# Patient Record
Sex: MALE | Race: OTHER | NOT HISPANIC OR LATINO
[De-identification: names, ages, dates, MRNs, and addresses within clinical notes are randomized per-mention and may not be internally consistent; named-entity substitution may affect disease eponyms.]

---

## 2017-05-18 ENCOUNTER — APPOINTMENT (OUTPATIENT)
Dept: ORTHOPEDIC SURGERY | Facility: CLINIC | Age: 22
End: 2017-05-18

## 2017-08-09 ENCOUNTER — APPOINTMENT (OUTPATIENT)
Age: 22
End: 2017-08-09
Payer: COMMERCIAL

## 2017-08-09 VITALS
SYSTOLIC BLOOD PRESSURE: 143 MMHG | BODY MASS INDEX: 36.1 KG/M2 | TEMPERATURE: 98.2 F | RESPIRATION RATE: 14 BRPM | HEIGHT: 67 IN | HEART RATE: 86 BPM | WEIGHT: 230 LBS | DIASTOLIC BLOOD PRESSURE: 85 MMHG

## 2017-08-09 DIAGNOSIS — Z87.2 PERSONAL HISTORY OF DISEASES OF THE SKIN AND SUBCUTANEOUS TISSUE: ICD-10-CM

## 2017-08-09 DIAGNOSIS — Z78.9 OTHER SPECIFIED HEALTH STATUS: ICD-10-CM

## 2017-08-09 DIAGNOSIS — Z83.3 FAMILY HISTORY OF DIABETES MELLITUS: ICD-10-CM

## 2017-08-09 PROCEDURE — 99243 OFF/OP CNSLTJ NEW/EST LOW 30: CPT

## 2017-08-11 ENCOUNTER — APPOINTMENT (OUTPATIENT)
Dept: RHEUMATOLOGY | Facility: CLINIC | Age: 22
End: 2017-08-11

## 2017-09-12 ENCOUNTER — APPOINTMENT (OUTPATIENT)
Dept: RHEUMATOLOGY | Facility: CLINIC | Age: 22
End: 2017-09-12
Payer: COMMERCIAL

## 2017-09-12 VITALS
OXYGEN SATURATION: 98 % | WEIGHT: 228 LBS | DIASTOLIC BLOOD PRESSURE: 98 MMHG | HEART RATE: 96 BPM | BODY MASS INDEX: 35.79 KG/M2 | TEMPERATURE: 98.1 F | SYSTOLIC BLOOD PRESSURE: 129 MMHG | HEIGHT: 67 IN

## 2017-09-12 PROCEDURE — 99205 OFFICE O/P NEW HI 60 MIN: CPT

## 2017-09-13 LAB
ALBUMIN SERPL ELPH-MCNC: 4.1 G/DL
ALP BLD-CCNC: 82 U/L
ALT SERPL-CCNC: 55 U/L
ANION GAP SERPL CALC-SCNC: 16 MMOL/L
AST SERPL-CCNC: 35 U/L
BASOPHILS # BLD AUTO: 0.03 K/UL
BASOPHILS NFR BLD AUTO: 0.4 %
BILIRUB SERPL-MCNC: 0.7 MG/DL
BUN SERPL-MCNC: 10 MG/DL
C3 SERPL-MCNC: 94 MG/DL
C4 SERPL-MCNC: 16 MG/DL
CALCIUM SERPL-MCNC: 9.3 MG/DL
CARDIOLIPIN AB SER IA-ACNC: NEGATIVE
CHLORIDE SERPL-SCNC: 101 MMOL/L
CK SERPL-CCNC: 62 U/L
CO2 SERPL-SCNC: 22 MMOL/L
CREAT SERPL-MCNC: 0.85 MG/DL
DSDNA AB SER-ACNC: 169 IU/ML
EOSINOPHIL # BLD AUTO: 0.1 K/UL
EOSINOPHIL NFR BLD AUTO: 1.2 %
ERYTHROCYTE [SEDIMENTATION RATE] IN BLOOD BY WESTERGREN METHOD: 34 MM/HR
ERYTHROCYTE [SEDIMENTATION RATE] IN BLOOD BY WESTERGREN METHOD: 37 MM/HR
GLUCOSE SERPL-MCNC: 90 MG/DL
HAV IGG+IGM SER QL: NONREACTIVE
HAV IGM SER QL: NONREACTIVE
HCT VFR BLD CALC: 43.1 %
HGB BLD-MCNC: 14.4 G/DL
HIV1+2 AB SPEC QL IA.RAPID: NONREACTIVE
IMM GRANULOCYTES NFR BLD AUTO: 0.7 %
LYMPHOCYTES # BLD AUTO: 3.84 K/UL
LYMPHOCYTES NFR BLD AUTO: 47.5 %
MAN DIFF?: NORMAL
MCHC RBC-ENTMCNC: 26.6 PG
MCHC RBC-ENTMCNC: 33.4 GM/DL
MCV RBC AUTO: 79.5 FL
MONOCYTES # BLD AUTO: 0.63 K/UL
MONOCYTES NFR BLD AUTO: 7.8 %
NEUTROPHILS # BLD AUTO: 3.43 K/UL
NEUTROPHILS NFR BLD AUTO: 42.4 %
PLATELET # BLD AUTO: 229 K/UL
POTASSIUM SERPL-SCNC: 4.4 MMOL/L
PROT SERPL-MCNC: 7.9 G/DL
RBC # BLD: 5.42 M/UL
RBC # FLD: 15.4 %
SODIUM SERPL-SCNC: 139 MMOL/L
WBC # FLD AUTO: 8.09 K/UL

## 2017-09-18 LAB
ANA PAT FLD IF-IMP: ABNORMAL
ANA SER IF-ACNC: ABNORMAL
B19V IGG SER QL IA: 0.4 INDEX
B19V IGG+IGM SER-IMP: NEGATIVE
B19V IGG+IGM SER-IMP: NORMAL
B19V IGM FLD-ACNC: 0.1 INDEX
B19V IGM SER-ACNC: NEGATIVE
B2 GLYCOPROT1 IGA SERPL IA-ACNC: <5 SAU
B2 GLYCOPROT1 IGG SER-ACNC: <5 SGU
B2 GLYCOPROT1 IGM SER-ACNC: 22.3 SMU
ENA RNP AB SER IA-ACNC: 0.8 AL
ENA SM AB SER IA-ACNC: <0.2 AL
ENA SS-A AB SER IA-ACNC: <0.2 AL
ENA SS-B AB SER IA-ACNC: <0.2 AL
HLA-B27 RELATED AG QL: NORMAL
MITOCHONDRIA AB SER IF-ACNC: NORMAL
RPR SER-TITR: NORMAL
SMOOTH MUSCLE AB SER QL IF: NORMAL

## 2017-09-21 ENCOUNTER — RESULT REVIEW (OUTPATIENT)
Age: 22
End: 2017-09-21

## 2017-11-06 ENCOUNTER — APPOINTMENT (OUTPATIENT)
Age: 22
End: 2017-11-06

## 2017-12-05 ENCOUNTER — APPOINTMENT (OUTPATIENT)
Dept: RHEUMATOLOGY | Facility: CLINIC | Age: 22
End: 2017-12-05
Payer: COMMERCIAL

## 2017-12-05 ENCOUNTER — MESSAGE (OUTPATIENT)
Age: 22
End: 2017-12-05

## 2017-12-05 VITALS
BODY MASS INDEX: 35.31 KG/M2 | HEIGHT: 67 IN | OXYGEN SATURATION: 98 % | HEART RATE: 80 BPM | DIASTOLIC BLOOD PRESSURE: 78 MMHG | WEIGHT: 225 LBS | SYSTOLIC BLOOD PRESSURE: 136 MMHG | TEMPERATURE: 98.4 F

## 2017-12-05 PROCEDURE — 99214 OFFICE O/P EST MOD 30 MIN: CPT

## 2017-12-06 LAB
ALBUMIN SERPL ELPH-MCNC: 3.8 G/DL
ALP BLD-CCNC: 70 U/L
ALT SERPL-CCNC: 47 U/L
ANION GAP SERPL CALC-SCNC: 16 MMOL/L
APPEARANCE: CLEAR
AST SERPL-CCNC: 27 U/L
B2 GLYCOPROT1 IGA SERPL IA-ACNC: 5.6 SAU
B2 GLYCOPROT1 IGG SER-ACNC: <5 SGU
B2 GLYCOPROT1 IGM SER-ACNC: 14.7 SMU
BASOPHILS # BLD AUTO: 0.02 K/UL
BASOPHILS NFR BLD AUTO: 0.3 %
BILIRUB SERPL-MCNC: 0.8 MG/DL
BILIRUBIN URINE: NEGATIVE
BLOOD URINE: NEGATIVE
BUN SERPL-MCNC: 11 MG/DL
C3 SERPL-MCNC: 72 MG/DL
C4 SERPL-MCNC: 13 MG/DL
CALCIUM SERPL-MCNC: 9.4 MG/DL
CHLORIDE SERPL-SCNC: 103 MMOL/L
CK SERPL-CCNC: 53 U/L
CO2 SERPL-SCNC: 23 MMOL/L
COLOR: YELLOW
CREAT SERPL-MCNC: 0.86 MG/DL
CREAT SPEC-SCNC: 144 MG/DL
CREAT/PROT UR: 0.1 RATIO
CRP SERPL-MCNC: 0.8 MG/DL
DSDNA AB SER-ACNC: 172 IU/ML
EOSINOPHIL # BLD AUTO: 0.09 K/UL
EOSINOPHIL NFR BLD AUTO: 1.3 %
ERYTHROCYTE [SEDIMENTATION RATE] IN BLOOD BY WESTERGREN METHOD: 41 MM/HR
GLUCOSE QUALITATIVE U: NEGATIVE MG/DL
HCT VFR BLD CALC: 42 %
HGB BLD-MCNC: 14 G/DL
IMM GRANULOCYTES NFR BLD AUTO: 0.4 %
KETONES URINE: NEGATIVE
LEUKOCYTE ESTERASE URINE: NEGATIVE
LYMPHOCYTES # BLD AUTO: 2.54 K/UL
LYMPHOCYTES NFR BLD AUTO: 35.5 %
MAN DIFF?: NORMAL
MCHC RBC-ENTMCNC: 26.5 PG
MCHC RBC-ENTMCNC: 33.3 GM/DL
MCV RBC AUTO: 79.4 FL
MONOCYTES # BLD AUTO: 0.48 K/UL
MONOCYTES NFR BLD AUTO: 6.7 %
NEUTROPHILS # BLD AUTO: 3.99 K/UL
NEUTROPHILS NFR BLD AUTO: 55.8 %
NITRITE URINE: NEGATIVE
PH URINE: 6.5
PLATELET # BLD AUTO: 206 K/UL
POTASSIUM SERPL-SCNC: 4.2 MMOL/L
PROT SERPL-MCNC: 7.4 G/DL
PROT UR-MCNC: 9 MG/DL
PROTEIN URINE: NEGATIVE MG/DL
RBC # BLD: 5.29 M/UL
RBC # FLD: 14.7 %
SODIUM SERPL-SCNC: 142 MMOL/L
SPECIFIC GRAVITY URINE: 1.02
UROBILINOGEN URINE: NEGATIVE MG/DL
WBC # FLD AUTO: 7.15 K/UL

## 2017-12-08 LAB
CARDIOLIPIN AB SER IA-ACNC: NEGATIVE
ENA SS-A AB SER IA-ACNC: <0.2 AL
ENA SS-B AB SER IA-ACNC: <0.2 AL
MITOCHONDRIA AB SER IF-ACNC: NORMAL
SMOOTH MUSCLE AB SER QL IF: NORMAL

## 2017-12-15 ENCOUNTER — RESULT REVIEW (OUTPATIENT)
Age: 22
End: 2017-12-15

## 2017-12-15 LAB
ENA RNP AB SER IA-ACNC: 2.4 AL
ENA SM AB SER IA-ACNC: <0.2 AL
GGT SERPL-CCNC: 97 U/L

## 2018-07-23 PROBLEM — Z78.9 ALCOHOL USE: Status: ACTIVE | Noted: 2017-08-09

## 2019-03-11 ENCOUNTER — APPOINTMENT (OUTPATIENT)
Dept: RHEUMATOLOGY | Facility: CLINIC | Age: 24
End: 2019-03-11
Payer: COMMERCIAL

## 2019-03-11 VITALS
TEMPERATURE: 98.1 F | BODY MASS INDEX: 36.88 KG/M2 | HEIGHT: 67 IN | OXYGEN SATURATION: 98 % | DIASTOLIC BLOOD PRESSURE: 85 MMHG | WEIGHT: 235 LBS | HEART RATE: 117 BPM | SYSTOLIC BLOOD PRESSURE: 141 MMHG

## 2019-03-11 PROCEDURE — 99214 OFFICE O/P EST MOD 30 MIN: CPT

## 2019-03-12 ENCOUNTER — CLINICAL ADVICE (OUTPATIENT)
Age: 24
End: 2019-03-12

## 2019-03-12 LAB
BASOPHILS # BLD AUTO: 0.04 K/UL
BASOPHILS NFR BLD AUTO: 0.5 %
C3 SERPL-MCNC: 58 MG/DL
C4 SERPL-MCNC: 8 MG/DL
EOSINOPHIL # BLD AUTO: 0.1 K/UL
EOSINOPHIL NFR BLD AUTO: 1.2 %
HCT VFR BLD CALC: 42.5 %
HGB BLD-MCNC: 13.9 G/DL
IMM GRANULOCYTES NFR BLD AUTO: 0.6 %
LYMPHOCYTES # BLD AUTO: 3.43 K/UL
LYMPHOCYTES NFR BLD AUTO: 40 %
MAN DIFF?: NORMAL
MCHC RBC-ENTMCNC: 26.6 PG
MCHC RBC-ENTMCNC: 32.7 GM/DL
MCV RBC AUTO: 81.4 FL
MONOCYTES # BLD AUTO: 0.48 K/UL
MONOCYTES NFR BLD AUTO: 5.6 %
NEUTROPHILS # BLD AUTO: 4.47 K/UL
NEUTROPHILS NFR BLD AUTO: 52.1 %
PLATELET # BLD AUTO: 221 K/UL
RBC # BLD: 5.22 M/UL
RBC # FLD: 14.2 %
TSH SERPL-ACNC: 1.69 UIU/ML
WBC # FLD AUTO: 8.57 K/UL

## 2019-03-13 ENCOUNTER — MESSAGE (OUTPATIENT)
Age: 24
End: 2019-03-13

## 2019-03-13 ENCOUNTER — OUTPATIENT (OUTPATIENT)
Dept: OUTPATIENT SERVICES | Facility: HOSPITAL | Age: 24
LOS: 1 days | End: 2019-03-13
Payer: COMMERCIAL

## 2019-03-13 ENCOUNTER — APPOINTMENT (OUTPATIENT)
Dept: MRI IMAGING | Facility: CLINIC | Age: 24
End: 2019-03-13
Payer: COMMERCIAL

## 2019-03-13 DIAGNOSIS — Z00.8 ENCOUNTER FOR OTHER GENERAL EXAMINATION: ICD-10-CM

## 2019-03-13 LAB
ALBUMIN SERPL ELPH-MCNC: 3.9 G/DL
ALDOLASE SERPL-CCNC: 15.8 U/L
ALP BLD-CCNC: 73 U/L
ALT SERPL-CCNC: 50 U/L
ANION GAP SERPL CALC-SCNC: 13 MMOL/L
APPEARANCE: CLEAR
AST SERPL-CCNC: 49 U/L
B2 GLYCOPROT1 IGA SERPL IA-ACNC: 5 SAU
B2 GLYCOPROT1 IGG SER-ACNC: <5 SGU
B2 GLYCOPROT1 IGM SER-ACNC: <5 SMU
BACTERIA: NEGATIVE
BILIRUB SERPL-MCNC: 0.4 MG/DL
BILIRUBIN URINE: NEGATIVE
BLOOD URINE: ABNORMAL
BUN SERPL-MCNC: 20 MG/DL
CALCIUM SERPL-MCNC: 9.5 MG/DL
CARDIOLIPIN AB SER IA-ACNC: POSITIVE
CARDIOLIPIN IGM SER-MCNC: 12.9 GPL
CARDIOLIPIN IGM SER-MCNC: 8.2 MPL
CHLORIDE SERPL-SCNC: 106 MMOL/L
CK SERPL-CCNC: 900 U/L
CO2 SERPL-SCNC: 22 MMOL/L
COLOR: YELLOW
CREAT SERPL-MCNC: 0.98 MG/DL
CREAT SPEC-SCNC: 182 MG/DL
CREAT/PROT UR: 2.1 RATIO
CRP SERPL-MCNC: 1.82 MG/DL
DSDNA AB SER-ACNC: 607 IU/ML
ENA RNP AB SER IA-ACNC: 5.2 AL
ENA SM AB SER IA-ACNC: <0.2 AL
ENA SS-A AB SER IA-ACNC: <0.2 AL
ENA SS-B AB SER IA-ACNC: <0.2 AL
ERYTHROCYTE [SEDIMENTATION RATE] IN BLOOD BY WESTERGREN METHOD: 84 MM/HR
GGT SERPL-CCNC: 84 U/L
GLUCOSE QUALITATIVE U: NEGATIVE
HYALINE CASTS: 4 /LPF
KETONES URINE: NEGATIVE
LEUKOCYTE ESTERASE URINE: NEGATIVE
MICROSCOPIC-UA: NORMAL
NITRITE URINE: NEGATIVE
PH URINE: 6.5
POTASSIUM SERPL-SCNC: 4.7 MMOL/L
PROT SERPL-MCNC: 7.6 G/DL
PROT UR-MCNC: 374 MG/DL
PROTEIN URINE: ABNORMAL
RED BLOOD CELLS URINE: 3 /HPF
RIBOSOMAL P AB SER IA-ACNC: <0.2 AL
SODIUM SERPL-SCNC: 140 MMOL/L
SPECIFIC GRAVITY URINE: 1.03
SQUAMOUS EPITHELIAL CELLS: 3 /HPF
THYROGLOB AB SERPL-ACNC: <20 IU/ML
THYROPEROXIDASE AB SERPL IA-ACNC: <10 IU/ML
UROBILINOGEN URINE: NORMAL
WHITE BLOOD CELLS URINE: 3 /HPF

## 2019-03-13 PROCEDURE — 70551 MRI BRAIN STEM W/O DYE: CPT | Mod: 26

## 2019-03-13 PROCEDURE — 70551 MRI BRAIN STEM W/O DYE: CPT

## 2019-03-14 ENCOUNTER — APPOINTMENT (OUTPATIENT)
Dept: NEPHROLOGY | Facility: CLINIC | Age: 24
End: 2019-03-14
Payer: COMMERCIAL

## 2019-03-14 ENCOUNTER — FORM ENCOUNTER (OUTPATIENT)
Age: 24
End: 2019-03-14

## 2019-03-14 VITALS
DIASTOLIC BLOOD PRESSURE: 88 MMHG | SYSTOLIC BLOOD PRESSURE: 143 MMHG | HEART RATE: 108 BPM | WEIGHT: 230 LBS | OXYGEN SATURATION: 98 % | BODY MASS INDEX: 36.1 KG/M2 | HEIGHT: 67 IN

## 2019-03-14 LAB
ANA PAT FLD IF-IMP: ABNORMAL
ANA SER IF-ACNC: ABNORMAL
APPEARANCE: CLEAR
APTT BLD: 30.7 SEC
BACTERIA: NEGATIVE
BILIRUBIN URINE: NEGATIVE
BLOOD URINE: ABNORMAL
COLOR: YELLOW
CREAT SPEC-SCNC: 231 MG/DL
CREAT/PROT UR: 1.3 RATIO
GLUCOSE QUALITATIVE U: NEGATIVE
HYALINE CASTS: 14 /LPF
INR PPP: 0.98 RATIO
KETONES URINE: NEGATIVE
LEUKOCYTE ESTERASE URINE: NEGATIVE
MICROSCOPIC-UA: NORMAL
NITRITE URINE: NEGATIVE
PH URINE: 6.5
PROT UR-MCNC: 294 MG/DL
PROTEIN URINE: ABNORMAL
PT BLD: 11.2 SEC
RED BLOOD CELLS URINE: 15 /HPF
SPECIFIC GRAVITY URINE: 1.03
SQUAMOUS EPITHELIAL CELLS: 2 /HPF
UROBILINOGEN URINE: NORMAL
WHITE BLOOD CELLS URINE: 4 /HPF

## 2019-03-14 PROCEDURE — 99244 OFF/OP CNSLTJ NEW/EST MOD 40: CPT

## 2019-03-14 NOTE — PHYSICAL EXAM
[General Appearance - Alert] : alert [General Appearance - In No Acute Distress] : in no acute distress [Outer Ear] : the ears and nose were normal in appearance [Oropharynx] : the oropharynx was normal [Neck Appearance] : the appearance of the neck was normal [Neck Cervical Mass (___cm)] : no neck mass was observed [Jugular Venous Distention Increased] : there was no jugular-venous distention [Thyroid Diffuse Enlargement] : the thyroid was not enlarged [Thyroid Nodule] : there were no palpable thyroid nodules [Auscultation Breath Sounds / Voice Sounds] : lungs were clear to auscultation bilaterally [Heart Rate And Rhythm] : heart rate was normal and rhythm regular [Heart Sounds] : normal S1 and S2 [Heart Sounds Gallop] : no gallops [Murmurs] : no murmurs [Heart Sounds Pericardial Friction Rub] : no pericardial rub [Edema] : there was no peripheral edema [Bowel Sounds] : normal bowel sounds [Abdomen Soft] : soft [Abdomen Tenderness] : non-tender [] : no hepato-splenomegaly [Abdomen Mass (___ Cm)] : no abdominal mass palpated [No CVA Tenderness] : no ~M costovertebral angle tenderness [Abnormal Walk] : normal gait [Nail Clubbing] : no clubbing  or cyanosis of the fingernails [Musculoskeletal - Swelling] : no joint swelling seen [Motor Tone] : muscle strength and tone were normal [Cranial Nerves] : cranial nerves 2-12 were intact [Oriented To Time, Place, And Person] : oriented to person, place, and time

## 2019-03-14 NOTE — ASSESSMENT
[FreeTextEntry1] : Mr. SCRUGGS is a 23 year old male with hx of SLE here with recently noted proteinuria, non nephrotic with hematuria and hypocomplementemia. 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 No JOYCE at this point. NSAID induced injury less likely as were stopped 1 month ago. Pt has agreed for kidney biopsy\par \par Plan for kidney bx tomorrow( 1:30pm radiology), coags ordered.\par He could have a slight elevation in PTT given he has APLAS positive but will likely be lupus anticaog related.\par Risk/benefit of kidney bx explained\par Pt to start prednisone 60mg and ACEI tomorrow as well\par Might require MMF vs Rituxan post kidney bx findings.\par Pt also going to [place] for school, gave him name of a nephrologist in [place] that can follow in case needs closer follow up there and bx cannot happen tomorrow- Dr Michoacano Bello\par \par d/w with mother and sister in detail\par \par fu in 2 months

## 2019-03-14 NOTE — REASON FOR VISIT
[Initial Evaluation] : an initial evaluation [Family Member] : family member [FreeTextEntry1] : for proteinrua

## 2019-03-14 NOTE — HISTORY OF PRESENT ILLNESS
[FreeTextEntry1] : Mr. SCRUGGS is a 23 year old male with known SLE here for recently noted proteinuria. He was dx with SLE 2 years ago with symptoms of jt pains and was prescribed plaquenil and steroids, at that time he was ELMO, dsDNA and RNP positive, His urinalysis at that time was neg. He didn't take his prescribed meds and just worked on getting his diet and exercise regimen. He did take NSAIDS on and off for years for the pain. He stopped taking NSAIDS last 1 month. Last few weeks,noted foamy urine and more fatigue. Labs revealed ELMO rising, dsDNA worse, lower c3 and c4 and now 2.1gm of proteinuria along with hematuria and RBCS in the urine. He has normal crt for now at 0.9mg/dl. He feels no other symptoms. He is currently not on any meds. Recently also had Headaches that are worse and he saw Neurology last week. He had a MRI that has results pending. No OTC use. 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

## 2019-03-15 ENCOUNTER — OUTPATIENT (OUTPATIENT)
Dept: OUTPATIENT SERVICES | Facility: HOSPITAL | Age: 24
LOS: 1 days | End: 2019-03-15
Payer: COMMERCIAL

## 2019-03-15 ENCOUNTER — APPOINTMENT (OUTPATIENT)
Dept: ULTRASOUND IMAGING | Facility: HOSPITAL | Age: 24
End: 2019-03-15

## 2019-03-15 ENCOUNTER — RESULT REVIEW (OUTPATIENT)
Age: 24
End: 2019-03-15

## 2019-03-15 ENCOUNTER — APPOINTMENT (OUTPATIENT)
Dept: RHEUMATOLOGY | Facility: CLINIC | Age: 24
End: 2019-03-15
Payer: COMMERCIAL

## 2019-03-15 VITALS
WEIGHT: 230 LBS | SYSTOLIC BLOOD PRESSURE: 141 MMHG | HEIGHT: 67 IN | OXYGEN SATURATION: 99 % | RESPIRATION RATE: 14 BRPM | DIASTOLIC BLOOD PRESSURE: 98 MMHG | HEART RATE: 132 BPM | BODY MASS INDEX: 36.1 KG/M2

## 2019-03-15 DIAGNOSIS — R76.8 OTHER SPECIFIED ABNORMAL IMMUNOLOGICAL FINDINGS IN SERUM: ICD-10-CM

## 2019-03-15 PROCEDURE — 88312 SPECIAL STAINS GROUP 1: CPT | Mod: 26

## 2019-03-15 PROCEDURE — 76942 ECHO GUIDE FOR BIOPSY: CPT

## 2019-03-15 PROCEDURE — 50200 RENAL BIOPSY PERQ: CPT

## 2019-03-15 PROCEDURE — 88305 TISSUE EXAM BY PATHOLOGIST: CPT | Mod: 26

## 2019-03-15 PROCEDURE — 88312 SPECIAL STAINS GROUP 1: CPT

## 2019-03-15 PROCEDURE — 88305 TISSUE EXAM BY PATHOLOGIST: CPT

## 2019-03-15 PROCEDURE — 88313 SPECIAL STAINS GROUP 2: CPT

## 2019-03-15 PROCEDURE — 88350 IMFLUOR EA ADDL 1ANTB STN PX: CPT | Mod: 26

## 2019-03-15 PROCEDURE — 88313 SPECIAL STAINS GROUP 2: CPT | Mod: 26

## 2019-03-15 PROCEDURE — 88346 IMFLUOR 1ST 1ANTB STAIN PX: CPT | Mod: 26

## 2019-03-15 PROCEDURE — 88348 ELECTRON MICROSCOPY DX: CPT

## 2019-03-15 PROCEDURE — 99215 OFFICE O/P EST HI 40 MIN: CPT

## 2019-03-15 PROCEDURE — 88348 ELECTRON MICROSCOPY DX: CPT | Mod: 26

## 2019-03-15 PROCEDURE — 88346 IMFLUOR 1ST 1ANTB STAIN PX: CPT

## 2019-03-15 PROCEDURE — 76942 ECHO GUIDE FOR BIOPSY: CPT | Mod: 26

## 2019-03-15 PROCEDURE — 88350 IMFLUOR EA ADDL 1ANTB STN PX: CPT

## 2019-03-15 PROCEDURE — 50200 RENAL BIOPSY PERQ: CPT | Mod: LT

## 2019-03-15 NOTE — DATA REVIEWED
[FreeTextEntry1] : 2017\par ELMO 1:640\par dsDNA 179\par SSA, SSB neg\par Sm neg\par RNP POS\par HLA B27 neg\par smooth and caty neg\par c3 72 with normal c4\par CPK 53 and 62\par \par 2019\par Protein: creat ratio 1.7 - 2.1\par SGOT 49, SGPT 50, GGTP 84\par  Aldolase 15.8\par CRP 1.82\par C3 58\par C4 8\par ELMO, 1:2560, dsDNA 607\par RNP +\par Sm, SSA, SSB, Ribosomal P neg\par beta 2 GP1 negative\par acl IgG12.9

## 2019-03-15 NOTE — HISTORY OF PRESENT ILLNESS
[Currently Experiencing] : currently experiencing [Headache] : headache [Arthritis] : arthritis [Arthralgias] : arthralgias [None] : The patient is currently asymptomatic [Sicca] : no sicca [Rash] : no rash [Chest Pain] : no chest pain [Shortness of Breath] : no shortness of breath [Psychological Symptoms] : no psychological symptoms [Sun Sensitivity] : no sun sensitivity [FreeTextEntry1] : Active proteinuria - \par Didnt start the steroids yet - biopsy this afternoon\par has questions regarding chemotherapy\par denies edema or sob\par \par Headaches \par feeling overall better than monday in terms of headaches.   Hasnt had a headache since restarting herbal medications, teas and tumeric. \par \par Myopathy -  Does endorse overall muscle weakness - notably in the hands and when lifting weights in the gym.  Hasnt been able to lift as much - unsure of how long that is occurring for.  denies neck weakness \par \par  [FreeTextEntry7] : +ELMO, +dsDNA

## 2019-03-15 NOTE — REVIEW OF SYSTEMS
[As Noted in HPI] : as noted in HPI [Negative] : Genitourinary [FreeTextEntry4] : no oral ulcers [FreeTextEntry8] : denies foamy urine [FreeTextEntry9] : no recurrent swelling [de-identified] : denines photosensitivity /rash

## 2019-03-15 NOTE — ASSESSMENT
[FreeTextEntry1] : 21 yo man SLE (2017) now with non-nephrotic proteinuria, headaches, myopathy and serologic activity \par \par # SLE - nephritis - active\par -Having kidney biopsy this afternoon\par -will be starting the steroid after bx\par -lisinopril also to start after bx per renal\par -d/w patient need for medication such as cellcept or rtx - r/b/a of taking and not taking discussed at length.   Discussed and strategize how to optimize treatment and communication while in [place] at school\par -will be coming home once a month to follow with renal and rheum\par -can have b/w interval at [place]\par -will be seeing renal in [place] \par \par #migraines\par -worsening late likely due to lupus\par -doing well now \par \par #myositis\par -lupus myopathy with increased cpk, aldolase and subjective weakness\par -given RNP serology - will continue to monitor for evolution to MCTD - \par -no pulmonary symptoms at present.\par \par #CAM\par -d/w patient at length concerns regarding meds and desire for natural remedies - such as tumeric\par -will continue to utilize in context of rx\par all questions answered\par

## 2019-03-15 NOTE — PHYSICAL EXAM
[General Appearance - Alert] : alert [General Appearance - In No Acute Distress] : in no acute distress [Sclera] : the sclera and conjunctiva were normal [PERRL With Normal Accommodation] : pupils were equal in size, round, and reactive to light [Extraocular Movements] : extraocular movements were intact [Outer Ear] : the ears and nose were normal in appearance [Oropharynx] : the oropharynx was normal [Neck Appearance] : the appearance of the neck was normal [Neck Cervical Mass (___cm)] : no neck mass was observed [Jugular Venous Distention Increased] : there was no jugular-venous distention [Auscultation Breath Sounds / Voice Sounds] : lungs were clear to auscultation bilaterally [Heart Rate And Rhythm] : heart rate was normal and rhythm regular [Heart Sounds] : normal S1 and S2 [Heart Sounds Gallop] : no gallops [Murmurs] : no murmurs [Heart Sounds Pericardial Friction Rub] : no pericardial rub [Edema] : there was no peripheral edema [Cervical Lymph Nodes Enlarged Posterior Bilaterally] : posterior cervical [Cervical Lymph Nodes Enlarged Anterior Bilaterally] : anterior cervical [Supraclavicular Lymph Nodes Enlarged Bilaterally] : supraclavicular [No CVA Tenderness] : no ~M costovertebral angle tenderness [No Spinal Tenderness] : no spinal tenderness [Abnormal Walk] : normal gait [Nail Clubbing] : no clubbing  or cyanosis of the fingernails [Musculoskeletal - Swelling] : no joint swelling seen [Motor Tone] : muscle strength and tone were normal [Skin Color & Pigmentation] : normal skin color and pigmentation [Skin Turgor] : normal skin turgor [Motor Exam] : the motor exam was normal [No Focal Deficits] : no focal deficits [Oriented To Time, Place, And Person] : oriented to person, place, and time [Impaired Insight] : insight and judgment were intact [Affect] : the affect was normal [Mood] : the mood was normal [Memory Recent] : recent memory was not impaired [General Appearance - Well Nourished] : well nourished [General Appearance - Well Developed] : well developed [General Appearance - Well-Appearing] : healthy appearing [] : normal voice and communication [FreeTextEntry1] : MMM, no oral ulcers

## 2019-03-16 ENCOUNTER — TRANSCRIPTION ENCOUNTER (OUTPATIENT)
Age: 24
End: 2019-03-16

## 2019-03-19 LAB — SURGICAL PATHOLOGY STUDY: SIGNIFICANT CHANGE UP

## 2019-03-20 ENCOUNTER — CXD DOCUMENT (OUTPATIENT)
Age: 24
End: 2019-03-20

## 2019-04-11 ENCOUNTER — APPOINTMENT (OUTPATIENT)
Dept: RHEUMATOLOGY | Facility: CLINIC | Age: 24
End: 2019-04-11
Payer: COMMERCIAL

## 2019-04-11 ENCOUNTER — APPOINTMENT (OUTPATIENT)
Dept: RADIOLOGY | Facility: CLINIC | Age: 24
End: 2019-04-11
Payer: COMMERCIAL

## 2019-04-11 ENCOUNTER — OUTPATIENT (OUTPATIENT)
Dept: OUTPATIENT SERVICES | Facility: HOSPITAL | Age: 24
LOS: 1 days | End: 2019-04-11
Payer: COMMERCIAL

## 2019-04-11 ENCOUNTER — APPOINTMENT (OUTPATIENT)
Dept: ULTRASOUND IMAGING | Facility: CLINIC | Age: 24
End: 2019-04-11
Payer: COMMERCIAL

## 2019-04-11 VITALS
OXYGEN SATURATION: 98 % | BODY MASS INDEX: 36.1 KG/M2 | SYSTOLIC BLOOD PRESSURE: 117 MMHG | HEART RATE: 105 BPM | WEIGHT: 230 LBS | TEMPERATURE: 98.2 F | HEIGHT: 67 IN | DIASTOLIC BLOOD PRESSURE: 77 MMHG

## 2019-04-11 DIAGNOSIS — Z00.8 ENCOUNTER FOR OTHER GENERAL EXAMINATION: ICD-10-CM

## 2019-04-11 PROCEDURE — 76700 US EXAM ABDOM COMPLETE: CPT | Mod: 26

## 2019-04-11 PROCEDURE — 76700 US EXAM ABDOM COMPLETE: CPT

## 2019-04-11 PROCEDURE — 73630 X-RAY EXAM OF FOOT: CPT

## 2019-04-11 PROCEDURE — 99214 OFFICE O/P EST MOD 30 MIN: CPT

## 2019-04-11 PROCEDURE — 73630 X-RAY EXAM OF FOOT: CPT | Mod: 26,LT

## 2019-04-11 NOTE — PHYSICAL EXAM
[General Appearance - Alert] : alert [General Appearance - In No Acute Distress] : in no acute distress [General Appearance - Well Nourished] : well nourished [General Appearance - Well Developed] : well developed [General Appearance - Well-Appearing] : healthy appearing [Sclera] : the sclera and conjunctiva were normal [Outer Ear] : the ears and nose were normal in appearance [PERRL With Normal Accommodation] : pupils were equal in size, round, and reactive to light [Extraocular Movements] : extraocular movements were intact [Oropharynx] : the oropharynx was normal [Neck Appearance] : the appearance of the neck was normal [Neck Cervical Mass (___cm)] : no neck mass was observed [Jugular Venous Distention Increased] : there was no jugular-venous distention [Heart Rate And Rhythm] : heart rate was normal and rhythm regular [Auscultation Breath Sounds / Voice Sounds] : lungs were clear to auscultation bilaterally [Heart Sounds] : normal S1 and S2 [Heart Sounds Gallop] : no gallops [Murmurs] : no murmurs [Heart Sounds Pericardial Friction Rub] : no pericardial rub [Edema] : there was no peripheral edema [Cervical Lymph Nodes Enlarged Posterior Bilaterally] : posterior cervical [Supraclavicular Lymph Nodes Enlarged Bilaterally] : supraclavicular [Cervical Lymph Nodes Enlarged Anterior Bilaterally] : anterior cervical [No CVA Tenderness] : no ~M costovertebral angle tenderness [No Spinal Tenderness] : no spinal tenderness [Abnormal Walk] : normal gait [Nail Clubbing] : no clubbing  or cyanosis of the fingernails [Musculoskeletal - Swelling] : no joint swelling seen [Motor Tone] : muscle strength and tone were normal [Skin Color & Pigmentation] : normal skin color and pigmentation [Skin Turgor] : normal skin turgor [] : no rash [Motor Exam] : the motor exam was normal [No Focal Deficits] : no focal deficits [Oriented To Time, Place, And Person] : oriented to person, place, and time [Impaired Insight] : insight and judgment were intact [Affect] : the affect was normal [Mood] : the mood was normal [Memory Recent] : recent memory was not impaired [FreeTextEntry1] : MS 5/5 bilaterally

## 2019-04-11 NOTE — CONSULT LETTER
[Dear  ___] : Dear  [unfilled], [Courtesy Letter:] : I had the pleasure of seeing your patient, [unfilled], in my office today. [Please see my note below.] : Please see my note below. [Referral Closing:] : Thank you very much for seeing this patient.  If you have any questions, please do not hesitate to contact me. [FreeTextEntry2] : RENAL IN [place] - [facility] - [phone]

## 2019-04-11 NOTE — PROCEDURE
[FreeTextEntry1] : Kidney biopsy \par lupus nephritis class 3 focal proliferative type\par -no cellular crescents\par -activity score 1/24 and chronicicty score 1/12\par - IF with igg, iga, c3, c1q

## 2019-04-11 NOTE — PHYSICAL EXAM
[General Appearance - Alert] : alert [General Appearance - In No Acute Distress] : in no acute distress [General Appearance - Well Nourished] : well nourished [General Appearance - Well-Appearing] : healthy appearing [General Appearance - Well Developed] : well developed [Sclera] : the sclera and conjunctiva were normal [Outer Ear] : the ears and nose were normal in appearance [Extraocular Movements] : extraocular movements were intact [PERRL With Normal Accommodation] : pupils were equal in size, round, and reactive to light [Oropharynx] : the oropharynx was normal [Neck Appearance] : the appearance of the neck was normal [Neck Cervical Mass (___cm)] : no neck mass was observed [Jugular Venous Distention Increased] : there was no jugular-venous distention [Auscultation Breath Sounds / Voice Sounds] : lungs were clear to auscultation bilaterally [Heart Rate And Rhythm] : heart rate was normal and rhythm regular [Heart Sounds] : normal S1 and S2 [Heart Sounds Gallop] : no gallops [Murmurs] : no murmurs [Heart Sounds Pericardial Friction Rub] : no pericardial rub [Edema] : there was no peripheral edema [Cervical Lymph Nodes Enlarged Posterior Bilaterally] : posterior cervical [Supraclavicular Lymph Nodes Enlarged Bilaterally] : supraclavicular [Cervical Lymph Nodes Enlarged Anterior Bilaterally] : anterior cervical [No CVA Tenderness] : no ~M costovertebral angle tenderness [No Spinal Tenderness] : no spinal tenderness [Abnormal Walk] : normal gait [Nail Clubbing] : no clubbing  or cyanosis of the fingernails [Musculoskeletal - Swelling] : no joint swelling seen [Motor Tone] : muscle strength and tone were normal [Skin Turgor] : normal skin turgor [Skin Color & Pigmentation] : normal skin color and pigmentation [] : no rash [Motor Exam] : the motor exam was normal [No Focal Deficits] : no focal deficits [Oriented To Time, Place, And Person] : oriented to person, place, and time [Impaired Insight] : insight and judgment were intact [Mood] : the mood was normal [Affect] : the affect was normal [Memory Recent] : recent memory was not impaired [FreeTextEntry1] : MS 5/5 bilaterally

## 2019-04-11 NOTE — REVIEW OF SYSTEMS
[As Noted in HPI] : as noted in HPI [Negative] : Heme/Lymph [FreeTextEntry8] : denies foamy urine [FreeTextEntry4] : no oral ulcers [FreeTextEntry9] : no recurrent swelling [de-identified] : denines photosensitivity /rash

## 2019-04-11 NOTE — REVIEW OF SYSTEMS
[As Noted in HPI] : as noted in HPI [Negative] : Heme/Lymph [FreeTextEntry4] : no oral ulcers [FreeTextEntry8] : denies foamy urine [de-identified] : denines photosensitivity /rash [FreeTextEntry9] : no recurrent swelling

## 2019-04-11 NOTE — ASSESSMENT
[FreeTextEntry1] : 21 yo man SLE (2017) now with non-nephrotic proteinuria, headaches, myopathy and serologic activity s/p kidney biopsy with Class 3 nephritis low chronicity\par \par # SLE - nephritis -\par -improving\par -continue CellCept - d/w patient possible need to increase CellCept - understands and will be d//w nephrology at upcoming appointment\par -discussed Plaquenil - patient considering at this time\par - wean steroids as tolerated\par - will send all labs to renal in [place]\par -will be starting the steroid after bx\par -lisinopril also to start after bx per renal\par -d/w patient need for medication such as cellcept or rtx - r/b/a of taking and not taking discussed at length. \par \par RENAL IN [place] - [facility] - [phone]\par \par #migraines\par -worsening late likely due to lupus\par -doing well now \par \par #myositis and muscle cramps - weakness improved now with intermittent muscle cramps - \par -check electrolytes and vit d given steroids\par --lupus myopathy with increased cpk, aldolase and subjective weakness\par -given RNP serology - will continue to monitor for evolution to MCTD - \par -no pulmonary symptoms at present.\par \par #CAM\par -d/w patient at length concerns regarding meds and desire for natural remedies - such as tumeric\par -will continue to utilize in context of rx\par \par #Social context\par -d/w patient plans to continue communication and followup while at college in [place]\par -will use [facility] and correspond to renal \par -verified phone\par all questions answered\par

## 2019-04-11 NOTE — CONSULT LETTER
[Dear  ___] : Dear  [unfilled], [Courtesy Letter:] : I had the pleasure of seeing your patient, [unfilled], in my office today. [Please see my note below.] : Please see my note below. [Referral Closing:] : Thank you very much for seeing this patient.  If you have any questions, please do not hesitate to contact me. [FreeTextEntry2] : RENAL IN [place] - [facility] - [phone]

## 2019-04-11 NOTE — ASSESSMENT
[FreeTextEntry1] : 23 yo man SLE (2017) now with non-nephrotic proteinuria, headaches, myopathy and serologic activity s/p kidney biopsy with Class 3 nephritis low chronicity\par \par # SLE - nephritis -\par -improving\par -continue CellCept - d/w patient possible need to increase CellCept - understands and will be d//w nephrology at upcoming appointment\par -discussed Plaquenil - patient considering at this time\par - wean steroids as tolerated\par - will send all labs to renal in [place]\par -will be starting the steroid after bx\par -lisinopril also to start after bx per renal\par -d/w patient need for medication such as cellcept or rtx - r/b/a of taking and not taking discussed at length. \par \par RENAL IN [place] - [facility] - [phone]\par \par #migraines\par -worsening late likely due to lupus\par -doing well now \par \par #myositis and muscle cramps - weakness improved now with intermittent muscle cramps - \par -check electrolytes and vit d given steroids\par --lupus myopathy with increased cpk, aldolase and subjective weakness\par -given RNP serology - will continue to monitor for evolution to MCTD - \par -no pulmonary symptoms at present.\par \par #CAM\par -d/w patient at length concerns regarding meds and desire for natural remedies - such as tumeric\par -will continue to utilize in context of rx\par \par #Social context\par -d/w patient plans to continue communication and followup while at college in [place]\par -will use [facility] and correspond to renal \par -verified phone\par all questions answered\par

## 2019-04-12 ENCOUNTER — CLINICAL ADVICE (OUTPATIENT)
Age: 24
End: 2019-04-12

## 2019-04-12 LAB
25(OH)D3 SERPL-MCNC: 24.3 NG/ML
ALBUMIN SERPL ELPH-MCNC: 3.7 G/DL
ALP BLD-CCNC: 52 U/L
ALT SERPL-CCNC: 43 U/L
ANION GAP SERPL CALC-SCNC: 13 MMOL/L
APPEARANCE: CLEAR
AST SERPL-CCNC: 17 U/L
BACTERIA: NEGATIVE
BASOPHILS # BLD AUTO: 0.05 K/UL
BASOPHILS NFR BLD AUTO: 0.4 %
BILIRUB SERPL-MCNC: 0.3 MG/DL
BILIRUBIN URINE: NEGATIVE
BLOOD URINE: ABNORMAL
BUN SERPL-MCNC: 21 MG/DL
C3 SERPL-MCNC: 77 MG/DL
C4 SERPL-MCNC: 15 MG/DL
CALCIUM OXALATE CRYSTALS: ABNORMAL
CALCIUM SERPL-MCNC: 9.1 MG/DL
CHLORIDE SERPL-SCNC: 102 MMOL/L
CK SERPL-CCNC: 49 U/L
CO2 SERPL-SCNC: 22 MMOL/L
COLOR: NORMAL
CREAT SERPL-MCNC: 0.83 MG/DL
CREAT SPEC-SCNC: 117 MG/DL
CREAT/PROT UR: 0.6 RATIO
CRP SERPL-MCNC: 0.86 MG/DL
EOSINOPHIL # BLD AUTO: 0.08 K/UL
EOSINOPHIL NFR BLD AUTO: 0.6 %
ERYTHROCYTE [SEDIMENTATION RATE] IN BLOOD BY WESTERGREN METHOD: 48 MM/HR
GGT SERPL-CCNC: 124 U/L
GLUCOSE QUALITATIVE U: NEGATIVE
HCT VFR BLD CALC: 40.9 %
HGB BLD-MCNC: 13.2 G/DL
HYALINE CASTS: 1 /LPF
IMM GRANULOCYTES NFR BLD AUTO: 2.1 %
KETONES URINE: NEGATIVE
LEUKOCYTE ESTERASE URINE: NEGATIVE
LYMPHOCYTES # BLD AUTO: 3.71 K/UL
LYMPHOCYTES NFR BLD AUTO: 27.2 %
MAGNESIUM SERPL-MCNC: 1.9 MG/DL
MAN DIFF?: NORMAL
MCHC RBC-ENTMCNC: 27.2 PG
MCHC RBC-ENTMCNC: 32.3 GM/DL
MCV RBC AUTO: 84.2 FL
MICROSCOPIC-UA: NORMAL
MONOCYTES # BLD AUTO: 1.02 K/UL
MONOCYTES NFR BLD AUTO: 7.5 %
NEUTROPHILS # BLD AUTO: 8.48 K/UL
NEUTROPHILS NFR BLD AUTO: 62.2 %
NITRITE URINE: NEGATIVE
PH URINE: 6.5
PLATELET # BLD AUTO: 207 K/UL
POTASSIUM SERPL-SCNC: 4.2 MMOL/L
PROT SERPL-MCNC: 6.3 G/DL
PROT UR-MCNC: 70 MG/DL
PROTEIN URINE: ABNORMAL
RBC # BLD: 4.86 M/UL
RBC # FLD: 15.2 %
RED BLOOD CELLS URINE: 7 /HPF
SODIUM SERPL-SCNC: 137 MMOL/L
SPECIFIC GRAVITY URINE: 1.03
SQUAMOUS EPITHELIAL CELLS: 0 /HPF
UROBILINOGEN URINE: NORMAL
WBC # FLD AUTO: 13.63 K/UL
WHITE BLOOD CELLS URINE: 2 /HPF

## 2019-04-12 NOTE — HISTORY OF PRESENT ILLNESS
[Currently Experiencing] : currently experiencing [Headache] : headache [Arthritis] : arthritis [Arthralgias] : arthralgias [None] : The patient is currently asymptomatic [Sicca] : no sicca [Shortness of Breath] : no shortness of breath [Chest Pain] : no chest pain [Rash] : no rash [Sun Sensitivity] : no sun sensitivity [Psychological Symptoms] : no psychological symptoms [FreeTextEntry7] : +ELMO, +dsDNA [FreeTextEntry1] : opened in duplicate

## 2019-04-12 NOTE — HISTORY OF PRESENT ILLNESS
[Currently Experiencing] : currently experiencing [Headache] : headache [Arthritis] : arthritis [Arthralgias] : arthralgias [None] : The patient is currently asymptomatic [Sicca] : no sicca [Shortness of Breath] : no shortness of breath [Rash] : no rash [Chest Pain] : no chest pain [Psychological Symptoms] : no psychological symptoms [Sun Sensitivity] : no sun sensitivity [FreeTextEntry7] : +ELMO, +dsDNA [FreeTextEntry1] : opened in duplicate

## 2019-04-13 LAB
ALDOLASE SERPL-CCNC: 5.9 U/L
DSDNA AB SER-ACNC: 157 IU/ML

## 2019-04-15 ENCOUNTER — APPOINTMENT (OUTPATIENT)
Dept: RHEUMATOLOGY | Facility: CLINIC | Age: 24
End: 2019-04-15

## 2019-07-15 ENCOUNTER — APPOINTMENT (OUTPATIENT)
Dept: RHEUMATOLOGY | Facility: CLINIC | Age: 24
End: 2019-07-15

## 2019-07-30 ENCOUNTER — APPOINTMENT (OUTPATIENT)
Dept: RHEUMATOLOGY | Facility: CLINIC | Age: 24
End: 2019-07-30
Payer: MEDICAID

## 2019-07-30 VITALS
WEIGHT: 235 LBS | OXYGEN SATURATION: 99 % | SYSTOLIC BLOOD PRESSURE: 135 MMHG | BODY MASS INDEX: 36.88 KG/M2 | HEIGHT: 67 IN | HEART RATE: 94 BPM | DIASTOLIC BLOOD PRESSURE: 87 MMHG

## 2019-07-30 DIAGNOSIS — E55.9 VITAMIN D DEFICIENCY, UNSPECIFIED: ICD-10-CM

## 2019-07-30 PROCEDURE — 99214 OFFICE O/P EST MOD 30 MIN: CPT

## 2019-07-30 NOTE — HISTORY OF PRESENT ILLNESS
[Currently Experiencing] : currently experiencing [Headache] : headache [Arthritis] : arthritis [Arthralgias] : arthralgias [None] : The patient is currently asymptomatic [Sicca] : no sicca [Chest Pain] : no chest pain [Rash] : no rash [Psychological Symptoms] : no psychological symptoms [Shortness of Breath] : no shortness of breath [Sun Sensitivity] : no sun sensitivity [FreeTextEntry7] : +ELMO, +dsDNA [FreeTextEntry1] : INTERVAL HX\par - doing well without complaints\par - denies lupus s/s - denies muscle weakness - \par - taking cellcept 2 tabs BID - off all other meds\par - denies sle s/s - no photosensitivity

## 2019-07-30 NOTE — PHYSICAL EXAM
[General Appearance - Alert] : alert [General Appearance - Well Nourished] : well nourished [General Appearance - In No Acute Distress] : in no acute distress [General Appearance - Well-Appearing] : healthy appearing [General Appearance - Well Developed] : well developed [PERRL With Normal Accommodation] : pupils were equal in size, round, and reactive to light [Sclera] : the sclera and conjunctiva were normal [Extraocular Movements] : extraocular movements were intact [Outer Ear] : the ears and nose were normal in appearance [Oropharynx] : the oropharynx was normal [Neck Cervical Mass (___cm)] : no neck mass was observed [Jugular Venous Distention Increased] : there was no jugular-venous distention [Neck Appearance] : the appearance of the neck was normal [Auscultation Breath Sounds / Voice Sounds] : lungs were clear to auscultation bilaterally [Heart Rate And Rhythm] : heart rate was normal and rhythm regular [Heart Sounds] : normal S1 and S2 [Heart Sounds Gallop] : no gallops [Murmurs] : no murmurs [Heart Sounds Pericardial Friction Rub] : no pericardial rub [Edema] : there was no peripheral edema [Cervical Lymph Nodes Enlarged Anterior Bilaterally] : anterior cervical [Cervical Lymph Nodes Enlarged Posterior Bilaterally] : posterior cervical [Supraclavicular Lymph Nodes Enlarged Bilaterally] : supraclavicular [No CVA Tenderness] : no ~M costovertebral angle tenderness [No Spinal Tenderness] : no spinal tenderness [Abnormal Walk] : normal gait [Musculoskeletal - Swelling] : no joint swelling seen [Nail Clubbing] : no clubbing  or cyanosis of the fingernails [Skin Color & Pigmentation] : normal skin color and pigmentation [Motor Tone] : muscle strength and tone were normal [Skin Turgor] : normal skin turgor [] : no rash [Motor Exam] : the motor exam was normal [Oriented To Time, Place, And Person] : oriented to person, place, and time [No Focal Deficits] : no focal deficits [Impaired Insight] : insight and judgment were intact [Affect] : the affect was normal [Mood] : the mood was normal [Memory Recent] : recent memory was not impaired [FreeTextEntry1] : MMM, no oral ulcers

## 2019-07-30 NOTE — REVIEW OF SYSTEMS
[As Noted in HPI] : as noted in HPI [Negative] : Heme/Lymph [FreeTextEntry8] : denies foamy urine [FreeTextEntry9] : no recurrent swelling [FreeTextEntry4] : no oral ulcers [de-identified] : denines photosensitivity /rash

## 2019-07-30 NOTE — ASSESSMENT
[FreeTextEntry1] : 23 yo man SLE (2017) now with non-nephrotic proteinuria, headaches, myopathy and serologic activity \par \par # SLE - class III nephritis, myositis  - ELMO, RNP, ds DNA positive\par - on cellcept 2 tab bid\par - declines HCQ\par - check labs\par \par #nephritis - follows with a neprhologist in [place].  has been stable.  \par - continue cellcept\par - reinforced importance of f/u with nephro\par - off lisinopril per nephro/pt\par \par #migraines\par -worsening late likely due to lupus\par -doing well now \par \par #myositis\par -lupus myopathy with increased cpk, aldolase and subjective weakness\par -given RNP serology - will continue to monitor for evolution to MCTD - \par -no pulmonary symptoms at present.\par \par #CAM\par - took tumeric for a short while - no longer on it\par all questions answered\par

## 2019-07-31 LAB
25(OH)D3 SERPL-MCNC: 23.1 NG/ML
ALBUMIN SERPL ELPH-MCNC: 3.9 G/DL
ALP BLD-CCNC: 87 U/L
ALT SERPL-CCNC: 25 U/L
ANION GAP SERPL CALC-SCNC: 11 MMOL/L
AST SERPL-CCNC: 20 U/L
BASOPHILS # BLD AUTO: 0.02 K/UL
BASOPHILS NFR BLD AUTO: 0.2 %
BILIRUB SERPL-MCNC: 0.5 MG/DL
BUN SERPL-MCNC: 10 MG/DL
CALCIUM SERPL-MCNC: 9.3 MG/DL
CHLORIDE SERPL-SCNC: 105 MMOL/L
CK SERPL-CCNC: 75 U/L
CO2 SERPL-SCNC: 25 MMOL/L
CREAT SERPL-MCNC: 0.76 MG/DL
CREAT SPEC-SCNC: 123 MG/DL
CREAT/PROT UR: 0.3 RATIO
CRP SERPL-MCNC: 1.36 MG/DL
EOSINOPHIL # BLD AUTO: 0.15 K/UL
EOSINOPHIL NFR BLD AUTO: 1.9 %
ERYTHROCYTE [SEDIMENTATION RATE] IN BLOOD BY WESTERGREN METHOD: 64 MM/HR
GGT SERPL-CCNC: 103 U/L
HCT VFR BLD CALC: 45 %
HGB BLD-MCNC: 14 G/DL
IMM GRANULOCYTES NFR BLD AUTO: 1 %
LYMPHOCYTES # BLD AUTO: 3.29 K/UL
LYMPHOCYTES NFR BLD AUTO: 40.6 %
MAN DIFF?: NORMAL
MCHC RBC-ENTMCNC: 26.1 PG
MCHC RBC-ENTMCNC: 31.1 GM/DL
MCV RBC AUTO: 84 FL
MONOCYTES # BLD AUTO: 0.46 K/UL
MONOCYTES NFR BLD AUTO: 5.7 %
NEUTROPHILS # BLD AUTO: 4.1 K/UL
NEUTROPHILS NFR BLD AUTO: 50.6 %
PLATELET # BLD AUTO: 245 K/UL
POTASSIUM SERPL-SCNC: 4.6 MMOL/L
PROT SERPL-MCNC: 7.1 G/DL
PROT UR-MCNC: 31 MG/DL
RBC # BLD: 5.36 M/UL
RBC # FLD: 14.3 %
SODIUM SERPL-SCNC: 141 MMOL/L
WBC # FLD AUTO: 8.1 K/UL

## 2019-08-01 LAB
C3 SERPL-MCNC: 116 MG/DL
C4 SERPL-MCNC: 18 MG/DL
DSDNA AB SER-ACNC: 236 IU/ML

## 2019-08-09 ENCOUNTER — MESSAGE (OUTPATIENT)
Age: 24
End: 2019-08-09

## 2019-12-28 ENCOUNTER — INPATIENT (INPATIENT)
Facility: HOSPITAL | Age: 24
LOS: 4 days | Discharge: HOME | End: 2020-01-02
Attending: HOSPITALIST | Admitting: HOSPITALIST
Payer: MEDICAID

## 2019-12-28 VITALS
HEART RATE: 99 BPM | SYSTOLIC BLOOD PRESSURE: 166 MMHG | RESPIRATION RATE: 16 BRPM | DIASTOLIC BLOOD PRESSURE: 107 MMHG | TEMPERATURE: 98 F | OXYGEN SATURATION: 100 %

## 2019-12-28 DIAGNOSIS — Z29.9 ENCOUNTER FOR PROPHYLACTIC MEASURES, UNSPECIFIED: ICD-10-CM

## 2019-12-28 DIAGNOSIS — Z02.9 ENCOUNTER FOR ADMINISTRATIVE EXAMINATIONS, UNSPECIFIED: ICD-10-CM

## 2019-12-28 DIAGNOSIS — M32.9 SYSTEMIC LUPUS ERYTHEMATOSUS, UNSPECIFIED: ICD-10-CM

## 2019-12-28 DIAGNOSIS — I10 ESSENTIAL (PRIMARY) HYPERTENSION: ICD-10-CM

## 2019-12-28 DIAGNOSIS — R60.0 LOCALIZED EDEMA: ICD-10-CM

## 2019-12-28 DIAGNOSIS — M32.14 GLOMERULAR DISEASE IN SYSTEMIC LUPUS ERYTHEMATOSUS: ICD-10-CM

## 2019-12-28 LAB
ALBUMIN SERPL ELPH-MCNC: 2.1 G/DL — LOW (ref 3.3–5)
ALP SERPL-CCNC: 69 U/L — SIGNIFICANT CHANGE UP (ref 40–120)
ALT FLD-CCNC: 15 U/L — SIGNIFICANT CHANGE UP (ref 4–41)
ANION GAP SERPL CALC-SCNC: 8 MMO/L — SIGNIFICANT CHANGE UP (ref 7–14)
APPEARANCE UR: SIGNIFICANT CHANGE UP
AST SERPL-CCNC: 26 U/L — SIGNIFICANT CHANGE UP (ref 4–40)
BACTERIA # UR AUTO: SIGNIFICANT CHANGE UP
BASOPHILS # BLD AUTO: 0.03 K/UL — SIGNIFICANT CHANGE UP (ref 0–0.2)
BASOPHILS NFR BLD AUTO: 0.4 % — SIGNIFICANT CHANGE UP (ref 0–2)
BILIRUB SERPL-MCNC: 0.3 MG/DL — SIGNIFICANT CHANGE UP (ref 0.2–1.2)
BILIRUB UR-MCNC: NEGATIVE — SIGNIFICANT CHANGE UP
BLOOD UR QL VISUAL: HIGH
BUN SERPL-MCNC: 16 MG/DL — SIGNIFICANT CHANGE UP (ref 7–23)
CALCIUM SERPL-MCNC: 7.8 MG/DL — LOW (ref 8.4–10.5)
CHLORIDE SERPL-SCNC: 111 MMOL/L — HIGH (ref 98–107)
CO2 SERPL-SCNC: 23 MMOL/L — SIGNIFICANT CHANGE UP (ref 22–31)
COLOR SPEC: YELLOW — SIGNIFICANT CHANGE UP
CREAT SERPL-MCNC: 1.09 MG/DL — SIGNIFICANT CHANGE UP (ref 0.5–1.3)
EOSINOPHIL # BLD AUTO: 0.07 K/UL — SIGNIFICANT CHANGE UP (ref 0–0.5)
EOSINOPHIL NFR BLD AUTO: 0.8 % — SIGNIFICANT CHANGE UP (ref 0–6)
GLUCOSE SERPL-MCNC: 83 MG/DL — SIGNIFICANT CHANGE UP (ref 70–99)
GLUCOSE UR-MCNC: NEGATIVE — SIGNIFICANT CHANGE UP
HCT VFR BLD CALC: 39.5 % — SIGNIFICANT CHANGE UP (ref 39–50)
HGB BLD-MCNC: 13.2 G/DL — SIGNIFICANT CHANGE UP (ref 13–17)
HYALINE CASTS # UR AUTO: HIGH
IMM GRANULOCYTES NFR BLD AUTO: 0.5 % — SIGNIFICANT CHANGE UP (ref 0–1.5)
KETONES UR-MCNC: NEGATIVE — SIGNIFICANT CHANGE UP
LEUKOCYTE ESTERASE UR-ACNC: NEGATIVE — SIGNIFICANT CHANGE UP
LYMPHOCYTES # BLD AUTO: 3.62 K/UL — HIGH (ref 1–3.3)
LYMPHOCYTES # BLD AUTO: 42.5 % — SIGNIFICANT CHANGE UP (ref 13–44)
MAGNESIUM SERPL-MCNC: 1.9 MG/DL — SIGNIFICANT CHANGE UP (ref 1.6–2.6)
MCHC RBC-ENTMCNC: 26 PG — LOW (ref 27–34)
MCHC RBC-ENTMCNC: 33.4 % — SIGNIFICANT CHANGE UP (ref 32–36)
MCV RBC AUTO: 77.9 FL — LOW (ref 80–100)
MONOCYTES # BLD AUTO: 0.65 K/UL — SIGNIFICANT CHANGE UP (ref 0–0.9)
MONOCYTES NFR BLD AUTO: 7.6 % — SIGNIFICANT CHANGE UP (ref 2–14)
NEUTROPHILS # BLD AUTO: 4.1 K/UL — SIGNIFICANT CHANGE UP (ref 1.8–7.4)
NEUTROPHILS NFR BLD AUTO: 48.2 % — SIGNIFICANT CHANGE UP (ref 43–77)
NITRITE UR-MCNC: NEGATIVE — SIGNIFICANT CHANGE UP
NRBC # FLD: 0 K/UL — SIGNIFICANT CHANGE UP (ref 0–0)
PH UR: 6.5 — SIGNIFICANT CHANGE UP (ref 5–8)
PHOSPHATE SERPL-MCNC: 2.9 MG/DL — SIGNIFICANT CHANGE UP (ref 2.5–4.5)
PLATELET # BLD AUTO: 190 K/UL — SIGNIFICANT CHANGE UP (ref 150–400)
PMV BLD: 11 FL — SIGNIFICANT CHANGE UP (ref 7–13)
POTASSIUM SERPL-MCNC: 4.4 MMOL/L — SIGNIFICANT CHANGE UP (ref 3.5–5.3)
POTASSIUM SERPL-SCNC: 4.4 MMOL/L — SIGNIFICANT CHANGE UP (ref 3.5–5.3)
PROT SERPL-MCNC: 5.2 G/DL — LOW (ref 6–8.3)
PROT UR-MCNC: 600 — HIGH
RBC # BLD: 5.07 M/UL — SIGNIFICANT CHANGE UP (ref 4.2–5.8)
RBC # FLD: 14.6 % — HIGH (ref 10.3–14.5)
RBC CASTS # UR COMP ASSIST: >50 — HIGH (ref 0–?)
SODIUM SERPL-SCNC: 142 MMOL/L — SIGNIFICANT CHANGE UP (ref 135–145)
SP GR SPEC: 1.02 — SIGNIFICANT CHANGE UP (ref 1–1.04)
SQUAMOUS # UR AUTO: SIGNIFICANT CHANGE UP
UROBILINOGEN FLD QL: NORMAL — SIGNIFICANT CHANGE UP
WBC # BLD: 8.51 K/UL — SIGNIFICANT CHANGE UP (ref 3.8–10.5)
WBC # FLD AUTO: 8.51 K/UL — SIGNIFICANT CHANGE UP (ref 3.8–10.5)
WBC UR QL: HIGH (ref 0–?)
YEAST BUDDING # UR COMP ASSIST: SIGNIFICANT CHANGE UP

## 2019-12-28 PROCEDURE — 99223 1ST HOSP IP/OBS HIGH 75: CPT

## 2019-12-28 PROCEDURE — 93970 EXTREMITY STUDY: CPT | Mod: 26

## 2019-12-28 RX ORDER — LISINOPRIL 2.5 MG/1
10 TABLET ORAL DAILY
Refills: 0 | Status: DISCONTINUED | OUTPATIENT
Start: 2019-12-28 | End: 2019-12-28

## 2019-12-28 RX ORDER — ENOXAPARIN SODIUM 100 MG/ML
40 INJECTION SUBCUTANEOUS EVERY 12 HOURS
Refills: 0 | Status: DISCONTINUED | OUTPATIENT
Start: 2019-12-28 | End: 2019-12-28

## 2019-12-28 RX ORDER — MYCOPHENOLATE MOFETIL 250 MG/1
1000 CAPSULE ORAL
Refills: 0 | Status: DISCONTINUED | OUTPATIENT
Start: 2019-12-28 | End: 2019-12-30

## 2019-12-28 RX ORDER — ENOXAPARIN SODIUM 100 MG/ML
40 INJECTION SUBCUTANEOUS DAILY
Refills: 0 | Status: DISCONTINUED | OUTPATIENT
Start: 2019-12-28 | End: 2020-01-02

## 2019-12-28 RX ORDER — FUROSEMIDE 40 MG
20 TABLET ORAL ONCE
Refills: 0 | Status: COMPLETED | OUTPATIENT
Start: 2019-12-28 | End: 2019-12-28

## 2019-12-28 RX ORDER — LISINOPRIL 2.5 MG/1
10 TABLET ORAL ONCE
Refills: 0 | Status: COMPLETED | OUTPATIENT
Start: 2019-12-28 | End: 2019-12-28

## 2019-12-28 RX ORDER — FUROSEMIDE 40 MG
40 TABLET ORAL
Refills: 0 | Status: DISCONTINUED | OUTPATIENT
Start: 2019-12-29 | End: 2019-12-31

## 2019-12-28 RX ADMIN — LISINOPRIL 10 MILLIGRAM(S): 2.5 TABLET ORAL at 17:13

## 2019-12-28 RX ADMIN — Medication 20 MILLIGRAM(S): at 20:59

## 2019-12-28 RX ADMIN — Medication 20 MILLIGRAM(S): at 18:15

## 2019-12-28 NOTE — CONSULT NOTE ADULT - PROBLEM SELECTOR RECOMMENDATION 9
Pt. with proteinuria in the setting of lupus nephritis. Pt. found to have 600 of protein, along with moderate blood and RBCs on UA from admission (12/28/19). Pt. with history of proteinuria, with last spot urine TP/CR on 7/31/19 was 0.3, however was noted to be as high as 2.1 on 3/11/19. Pt. previously on lisinopril, however discontinued due to normal BP. Recommend obtaining spot urine TP/CR. Check renal US with renal vein and artery Doppler to rule out RVT. Monitor daily weights and UOP

## 2019-12-28 NOTE — CONSULT NOTE ADULT - SUBJECTIVE AND OBJECTIVE BOX
[facility] DIVISION OF KIDNEY DISEASES AND HYPERTENSION -- INITIAL CONSULT NOTE  --------------------------------------------------------------------------------  HPI: 25 yo M with known class 3 lupus nephritis 2/2 SLE is admitted to [facility] with b/l LE swelling and intermittent sharp low back pain. On admission (12/28/19), pt. found to have a Scr of 1.09, serum albumin of 2.1, an UA significant for moderate blood/RBCs and 600 protein. Nephrology team was consulted for lupus nephritis and volume overload. Pt. seen and examined at bedside in ER with sister present. Pt. states that he was first diagnosed with SLE about 3 years ago, and was diagnosed with lupus nephritis in March 2019. Pt. was subsequently started on MMF 1G BID by his rheumatologist, and lisinopril 5 mg daily. Pt. was stopped of his lisinopril due to improvement of his BP. Pt. states that he developed LE edema yesterday along with intermittent back pain. Pt. also states that he has gained a significant amount of weight in the past several months and feels that his face is swollen. Upon lab review of [facility], pt. with normal Scr, last found to be 0.76 on 7/30/19. Pt. with history of proteinuria, with last spot urine TP/CR on 7/31/19 was 0.3, however was noted to be as high as 2.1 on 3/11/19. Pt. underwent kidney biopsy on 3/15/19 which confirmed class 3 lupus nephritis without cellular crescents however with 10 % global glomerulosclerosis in sampled glomeruli. Pt. currently denies CP, SOB, N/V/F/C, rashes, hair loss, mouth ulcers, family history of kidney disease/lupus.    PAST HISTORY  --------------------------------------------------------------------------------  PAST MEDICAL & SURGICAL HISTORY:  Lupus nephritis  No significant past surgical history    FAMILY HISTORY:  Denies family history of kidney disease    PAST SOCIAL HISTORY:  Admits to occasional alcohol use.  Denies tobacco use.    ALLERGIES & MEDICATIONS  --------------------------------------------------------------------------------  Allergies    No Known Allergies    Intolerances    Standing Inpatient Medications    PRN Inpatient Medications    REVIEW OF SYSTEMS  --------------------------------------------------------------------------------  Gen: No lethargy  Respiratory: No dyspnea  CV: No chest pain  GI: No abdominal pain  : + decreased urination  MSK: + LE edema, + intermittent back pain  Neuro: No dizziness  Heme: No bleeding  Psych: No depression  Skin: No rashes    All other systems were reviewed and are negative, except as noted.    VITALS/PHYSICAL EXAM  --------------------------------------------------------------------------------  T(C): 36.6 (12-28-19 @ 14:15), Max: 36.6 (12-28-19 @ 14:15)  HR: 99 (12-28-19 @ 16:30) (99 - 99)  BP: 187/131 (12-28-19 @ 16:30) (166/107 - 187/131)  RR: 18 (12-28-19 @ 16:30) (16 - 18)  SpO2: 99% (12-28-19 @ 16:30) (99% - 100%)  Wt(kg): --    Physical Exam:  	Gen: NAD, well-appearing  	HEENT: ? facial swelling, otherwise supple neck  	Pulm: CTA B/L  	CV: RRR, S1S2; no rub  	Back: No spinal or CVA tenderness  	Abd: +BS, soft, nontender/nondistended  	: No suprapubic tenderness  	LE: 1+ pitting edema b/l  	Skin: Warm, without rashes  	Psych: Normal affect    LABS/STUDIES  --------------------------------------------------------------------------------              13.2   8.51  >-----------<  190      [12-28-19 @ 16:30]              39.5     142  |  111  |  16  ----------------------------<  83      [12-28-19 @ 16:30]  4.4   |  23  |  1.09        Ca     7.8     [12-28-19 @ 16:30]      Mg     1.9     [12-28-19 @ 16:30]      Phos  2.9     [12-28-19 @ 16:30]    Creatinine Trend:  SCr 1.09 [12-28 @ 16:30]    Urinalysis - [12-28-19 @ 16:30]      Color YELLOW / Appearance Lt TURBID / SG 1.022 / pH 6.5      Gluc NEGATIVE / Ketone NEGATIVE  / Bili NEGATIVE / Urobili NORMAL       Blood MODERATE / Protein 600 / Leuk Est NEGATIVE / Nitrite NEGATIVE      RBC >50 / WBC 11-25 / Hyaline 2+ / Gran  / Sq Epi MODERATE / Non Sq Epi  / Bacteria FEW

## 2019-12-28 NOTE — ED ADULT NURSE NOTE - NSIMPLEMENTINTERV_GEN_ALL_ED
Implemented All Universal Safety Interventions:  [place] to call system. Call bell, personal items and telephone within reach. Instruct patient to call for assistance. Room bathroom lighting operational. Non-slip footwear when patient is off stretcher. Physically safe environment: no spills, clutter or unnecessary equipment. Stretcher in lowest position, wheels locked, appropriate side rails in place.

## 2019-12-28 NOTE — ED PROVIDER NOTE - CLINICAL SUMMARY MEDICAL DECISION MAKING FREE TEXT BOX
24 year old male with PMH lupus nephritis presents with one-day history of lower extremity edema. Nephrology consulted. Rheumatology consulted. CBC. CMP, U/A ordered. Patient hypertensive, given lisinopril 10 mg x1 and lasix 20 mg x1. 24 year old male with PMH lupus nephritis presents with one-day history of lower extremity edema. Nephrology consulted. Rheumatology consulted. CBC. CMP, U/A ordered. Patient hypertensive, given lisinopril 10 mg x1 and lasix 20 mg x1. LE dopplers to rule out DVT.

## 2019-12-28 NOTE — H&P ADULT - NSHPSOCIALHISTORY_GEN_ALL_CORE
Currently in [place] for [place], visiting family in [place]  Denies any tobacco or illicit substance use  Occasional EtOH use (less than once a week)

## 2019-12-28 NOTE — H&P ADULT - NSHPPHYSICALEXAM_GEN_ALL_CORE
Vital Signs Last 24 Hrs  T(C): 36.7 (28 Dec 2019 20:32), Max: 36.7 (28 Dec 2019 20:32)  T(F): 98.1 (28 Dec 2019 20:32), Max: 98.1 (28 Dec 2019 20:32)  HR: 91 (28 Dec 2019 20:32) (91 - 99)  BP: 172/97 (28 Dec 2019 20:32) (166/107 - 187/131)  BP(mean): --  RR: 18 (28 Dec 2019 20:32) (16 - 18)  SpO2: 99% (28 Dec 2019 20:32) (99% - 100%)    GENERAL: No acute distress, well-developed  ENT: EOMI, PERRL, conjunctiva and sclera clear, Neck supple, No JVD, moist mucosa  CHEST/LUNG: Clear to auscultation bilaterally; No wheeze, equal breath sounds bilaterally   BACK: No spinal tenderness  HEART: Regular rate and rhythm; No murmurs, rubs, or gallops  ABDOMEN: Soft, Nontender, Nondistended; Bowel sounds present  EXTREMITIES: 2+ DP/PT pulses, 1+ pitting edema up to mid shins  PSYCH: Nl behavior, nl affect  NEUROLOGY: AAOx3, non-focal, cranial nerves intact  SKIN: Normal color, No rashes or lesions

## 2019-12-28 NOTE — H&P ADULT - HISTORY OF PRESENT ILLNESS
This is a 24M with history of SLE c/b Lupus Nephritis (dx in March 2019 with renal bx, s/p course of prednisone, was on lisinopril but stopped due to improved BPs) who presents to the hospital with complaints of worsening LE edema and intermittent b/l flank pain for the past 1 day. Said that he has also noted significant foamy urine over the past day. Denies any hematuria, dysuria, or fevers/chills. No recent illnesses or sick contacts. Said that he is compliant with his medication but did miss a few doses over the past few days. Came in due to the worsening LE edema. No other complaints.     On arrival to the ED, his vitals were T 97.9, P 99, /107, RR 16, O2 sat 100% RA. His lab work was significant for low protein, low albumin, and UA showing significant protein, moderate blood, and >50 RBCs/11-25 WBCs. He had a US Duplex of b/l LE that was negative for DVTs. He was seen by nephrology who recommended a further work up for his proteinuria. He was admitted to medicine.

## 2019-12-28 NOTE — H&P ADULT - NSHPLABSRESULTS_GEN_ALL_CORE
LABS and ADDITIONAL STUDIES:                        13.2   8.51  )-----------( 190      ( 28 Dec 2019 16:30 )             39.5         142  |  111<H>  |  16  ----------------------------<  83  4.4   |  23  |  1.09    Ca    7.8<L>      28 Dec 2019 16:30  Phos  2.9       Mg     1.9         TPro  5.2<L>  /  Alb  2.1<L>  /  TBili  0.3  /  DBili  x   /  AST  26  /  ALT  15  /  AlkPhos  69      LIVER FUNCTIONS - ( 28 Dec 2019 16:30 )  Alb: 2.1 g/dL / Pro: 5.2 g/dL / ALK PHOS: 69 u/L / ALT: 15 u/L / AST: 26 u/L / GGT: x           Urinalysis Basic - ( 28 Dec 2019 16:30 )  Color: YELLOW / Appearance: Lt TURBID / S.022 / pH: 6.5  Gluc: NEGATIVE / Ketone: NEGATIVE  / Bili: NEGATIVE / Urobili: NORMAL   Blood: MODERATE / Protein: 600 / Nitrite: NEGATIVE   Leuk Esterase: NEGATIVE / RBC: >50 / WBC 11-25   Sq Epi: MODERATE / Non Sq Epi: x / Bacteria: FEW    < from: US Duplex Venous Lower Ext Complete, Bilateral (19 @ 18:54) >    IMPRESSION:     No evidence of deep venous thrombosis in either lower extremity.    < end of copied text >

## 2019-12-28 NOTE — ED ADULT NURSE NOTE - CHIEF COMPLAINT QUOTE
PMH of lupus nephritis, reports lower leg/pedal edema, back pain, and HTN at home worsening since yesterday. Denies blurred vision or headache, was previously on HTN medications, not currently. Denies other PMH

## 2019-12-28 NOTE — ED ADULT NURSE NOTE - OBJECTIVE STATEMENT
Pt received into spot #27. AAOx4, c/o B/L leg/pedal edema. Pt states he has lupus nephritis, c/o lower back pain and high blood pressure at home worsening since yesterday. Denies blurred vision or headache. Denies n/v/abdominal pain. Denies hematuria/dysuria. MD street being performed at bedside. no acute distress. Awaiting further plan of care. Pt received into spot #27. AAOx4, c/o B/L leg/pedal edema. Pt states he has lupus nephritis, c/o lower back pain and high blood pressure at home worsening since yesterday. Denies blurred vision or headache. Denies n/v/abdominal pain. Denies hematuria/dysuria. MD street being performed at bedside. #20g IVSL placed to left ac, labs drawn and sent. no acute distress. Awaiting further plan of care.

## 2019-12-28 NOTE — H&P ADULT - NSHPREVIEWOFSYSTEMS_GEN_ALL_CORE
REVIEW OF SYSTEMS:    CONSTITUTIONAL: No generalized weakness, fevers or chills  EYES: No visual changes or eye discharge  ENT: No rhinorrhea or sore throat  NECK: No pain or stiffness  RESPIRATORY: No cough, wheezing, hemoptysis; No shortness of breath  CARDIOVASCULAR: No chest pain or palpitations; No lower extremity edema  GASTROINTESTINAL: No abdominal or epigastric pain. No nausea, vomiting, or hematemesis; No diarrhea or constipation. No melena or hematochezia.  BACK: No central back pain, b/l intermittent flank pain  GENITOURINARY: +Foamy urine; No dysuria, frequency or hematuria  NEUROLOGICAL: No numbness or weakness  SKIN: No itching, burning, rashes, or lesions

## 2019-12-28 NOTE — CONSULT NOTE ADULT - PROBLEM SELECTOR RECOMMENDATION 4
Pt. noted to have elevated BP in the setting of hypervolemia. Recommend Lasix 40 mg IV BID. Hold ACE-I therapy for now. Consider additional anti-hypertensives if patient remains hypertensive. Low salt and restricted fluid diet. Monitor BP.    Plan reviewed with on-call nephrology attending, Dr. Ham

## 2019-12-28 NOTE — ED PROVIDER NOTE - PHYSICAL EXAMINATION
Vital Signs Last 24 Hrs  T(C): 36.6 (28 Dec 2019 14:15), Max: 36.6 (28 Dec 2019 14:15)  T(F): 97.9 (28 Dec 2019 14:15), Max: 97.9 (28 Dec 2019 14:15)  HR: 99 (28 Dec 2019 14:15) (99 - 99)  BP: 166/107 (28 Dec 2019 14:15) (166/107 - 166/107)  BP(mean): --  RR: 16 (28 Dec 2019 14:15) (16 - 16)  SpO2: 100% (28 Dec 2019 14:15) (100% - 100%)    General:  NAD  Head: Normocephalic, Atraumatic  Eyes: PERRLA, EOMI, normal sclera  Throat: Moist mucous membranes  Respiratory: CTAB, normal respiratory effort, no wheezes, crackles, rales  CV: RRR, S1/S2, no murmurs, rubs or gallops  Abdominal: Soft, bowel sounds present, NT, ND  Extremities: 2+ pitting edema, 2+ DP pulses  Neurological: A&Ox4, MAEx4, non-focal  Skin: No rashes

## 2019-12-28 NOTE — H&P ADULT - PROBLEM SELECTOR PLAN 4
1.  Name of PCP: Rheum - Dr. Ramos, Renal - Dr. Brooks  2.  PCP Contacted on Admission: [ ] Y    [ ] N    3.  PCP contacted at Discharge: [ ] Y    [ ] N    [ ] N/A  4.  Post-Discharge Appointment Date and Location:  5.  Summary of Handoff given to PCP:

## 2019-12-28 NOTE — ED PROVIDER NOTE - ATTENDING CONTRIBUTION TO CARE
Attending note:   After face to face evaluation of this patient, I concur with above noted hx, pe, and care plan for this patient.  23 y/o M with sle, nephritis, with c/o leg swelling for 24 hours, +diffuse edema of legs,  diastolic.     Evaluation in progress

## 2019-12-28 NOTE — CONSULT NOTE ADULT - PROBLEM SELECTOR RECOMMENDATION 2
Pt. with likely JOYCE in the setting of worsening lupus nephritis/nephrotic syndrome. Upon lab review of [facility], pt. with normal Scr, last found to be 0.76 on 7/30/19. Scr on admission (12/28/19) was normal but elevated to 1.09. Recommend serologic work up with C3, C4, ELMO, dsDNA, anti-PLA2R ab, HepBsA and Hep C Ab. Check renal US as outlined above. Monitor labs and urine output. Avoid nephrotoxins

## 2019-12-28 NOTE — CONSULT NOTE ADULT - PROBLEM SELECTOR RECOMMENDATION 3
Pt. with known class 3 lupus nephritis 2/2 SLE. Pt. diagnosed with SLE 3 years ago.  Pt. underwent kidney biopsy (d/t proteinuria) on 3/15/19 which confirmed class 3 lupus nephritis without cellular crescents however with 10 % global glomerulosclerosis in sampled glomeruli. Pt. was then started on MMF 1 G BID by his rheumatologist. Recommend continuation of immunosuppression. Obtain rheumatology team consult. Check serologies as outlined above. Monitor labs

## 2019-12-28 NOTE — ED PROVIDER NOTE - OBJECTIVE STATEMENT
24 year old male with lupus nephritis on mycophenolate presenting with complaint of bilateral lower extremity swelling, hypertension and back pain. The patient's legs started to swell yesterday. The foot swelling makes ambulation painful. He took 5 mg prednisone twice and saw no relief. He called his Nephrologist who told him to go to the ED if no improvement is seen the next day. Today, the patient's legs were unchanged. He also developed sharp bilateral lower back pain. It was relieved temporarily by Alleve. He checked his blood pressure and it was 187/129 at home. He also reports reduced urination since yesterday. But, patient has also had reduced po intake. He has been compliant with his medication. He denies trauma, fever, chills, chest pain, shortness of breath, abdominal pain, nausea, vomiting, diarrhea, constipation, hematuria. Rheumatologist is Dr. Guillaume. PCP is Dr. Bangura. Nephrologist is Dr. Adelita Brooks.

## 2019-12-29 LAB
ANION GAP SERPL CALC-SCNC: 12 MMO/L — SIGNIFICANT CHANGE UP (ref 7–14)
BUN SERPL-MCNC: 16 MG/DL — SIGNIFICANT CHANGE UP (ref 7–23)
C3 SERPL-MCNC: 41.2 MG/DL — LOW (ref 90–180)
C4 SERPL-MCNC: 4.9 MG/DL — LOW (ref 10–40)
CALCIUM SERPL-MCNC: 7.7 MG/DL — LOW (ref 8.4–10.5)
CHLORIDE SERPL-SCNC: 110 MMOL/L — HIGH (ref 98–107)
CO2 SERPL-SCNC: 20 MMOL/L — LOW (ref 22–31)
CREAT SERPL-MCNC: 1.08 MG/DL — SIGNIFICANT CHANGE UP (ref 0.5–1.3)
GLUCOSE SERPL-MCNC: 90 MG/DL — SIGNIFICANT CHANGE UP (ref 70–99)
HBV SURFACE AB SER-ACNC: SIGNIFICANT CHANGE UP
HCT VFR BLD CALC: 36.6 % — LOW (ref 39–50)
HCV AB S/CO SERPL IA: 0.23 S/CO — SIGNIFICANT CHANGE UP (ref 0–0.99)
HCV AB SERPL-IMP: SIGNIFICANT CHANGE UP
HGB BLD-MCNC: 12.1 G/DL — LOW (ref 13–17)
MAGNESIUM SERPL-MCNC: 1.8 MG/DL — SIGNIFICANT CHANGE UP (ref 1.6–2.6)
MCHC RBC-ENTMCNC: 26.1 PG — LOW (ref 27–34)
MCHC RBC-ENTMCNC: 33.1 % — SIGNIFICANT CHANGE UP (ref 32–36)
MCV RBC AUTO: 79 FL — LOW (ref 80–100)
NRBC # FLD: 0 K/UL — SIGNIFICANT CHANGE UP (ref 0–0)
PHOSPHATE SERPL-MCNC: 3.6 MG/DL — SIGNIFICANT CHANGE UP (ref 2.5–4.5)
PLATELET # BLD AUTO: 180 K/UL — SIGNIFICANT CHANGE UP (ref 150–400)
PMV BLD: 12.1 FL — SIGNIFICANT CHANGE UP (ref 7–13)
POTASSIUM SERPL-MCNC: 3.8 MMOL/L — SIGNIFICANT CHANGE UP (ref 3.5–5.3)
POTASSIUM SERPL-SCNC: 3.8 MMOL/L — SIGNIFICANT CHANGE UP (ref 3.5–5.3)
RBC # BLD: 4.63 M/UL — SIGNIFICANT CHANGE UP (ref 4.2–5.8)
RBC # FLD: 14.8 % — HIGH (ref 10.3–14.5)
SODIUM SERPL-SCNC: 142 MMOL/L — SIGNIFICANT CHANGE UP (ref 135–145)
URATE SERPL-MCNC: 9.4 MG/DL — HIGH (ref 3.4–8.8)
WBC # BLD: 7.68 K/UL — SIGNIFICANT CHANGE UP (ref 3.8–10.5)
WBC # FLD AUTO: 7.68 K/UL — SIGNIFICANT CHANGE UP (ref 3.8–10.5)

## 2019-12-29 PROCEDURE — 99222 1ST HOSP IP/OBS MODERATE 55: CPT | Mod: GC

## 2019-12-29 PROCEDURE — 99233 SBSQ HOSP IP/OBS HIGH 50: CPT

## 2019-12-29 PROCEDURE — 99223 1ST HOSP IP/OBS HIGH 75: CPT | Mod: GC

## 2019-12-29 RX ORDER — ACETAMINOPHEN 500 MG
650 TABLET ORAL EVERY 6 HOURS
Refills: 0 | Status: DISCONTINUED | OUTPATIENT
Start: 2019-12-29 | End: 2020-01-02

## 2019-12-29 RX ORDER — INFLUENZA VIRUS VACCINE 15; 15; 15; 15 UG/.5ML; UG/.5ML; UG/.5ML; UG/.5ML
0.5 SUSPENSION INTRAMUSCULAR ONCE
Refills: 0 | Status: COMPLETED | OUTPATIENT
Start: 2019-12-29 | End: 2019-12-29

## 2019-12-29 RX ORDER — PANTOPRAZOLE SODIUM 20 MG/1
40 TABLET, DELAYED RELEASE ORAL
Refills: 0 | Status: DISCONTINUED | OUTPATIENT
Start: 2019-12-29 | End: 2020-01-02

## 2019-12-29 RX ADMIN — MYCOPHENOLATE MOFETIL 1000 MILLIGRAM(S): 250 CAPSULE ORAL at 17:34

## 2019-12-29 RX ADMIN — Medication 40 MILLIGRAM(S): at 17:31

## 2019-12-29 RX ADMIN — Medication 50 MILLIGRAM(S): at 15:31

## 2019-12-29 RX ADMIN — ENOXAPARIN SODIUM 40 MILLIGRAM(S): 100 INJECTION SUBCUTANEOUS at 14:05

## 2019-12-29 RX ADMIN — Medication 40 MILLIGRAM(S): at 05:45

## 2019-12-29 RX ADMIN — MYCOPHENOLATE MOFETIL 1000 MILLIGRAM(S): 250 CAPSULE ORAL at 05:49

## 2019-12-29 RX ADMIN — Medication 650 MILLIGRAM(S): at 22:00

## 2019-12-29 RX ADMIN — Medication 650 MILLIGRAM(S): at 21:19

## 2019-12-29 NOTE — CHART NOTE - NSCHARTNOTEFT_GEN_A_CORE
Rheum consul e-mailed as requested by renal    Lesly Robles [facility] [id number]

## 2019-12-29 NOTE — PROGRESS NOTE ADULT - SUBJECTIVE AND OBJECTIVE BOX
Patient is a 24y old  Male who presents with a chief complaint of LE edema, b/l intermittent flank pain (29 Dec 2019 10:16)      SUBJECTIVE / OVERNIGHT EVENTS:    MEDICATIONS  (STANDING):  enoxaparin Injectable 40 milliGRAM(s) SubCutaneous daily  furosemide   Injectable 40 milliGRAM(s) IV Push two times a day  influenza   Vaccine 0.5 milliLiter(s) IntraMuscular once  mycophenolate mofetil 1000 milliGRAM(s) Oral two times a day    MEDICATIONS  (PRN):      Vital Signs Last 24 Hrs  T(C): 36.6 (29 Dec 2019 05:37), Max: 36.7 (28 Dec 2019 20:32)  T(F): 97.9 (29 Dec 2019 05:37), Max: 98.1 (28 Dec 2019 20:32)  HR: 91 (29 Dec 2019 05:37) (91 - 99)  BP: 149/99 (29 Dec 2019 05:37) (149/99 - 187/131)  BP(mean): --  RR: 18 (29 Dec 2019 05:37) (16 - 18)  SpO2: 99% (29 Dec 2019 05:37) (99% - 100%)  CAPILLARY BLOOD GLUCOSE        I&O's Summary    28 Dec 2019 07:  -  29 Dec 2019 07:00  --------------------------------------------------------  IN: 0 mL / OUT: 1650 mL / NET: -1650 mL    29 Dec 2019 07:  -  29 Dec 2019 10:59  --------------------------------------------------------  IN: 0 mL / OUT: 500 mL / NET: -500 mL        PHYSICAL EXAM:  GENERAL: NAD, well-developed  HEAD:  Atraumatic, Normocephalic  EYES: EOMI, PERRLA, conjunctiva and sclera clear  NECK: Supple, No JVD  CHEST/LUNG: Clear to auscultation bilaterally; No wheeze  HEART: Regular rate and rhythm; No murmurs, rubs, or gallops  ABDOMEN: Soft, Nontender, Nondistended; Bowel sounds present  EXTREMITIES:  2+ Peripheral Pulses, No clubbing, cyanosis, or edema  PSYCH: AAOx3  NEUROLOGY: non-focal  SKIN: No rashes or lesions    LABS:                        12.1   7.68  )-----------( 180      ( 29 Dec 2019 06:35 )             36.6     12-    142  |  110<H>  |  16  ----------------------------<  90  3.8   |  20<L>  |  1.08    Ca    7.7<L>      29 Dec 2019 06:35  Phos  3.6       Mg     1.8         TPro  5.2<L>  /  Alb  2.1<L>  /  TBili  0.3  /  DBili  x   /  AST  26  /  ALT  15  /  AlkPhos  69  12-          Urinalysis Basic - ( 28 Dec 2019 16:30 )    Color: YELLOW / Appearance: Lt TURBID / S.022 / pH: 6.5  Gluc: NEGATIVE / Ketone: NEGATIVE  / Bili: NEGATIVE / Urobili: NORMAL   Blood: MODERATE / Protein: 600 / Nitrite: NEGATIVE   Leuk Esterase: NEGATIVE / RBC: >50 / WBC 11-25   Sq Epi: MODERATE / Non Sq Epi: x / Bacteria: FEW        RADIOLOGY & ADDITIONAL TESTS:    Imaging Personally Reviewed:    Consultant(s) Notes Reviewed:      Care Discussed with Consultants/Other Providers: Patient is a 24y old  Male who presents with a chief complaint of LE edema, b/l intermittent flank pain (29 Dec 2019 10:16)      SUBJECTIVE / OVERNIGHT EVENTS: patient seen and examined by bedside, legs are still swollen but rt leg better than before , denies headache, dizziness, SOB, CP, Palpitations , N/V/D, abdominal pain        MEDICATIONS  (STANDING):  enoxaparin Injectable 40 milliGRAM(s) SubCutaneous daily  furosemide   Injectable 40 milliGRAM(s) IV Push two times a day  influenza   Vaccine 0.5 milliLiter(s) IntraMuscular once  mycophenolate mofetil 1000 milliGRAM(s) Oral two times a day    MEDICATIONS  (PRN):      Vital Signs Last 24 Hrs  T(C): 36.6 (29 Dec 2019 05:37), Max: 36.7 (28 Dec 2019 20:32)  T(F): 97.9 (29 Dec 2019 05:37), Max: 98.1 (28 Dec 2019 20:32)  HR: 91 (29 Dec 2019 05:37) (91 - 99)  BP: 149/99 (29 Dec 2019 05:37) (149/99 - 187/131)  BP(mean): --  RR: 18 (29 Dec 2019 05:37) (16 - 18)  SpO2: 99% (29 Dec 2019 05:37) (99% - 100%)  CAPILLARY BLOOD GLUCOSE        I&O's Summary    28 Dec 2019 07:01  -  29 Dec 2019 07:00  --------------------------------------------------------  IN: 0 mL / OUT: 1650 mL / NET: -1650 mL    29 Dec 2019 07:01  -  29 Dec 2019 10:59  --------------------------------------------------------  IN: 0 mL / OUT: 500 mL / NET: -500 mL        PHYSICAL EXAM:  GENERAL: NAD, well-developed  HEAD:  Atraumatic, Normocephalic  EYES: EOMI, PERRLA, conjunctiva and sclera clear  NECK: Supple,  CHEST/LUNG: Clear to auscultation bilaterally; No wheeze  HEART: Regular rate and rhythm;   ABDOMEN: Soft, Nontender, Nondistended; Bowel sounds present  EXTREMITIES:  2+ Peripheral Pulses, No clubbing, cyanosis,1+ pitting edema up to mid shins   PSYCH: AAOx3  NEUROLOGY: non-focal  SKIN: No rashes or lesions    LABS:                        12.1   7.68  )-----------( 180      ( 29 Dec 2019 06:35 )             36.6     -    142  |  110<H>  |  16  ----------------------------<  90  3.8   |  20<L>  |  1.08    Ca    7.7<L>      29 Dec 2019 06:35  Phos  3.6       Mg     1.8         TPro  5.2<L>  /  Alb  2.1<L>  /  TBili  0.3  /  DBili  x   /  AST  26  /  ALT  15  /  AlkPhos  69            Urinalysis Basic - ( 28 Dec 2019 16:30 )    Color: YELLOW / Appearance: Lt TURBID / S.022 / pH: 6.5  Gluc: NEGATIVE / Ketone: NEGATIVE  / Bili: NEGATIVE / Urobili: NORMAL   Blood: MODERATE / Protein: 600 / Nitrite: NEGATIVE   Leuk Esterase: NEGATIVE / RBC: >50 / WBC 11-25   Sq Epi: MODERATE / Non Sq Epi: x / Bacteria: FEW        RADIOLOGY & ADDITIONAL TESTS:    Imaging Personally Reviewed:    Consultant(s) Notes Reviewed:  Rheum, Nephrology     Care Discussed with Consultants/Other Providers:

## 2019-12-29 NOTE — CONSULT NOTE ADULT - SUBJECTIVE AND OBJECTIVE BOX
DANUTA [facility]  [id number]    HISTORY OF PRESENT ILLNESS:    24yoM with PMHx of SLE with LN Class III p/w worsening LE edema, b/l flank pain and elevated BP a/w increased frothy urine for 1 day PTA. Compliant with meds.     SLE hx:  Follows with Rheumatologist, Dr. Guillaume and Nephrologist, Dr. Brooks.  Dx 3 yrs ago. +ELMO, dsDNA 600s, high titer RNP, low C', arthralgias, elevated LFT.   Lupus myopathy with increased CPK, aldolase, and subjective weakness, no pulmonary sx.  s/p kidney biopsy 3/2019 showing ISN/RPS Class III without cellular crescents, 10% global glomerulosclerosis.  Medication hx: On MMF 1g BID. Declines HCQ.  Labs from 2019 - sCr 0.76 and UPCR 0.3 g/d    PAST MEDICAL & SURGICAL HISTORY:  SLE (systemic lupus erythematosus)  Lupus nephritis  No significant past surgical history    Review of Systems:  Gen:  No fevers/chills, weight loss  HEENT: No blurry vision, no difficulty swallowing, no oral or nasal ulcers  CVS: No chest pain/palpitations  Resp: No SOB/wheezing  GI: No N/V/C/D/abdominal pain. +Frothy urine  MSK: No joint pain or swelling. +LE edema  Back: +flank pain  Skin: No new rashes  Neuro: No headaches    MEDICATIONS  (STANDING):  enoxaparin Injectable 40 milliGRAM(s) SubCutaneous daily  furosemide   Injectable 40 milliGRAM(s) IV Push two times a day  influenza   Vaccine 0.5 milliLiter(s) IntraMuscular once  mycophenolate mofetil 1000 milliGRAM(s) Oral two times a day    MEDICATIONS  (PRN):    Allergies  No Known Allergies  Intolerances      PERTINENT MEDICATION HISTORY:  SOCIAL HISTORY:  OCCUPATION:  TRAVEL HISTORY:  FAMILY HISTORY:  FH: diabetes mellitus: in Father and Mother    Vital Signs Last 24 Hrs  T(C): 36.6 (29 Dec 2019 05:37), Max: 36.7 (28 Dec 2019 20:32)  T(F): 97.9 (29 Dec 2019 05:37), Max: 98.1 (28 Dec 2019 20:32)  HR: 91 (29 Dec 2019 05:37) (91 - 99)  BP: 149/99 (29 Dec 2019 05:37) (149/99 - 187/131)  BP(mean): --  RR: 18 (29 Dec 2019 05:37) (16 - 18)  SpO2: 99% (29 Dec 2019 05:37) (99% - 100%)    Physical Exam:  General: No apparent distress  HEENT: EOMI, MMM  CVS: +S1/S2, RRR, no murmurs/rubs/gallops  Resp: CTA b/l. No crackles/wheezing  GI: Soft, NT/ND +BS  MSK:  Neuro: AAOx3  Skin: no visible rashes    LABS:                        12.1   7.68  )-----------( 180      ( 29 Dec 2019 06:35 )             36.6     12-    142  |  110<H>  |  16  ----------------------------<  90  3.8   |  20<L>  |  1.08    Ca    7.7<L>      29 Dec 2019 06:35  Phos  3.6       Mg     1.8         TPro  5.2<L>  /  Alb  2.1<L>  /  TBili  0.3  /  DBili  x   /  AST  26  /  ALT  15  /  AlkPhos  69  -      Urinalysis Basic - ( 28 Dec 2019 16:30 )    Color: YELLOW / Appearance: Lt TURBID / S.022 / pH: 6.5  Gluc: NEGATIVE / Ketone: NEGATIVE  / Bili: NEGATIVE / Urobili: NORMAL   Blood: MODERATE / Protein: 600 / Nitrite: NEGATIVE   Leuk Esterase: NEGATIVE / RBC: >50 / WBC 11-25   Sq Epi: MODERATE / Non Sq Epi: x / Bacteria: FEW        RADIOLOGY & ADDITIONAL STUDIES:    Protein, Random Urine (19 @ 20:02)    Protein, Random Urine: > 600: URINE CM=8859.5 mg/dL  NO REFERENCE RANGES HAVE BEEN ESTABLISHED. mg/dL    Creatinine, Random Urine (19 @ 20:02)    Creatinine, Random Urine: 139.10: * * RANDOM URINARY CREATININE * *  REFERENCE RANGE    NORMAL = 15-30 MG/KG BODY WEIGHT/DAY mg/dL    UPCR 4.3g/d    Labs from 2019 - sCr 0.76 and UPCR 0.3 g/d      EXAM:  US DPLX LWR EXT VEINS COMPL BI    PROCEDURE DATE:  Dec 28 2019   IMPRESSION: No evidence of deep venous thrombosis in either lower extremity. [facility]ANUTA [facility]  [id number]    HISTORY OF PRESENT ILLNESS:    24yoM with PMHx of SLE with LN Class III p/w worsening LE edema, b/l flank pain and elevated BP a/w increased frothy urine for 1 day PTA. Compliant with meds.     SLE hx:  Follows with Rheumatologist, Dr. Guillaume and Nephrologist, Dr. Brooks.  Dx 3 yrs ago. +ELMO, dsDNA 600s, high titer RNP, low C', arthralgias, elevated LFT.   Lupus myopathy with increased CPK, aldolase, and subjective weakness, no pulmonary sx.  s/p kidney biopsy 3/2019 showing ISN/RPS Class III without cellular crescents, 10% global glomerulosclerosis.  Medication hx: On MMF 1g BID. Declines HCQ.  Labs from 2019 - sCr 0.76 and UPCR 0.3 g/d    PAST MEDICAL & SURGICAL HISTORY:  SLE (systemic lupus erythematosus)  Lupus nephritis  No significant past surgical history    Review of Systems:  Gen:  No fevers/chills, weight loss  HEENT: No blurry vision, no difficulty swallowing, no oral or nasal ulcers  CVS: No chest pain/palpitations  Resp: No SOB/wheezing  GI: No N/V/C/D/abdominal pain. +Frothy urine  MSK: No joint pain or swelling. +LE edema  Back: +flank pain  Skin: No new rashes  Neuro: No headaches    MEDICATIONS  (STANDING):  enoxaparin Injectable 40 milliGRAM(s) SubCutaneous daily  furosemide   Injectable 40 milliGRAM(s) IV Push two times a day  influenza   Vaccine 0.5 milliLiter(s) IntraMuscular once  mycophenolate mofetil 1000 milliGRAM(s) Oral two times a day    MEDICATIONS  (PRN):    Allergies  No Known Allergies  Intolerances      PERTINENT MEDICATION HISTORY:  SOCIAL HISTORY:  OCCUPATION:  TRAVEL HISTORY:  FAMILY HISTORY:  FH: diabetes mellitus: in Father and Mother    Vital Signs Last 24 Hrs  T(C): 36.6 (29 Dec 2019 05:37), Max: 36.7 (28 Dec 2019 20:32)  T(F): 97.9 (29 Dec 2019 05:37), Max: 98.1 (28 Dec 2019 20:32)  HR: 91 (29 Dec 2019 05:37) (91 - 99)  BP: 149/99 (29 Dec 2019 05:37) (149/99 - 187/131)  BP(mean): --  RR: 18 (29 Dec 2019 05:37) (16 - 18)  SpO2: 99% (29 Dec 2019 05:37) (99% - 100%)    Physical Exam:  General: No apparent distress  HEENT: EOMI, MMM  CVS: +S1/S2, RRR, no murmurs/rubs/gallops  Resp: CTA b/l. No crackles/wheezing  GI: Soft, NT/ND +BS  MSK:  Neuro: AAOx3  Skin: no visible rashes    LABS:                        12.1   7.68  )-----------( 180      ( 29 Dec 2019 06:35 )             36.6         142  |  110<H>  |  16  ----------------------------<  90  3.8   |  20<L>  |  1.08    Ca    7.7<L>      29 Dec 2019 06:35  Phos  3.6       Mg     1.8         TPro  5.2<L>  /  Alb  2.1<L>  /  TBili  0.3  /  DBili  x   /  AST  26  /  ALT  15  /  AlkPhos  69  12-28      Urinalysis Basic - ( 28 Dec 2019 16:30 )    Color: YELLOW / Appearance: Lt TURBID / S.022 / pH: 6.5  Gluc: NEGATIVE / Ketone: NEGATIVE  / Bili: NEGATIVE / Urobili: NORMAL   Blood: MODERATE / Protein: 600 / Nitrite: NEGATIVE   Leuk Esterase: NEGATIVE / RBC: >50 / WBC 11-25   Sq Epi: MODERATE / Non Sq Epi: x / Bacteria: FEW    C3 Complement, Serum (19 @ 06:35)    C3 Complement, Serum: 41.2 mg/dL    C4 Complement, Serum (19 @ 06:35)    C4 Complement, Serum: 4.9 mg/dL      RADIOLOGY & ADDITIONAL STUDIES:    Protein, Random Urine (19 @ 20:02)    Protein, Random Urine: > 600: URINE MH=1325.5 mg/dL  NO REFERENCE RANGES HAVE BEEN ESTABLISHED. mg/dL    Creatinine, Random Urine (19 @ 20:02)    Creatinine, Random Urine: 139.10: * * RANDOM URINARY CREATININE * *  REFERENCE RANGE    NORMAL = 15-30 MG/KG BODY WEIGHT/DAY mg/dL    UPCR 4.3g/d    Labs from 2019 - sCr 0.76 and UPCR 0.3 g/d      EXAM:  US DPLX LWR EXT VEINS COMPL BI    PROCEDURE DATE:  Dec 28 2019   IMPRESSION: No evidence of deep venous thrombosis in either lower extremity. [facility]ANUTA [facility]  [id number]    HISTORY OF PRESENT ILLNESS:    24yoM with PMHx of SLE with LN Class III p/w worsening LE edema, b/l flank pain and elevated BP a/w increased frothy urine for 1 day PTA. Compliant with meds up until a week ago where he was missing doses intermittently.    SLE hx:  Follows with Rheumatologist, Dr. Guillaume and Nephrologist, Dr. Brooks.  Dx 3 yrs ago. +ELMO, dsDNA 600s, high titer RNP, low C', arthralgias, elevated LFT.   Lupus myopathy with increased CPK, aldolase, and subjective weakness, no pulmonary sx.  s/p kidney biopsy 3/2019 showing ISN/RPS Class III without cellular crescents, 10% global glomerulosclerosis.  Medication hx: On MMF 1g BID. Declines HCQ.  Labs from 2019 - sCr 0.76 and UPCR 0.3 g/d    PAST MEDICAL & SURGICAL HISTORY:  SLE (systemic lupus erythematosus)  Lupus nephritis  No significant past surgical history    Review of Systems:  Gen:  No fevers/chills, weight loss  HEENT: No blurry vision, no difficulty swallowing, no oral or nasal ulcers  CVS: No chest pain/palpitations  Resp: No SOB/wheezing  GI: No N/V/C/D/abdominal pain. +Frothy urine  MSK: No joint pain or swelling. +LE edema  Back: +flank pain  Skin: No new rashes  Neuro: No headaches    MEDICATIONS  (STANDING):  enoxaparin Injectable 40 milliGRAM(s) SubCutaneous daily  furosemide   Injectable 40 milliGRAM(s) IV Push two times a day  influenza   Vaccine 0.5 milliLiter(s) IntraMuscular once  mycophenolate mofetil 1000 milliGRAM(s) Oral two times a day    MEDICATIONS  (PRN):    Allergies  No Known Allergies  Intolerances      PERTINENT MEDICATION HISTORY:  SOCIAL HISTORY: occassional etoh, denies tobacco and illicits  OCCUPATION: student  TRAVEL HISTORY: lives in [place]  FAMILY HISTORY:  FH: diabetes mellitus: in Father and Mother    Vital Signs Last 24 Hrs  T(C): 36.6 (29 Dec 2019 05:37), Max: 36.7 (28 Dec 2019 20:32)  T(F): 97.9 (29 Dec 2019 05:37), Max: 98.1 (28 Dec 2019 20:32)  HR: 91 (29 Dec 2019 05:37) (91 - 99)  BP: 149/99 (29 Dec 2019 05:37) (149/99 - 187/131)  BP(mean): --  RR: 18 (29 Dec 2019 05:37) (16 - 18)  SpO2: 99% (29 Dec 2019 05:37) (99% - 100%)    Physical Exam:  General: No apparent distress  HEENT: EOMI, MMM  CVS: +S1/S2, RRR, no murmurs/rubs/gallops  Resp: CTA b/l. No crackles/wheezing  GI: Soft, NT/ND +BS  MSK: No synovitis. LE edema non pitting over feet and ankles  Neuro: AAOx3  Skin: no visible rashes    LABS:                        12.1   7.68  )-----------( 180      ( 29 Dec 2019 06:35 )             36.6         142  |  110<H>  |  16  ----------------------------<  90  3.8   |  20<L>  |  1.08    Ca    7.7<L>      29 Dec 2019 06:35  Phos  3.6       Mg     1.8         TPro  5.2<L>  /  Alb  2.1<L>  /  TBili  0.3  /  DBili  x   /  AST  26  /  ALT  15  /  AlkPhos  69  12-      Urinalysis Basic - ( 28 Dec 2019 16:30 )    Color: YELLOW / Appearance: Lt TURBID / S.022 / pH: 6.5  Gluc: NEGATIVE / Ketone: NEGATIVE  / Bili: NEGATIVE / Urobili: NORMAL   Blood: MODERATE / Protein: 600 / Nitrite: NEGATIVE   Leuk Esterase: NEGATIVE / RBC: >50 / WBC 11-25   Sq Epi: MODERATE / Non Sq Epi: x / Bacteria: FEW    C3 Complement, Serum (19 @ 06:35)    C3 Complement, Serum: 41.2 mg/dL    C4 Complement, Serum (19 @ 06:35)    C4 Complement, Serum: 4.9 mg/dL      RADIOLOGY & ADDITIONAL STUDIES:    Protein, Random Urine (19 @ 20:02)    Protein, Random Urine: > 600: URINE QV=7961.5 mg/dL  NO REFERENCE RANGES HAVE BEEN ESTABLISHED. mg/dL    Creatinine, Random Urine (19 @ 20:02)    Creatinine, Random Urine: 139.10: * * RANDOM URINARY CREATININE * *  REFERENCE RANGE    NORMAL = 15-30 MG/KG BODY WEIGHT/DAY mg/dL    UPCR 4.3g/d    Labs from 2019 - sCr 0.76 and UPCR 0.3 g/d      EXAM:  US DPLX LWR EXT VEINS COMPL BI    PROCEDURE DATE:  Dec 28 2019   IMPRESSION: No evidence of deep venous thrombosis in either lower extremity.

## 2019-12-29 NOTE — CONSULT NOTE ADULT - ASSESSMENT
24yoM with PMHx of SLE with LN Class III (+ELMO, dsDNA 600s, high titer RNP, low C', arthralgias, elevated LFT; currently on MMF 1g BID), p/w worsening LE edema, b/l flank pain, and elevated BP a/w increased frothy urine. Labs with JOYCE and worsening proteinuria.    Recommendations: ***FINAL PENDING***  -F/U C3, C4, dsDNA  -Renal duplex to r/o SVT  -c/w MMF 1g BID  -Lasix as per Nephrology 24yoM with PMHx of SLE with LN Class III (+ELMO, dsDNA 600s, high titer RNP, low C', arthralgias, elevated LFT; currently on MMF 1g BID), p/w worsening LE edema, b/l flank pain, and elevated BP a/w increased frothy urine. Labs with JOYCE and worsening proteinuria.    Recommendations: ***FINAL PENDING***  -F/U dsDNA  -Renal duplex to r/o SVT  -c/w MMF 1g BID  -Lasix as per Nephrology 24yoM with PMHx of SLE with LN Class III (+ELMO, dsDNA 600s, high titer RNP, low C', arthralgias, elevated LFT; currently on MMF 1g BID), p/w worsening LE edema, b/l flank pain, and elevated BP a/w increased frothy urine. Labs with JOYCE and worsening proteinuria. Concern for lupus nephritis flare.    Recommendations:  -Start Solumedrol 1mg/kg daily  -GI and PCP ppx while on high dose steroids  -c/w MMF 1g BID  -F/U dsDNA  -Renal duplex to r/o SVT  -Lasix as per Nephrology  -Will likely need kidney re-biopsy to re-classify LN involvement

## 2019-12-29 NOTE — PROGRESS NOTE ADULT - PROBLEM SELECTOR PLAN 2
- Will c/w cellcept  - Consider rheum eval in AM - Will c/w cellcept  - rheum eval appreciated, plan as above

## 2019-12-29 NOTE — PROGRESS NOTE ADULT - PROBLEM SELECTOR PLAN 1
- Likely has flare of his lupus nephritis  - Renal consult appreciated, started patient on lasix 40mg IV BID, holding lisinopril as per renal   - Lab work ordered as per renal, unable to find order for anti-PLA2R ab -> defer that to team in AM  - Ordered US renal, as per US tech they do not perform renal duplex, ordered [facility] duplex of abdomen and specified for renal vein/artery duplex, will also make patient NPO in AM for his abd imaging test - Likely has flare of his lupus nephritis  - Renal consult appreciated, started patient on lasix 40mg IV BID, holding lisinopril as per renal   - will d/w renal for plan for renal Bx   - Lab work ordered as per renal, unable to find order for anti-PLA2R ab   - Ordered US renal, as per US tech they do not perform renal duplex, ordered [facility] duplex of abdomen and specified for renal vein/artery duplex,   Rheum eval appreciated : recommend c/w MMF 1 gm BID , f/u Ds DNA and Renal duplex to r/o SVT

## 2019-12-30 DIAGNOSIS — I10 ESSENTIAL (PRIMARY) HYPERTENSION: ICD-10-CM

## 2019-12-30 LAB — DSDNA AB SER-ACNC: 254 IU/ML — HIGH

## 2019-12-30 PROCEDURE — 99233 SBSQ HOSP IP/OBS HIGH 50: CPT

## 2019-12-30 PROCEDURE — 76770 US EXAM ABDO BACK WALL COMP: CPT | Mod: 26

## 2019-12-30 PROCEDURE — 99233 SBSQ HOSP IP/OBS HIGH 50: CPT | Mod: GC

## 2019-12-30 RX ORDER — MYCOPHENOLATE MOFETIL 250 MG/1
1000 CAPSULE ORAL
Refills: 0 | Status: DISCONTINUED | OUTPATIENT
Start: 2019-12-31 | End: 2019-12-31

## 2019-12-30 RX ORDER — LABETALOL HCL 100 MG
200 TABLET ORAL EVERY 8 HOURS
Refills: 0 | Status: DISCONTINUED | OUTPATIENT
Start: 2019-12-30 | End: 2020-01-02

## 2019-12-30 RX ORDER — POLYETHYLENE GLYCOL 3350 17 G/17G
17 POWDER, FOR SOLUTION ORAL DAILY
Refills: 0 | Status: DISCONTINUED | OUTPATIENT
Start: 2019-12-30 | End: 2020-01-02

## 2019-12-30 RX ORDER — LABETALOL HCL 100 MG
200 TABLET ORAL EVERY 8 HOURS
Refills: 0 | Status: DISCONTINUED | OUTPATIENT
Start: 2019-12-30 | End: 2019-12-30

## 2019-12-30 RX ADMIN — ENOXAPARIN SODIUM 40 MILLIGRAM(S): 100 INJECTION SUBCUTANEOUS at 11:30

## 2019-12-30 RX ADMIN — MYCOPHENOLATE MOFETIL 1000 MILLIGRAM(S): 250 CAPSULE ORAL at 17:16

## 2019-12-30 RX ADMIN — Medication 200 MILLIGRAM(S): at 14:22

## 2019-12-30 RX ADMIN — POLYETHYLENE GLYCOL 3350 17 GRAM(S): 17 POWDER, FOR SOLUTION ORAL at 11:30

## 2019-12-30 RX ADMIN — PANTOPRAZOLE SODIUM 40 MILLIGRAM(S): 20 TABLET, DELAYED RELEASE ORAL at 06:26

## 2019-12-30 RX ADMIN — Medication 40 MILLIGRAM(S): at 17:16

## 2019-12-30 RX ADMIN — Medication 40 MILLIGRAM(S): at 05:37

## 2019-12-30 RX ADMIN — Medication 50 MILLIGRAM(S): at 05:37

## 2019-12-30 RX ADMIN — Medication 1 TABLET(S): at 09:07

## 2019-12-30 RX ADMIN — MYCOPHENOLATE MOFETIL 1000 MILLIGRAM(S): 250 CAPSULE ORAL at 05:38

## 2019-12-30 RX ADMIN — Medication 50 MILLIGRAM(S): at 17:16

## 2019-12-30 NOTE — PROGRESS NOTE ADULT - ASSESSMENT
Mr. Yoo is a 25 yo man with SLE c/b lupus nephritis admitted for likely lupus nephritis flare with noted poorly controlled BP.

## 2019-12-30 NOTE — PROGRESS NOTE ADULT - PROBLEM SELECTOR PLAN 2
- Will c/w cellcept  - rheum eval appreciated, plan as above Likely due to renal disease  - start labetalol 200mg TID as per renal  - monitor BP

## 2019-12-30 NOTE — PROGRESS NOTE ADULT - SUBJECTIVE AND OBJECTIVE BOX
DANUTA SCRUGGS  [id number]    INTERVAL HPI/OVERNIGHT EVENTS:        MEDICATIONS  (STANDING):  enoxaparin Injectable 40 milliGRAM(s) SubCutaneous daily  furosemide   Injectable 40 milliGRAM(s) IV Push two times a day  influenza   Vaccine 0.5 milliLiter(s) IntraMuscular once  labetalol 200 milliGRAM(s) Oral every 8 hours  methylPREDNISolone sodium succinate Injectable 50 milliGRAM(s) IV Push two times a day  mycophenolate mofetil 1000 milliGRAM(s) Oral two times a day  pantoprazole    Tablet 40 milliGRAM(s) Oral before breakfast  polyethylene glycol 3350 17 Gram(s) Oral daily  trimethoprim  160 mG/sulfamethoxazole 800 mG 1 Tablet(s) Oral <User Schedule>    MEDICATIONS  (PRN):  acetaminophen   Tablet .. 650 milliGRAM(s) Oral every 6 hours PRN Mild Pain (1 - 3), Moderate Pain (4 - 6)      Allergies    No Known Allergies    Intolerances      Vital Signs Last 24 Hrs  T(C): 36.5 (30 Dec 2019 05:27), Max: 36.5 (30 Dec 2019 05:27)  T(F): 97.7 (30 Dec 2019 05:27), Max: 97.7 (30 Dec 2019 05:27)  HR: 94 (30 Dec 2019 06:41) (91 - 103)  BP: 171/97 (30 Dec 2019 08:52) (151/107 - 178/120)  BP(mean): --  RR: 18 (30 Dec 2019 05:27) (18 - 18)  SpO2: 97% (30 Dec 2019 05:27) (95% - 97%)    Physical Exam:  General: No apparent distress  HEENT: EOMI, MMM  CVS: +S1/S2, RRR, no murmurs/rubs/gallops  Resp: CTA b/l. No crackles/wheezing  GI: Soft, NT/ND +BS  MSK: No synovitis. LE edema non pitting over feet and ankles  Neuro: AAOx3  Skin: no visible rashes    LABS:                        12.1   7.68  )-----------( 180      ( 29 Dec 2019 06:35 )             36.6     12-    142  |  110<H>  |  16  ----------------------------<  90  3.8   |  20<L>  |  1.08    Ca    7.7<L>      29 Dec 2019 06:35  Phos  3.6     12-  Mg     1.8     12-    TPro  5.2<L>  /  Alb  2.1<L>  /  TBili  0.3  /  DBili  x   /  AST  26  /  ALT  15  /  AlkPhos  69  12-28      Urinalysis Basic - ( 28 Dec 2019 16:30 )    Color: YELLOW / Appearance: Lt TURBID / S.022 / pH: 6.5  Gluc: NEGATIVE / Ketone: NEGATIVE  / Bili: NEGATIVE / Urobili: NORMAL   Blood: MODERATE / Protein: 600 / Nitrite: NEGATIVE   Leuk Esterase: NEGATIVE / RBC: >50 / WBC 11-25   Sq Epi: MODERATE / Non Sq Epi: x / Bacteria: FEW          RADIOLOGY & ADDITIONAL TESTS:      Double Stranded DNA Antibody (12.29.19 @ 06:35)    Double Stranded DNA Antibody: 254: Method: EIA  Reference Ranges  Interpretation  <= 29    IU/mL     Negative  30 - 75  IU/mL     Borderline  > 75      IU/mL     Positive IU/mL        EXAM:  US KIDNEYS AND BLADDER        PROCEDURE DATE:  Dec 30 2019         INTERPRETATION:  CLINICAL INFORMATION: 24-year-old man with proteinuria    COMPARISON: None available.    TECHNIQUE: Sonography of the kidneys and bladder.     FINDINGS:    Right kidney:  10.9 cm. No renal mass, hydronephrosis or calculi.    Left kidney:  11.8 cm. No renal mass, hydronephrosis or calculi.    Urinary bladder: Collapsed.    IMPRESSION:     Normal renal ultrasound.        MARTIN STEINER M.D., ATTENDING RADIOLOGIST  This document has been electronically signed. Dec 30 2019  8:39AM DANUTA SCRUGGS  [id number]    INTERVAL HPI/OVERNIGHT EVENTS:    Patient reports mildly improved feet swelling. Also reports h/o NSAID use for neuropathic pain on soles of feet. Was taking around 200-600mg per week of ibuprofen.    MEDICATIONS  (STANDING):  enoxaparin Injectable 40 milliGRAM(s) SubCutaneous daily  furosemide   Injectable 40 milliGRAM(s) IV Push two times a day  influenza   Vaccine 0.5 milliLiter(s) IntraMuscular once  labetalol 200 milliGRAM(s) Oral every 8 hours  methylPREDNISolone sodium succinate Injectable 50 milliGRAM(s) IV Push two times a day  mycophenolate mofetil 1000 milliGRAM(s) Oral two times a day  pantoprazole    Tablet 40 milliGRAM(s) Oral before breakfast  polyethylene glycol 3350 17 Gram(s) Oral daily  trimethoprim  160 mG/sulfamethoxazole 800 mG 1 Tablet(s) Oral <User Schedule>    MEDICATIONS  (PRN):  acetaminophen   Tablet .. 650 milliGRAM(s) Oral every 6 hours PRN Mild Pain (1 - 3), Moderate Pain (4 - 6)      Allergies    No Known Allergies    Intolerances      Vital Signs Last 24 Hrs  T(C): 36.5 (30 Dec 2019 05:27), Max: 36.5 (30 Dec 2019 05:27)  T(F): 97.7 (30 Dec 2019 05:27), Max: 97.7 (30 Dec 2019 05:27)  HR: 94 (30 Dec 2019 06:41) (91 - 103)  BP: 171/97 (30 Dec 2019 08:52) (151/107 - 178/120)  BP(mean): --  RR: 18 (30 Dec 2019 05:27) (18 - 18)  SpO2: 97% (30 Dec 2019 05:27) (95% - 97%)    Physical Exam:  General: No apparent distress  HEENT: EOMI, MMM  CVS: +S1/S2, RRR, no murmurs/rubs/gallops  Resp: CTA b/l. No crackles/wheezing  GI: Soft, NT/ND +BS  MSK: No synovitis. LE edema non pitting over feet and ankles  Neuro: AAOx3  Skin: no visible rashes    LABS:                        12.1   7.68  )-----------( 180      ( 29 Dec 2019 06:35 )             36.6     12-    142  |  110<H>  |  16  ----------------------------<  90  3.8   |  20<L>  |  1.08    Ca    7.7<L>      29 Dec 2019 06:35  Phos  3.6       Mg     1.8         TPro  5.2<L>  /  Alb  2.1<L>  /  TBili  0.3  /  DBili  x   /  AST  26  /  ALT  15  /  AlkPhos  69        Urinalysis Basic - ( 28 Dec 2019 16:30 )    Color: YELLOW / Appearance: Lt TURBID / S.022 / pH: 6.5  Gluc: NEGATIVE / Ketone: NEGATIVE  / Bili: NEGATIVE / Urobili: NORMAL   Blood: MODERATE / Protein: 600 / Nitrite: NEGATIVE   Leuk Esterase: NEGATIVE / RBC: >50 / WBC 11-25   Sq Epi: MODERATE / Non Sq Epi: x / Bacteria: FEW          RADIOLOGY & ADDITIONAL TESTS:  C3 Complement, Serum (12.29.19 @ 06:35)    C3 Complement, Serum: 41.2 mg/dL    C4 Complement, Serum (12.29.19 @ 06:35)    C4 Complement, Serum: 4.9 mg/dL    Double Stranded DNA Antibody (12.29.19 @ 06:35)    Double Stranded DNA Antibody: 254: Method: EIA  Reference Ranges  Interpretation  <= 29    IU/mL     Negative  30 - 75  IU/mL     Borderline  > 75      IU/mL     Positive IU/mL    Protein, Random Urine (12.28.19 @ 20:02)    Protein, Random Urine: > 600: URINE LP=8271.5 mg/dL  NO REFERENCE RANGES HAVE BEEN ESTABLISHED. mg/dL    Creatinine, Random Urine (12..19 @ 20:02)    Creatinine, Random Urine: 139.10: * * RANDOM URINARY CREATININE * *  REFERENCE RANGE    NORMAL = 15-30 MG/KG BODY WEIGHT/DAY mg/dL        EXAM:  US KIDNEYS AND BLADDER        PROCEDURE DATE:  Dec 30 2019         INTERPRETATION:  CLINICAL INFORMATION: 24-year-old man with proteinuria    COMPARISON: None available.    TECHNIQUE: Sonography of the kidneys and bladder.     FINDINGS:    Right kidney:  10.9 cm. No renal mass, hydronephrosis or calculi.    Left kidney:  11.8 cm. No renal mass, hydronephrosis or calculi.    Urinary bladder: Collapsed.    IMPRESSION:     Normal renal ultrasound.        MARTIN STEINER M.D., ATTENDING RADIOLOGIST  This document has been electronically signed. Dec 30 2019  8:39AM DANUTA SCRUGGS  [id number]    INTERVAL HPI/OVERNIGHT EVENTS:    Patient reports mildly improved feet swelling. Also reports h/o NSAID use for neuropathic pain on soles of feet. Was taking around 200-600mg per week of ibuprofen.    MEDICATIONS  (STANDING):  enoxaparin Injectable 40 milliGRAM(s) SubCutaneous daily  furosemide   Injectable 40 milliGRAM(s) IV Push two times a day  influenza   Vaccine 0.5 milliLiter(s) IntraMuscular once  labetalol 200 milliGRAM(s) Oral every 8 hours  methylPREDNISolone sodium succinate Injectable 50 milliGRAM(s) IV Push two times a day  mycophenolate mofetil 1000 milliGRAM(s) Oral two times a day  pantoprazole    Tablet 40 milliGRAM(s) Oral before breakfast  polyethylene glycol 3350 17 Gram(s) Oral daily  trimethoprim  160 mG/sulfamethoxazole 800 mG 1 Tablet(s) Oral <User Schedule>    MEDICATIONS  (PRN):  acetaminophen   Tablet .. 650 milliGRAM(s) Oral every 6 hours PRN Mild Pain (1 - 3), Moderate Pain (4 - 6)      Allergies    No Known Allergies    Intolerances      Vital Signs Last 24 Hrs  T(C): 36.5 (30 Dec 2019 05:27), Max: 36.5 (30 Dec 2019 05:27)  T(F): 97.7 (30 Dec 2019 05:27), Max: 97.7 (30 Dec 2019 05:27)  HR: 94 (30 Dec 2019 06:41) (91 - 103)  BP: 171/97 (30 Dec 2019 08:52) (151/107 - 178/120)  BP(mean): --  RR: 18 (30 Dec 2019 05:27) (18 - 18)  SpO2: 97% (30 Dec 2019 05:27) (95% - 97%)    Physical Exam:  General: No apparent distress  HEENT: EOMI, MMM  CVS: +S1/S2, RRR, no murmurs/rubs/gallops  Resp: CTA b/l. No crackles/wheezing  GI: Soft, NT/ND +BS  MSK: No synovitis.  Neuro: AAOx3  Skin: no visible rashes  LE : + pitting edema    LABS:                        12.1   7.68  )-----------( 180      ( 29 Dec 2019 06:35 )             36.6     12-    142  |  110<H>  |  16  ----------------------------<  90  3.8   |  20<L>  |  1.08      Ca    7.7<L>      29 Dec 2019 06:35  Phos  3.6       Mg     1.8         TPro  5.2<L>  /  Alb  2.1<L>  /  TBili  0.3  /  DBili  x   /  AST  26  /  ALT  15  /  AlkPhos  69        Urinalysis Basic - ( 28 Dec 2019 16:30 )    Color: YELLOW / Appearance: Lt TURBID / S.022 / pH: 6.5  Gluc: NEGATIVE / Ketone: NEGATIVE  / Bili: NEGATIVE / Urobili: NORMAL   Blood: MODERATE / Protein: 600 / Nitrite: NEGATIVE   Leuk Esterase: NEGATIVE / RBC: >50 / WBC 11-25   Sq Epi: MODERATE / Non Sq Epi: x / Bacteria: FEW          RADIOLOGY & ADDITIONAL TESTS:  C3 Complement, Serum (..19 @ 06:35)    C3 Complement, Serum: 41.2 mg/dL    C4 Complement, Serum (..19 @ 06:35)    C4 Complement, Serum: 4.9 mg/dL    Double Stranded DNA Antibody (..19 @ 06:35)    Double Stranded DNA Antibody: 254: Method: EIA  Reference Ranges  Interpretation  <= 29    IU/mL     Negative  30 - 75  IU/mL     Borderline  > 75      IU/mL     Positive IU/mL    Protein, Random Urine (..19 @ 20:02)    Protein, Random Urine: > 600: URINE IE=9648.5 mg/dL  NO REFERENCE RANGES HAVE BEEN ESTABLISHED. mg/dL    Creatinine, Random Urine (. @ 20:02)    Creatinine, Random Urine: 139.10: * * RANDOM URINARY CREATININE * *  REFERENCE RANGE    NORMAL = 15-30 MG/KG BODY WEIGHT/DAY mg/dL        EXAM:  US KIDNEYS AND BLADDER        PROCEDURE DATE:  Dec 30 2019         INTERPRETATION:  CLINICAL INFORMATION: 24-year-old man with proteinuria    COMPARISON: None available.    TECHNIQUE: Sonography of the kidneys and bladder.     FINDINGS:    Right kidney:  10.9 cm. No renal mass, hydronephrosis or calculi.    Left kidney:  11.8 cm. No renal mass, hydronephrosis or calculi.    Urinary bladder: Collapsed.    IMPRESSION:     Normal renal ultrasound.        MARTIN STEINER M.D., ATTENDING RADIOLOGIST  This document has been electronically signed. Dec 30 2019  8:39AM

## 2019-12-30 NOTE — PROGRESS NOTE ADULT - ASSESSMENT
24yoM with PMHx of SLE with LN Class III (+ELMO, dsDNA 600s, high titer RNP, low C', arthralgias, elevated LFT; currently on MMF 1g BID), p/w worsening LE edema, b/l flank pain, and elevated BP a/w increased frothy urine in the setting of non-compliance to MMF. Labs with JOYCE and worsening proteinuria. Concern for lupus nephritis flare.    Recommendations:  -c/w Solumedrol 1mg/kg daily  -GI and PCP ppx while on high dose steroids  -c/w MMF 1g BID  -Lasix as per Nephrology 24yoM with PMHx of SLE with LN Class III (+ELMO, dsDNA 600s, high titer RNP, low C', arthralgias, elevated LFT; s/p kidney bx 3/2019, currently on MMF 1g BID), p/w worsening LE edema, b/l flank pain, and elevated BP a/w increased frothy urine in the setting of non-compliance to MMF and NSAID use for LE neuropathic pain. Labs with JOYCE, worsening proteinuria, active urinary sediment with serologically active disease. Concern for lupus nephritis flare with possible transformation to Class IV vs analgesic nephropathy/minimal change disease.    Recommendations:  -Increase MMF to 1.5g BID.  -c/w Solumedrol 1mg/kg daily.  -GI and PCP ppx while on high dose steroids.  -Obtain US kidney with duplex to r/o RVT prior to proceeding with kidney biopsy.  -Potential candidate for AMP study.  -Lasix as per Nephrology. 24yoM with PMHx of SLE with LN Class III (+ELMO, dsDNA 600s, high titer RNP, low C', arthralgias, elevated LFT; s/p kidney bx 3/2019, currently on MMF 1g BID), p/w worsening LE edema, b/l flank pain, and elevated BP a/w increased frothy urine in the setting of non-compliance to MMF and NSAID use for LE neuropathic pain. Labs with JOYCE, worsening proteinuria, active urinary sediment with serologically active disease. Concern for lupus nephritis flare with possible transformation to Class IV vs analgesic nephropathy/minimal change disease.    Recommendations:  -Increase MMF to 1.5g BID.  -c/w Solumedrol 1mg/kg daily.  -GI and PCP ppx while on high dose steroids.  -Obtain US kidney with duplex to r/o RVT prior to proceeding with kidney biopsy.  -Potential candidate for AMP study.  -Lasix as per Nephrology.      ACR 2012 Lupus Nephritis Guidelines:  [email or web address]/ACR%20Guidelines%20for%20Screening,%20Treatment,%20and%20Management%20of%20Lupus%20Nephritis.pdf 24yoM with PMHx of SLE with LN Class III (+ELMO, dsDNA 600s, high titer RNP, low C', arthralgias, elevated LFT; s/p kidney bx 3/2019, currently on MMF 1g BID), p/w worsening LE edema, b/l flank pain, and elevated BP a/w increased frothy urine in the setting of non-compliance to MMF and NSAID use for LE neuropathic pain. Labs with JOYCE ( creat increase from 0.8 to 1.08) worsening proteinuria, active urinary sediment with serologically active disease, consistent with lupus nephritis flare with possible transformation to Class IV/ +/- class V vs analgesic nephropathy/minimal change disease.    Recommendations:  -Obtain US kidney with duplex to r/o RVT prior to proceeding with kidney biopsy.  -If no evidence of renal vein thrombois - would start pulse steroids - Solumedrol 1g IV daily x 3 and increase MMF to 1.5g BID.  -in meantime , c/w Solumedrol 1mg/kg daily  -GI and PCP ppx while on high dose steroids.  -Lasix as per Nephrology  -If proceeding with renal bx - the patient is a potential candidate for renal bx AMP study.      ACR 2012 Lupus Nephritis Guidelines:  [email or web address]/ACR%20Guidelines%20for%20Screening,%20Treatment,%20and%20Management%20of%20Lupus%20Nephritis.pdf

## 2019-12-30 NOTE — PROGRESS NOTE ADULT - PROBLEM SELECTOR PLAN 3
- Lovenox for DVT ppx  - Ambulate as tolerated  - DASH Diet - Lovenox for DVT ppx  - Ambulate as tolerated  - DASH Diet, start Miralax for constipation

## 2019-12-30 NOTE — CHART NOTE - NSCHARTNOTEFT_GEN_A_CORE
Patient Age: 24  Patient Gender: male    Procedure (including site / side if known): renal biopsy   Diagnosis / Indication: lupus nephritis   Interventional Radiology Attending Physician: Dr Bose  Ordering Attending Physician: Dr Sanches   Pertinent medical history: SLE, lupus nephritis   Pertinent labs:  wbc 7.6 h&h 12/36 platelets 180   na 142 k 3.8 cl 110 co2 20 bun 16 creatinine 1.08   Patient and Family aware? Yes

## 2019-12-30 NOTE — PROGRESS NOTE ADULT - ASSESSMENT
Pt. with lupus nephritis presents with LE edema, HTN, and intermittent back pain.    Assessment and Recommendation:   · Assessment		  Pt. with lupus nephritis presents with LE edema, HTN, and intermittent back pain.     Problem/Recommendation - 1:  Problem: R/O Nephrotic syndrome. Recommendation: Pt. with proteinuria in the setting of lupus nephritis. Pt. found to have 600 of protein, along with moderate blood and RBCs on UA from admission (12/28/19). Pt. with history of proteinuria, with last spot urine TP/CR on 7/31/19 was 0.3, however was noted to be as high as 2.1 on 3/11/19. Pt. previously on lisinopril, however discontinued due to normal BP. TP/C ratio now ~12g. Check renal US with renal vein to rule out RVT PRIOR to biopsy. Monitor daily weights and UOP.     Problem/Recommendation - 2:  ·  Problem: R/O JOYCE (acute kidney injury).  Recommendation: Pt. with likely JOYCE in the setting of worsening lupus nephritis/nephrotic syndrome. Upon lab review of [facility], pt. with normal Scr, last found to be 0.76 on 7/30/19. Scr on admission (12/28/19) was normal but elevated to 1.08. Recommend serologic work up with C3, C4, ELMO, dsDNA, anti-PLA2R ab, HepBsA and Hep C Ab. Check renal US as outlined above. Monitor labs and urine output. Avoid nephrotoxins.      Problem/Recommendation - 3:  ·  Problem: Lupus nephritis, ISN/RPS class III.  Recommendation: Pt. with known class 3 lupus nephritis 2/2 SLE. Pt. diagnosed with SLE 3 years ago.  Pt. underwent kidney biopsy (d/t proteinuria) on 3/15/19 which confirmed class 3 lupus nephritis without cellular crescents however with 10 % global glomerulosclerosis in sampled glomeruli. Pt. was then started on MMF 1 G BID by his rheumatologist. Recommend continuation of immunosuppression. Obtain rheumatology team consult. Check serologies as outlined above. Monitor labs.      Problem/Recommendation - 4:  ·  Problem: HTN (hypertension).  Recommendation: Pt. noted to have elevated BP in the setting of hypervolemia. Recommend continue Lasix 40 mg IV BID. Please start Labetalol 300  TID and requires BP control prior to biopsy . Low salt and restricted fluid diet. Monitor BP. Pt. with lupus nephritis presents with LE edema, HTN, and intermittent back pain.    Assessment and Recommendation:   · Assessment		  Pt. with lupus nephritis presents with LE edema, HTN, and intermittent back pain.     Problem/Recommendation - 1:  Problem: Nephrotic syndrome. Recommendation: Pt. with proteinuria in the setting of lupus nephritis. Pt. found to have ~10 g protein, along with moderate blood and RBCs on UA from admission (12/28/19). Pt. with history of proteinuria, with last spot urine TP/CR on 7/31/19 was 0.3, however was noted to be as high as 2.1 on 3/11/19. Pt. previously on lisinopril, however discontinued due to normal BP. Check renal US with renal vein to rule out RVT PRIOR to anticipated kidney biopsy.  Differential diagnosis includes transformation to class V lupus vs less likely minimal change disease from nsaid use. Monitor daily weights and UOP.     Problem/Recommendation - 2:  ·  Problem: R/O JOYCE (acute kidney injury).  Recommendation: Pt. with likely JOYCE in the setting of worsening lupus nephritis/nephrotic syndrome. Upon lab review of [facility], pt. with normal Scr, last found to be 0.76 on 7/30/19. Scr on admission (12/28/19) was normal but elevated to 1.08. Monitor labs and urine output. Avoid nephrotoxins.      Problem/Recommendation - 3:  ·  Problem: Lupus nephritis, ISN/RPS class III.  Recommendation: Pt. with known class 3 lupus nephritis 2/2 SLE. Pt. diagnosed with SLE 3 years ago.  Pt. underwent kidney biopsy (d/t proteinuria) on 3/15/19 which confirmed class 3 lupus nephritis without cellular crescents however with 10 % global glomerulosclerosis in sampled glomeruli. Pt. was then started on MMF 1 G BID by his rheumatologist. Recommend continuation of immunosuppression.  complete steroid pulse and infectious prophylaxis. Monitor labs.      Problem/Recommendation - 4:  ·  Problem: HTN (hypertension).  Recommendation: Pt. noted to have elevated BP in the setting of hypervolemia. Recommend continue Lasix 40 mg IV BID. Please start Labetalol 200  TID and requires BP control prior to biopsy . Low salt and restricted fluid diet. Monitor BP.

## 2019-12-30 NOTE — PROGRESS NOTE ADULT - SUBJECTIVE AND OBJECTIVE BOX
Patient is a 24y old  Male who presents with a chief complaint of LE edema, b/l intermittent flank pain (29 Dec 2019 10:58)        SUBJECTIVE / OVERNIGHT EVENTS:      MEDICATIONS  (STANDING):  enoxaparin Injectable 40 milliGRAM(s) SubCutaneous daily  furosemide   Injectable 40 milliGRAM(s) IV Push two times a day  influenza   Vaccine 0.5 milliLiter(s) IntraMuscular once  methylPREDNISolone sodium succinate Injectable 50 milliGRAM(s) IV Push two times a day  mycophenolate mofetil 1000 milliGRAM(s) Oral two times a day  pantoprazole    Tablet 40 milliGRAM(s) Oral before breakfast  polyethylene glycol 3350 17 Gram(s) Oral daily  trimethoprim  160 mG/sulfamethoxazole 800 mG 1 Tablet(s) Oral <User Schedule>    MEDICATIONS  (PRN):  acetaminophen   Tablet .. 650 milliGRAM(s) Oral every 6 hours PRN Mild Pain (1 - 3), Moderate Pain (4 - 6)      Vital Signs Last 24 Hrs  T(C): 36.5 (30 Dec 2019 05:27), Max: 36.6 (29 Dec 2019 12:09)  T(F): 97.7 (30 Dec 2019 05:27), Max: 97.8 (29 Dec 2019 12:09)  HR: 94 (30 Dec 2019 06:41) (88 - 103)  BP: 171/97 (30 Dec 2019 08:52) (151/107 - 178/120)  BP(mean): --  RR: 18 (30 Dec 2019 05:27) (18 - 18)  SpO2: 97% (30 Dec 2019 05:27) (95% - 98%)  CAPILLARY BLOOD GLUCOSE        I&O's Summary    29 Dec 2019 07:01  -  30 Dec 2019 07:00  --------------------------------------------------------  IN: 1210 mL / OUT: 2850 mL / NET: -1640 mL          PHYSICAL EXAM  GENERAL: NAD, well-developed  HEAD:  Atraumatic, Normocephalic  EYES: EOMI, PERRLA, conjunctiva and sclera clear  NECK: Supple, No JVD  CHEST/LUNG: Clear to auscultation bilaterally; No wheeze  HEART: Regular rate and rhythm; No murmurs, rubs, or gallops  ABDOMEN: Soft, Nontender, Nondistended; Bowel sounds present  EXTREMITIES:  2+ Peripheral Pulses, No clubbing, cyanosis, or edema  PSYCH: AAOx3  SKIN: No rashes or lesions    LABS:                        12.1   7.68  )-----------( 180      ( 29 Dec 2019 06:35 )             36.6     12-    142  |  110<H>  |  16  ----------------------------<  90  3.8   |  20<L>  |  1.08    Ca    7.7<L>      29 Dec 2019 06:35  Phos  3.6       Mg     1.8         TPro  5.2<L>  /  Alb  2.1<L>  /  TBili  0.3  /  DBili  x   /  AST  26  /  ALT  15  /  AlkPhos  69  12-          Urinalysis Basic - ( 28 Dec 2019 16:30 )    Color: YELLOW / Appearance: Lt TURBID / S.022 / pH: 6.5  Gluc: NEGATIVE / Ketone: NEGATIVE  / Bili: NEGATIVE / Urobili: NORMAL   Blood: MODERATE / Protein: 600 / Nitrite: NEGATIVE   Leuk Esterase: NEGATIVE / RBC: >50 / WBC 11-25   Sq Epi: MODERATE / Non Sq Epi: x / Bacteria: FEW        RADIOLOGY & ADDITIONAL TESTS:    Imaging Personally Reviewed:  Consultant(s) Notes Reviewed:    Care Discussed with Consultants/Other Providers: Patient is a 24y old  Male who presents with a chief complaint of LE edema, b/l intermittent flank pain (29 Dec 2019 10:58)      SUBJECTIVE / OVERNIGHT EVENTS:  Patient seen and examined in the morning. Patient's mother present at bedside. He reports intermittent left foot pain on the plantar surface, but not currently present. Patient with b/l LE edema, L slightly more than the right. He states his been urinating more with the IV Lasix but has been constipated. No headache, chest pain, SOB or abdominal pain.       MEDICATIONS  (STANDING):  enoxaparin Injectable 40 milliGRAM(s) SubCutaneous daily  furosemide   Injectable 40 milliGRAM(s) IV Push two times a day  influenza   Vaccine 0.5 milliLiter(s) IntraMuscular once  methylPREDNISolone sodium succinate Injectable 50 milliGRAM(s) IV Push two times a day  mycophenolate mofetil 1000 milliGRAM(s) Oral two times a day  pantoprazole    Tablet 40 milliGRAM(s) Oral before breakfast  polyethylene glycol 3350 17 Gram(s) Oral daily  trimethoprim  160 mG/sulfamethoxazole 800 mG 1 Tablet(s) Oral <User Schedule>    MEDICATIONS  (PRN):  acetaminophen   Tablet .. 650 milliGRAM(s) Oral every 6 hours PRN Mild Pain (1 - 3), Moderate Pain (4 - 6)      Vital Signs Last 24 Hrs  T(C): 36.5 (30 Dec 2019 05:27), Max: 36.6 (29 Dec 2019 12:09)  T(F): 97.7 (30 Dec 2019 05:27), Max: 97.8 (29 Dec 2019 12:09)  HR: 94 (30 Dec 2019 06:41) (88 - 103)  BP: 171/97 (30 Dec 2019 08:52) (151/107 - 178/120)  BP(mean): --  RR: 18 (30 Dec 2019 05:27) (18 - 18)  SpO2: 97% (30 Dec 2019 05:27) (95% - 98%)          I&O's Summary    29 Dec 2019 07:01  -  30 Dec 2019 07:00  --------------------------------------------------------  IN: 1210 mL / OUT: 2850 mL / NET: -1640 mL          PHYSICAL EXAM  GENERAL: NAD, well-appearing, obese  CHEST/LUNG: Clear to auscultation bilaterally; No wheeze  HEART: Regular rate and rhythm; No murmurs, rubs, or gallops  ABDOMEN: Soft, Nontender, Nondistended; Bowel sounds present  EXTREMITIES:  2+ Peripheral Pulses, No clubbing, cyanosis. Significant 3+ pedal edema, L>R. 1+ pitting edema of the legs b/l. No point tenderness or erythema of left foot. ROM intact in all 4 extremities  PSYCH: AAOx3  SKIN: No rashes or lesions      LABS:                        12.1   7.68  )-----------( 180      ( 29 Dec 2019 06:35 )             36.6         142  |  110<H>  |  16  ----------------------------<  90  3.8   |  20<L>  |  1.08    Ca    7.7<L>      29 Dec 2019 06:35  Phos  3.6       Mg     1.8         TPro  5.2<L>  /  Alb  2.1<L>  /  TBili  0.3  /  DBili  x   /  AST  26  /  ALT  15  /  AlkPhos  69        Urinalysis Basic - ( 28 Dec 2019 16:30 )    Color: YELLOW / Appearance: Lt TURBID / S.022 / pH: 6.5  Gluc: NEGATIVE / Ketone: NEGATIVE  / Bili: NEGATIVE / Urobili: NORMAL   Blood: MODERATE / Protein: 600 / Nitrite: NEGATIVE   Leuk Esterase: NEGATIVE / RBC: >50 / WBC 11-25   Sq Epi: MODERATE / Non Sq Epi: x / Bacteria: FEW        RADIOLOGY & ADDITIONAL TESTS:      EXAM:  US KIDNEYS AND BLADDER      FINDINGS:  Right kidney:  10.9 cm. No renal mass, hydronephrosis or calculi.    Left kidney:  11.8 cm. No renal mass, hydronephrosis or calculi.    Urinary bladder: Collapsed.    IMPRESSION:   Normal renal ultrasound.        Consultant(s) Notes Reviewed:  Yes  Care Discussed with Consultants/Other Providers: Yes, Dr. Luis (nephrology)

## 2019-12-30 NOTE — PROGRESS NOTE ADULT - PROBLEM SELECTOR PLAN 1
Suspect acute flare given elevated dsDNA and low complement levels. Last bx done on March 2019. Renal following,  - c/w lasix 40mg IV BID; monitor I&Os  - continue holding lisinopril as per renal. Avoid nephrotoxins, such as   - will d/w renal for planfor renal Bx   - Lab work ordered as per renal, unable to find order for anti-PLA2R ab   - Ordered US renal, as per US tech they do not perform renal duplex, ordered [facility] duplex of abdomen and specified for renal vein/artery duplex,   Rheum eval appreciated : recommend c/w MMF 1 gm BID , f/u Ds DNA and Renal duplex to r/o SVT Suspect acute flare given elevated dsDNA and low complement levels. Significant proteinuria on UA. Last bx done on March 2019. 12/30 Renal US unremarkable. Renal following, recs appreciated.  - c/w lasix 40mg IV BID; monitor I&Os  - continue holding lisinopril as per renal. Avoid nephrotoxins, such as NSAIDs  - planned for IR-guided renal biopsy tomorrow   - pending [facility] duplex of abdomen for renal vein/artery assessment prior to biopsy  -  c/w MMF 1 gm BID   - c/w methylprednisolone 50mg IV BID  - c/w pantoprazole for GI ppx and Bactrim DS for PCP ppx

## 2019-12-30 NOTE — PROGRESS NOTE ADULT - PROBLEM SELECTOR PROBLEM 2
Systemic lupus erythematosus, unspecified SLE type, unspecified organ involvement status Hypertension, unspecified type

## 2019-12-30 NOTE — PROGRESS NOTE ADULT - SUBJECTIVE AND OBJECTIVE BOX
[facility] Division of Kidney Diseases & Hypertension  FOLLOW UP NOTE  [phone]--------------------------------------------------------------------------------  Chief Complaint:Localized edema    HPI: 25 yo M with known class 3 lupus nephritis 2/2 SLE is admitted to [facility] with b/l LE swelling and intermittent sharp low back pain. On admission (12/28/19), pt. found to have a Scr of 1.09, serum albumin of 2.1, an UA significant for moderate blood/RBCs and 600 protein. Nephrology team was consulted for lupus nephritis and volume overload. Pt. states that he was first diagnosed with SLE about 3 years ago, and was diagnosed with lupus nephritis in March 2019. Pt. was subsequently started on MMF 1G BID by his rheumatologist, and lisinopril 5 mg daily. Pt. was stopped of his lisinopril due to improvement of his BP. Pt. also states that he has gained a significant amount of weight in the past several months and feels that his face is swollen. [facility]. with normal Scr, last found to be 0.76 on 7/30/19. Pt. with history of proteinuria, with last spot urine TP/CR on 7/31/19 was 0.3, however was noted to be as high as 2.1 on 3/11/19. Pt. underwent kidney biopsy on 3/15/19 which confirmed class 3 lupus nephritis without cellular crescents however with 10 % global glomerulosclerosis in sampled glomeruli.    Patient seen and examined at bedside. States he has foot pain and some swelling in the legs as well but otherwise asymptomatic. Labs, vitals, and medications reviewed. Vital signs show patient is hypertensive. Labs show stable creatinine and stale electrolytes.        PAST HISTORY  --------------------------------------------------------------------------------  No significant changes to PMH, PSH, FHx, SHx, unless otherwise noted    ALLERGIES & MEDICATIONS  --------------------------------------------------------------------------------  Allergies    No Known Allergies    Intolerances      Standing Inpatient Medications  enoxaparin Injectable 40 milliGRAM(s) SubCutaneous daily  furosemide   Injectable 40 milliGRAM(s) IV Push two times a day  influenza   Vaccine 0.5 milliLiter(s) IntraMuscular once  methylPREDNISolone sodium succinate Injectable 50 milliGRAM(s) IV Push two times a day  mycophenolate mofetil 1000 milliGRAM(s) Oral two times a day  pantoprazole    Tablet 40 milliGRAM(s) Oral before breakfast  polyethylene glycol 3350 17 Gram(s) Oral daily  trimethoprim  160 mG/sulfamethoxazole 800 mG 1 Tablet(s) Oral <User Schedule>    PRN Inpatient Medications  acetaminophen   Tablet .. 650 milliGRAM(s) Oral every 6 hours PRN      REVIEW OF SYSTEMS  --------------------------------------------------------------------------------  Gen: No  fevers/chills  Head/Eyes/Ears/Mouth: No headache  Respiratory: No dyspnea  CV: No chest pain  GI: No abdominal pain, diarrhea, constipation, nausea, vomiting  MSK: Pedal edema and pain in left foot.  Neuro: No dizziness/lightheadedness      All other systems were reviewed and are negative, except as noted.    VITALS/PHYSICAL EXAM  --------------------------------------------------------------------------------  T(C): 36.5 (12-30-19 @ 05:27), Max: 36.5 (12-30-19 @ 05:27)  HR: 94 (12-30-19 @ 06:41) (91 - 103)  BP: 171/97 (12-30-19 @ 08:52) (151/107 - 178/120)  RR: 18 (12-30-19 @ 05:27) (18 - 18)  SpO2: 97% (12-30-19 @ 05:27) (95% - 97%)  Wt(kg): --  Height (cm): 172.7 (12-28-19 @ 20:32)  Weight (kg): 110.3 (12-28-19 @ 20:32)  BMI (kg/m2): 37 (12-28-19 @ 20:32)  BSA (m2): 2.22 (12-28-19 @ 20:32)      12-29-19 @ 07:01  -  12-30-19 @ 07:00  --------------------------------------------------------  IN: 1210 mL / OUT: 2850 mL / NET: -1640 mL      Physical Exam:  	Gen: NAD, well-appearing  	HEENT: Anicteric  	Pulm: CTA B/L  	CV: RRR, S1S2;  	Abd: +BS, soft, nondistended              Extremities: significant pedal edema noted L > R              Neuro: No focal deficits, intact gait  	Skin: Warm  	Vascular: No cyanosis    LABS/STUDIES  --------------------------------------------------------------------------------              12.1   7.68  >-----------<  180      [12-29-19 @ 06:35]              36.6     142  |  110  |  16  ----------------------------<  90      [12-29-19 @ 06:35]  3.8   |  20  |  1.08        Ca     7.7     [12-29-19 @ 06:35]      Mg     1.8     [12-29-19 @ 06:35]      Phos  3.6     [12-29-19 @ 06:35]    TPro  5.2  /  Alb  2.1  /  TBili  0.3  /  DBili  x   /  AST  26  /  ALT  15  /  AlkPhos  69  [12-28-19 @ 16:30]        Uric acid 9.4      [12-29-19 @ 06:35]    Creatinine Trend:  SCr 1.08 [12-29 @ 06:35]  SCr 1.09 [12-28 @ 16:30]    Urinalysis - [12-28-19 @ 16:30]      Color YELLOW / Appearance Lt TURBID / SG 1.022 / pH 6.5      Gluc NEGATIVE / Ketone NEGATIVE  / Bili NEGATIVE / Urobili NORMAL       Blood MODERATE / Protein 600 / Leuk Est NEGATIVE / Nitrite NEGATIVE      RBC >50 / WBC 11-25 / Hyaline 2+ / Gran  / Sq Epi MODERATE / Non Sq Epi  / Bacteria FEW    Urine Creatinine 139.10      [12-28-19 @ 20:02]  Urine Protein > 600      [12-28-19 @ 20:02]      HBsAb Indeterminate Test repeated.      [12-29-19 @ 06:35]  HCV 0.23, Nonreactive Hepatitis C AB  S/CO Ratio                        Interpretation  < 1.00                                   Non-Reactive  1.00 - 4.99                         Weakly-Reactive  >= 5.00                                Reactive  Non-Reactive: Aperson with a non-reactive HCV antibody  result is considered uninfected.  No further action is  needed unless recent infection is suspected.  In these  cases, consider repeat testing later to detect  seroconversion..  Weakly-Reactive: HCV antibody test is abnormal, HCV RNA  Qualitative test will follow.  Reactive: HCV antibody test is abnormal, HCV RNA  Qualitative test will follow.  Note: HCV antibody testing is performed on the [employer] system.      [12-29-19 @ 06:35]    C3 Complement 41.2      [12-29-19 @ 06:35]  C4 Complement 4.9      [12-29-19 @ 06:35] [facility] Division of Kidney Diseases & Hypertension  FOLLOW UP NOTE  [phone]--------------------------------------------------------------------------------  Chief Complaint:Localized edema    HPI: 23 yo M with known class 3 lupus nephritis 2/2 SLE is admitted to [facility] with b/l LE swelling and intermittent sharp low back pain. On admission (12/28/19), pt. found to have a Scr of 1.09, serum albumin of 2.1, an UA significant for moderate blood/RBCs and 600 protein. Nephrology team was consulted for lupus nephritis and volume overload. Pt. states that he was first diagnosed with SLE about 3 years ago, and was diagnosed with lupus nephritis in March 2019. Pt. was subsequently started on MMF 1G BID by his rheumatologist, and lisinopril 5 mg daily. Pt. was stopped of his lisinopril due to improvement of his BP. Pt. also states that he has gained a significant amount of weight in the past several months and feels that his face is swollen. [facility] pt. with normal Scr, last found to be 0.76 on 7/30/19. Pt. with history of proteinuria, with last spot urine TP/CR on 7/31/19 was 0.3, however was noted to be as high as 2.1 on 3/11/19. Pt. underwent kidney biopsy on 3/15/19 which confirmed class 3 lupus nephritis without cellular crescents however with 10 % global glomerulosclerosis in sampled glomeruli.    Patient seen and examined at bedside. States he has foot pain and some swelling in the legs as well but otherwise asymptomatic. Labs, vitals, and medications reviewed. Vital signs show patient is hypertensive. Labs show stable creatinine and stale electrolytes.        PAST HISTORY  --------------------------------------------------------------------------------  No significant changes to PMH, PSH, FHx, SHx, unless otherwise noted    ALLERGIES & MEDICATIONS  --------------------------------------------------------------------------------  Allergies    No Known Allergies    Intolerances      Standing Inpatient Medications  enoxaparin Injectable 40 milliGRAM(s) SubCutaneous daily  furosemide   Injectable 40 milliGRAM(s) IV Push two times a day  influenza   Vaccine 0.5 milliLiter(s) IntraMuscular once  methylPREDNISolone sodium succinate Injectable 50 milliGRAM(s) IV Push two times a day  mycophenolate mofetil 1000 milliGRAM(s) Oral two times a day  pantoprazole    Tablet 40 milliGRAM(s) Oral before breakfast  polyethylene glycol 3350 17 Gram(s) Oral daily  trimethoprim  160 mG/sulfamethoxazole 800 mG 1 Tablet(s) Oral <User Schedule>    PRN Inpatient Medications  acetaminophen   Tablet .. 650 milliGRAM(s) Oral every 6 hours PRN      REVIEW OF SYSTEMS  --------------------------------------------------------------------------------  Gen: No  fevers/chills  Head/Eyes/Ears/Mouth: No headache  Respiratory: No dyspnea  CV: No chest pain  GI: No abdominal pain, diarrhea, constipation, nausea, vomiting  MSK: Pedal edema and pain in left foot.  Neuro: No dizziness/lightheadedness    All other systems were reviewed and are negative, except as noted.    VITALS/PHYSICAL EXAM  --------------------------------------------------------------------------------  T(C): 36.5 (12-30-19 @ 05:27), Max: 36.5 (12-30-19 @ 05:27)  HR: 94 (12-30-19 @ 06:41) (91 - 103)  BP: 171/97 (12-30-19 @ 08:52) (151/107 - 178/120)  RR: 18 (12-30-19 @ 05:27) (18 - 18)  SpO2: 97% (12-30-19 @ 05:27) (95% - 97%)  Height (cm): 172.7 (12-28-19 @ 20:32)  Weight (kg): 110.3 (12-28-19 @ 20:32)  BMI (kg/m2): 37 (12-28-19 @ 20:32)  BSA (m2): 2.22 (12-28-19 @ 20:32)      12-29-19 @ 07:01  -  12-30-19 @ 07:00  --------------------------------------------------------  IN: 1210 mL / OUT: 2850 mL / NET: -1640 mL      Physical Exam:  	Gen: NAD, well-appearing  	HEENT: Anicteric  	Pulm: CTA B/L  	CV: RRR, S1S2;  	Abd: +BS, soft, nondistended              Extremities: significant pedal edema noted L > R              Neuro: No focal deficits, intact gait  	Skin: Warm  	Vascular: No cyanosis    LABS/STUDIES  --------------------------------------------------------------------------------              12.1   7.68  >-----------<  180      [12-29-19 @ 06:35]              36.6     142  |  110  |  16  ----------------------------<  90      [12-29-19 @ 06:35]  3.8   |  20  |  1.08        Ca     7.7     [12-29-19 @ 06:35]      Mg     1.8     [12-29-19 @ 06:35]      Phos  3.6     [12-29-19 @ 06:35]    TPro  5.2  /  Alb  2.1  /  TBili  0.3  /  DBili  x   /  AST  26  /  ALT  15  /  AlkPhos  69  [12-28-19 @ 16:30]        Uric acid 9.4      [12-29-19 @ 06:35]    Creatinine Trend:  SCr 1.08 [12-29 @ 06:35]  SCr 1.09 [12-28 @ 16:30]    Urinalysis - [12-28-19 @ 16:30]      Color YELLOW / Appearance Lt TURBID / SG 1.022 / pH 6.5      Gluc NEGATIVE / Ketone NEGATIVE  / Bili NEGATIVE / Urobili NORMAL       Blood MODERATE / Protein 600 / Leuk Est NEGATIVE / Nitrite NEGATIVE      RBC >50 / WBC 11-25 / Hyaline 2+ / Gran  / Sq Epi MODERATE / Non Sq Epi  / Bacteria FEW    Urine Creatinine 139.10      [12-28-19 @ 20:02]  Urine Protein > 600      [12-28-19 @ 20:02]      HBsAb Indeterminate Test repeated.      [12-29-19 @ 06:35]  HCV 0.23, Nonreactive Hepatitis C AB  S/CO Ratio                        Interpretation  < 1.00                                   Non-Reactive  1.00 - 4.99                         Weakly-Reactive  >= 5.00                                Reactive  Non-Reactive: Aperson with a non-reactive HCV antibody  result is considered uninfected.  No further action is  needed unless recent infection is suspected.  In these  cases, consider repeat testing later to detect  seroconversion..  Weakly-Reactive: HCV antibody test is abnormal, HCV RNA  Qualitative test will follow.  Reactive: HCV antibody test is abnormal, HCV RNA  Qualitative test will follow.  Note: HCV antibody testing is performed on the [employer] system.      [12-29-19 @ 06:35]    C3 Complement 41.2      [12-29-19 @ 06:35]  C4 Complement 4.9      [12-29-19 @ 06:35]

## 2019-12-31 LAB
ANA PAT FLD IF-IMP: SIGNIFICANT CHANGE UP
ANA TITR SER: SIGNIFICANT CHANGE UP
ANION GAP SERPL CALC-SCNC: 11 MMO/L — SIGNIFICANT CHANGE UP (ref 7–14)
APTT BLD: 29.3 SEC — SIGNIFICANT CHANGE UP (ref 27.5–36.3)
BUN SERPL-MCNC: 34 MG/DL — HIGH (ref 7–23)
CALCIUM SERPL-MCNC: 7.6 MG/DL — LOW (ref 8.4–10.5)
CHLORIDE SERPL-SCNC: 109 MMOL/L — HIGH (ref 98–107)
CO2 SERPL-SCNC: 22 MMOL/L — SIGNIFICANT CHANGE UP (ref 22–31)
CREAT SERPL-MCNC: 1.3 MG/DL — SIGNIFICANT CHANGE UP (ref 0.5–1.3)
GLUCOSE SERPL-MCNC: 155 MG/DL — HIGH (ref 70–99)
HCT VFR BLD CALC: 40.4 % — SIGNIFICANT CHANGE UP (ref 39–50)
HGB BLD-MCNC: 13.7 G/DL — SIGNIFICANT CHANGE UP (ref 13–17)
INR BLD: 0.98 — SIGNIFICANT CHANGE UP (ref 0.88–1.17)
MAGNESIUM SERPL-MCNC: 2 MG/DL — SIGNIFICANT CHANGE UP (ref 1.6–2.6)
MCHC RBC-ENTMCNC: 26.4 PG — LOW (ref 27–34)
MCHC RBC-ENTMCNC: 33.9 % — SIGNIFICANT CHANGE UP (ref 32–36)
MCV RBC AUTO: 77.8 FL — LOW (ref 80–100)
NRBC # FLD: 0 K/UL — SIGNIFICANT CHANGE UP (ref 0–0)
PHOSPHATE SERPL-MCNC: 4.2 MG/DL — SIGNIFICANT CHANGE UP (ref 2.5–4.5)
PLATELET # BLD AUTO: 247 K/UL — SIGNIFICANT CHANGE UP (ref 150–400)
PMV BLD: 11.6 FL — SIGNIFICANT CHANGE UP (ref 7–13)
POTASSIUM SERPL-MCNC: 4 MMOL/L — SIGNIFICANT CHANGE UP (ref 3.5–5.3)
POTASSIUM SERPL-SCNC: 4 MMOL/L — SIGNIFICANT CHANGE UP (ref 3.5–5.3)
PROTHROM AB SERPL-ACNC: 11.2 SEC — SIGNIFICANT CHANGE UP (ref 9.8–13.1)
RBC # BLD: 5.19 M/UL — SIGNIFICANT CHANGE UP (ref 4.2–5.8)
RBC # FLD: 14.6 % — HIGH (ref 10.3–14.5)
SODIUM SERPL-SCNC: 142 MMOL/L — SIGNIFICANT CHANGE UP (ref 135–145)
WBC # BLD: 13.56 K/UL — HIGH (ref 3.8–10.5)
WBC # FLD AUTO: 13.56 K/UL — HIGH (ref 3.8–10.5)

## 2019-12-31 PROCEDURE — 77012 CT SCAN FOR NEEDLE BIOPSY: CPT | Mod: 26

## 2019-12-31 PROCEDURE — 99233 SBSQ HOSP IP/OBS HIGH 50: CPT

## 2019-12-31 PROCEDURE — 50200 RENAL BIOPSY PERQ: CPT | Mod: LT

## 2019-12-31 PROCEDURE — 99233 SBSQ HOSP IP/OBS HIGH 50: CPT | Mod: GC

## 2019-12-31 RX ORDER — FUROSEMIDE 40 MG
40 TABLET ORAL DAILY
Refills: 0 | Status: DISCONTINUED | OUTPATIENT
Start: 2019-12-31 | End: 2020-01-02

## 2019-12-31 RX ORDER — MYCOPHENOLATE MOFETIL 250 MG/1
1500 CAPSULE ORAL
Refills: 0 | Status: DISCONTINUED | OUTPATIENT
Start: 2019-12-31 | End: 2020-01-02

## 2019-12-31 RX ADMIN — Medication 200 MILLIGRAM(S): at 21:30

## 2019-12-31 RX ADMIN — MYCOPHENOLATE MOFETIL 1000 MILLIGRAM(S): 250 CAPSULE ORAL at 05:38

## 2019-12-31 RX ADMIN — Medication 40 MILLIGRAM(S): at 09:07

## 2019-12-31 RX ADMIN — PANTOPRAZOLE SODIUM 40 MILLIGRAM(S): 20 TABLET, DELAYED RELEASE ORAL at 05:38

## 2019-12-31 RX ADMIN — Medication 200 MILLIGRAM(S): at 14:18

## 2019-12-31 RX ADMIN — Medication 108 MILLIGRAM(S): at 14:11

## 2019-12-31 RX ADMIN — MYCOPHENOLATE MOFETIL 1500 MILLIGRAM(S): 250 CAPSULE ORAL at 17:23

## 2019-12-31 RX ADMIN — Medication 50 MILLIGRAM(S): at 05:37

## 2019-12-31 NOTE — PROGRESS NOTE ADULT - ASSESSMENT
Mr. Yoo is a 23 yo man with SLE c/b lupus nephritis admitted for likely lupus nephritis flare with noted poorly controlled BP, improved with initiation of lasix and labetalol.

## 2019-12-31 NOTE — PROGRESS NOTE ADULT - SUBJECTIVE AND OBJECTIVE BOX
DANUTA SCRUGGS  [id number]    INTERVAL HPI/OVERNIGHT EVENTS:        MEDICATIONS  (STANDING):  enoxaparin Injectable 40 milliGRAM(s) SubCutaneous daily  furosemide   Injectable 40 milliGRAM(s) IV Push two times a day  influenza   Vaccine 0.5 milliLiter(s) IntraMuscular once  labetalol 200 milliGRAM(s) Oral every 8 hours  methylPREDNISolone sodium succinate Injectable 50 milliGRAM(s) IV Push two times a day  mycophenolate mofetil 1000 milliGRAM(s) Oral two times a day  pantoprazole    Tablet 40 milliGRAM(s) Oral before breakfast  polyethylene glycol 3350 17 Gram(s) Oral daily  trimethoprim  160 mG/sulfamethoxazole 800 mG 1 Tablet(s) Oral <User Schedule>    MEDICATIONS  (PRN):  acetaminophen   Tablet .. 650 milliGRAM(s) Oral every 6 hours PRN Mild Pain (1 - 3), Moderate Pain (4 - 6)      Allergies    No Known Allergies    Intolerances    Vital Signs Last 24 Hrs  T(C): 36.6 (31 Dec 2019 05:27), Max: 36.7 (30 Dec 2019 22:08)  T(F): 97.8 (31 Dec 2019 05:27), Max: 98 (30 Dec 2019 22:08)  HR: 80 (31 Dec 2019 08:57) (80 - 95)  BP: 148/97 (31 Dec 2019 08:57) (121/63 - 148/97)  BP(mean): --  RR: 16 (31 Dec 2019 08:57) (16 - 19)  SpO2: 95% (31 Dec 2019 08:57) (95% - 98%)    Physical Exam:  General: NAD  HEENT: EOMI, MMM  Cardio: +S1/S2, RRR  Resp: CTA b/l  GI: +BS, soft, NT/ND  MSK:  Neuro: AAOx3  Psych: wnl    LABS:                        13.7   13.56 )-----------( 247      ( 31 Dec 2019 06:08 )             40.4     12-31    142  |  109<H>  |  34<H>  ----------------------------<  155<H>  4.0   |  22  |  1.30    Ca    7.6<L>      31 Dec 2019 06:08  Phos  4.2     12-31  Mg     2.0     12-31      PT/INR - ( 31 Dec 2019 06:08 )   PT: 11.2 SEC;   INR: 0.98          PTT - ( 31 Dec 2019 06:08 )  PTT:29.3 SEC        RADIOLOGY & ADDITIONAL TESTS:      Patient name: DANUTA SCRUGGS  Date of test: 12/31/2019  MR#: [id number]  [facility] #: [id number]    Location: [facility] Physician(s): , Rhona Kumar MD  Interpreted by: William Manrique MD, [facility], [facility], [facility]  Tech: Judy Granado MACKENZIE  Type of Test: Abdominal Aorta  ------------------------------------------------------------------------  Procedure: Real-time grayscale and color Duplex  ultrasonography was used to interrogate the abdominal  arterial system including the mesentric and renal  arteries.  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)  ------------------------------------------------------------------------  DIAMETERS:  ------------------------------------------------------------------------  Prox width: 1.0 cm  Prox AP: N/A cm  Mid width: N/A cm  Mid AP: N/A cm  Distal width: N/A cm  Distal AP: N/A cm  ------------------------------------------------------------------------  Left Iliac diameter: N/A cm  Right Iliac diameter: N/A cm  ------------------------------------------------------------------------  Largest aortic diameter: 1.0 cm  ------------------------------------------------------------------------  MESENTERIC VESSELS:  ------------------------------------------------------------------------  PSV:     Aorta: 87.0 cm/sec     Celiac Artery: N/A cm/sec     Superior messenteric artery: N/A cm/sec  ------------------------------------------------------------------------  EDV:      Aorta: 13.0 cm/sec      Celiac Artery: N/A cm/sec      Superior messentericartery: N/A cm/sec  ------------------------------------------------------------------------  RENAL ARTERY:  ------------------------------------------------------------------------  RIGHT:  ------------------------------------------------------------------------   PSV:     Proximal Vessel: 153.0 cm/sec     Mid. Vessel: 160.0 cm/sec     Dist. Vessel: 128.0 cm/sec  ------------------------------------------------------------------------   EDV:      Proximal Vessel: 61.0 cm/sec      Mid. Vessel: 71.0 cm/sec      Dist. Vessel: 46.0 cm/sec  ------------------------------------------------------------------------   RAR: 1.7  ------------------------------------------------------------------------  LEFT:  ------------------------------------------------------------------------   PSV:     Proximal Vessel: 137.0 cm/sec     Mid. Vessel: 147.0 cm/sec     Dist. Vessel: 110.0 cm/sec  ------------------------------------------------------------------------   EDV:      Proximal Vessel: 52.0 cm/sec  Mid. Vessel: 66.0 cm/sec      Dist. Vessel: 53.0 cm/sec  ------------------------------------------------------------------------   RAR: 1.5  ------------------------------------------------------------------------  KIDNEY:  ------------------------------------------------------------------------  RIGHT:  ------------------------------------------------------------------------    PSV: 63.0 cm/sec    EDV: 25.0 cm/sec    RI: 0.6    Length: 11.3 cm  ------------------------------------------------------------------------  LEFT:  ------------------------------------------------------------------------    PSV: 51.0 cm/sec    EDV: 28.0 cm/sec    RI: 0.4    Length: 11.3 cm  ------------------------------------------------------------------------  Right Findings: ---The right kidney measures 11.3 x 6.3 x  6.2 cm.  ---Proximal right renal artery:  cm/sec, EDV 61  cm/sec; mid right renal artery:  cm/sec, EDV 71  cm/sec; distal right renal artery:  cm/sec, EDV 46  cm/sec.  ---The renal-aortic ratio (RAR) is 1.7.  The resistive  index (RI) is 0.6.  ---The right renal vein appears patent, with normal phasic  Doppler waveforms and without obvious evidence of  thrombosis.  Left Findings: ---The left kidney measures 11.3 x 7.3 x  7.1 cm  ---Proximal left renal artery:  cm/sec, EDV 52  cm/sec; mid left renal artery:  cm/sec, EDV 66  cm/sec; distal left renal artery:  cm/sec, EDV 53  cm/sec.  ---The renal-aortic ratio (RAR) is 1.5.  The resistive  index (RI) is .45.  ---The left renal vein appears patent, with normal phasic  Doppler waveforms and without obvious evidence of  thrombosis.  ------------------------------------------------------------------------  Summary/Impressions:  1.  No evidence of hemodynamically significant stenoses in  the proximal right and left renal arteries.  2.  The right and left renal veins appear patent, without  obvious evidence of thrombosis.  ------------------------------------------------------------------------  Confirmed on  12/31/2019 - 10:22 AM by William Manrique MD,  [facility], [facility], [facility]  By signing this report, the attending physician certifies  that he or she has personally supervised and interpreted  the vascular study and has reviewed and or edited and  agreeswith the written comments contained within the  report. DANUTA SCRUGGS  [id number]    INTERVAL HPI/OVERNIGHT EVENTS:    Feels ok today, reports decreased swelling of feet. No other new complaints. s/p kidney re-biopsy today.    MEDICATIONS  (STANDING):  enoxaparin Injectable 40 milliGRAM(s) SubCutaneous daily  furosemide   Injectable 40 milliGRAM(s) IV Push two times a day  influenza   Vaccine 0.5 milliLiter(s) IntraMuscular once  labetalol 200 milliGRAM(s) Oral every 8 hours  methylPREDNISolone sodium succinate Injectable 50 milliGRAM(s) IV Push two times a day  mycophenolate mofetil 1000 milliGRAM(s) Oral two times a day  pantoprazole    Tablet 40 milliGRAM(s) Oral before breakfast  polyethylene glycol 3350 17 Gram(s) Oral daily  trimethoprim  160 mG/sulfamethoxazole 800 mG 1 Tablet(s) Oral <User Schedule>    MEDICATIONS  (PRN):  acetaminophen   Tablet .. 650 milliGRAM(s) Oral every 6 hours PRN Mild Pain (1 - 3), Moderate Pain (4 - 6)      Allergies    No Known Allergies    Intolerances    Vital Signs Last 24 Hrs  T(C): 36.6 (31 Dec 2019 05:27), Max: 36.7 (30 Dec 2019 22:08)  T(F): 97.8 (31 Dec 2019 05:27), Max: 98 (30 Dec 2019 22:08)  HR: 80 (31 Dec 2019 08:57) (80 - 95)  BP: 148/97 (31 Dec 2019 08:57) (121/63 - 148/97)  BP(mean): --  RR: 16 (31 Dec 2019 08:57) (16 - 19)  SpO2: 95% (31 Dec 2019 08:57) (95% - 98%)    Physical Exam:  General: No apparent distress  HEENT: EOMI, MMM  CVS: +S1/S2, RRR, no murmurs/rubs/gallops  Resp: CTA b/l. No crackles/wheezing  GI: Soft, NT/ND +BS  MSK: No synovitis.  Neuro: AAOx3  Skin: no visible rashes  LE : + pitting edema    LABS:                        13.7   13.56 )-----------( 247      ( 31 Dec 2019 06:08 )             40.4     12-31    142  |  109<H>  |  34<H>  ----------------------------<  155<H>  4.0   |  22  |  1.30    Ca    7.6<L>      31 Dec 2019 06:08  Phos  4.2     12-31  Mg     2.0     12-31      PT/INR - ( 31 Dec 2019 06:08 )   PT: 11.2 SEC;   INR: 0.98          PTT - ( 31 Dec 2019 06:08 )  PTT:29.3 SEC        RADIOLOGY & ADDITIONAL TESTS:      Patient name: DANUTA SCRUGGS  Date of test: 12/31/2019  MR#: [id number]  [facility] #: [id number]    Location: [facility] Physician(s): , Rhona Kumar MD  Interpreted by: William Manrique MD, [facility], [facility]  Tech: Judy Granado MACKENZIE  Type of Test: Abdominal Aorta  ------------------------------------------------------------------------  Procedure: Real-time grayscale and color Duplex  ultrasonography was used to interrogate the abdominal  arterial system including the mesentric and renal  arteries.  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)  ------------------------------------------------------------------------  DIAMETERS:  ------------------------------------------------------------------------  Prox width: 1.0 cm  Prox AP: N/A cm  Mid width: N/A cm  Mid AP: N/A cm  Distal width: N/A cm  Distal AP: N/A cm  ------------------------------------------------------------------------  Left Iliac diameter: N/A cm  Right Iliac diameter: N/A cm  ------------------------------------------------------------------------  Largest aortic diameter: 1.0 cm  ------------------------------------------------------------------------  MESENTERIC VESSELS:  ------------------------------------------------------------------------  PSV:     Aorta: 87.0 cm/sec     Celiac Artery: N/A cm/sec     Superior messenteric artery: N/A cm/sec  ------------------------------------------------------------------------  EDV:      Aorta: 13.0 cm/sec      Celiac Artery: N/A cm/sec      Superior messentericartery: N/A cm/sec  ------------------------------------------------------------------------  RENAL ARTERY:  ------------------------------------------------------------------------  RIGHT:  ------------------------------------------------------------------------   PSV:     Proximal Vessel: 153.0 cm/sec     Mid. Vessel: 160.0 cm/sec     Dist. Vessel: 128.0 cm/sec  ------------------------------------------------------------------------   EDV:      Proximal Vessel: 61.0 cm/sec      Mid. Vessel: 71.0 cm/sec      Dist. Vessel: 46.0 cm/sec  ------------------------------------------------------------------------   RAR: 1.7  ------------------------------------------------------------------------  LEFT:  ------------------------------------------------------------------------   PSV:     Proximal Vessel: 137.0 cm/sec     Mid. Vessel: 147.0 cm/sec     Dist. Vessel: 110.0 cm/sec  ------------------------------------------------------------------------   EDV:      Proximal Vessel: 52.0 cm/sec  Mid. Vessel: 66.0 cm/sec      Dist. Vessel: 53.0 cm/sec  ------------------------------------------------------------------------   RAR: 1.5  ------------------------------------------------------------------------  KIDNEY:  ------------------------------------------------------------------------  RIGHT:  ------------------------------------------------------------------------    PSV: 63.0 cm/sec    EDV: 25.0 cm/sec    RI: 0.6    Length: 11.3 cm  ------------------------------------------------------------------------  LEFT:  ------------------------------------------------------------------------    PSV: 51.0 cm/sec    EDV: 28.0 cm/sec    RI: 0.4    Length: 11.3 cm  ------------------------------------------------------------------------  Right Findings: ---The right kidney measures 11.3 x 6.3 x  6.2 cm.  ---Proximal right renal artery:  cm/sec, EDV 61  cm/sec; mid right renal artery:  cm/sec, EDV 71  cm/sec; distal right renal artery:  cm/sec, EDV 46  cm/sec.  ---The renal-aortic ratio (RAR) is 1.7.  The resistive  index (RI) is 0.6.  ---The right renal vein appears patent, with normal phasic  Doppler waveforms and without obvious evidence of  thrombosis.  Left Findings: ---The left kidney measures 11.3 x 7.3 x  7.1 cm  ---Proximal left renal artery:  cm/sec, EDV 52  cm/sec; mid left renal artery:  cm/sec, EDV 66  cm/sec; distal left renal artery:  cm/sec, EDV 53  cm/sec.  ---The renal-aortic ratio (RAR) is 1.5.  The resistive  index (RI) is .45.  ---The left renal vein appears patent, with normal phasic  Doppler waveforms and without obvious evidence of  thrombosis.  ------------------------------------------------------------------------  Summary/Impressions:  1.  No evidence of hemodynamically significant stenoses in  the proximal right and left renal arteries.  2.  The right and left renal veins appear patent, without  obvious evidence of thrombosis.  ------------------------------------------------------------------------  Confirmed on  12/31/2019 - 10:22 AM by William Manrique MD,  [facility], [facility], [facility]  By signing this report, the attending physician certifies  that he or she has personally supervised and interpreted  the vascular study and has reviewed and or edited and  agreeswith the written comments contained within the  report. DANUTA SCRUGGS  [id number]    INTERVAL HPI/OVERNIGHT EVENTS:    Feels ok today, reports decreased swelling of feet. No other new complaints. s/p kidney re-biopsy today.    MEDICATIONS  (STANDING):  enoxaparin Injectable 40 milliGRAM(s) SubCutaneous daily  furosemide   Injectable 40 milliGRAM(s) IV Push two times a day  influenza   Vaccine 0.5 milliLiter(s) IntraMuscular once  labetalol 200 milliGRAM(s) Oral every 8 hours  methylPREDNISolone sodium succinate Injectable 50 milliGRAM(s) IV Push two times a day  mycophenolate mofetil 1000 milliGRAM(s) Oral two times a day  pantoprazole    Tablet 40 milliGRAM(s) Oral before breakfast  polyethylene glycol 3350 17 Gram(s) Oral daily  trimethoprim  160 mG/sulfamethoxazole 800 mG 1 Tablet(s) Oral <User Schedule>    MEDICATIONS  (PRN):  acetaminophen   Tablet .. 650 milliGRAM(s) Oral every 6 hours PRN Mild Pain (1 - 3), Moderate Pain (4 - 6)      Allergies    No Known Allergies    Intolerances    Vital Signs Last 24 Hrs  T(C): 36.6 (31 Dec 2019 05:27), Max: 36.7 (30 Dec 2019 22:08)  T(F): 97.8 (31 Dec 2019 05:27), Max: 98 (30 Dec 2019 22:08)  HR: 80 (31 Dec 2019 08:57) (80 - 95)  BP: 148/97 (31 Dec 2019 08:57) (121/63 - 148/97)  BP(mean): --  RR: 16 (31 Dec 2019 08:57) (16 - 19)  SpO2: 95% (31 Dec 2019 08:57) (95% - 98%)    Physical Exam:  General: No apparent distress  HEENT: EOMI, MMM  CVS: +S1/S2, RRR, no murmurs/rubs/gallops  Resp: CTA b/l. No crackles/wheezing  GI: Soft, NT/ND +BS  MSK: No synovitis.  Neuro: AAOx3  Skin: no visible rashes  LE : + pitting edema    LABS:                        13.7   13.56 )-----------( 247      ( 31 Dec 2019 06:08 )             40.4     12-31    142  |  109<H>  |  34<H>  ----------------------------<  155<H>  4.0   |  22  |  1.30    Ca    7.6<L>      31 Dec 2019 06:08  Phos  4.2     12-31  Mg     2.0     12-31      PT/INR - ( 31 Dec 2019 06:08 )   PT: 11.2 SEC;   INR: 0.98          PTT - ( 31 Dec 2019 06:08 )  PTT:29.3 SEC        RADIOLOGY & ADDITIONAL TESTS:      Patient name: DANUTA SCRUGGS  Date of test: 12/31/2019  MR#: [id number]  [facility] #: [id number]    Location: [facility] Physician(s): , Rhona Kumar MD  Interpreted by: William Manrique MD, [facility], [facility]  Tech: Judy Granado MACKENZIE  Type of Test: Abdominal Aorta  ------------------------------------------------------------------------  Procedure: Real-time grayscale and color Duplex  ultrasonography was used to interrogate the abdominal  arterial system including the mesentric and renal  arteries.  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)  ------------------------------------------------------------------------  DIAMETERS:  ------------------------------------------------------------------------  Prox width: 1.0 cm  Prox AP: N/A cm  Mid width: N/A cm  Mid AP: N/A cm  Distal width: N/A cm  Distal AP: N/A cm  ------------------------------------------------------------------------  Left Iliac diameter: N/A cm  Right Iliac diameter: N/A cm  ------------------------------------------------------------------------  Largest aortic diameter: 1.0 cm  ------------------------------------------------------------------------  MESENTERIC VESSELS:  ------------------------------------------------------------------------  PSV:     Aorta: 87.0 cm/sec     Celiac Artery: N/A cm/sec     Superior messenteric artery: N/A cm/sec  ------------------------------------------------------------------------  EDV:      Aorta: 13.0 cm/sec      Celiac Artery: N/A cm/sec      Superior messentericartery: N/A cm/sec  ------------------------------------------------------------------------  RENAL ARTERY:  ------------------------------------------------------------------------  RIGHT:  ------------------------------------------------------------------------   PSV:     Proximal Vessel: 153.0 cm/sec     Mid. Vessel: 160.0 cm/sec     Dist. Vessel: 128.0 cm/sec  ------------------------------------------------------------------------   EDV:      Proximal Vessel: 61.0 cm/sec      Mid. Vessel: 71.0 cm/sec      Dist. Vessel: 46.0 cm/sec  ------------------------------------------------------------------------   RAR: 1.7  ------------------------------------------------------------------------  LEFT:  ------------------------------------------------------------------------   PSV:     Proximal Vessel: 137.0 cm/sec     Mid. Vessel: 147.0 cm/sec     Dist. Vessel: 110.0 cm/sec  ------------------------------------------------------------------------   EDV:      Proximal Vessel: 52.0 cm/sec  Mid. Vessel: 66.0 cm/sec      Dist. Vessel: 53.0 cm/sec  ------------------------------------------------------------------------   RAR: 1.5  ------------------------------------------------------------------------  KIDNEY:  ------------------------------------------------------------------------  RIGHT:  ------------------------------------------------------------------------    PSV: 63.0 cm/sec    EDV: 25.0 cm/sec    RI: 0.6    Length: 11.3 cm  ------------------------------------------------------------------------  LEFT:  ------------------------------------------------------------------------    PSV: 51.0 cm/sec    EDV: 28.0 cm/sec    RI: 0.4    Length: 11.3 cm  ------------------------------------------------------------------------  Right Findings: ---The right kidney measures 11.3 x 6.3 x  6.2 cm.  ---Proximal right renal artery:  cm/sec, EDV 61  cm/sec; mid right renal artery:  cm/sec, EDV 71  cm/sec; distal right renal artery:  cm/sec, EDV 46  cm/sec.  ---The renal-aortic ratio (RAR) is 1.7.  The resistive  index (RI) is 0.6.  ---The right renal vein appears patent, with normal phasic  Doppler waveforms and without obvious evidence of  thrombosis.  Left Findings: ---The left kidney measures 11.3 x 7.3 x  7.1 cm  ---Proximal left renal artery:  cm/sec, EDV 52  cm/sec; mid left renal artery:  cm/sec, EDV 66  cm/sec; distal left renal artery:  cm/sec, EDV 53  cm/sec.  ---The renal-aortic ratio (RAR) is 1.5.  The resistive  index (RI) is .45.  ---The left renal vein appears patent, with normal phasic  Doppler waveforms and without obvious evidence of  thrombosis.  ------------------------------------------------------------------------  Summary/Impressions:  1.  No evidence of hemodynamically significant stenoses in  the proximal right and left renal arteries.  2.  The right and left renal veins appear patent, without  obvious evidence of thrombosis.  ------------------------------------------------------------------------  Confirmed on  12/31/2019 - 10:22 AM by William Manrique MD,  [facility], [facility], [facility]  By signing this report, the attending physician certifies  that he or she has personally supervised and interpreted  the vascular study and has reviewed and or edited and  agreeswith the written comments contained within the  report. DANUTA SCRUGGS  [id number]    INTERVAL HPI/OVERNIGHT EVENTS:    Feels ok today, reports decreased swelling of feet. No other new complaints.  s/p kidney re-biopsy 1 hour ago today.    MEDICATIONS  (STANDING):  enoxaparin Injectable 40 milliGRAM(s) SubCutaneous daily  furosemide   Injectable 40 milliGRAM(s) IV Push two times a day  influenza   Vaccine 0.5 milliLiter(s) IntraMuscular once  labetalol 200 milliGRAM(s) Oral every 8 hours  methylPREDNISolone sodium succinate Injectable 50 milliGRAM(s) IV Push two times a day  mycophenolate mofetil 1000 milliGRAM(s) Oral two times a day  pantoprazole    Tablet 40 milliGRAM(s) Oral before breakfast  polyethylene glycol 3350 17 Gram(s) Oral daily  trimethoprim  160 mG/sulfamethoxazole 800 mG 1 Tablet(s) Oral <User Schedule>    MEDICATIONS  (PRN):  acetaminophen   Tablet .. 650 milliGRAM(s) Oral every 6 hours PRN Mild Pain (1 - 3), Moderate Pain (4 - 6)      Allergies    No Known Allergies    Intolerances    Vital Signs Last 24 Hrs  T(C): 36.6 (31 Dec 2019 05:27), Max: 36.7 (30 Dec 2019 22:08)  T(F): 97.8 (31 Dec 2019 05:27), Max: 98 (30 Dec 2019 22:08)  HR: 80 (31 Dec 2019 08:57) (80 - 95)  BP: 148/97 (31 Dec 2019 08:57) (121/63 - 148/97)  BP(mean): --  RR: 16 (31 Dec 2019 08:57) (16 - 19)  SpO2: 95% (31 Dec 2019 08:57) (95% - 98%)    Physical Exam:  General: No apparent distress  HEENT: EOMI, MMM  CVS: +S1/S2, RRR, no murmurs/rubs/gallops  Resp: CTA b/l. No crackles/wheezing  GI: Soft, NT/ND +BS  MSK: No synovitis.  Neuro: AAOx3  Skin: no visible rashes  LE : trace pitting edema    LABS:                        13.7   13.56 )-----------( 247      ( 31 Dec 2019 06:08 )             40.4     12-31    142  |  109<H>  |  34<H>  ----------------------------<  155<H>  4.0   |  22  |  1.30    Ca    7.6<L>      31 Dec 2019 06:08  Phos  4.2     12-31  Mg     2.0     12-31      PT/INR - ( 31 Dec 2019 06:08 )   PT: 11.2 SEC;   INR: 0.98          PTT - ( 31 Dec 2019 06:08 )  PTT:29.3 SEC        RADIOLOGY & ADDITIONAL TESTS:      Patient name: DANUTA SCRUGGS  Date of test: 12/31/2019  MR#: [id number]  [facility] #: [id number]    Location: [facility] Physician(s): , Rhona Kumar MD  Interpreted by: William Manrique MD, [facility], [facility]  Tech: Judy Granado MACKENZIE  Type of Test: Abdominal Aorta  ------------------------------------------------------------------------  Procedure: Real-time grayscale and color Duplex  ultrasonography was used to interrogate the abdominal  arterial system including the mesenteric and renal  arteries.  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)  ------------------------------------------------------------------------  DIAMETERS:  ------------------------------------------------------------------------  Prox width: 1.0 cm  Prox AP: N/A cm  Mid width: N/A cm  Mid AP: N/A cm  Distal width: N/A cm  Distal AP: N/A cm  ------------------------------------------------------------------------  Left Iliac diameter: N/A cm  Right Iliac diameter: N/A cm  ------------------------------------------------------------------------  Largest aortic diameter: 1.0 cm  ------------------------------------------------------------------------  MESENTERIC VESSELS:  ------------------------------------------------------------------------  PSV:     Aorta: 87.0 cm/sec     Celiac Artery: N/A cm/sec     Superior mesenteric artery: N/A cm/sec  ------------------------------------------------------------------------  EDV:      Aorta: 13.0 cm/sec      Celiac Artery: N/A cm/sec      Superior messentericartery: N/A cm/sec  ------------------------------------------------------------------------  RENAL ARTERY:  ------------------------------------------------------------------------  RIGHT:  ------------------------------------------------------------------------   PSV:     Proximal Vessel: 153.0 cm/sec     Mid. Vessel: 160.0 cm/sec     Dist. Vessel: 128.0 cm/sec  ------------------------------------------------------------------------   EDV:      Proximal Vessel: 61.0 cm/sec      Mid. Vessel: 71.0 cm/sec      Dist. Vessel: 46.0 cm/sec  ------------------------------------------------------------------------   RAR: 1.7  ------------------------------------------------------------------------  LEFT:  ------------------------------------------------------------------------   PSV:     Proximal Vessel: 137.0 cm/sec     Mid. Vessel: 147.0 cm/sec     Dist. Vessel: 110.0 cm/sec  ------------------------------------------------------------------------   EDV:      Proximal Vessel: 52.0 cm/sec  Mid. Vessel: 66.0 cm/sec      Dist. Vessel: 53.0 cm/sec  ------------------------------------------------------------------------   RAR: 1.5  ------------------------------------------------------------------------  KIDNEY:  ------------------------------------------------------------------------  RIGHT:  ------------------------------------------------------------------------    PSV: 63.0 cm/sec    EDV: 25.0 cm/sec    RI: 0.6    Length: 11.3 cm  ------------------------------------------------------------------------  LEFT:  ------------------------------------------------------------------------    PSV: 51.0 cm/sec    EDV: 28.0 cm/sec    RI: 0.4    Length: 11.3 cm  ------------------------------------------------------------------------  Right Findings: ---The right kidney measures 11.3 x 6.3 x  6.2 cm.  ---Proximal right renal artery:  cm/sec, EDV 61  cm/sec; mid right renal artery:  cm/sec, EDV 71  cm/sec; distal right renal artery:  cm/sec, EDV 46  cm/sec.  ---The renal-aortic ratio (RAR) is 1.7.  The resistive  index (RI) is 0.6.  ---The right renal vein appears patent, with normal phasic  Doppler waveforms and without obvious evidence of  thrombosis.  Left Findings: ---The left kidney measures 11.3 x 7.3 x  7.1 cm  ---Proximal left renal artery:  cm/sec, EDV 52  cm/sec; mid left renal artery:  cm/sec, EDV 66  cm/sec; distal left renal artery:  cm/sec, EDV 53  cm/sec.  ---The renal-aortic ratio (RAR) is 1.5.  The resistive  index (RI) is .45.  ---The left renal vein appears patent, with normal phasic  Doppler waveforms and without obvious evidence of  thrombosis.  ------------------------------------------------------------------------  Summary/Impressions:  1.  No evidence of hemodynamically significant stenoses in  the proximal right and left renal arteries.  2.  The right and left renal veins appear patent, without  obvious evidence of thrombosis.  ------------------------------------------------------------------------  Confirmed on  12/31/2019 - 10:22 AM by William Manrique MD,  [facility], [facility], [facility]  By signing this report, the attending physician certifies  that he or she has personally supervised and interpreted  the vascular study and has reviewed and or edited and  agreeswith the written comments contained within the  report.

## 2019-12-31 NOTE — PROVIDER CONTACT NOTE (OTHER) - ACTION/TREATMENT ORDERED:
Reschedule lasix till 9am.  Inform day team
BP repeated and done manually: informed to NP.Tylenol was ordered for headache and given. Continue to monitor.
Continue to monitor patient BP. no new intervention
Continue to monitor x24hrs
Lasix 20mg IVP given as ordered. Continue to monitor. Urine output monitored.
Lasix administered as per order.
MD aware. will evaluate need for intervention and place orders as needed

## 2019-12-31 NOTE — PROGRESS NOTE ADULT - ASSESSMENT
24yoM with PMHx of SLE with LN Class III (+ELMO, dsDNA 600s, high titer RNP, low C', arthralgias, elevated LFT; s/p kidney bx 3/2019, currently on MMF 1g BID), p/w worsening LE edema, b/l flank pain, and elevated BP a/w increased frothy urine in the setting of non-compliance to MMF and NSAID use for LE neuropathic pain. Labs with JOYCE ( creat increase from 0.8 to 1.08) worsening proteinuria, active urinary sediment with serologically active disease, consistent with lupus nephritis flare with possible transformation to Class IV/ +/- class V vs analgesic nephropathy/minimal change disease. RVT has been ruled out on duplex. Plan for kidney re-biopsy.    Recommendations:  -Would start pulse steroids - Solumedrol 1g IV daily x 3 and increase MMF to 1.5g BID.  -GI and PCP ppx while on high dose steroids.  -Lasix as per Nephrology  -proceeding with renal bx - the patient is a potential candidate for renal bx AMP study.  -F/U kidney biopsy results 24yoM with PMHx of SLE with LN Class III (+ELMO, dsDNA 600s, high titer RNP, low C', arthralgias, elevated LFT; s/p kidney bx 3/2019, currently on MMF 1g BID), p/w worsening LE edema, b/l flank pain, and elevated BP a/w increased frothy urine in the setting of non-compliance to MMF and NSAID use for LE neuropathic pain. Labs with JOYCE ( creat increase from 0.8 to 1.08) worsening proteinuria, active urinary sediment with serologically active disease, consistent with lupus nephritis flare with possible transformation to Class IV/ +/- class V vs analgesic nephropathy/minimal change disease. RVT has been ruled out on duplex. S/p kidney re-biopsy on 12/31.    Recommendations:  -Start pulse steroids - Solumedrol 1g IV daily x 3 and increase MMF to 1.5g BID.  -GI and PCP ppx while on high dose steroids.  -Lasix as per Nephrology  -F/U kidney biopsy results 24yoM with PMHx of SLE with LN Class III (+ELMO, dsDNA 600s, high titer RNP, low C', arthralgias, elevated LFT; s/p kidney bx 3/2019, currently on MMF 1g BID), p/w worsening LE edema, b/l flank pain, and elevated BP a/w increased frothy urine in the setting of non-compliance to MMF and NSAID use for LE neuropathic pain. Labs with worsening JOYCE ( creat increase from 0.8 -> 1.08 -> 1.3) worsening proteinuria, active urinary sediment with serologically active disease, consistent with lupus nephritis flare with possible transformation to Class IV/ +/- class V vs analgesic nephropathy/minimal change disease. RVT has been ruled out on duplex. S/p kidney re-biopsy on 12/31.    Recommendations:  -Start pulse steroids - Solumedrol 1g IV daily x 3 and increase MMF to 1.5g BID.  -GI and PCP ppx while on high dose steroids.  -Lasix as per Nephrology  -F/U kidney biopsy results 24yoM with PMHx of SLE with LN Class III (+ELMO, dsDNA 600s, high titer RNP, low C', arthralgias, elevated LFT; s/p kidney bx 3/2019, currently on MMF 1g BID), p/w worsening LE edema, b/l flank pain, and elevated BP a/w increased frothy urine in the setting missing only few doses of MMF and NSAID use for LE neuropathic pain. Labs with worsening JOYCE ( creat increase from 0.8 -> 1.08 -> 1.3) worsening proteinuria, active urinary sediment with serologically active SLE.   Lupus nephritis flare with possible transformation to Class IV/ +/- class V S/p kidney re-biopsy on 12/31.  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     Recommendations:  -Start pulse steroids - Solumedrol 1g IV daily x 3 and increase MMF to 1.5g BID.  -GI and PCP ppx while on high dose steroids.  -Lasix as per Nephrology  -F/U kidney biopsy results

## 2019-12-31 NOTE — PROGRESS NOTE ADULT - ASSESSMENT
Pt. with lupus nephritis presents with LE edema, HTN, and intermittent back pain.    Assessment and Recommendation:   · Assessment		  Pt. with lupus nephritis presents with LE edema, HTN, and intermittent back pain.     Problem/Recommendation - 1:  Problem: Nephrotic syndrome. Recommendation: Pt. with proteinuria in the setting of lupus nephritis. Pt. found to have ~10 g protein, along with moderate blood and RBCs on UA from admission (12/28/19). Pt. with history of proteinuria, with last spot urine TP/CR on 7/31/19 was 0.3, however was noted to be as high as 2.1 on 3/11/19. Pt. previously on lisinopril, however discontinued due to normal BP. Patient underwent US of kidney with dopplers with no appreciable sign of RV thrombosis.  Differential diagnosis includes transformation to class V lupus vs less likely minimal change disease from nsaid use. Monitor daily weights and UOP.     Problem/Recommendation - 2:  ·  Problem: R/O JOYCE (acute kidney injury).  Recommendation: Pt. with likely JYOCE in the setting of worsening lupus nephritis/nephrotic syndrome. [facility] pt. with normal Scr, last found to be 0.76 on 7/30/19. Scr on admission (12/28/19) was normal but elevated to 1.08. Creatinine now elevated to 1.30 (12/31/19), likely in the setting of diuretic use. Monitor labs and urine output. Avoid nephrotoxins.      Problem/Recommendation - 3:  ·  Problem: Lupus nephritis, ISN/RPS class III.  Recommendation: Pt. with known class 3 lupus nephritis 2/2 SLE. Pt. diagnosed with SLE 3 years ago.  Pt. underwent kidney biopsy (d/t proteinuria) on 3/15/19 which confirmed class 3 lupus nephritis without cellular crescents however with 10 % global glomerulosclerosis in sampled glomeruli. Pt. was then started on MMF 1 G BID by his rheumatologist. Recommend continuation of immunosuppression.  complete steroid and infectious prophylaxis. Monitor labs.      Problem/Recommendation - 4:  ·  Problem: HTN (hypertension).  Recommendation: Pt. noted to have elevated BP in the setting of hypervolemia. Decrease Lasix to 40 mg IV daily. Please start Labetalol 200  TID and requires BP control prior to biopsy . Low salt and restricted fluid diet. Monitor BP. Pt. with lupus nephritis presents with LE edema, HTN, and intermittent back pain.    Assessment and Recommendation:   · Assessment		  Pt. with lupus nephritis presents with LE edema, HTN, and intermittent back pain.     Problem/Recommendation - 1:  Problem: Nephrotic syndrome. Recommendation: Pt. with proteinuria in the setting of lupus nephritis. Pt. found to have ~10 g protein, along with moderate blood and RBCs on UA from admission (12/28/19). Pt. with history of proteinuria, with last spot urine TP/CR on 7/31/19 was 0.3, however was noted to be as high as 2.1 on 3/11/19. Pt. previously on lisinopril, however discontinued due to normal BP. Patient underwent US of kidney with dopplers with no appreciable sign of RV thrombosis.  Differential diagnosis includes transformation to class V lupus vs less likely minimal change disease from nsaid use. Plan for biopsy per IR today now that BP is controlled -- risks of biopsy were explained to the patient.  Monitor daily weights and UOP.     Problem/Recommendation - 2:  ·  Problem: R/O JOYCE (acute kidney injury).  Recommendation: Pt. with likely JOYCE in the setting of worsening lupus nephritis/nephrotic syndrome. Upon lab review of [facility], pt. with normal Scr, last found to be 0.76 on 7/30/19. Scr on admission (12/28/19) was normal but elevated to 1.08. Creatinine now elevated to 1.30 (12/31/19), likely in the setting of diuretic use. Monitor labs and urine output. Avoid nephrotoxins. f/u biopsy.     Problem/Recommendation - 3:  ·  Problem: Lupus nephritis, ISN/RPS class III.  Recommendation: Pt. with known class 3 lupus nephritis 2/2 SLE. Pt. diagnosed with SLE 3 years ago.  Pt. underwent kidney biopsy (d/t proteinuria) on 3/15/19 which confirmed class 3 lupus nephritis without cellular crescents however with 10 % global glomerulosclerosis in sampled glomeruli. Pt. was then started on MMF 1 G BID by his rheumatologist. Recommend continuation of immunosuppression including solumedrol.  continue infectious prophylaxis. Monitor labs.      Problem/Recommendation - 4:  ·  Problem: HTN (hypertension).  Recommendation: Pt. noted to have elevated BP in the setting of hypervolemia. Decrease Lasix to 40 mg IV daily. Please continue Labetalol 200  TID.  BP optimized for biopsy . Low salt and restricted fluid diet. Monitor BP.

## 2019-12-31 NOTE — PROGRESS NOTE ADULT - SUBJECTIVE AND OBJECTIVE BOX
Patient is a 24y old  Male who presents with a chief complaint of LE edema, b/l intermittent flank pain (31 Dec 2019 11:55)    SUBJECTIVE / OVERNIGHT EVENTS:  Patient seen and examined s/p left kidney biopsy. Pt feels well and without pain. LE swelling markedly improved. No chest pain, back pain or SOB. Eating well.     MEDICATIONS  (STANDING):  enoxaparin Injectable 40 milliGRAM(s) SubCutaneous daily  furosemide   Injectable 40 milliGRAM(s) IV Push daily  influenza   Vaccine 0.5 milliLiter(s) IntraMuscular once  labetalol 200 milliGRAM(s) Oral every 8 hours  methylPREDNISolone sodium succinate IVPB 1000 milliGRAM(s) IV Intermittent daily  mycophenolate mofetil 1500 milliGRAM(s) Oral two times a day  pantoprazole    Tablet 40 milliGRAM(s) Oral before breakfast  polyethylene glycol 3350 17 Gram(s) Oral daily  trimethoprim  160 mG/sulfamethoxazole 800 mG 1 Tablet(s) Oral <User Schedule>    MEDICATIONS  (PRN):  acetaminophen   Tablet .. 650 milliGRAM(s) Oral every 6 hours PRN Mild Pain (1 - 3), Moderate Pain (4 - 6)      Vital Signs Last 24 Hrs  T(C): 36.3 (31 Dec 2019 16:10), Max: 36.7 (30 Dec 2019 22:08)  T(F): 97.4 (31 Dec 2019 16:10), Max: 98 (30 Dec 2019 22:08)  HR: 78 (31 Dec 2019 16:10) (78 - 95)  BP: 147/94 (31 Dec 2019 16:10) (121/63 - 148/97)  BP(mean): --  RR: 16 (31 Dec 2019 16:10) (16 - 19)  SpO2: 97% (31 Dec 2019 16:10) (95% - 98%)        PHYSICAL EXAM  GENERAL: NAD, well-appearing, obese  CHEST/LUNG: Clear to auscultation bilaterally; No wheeze  HEART: Regular rate and rhythm; No murmurs, rubs, or gallops  ABDOMEN: Soft, Nontender, Nondistended; Bowel sounds present  BACK: Left sided dressing from biopsy site c/d/i, no surrounding ecchymosis, tenderness, or erythema  EXTREMITIES:  2+ Peripheral Pulses, No clubbing, cyanosis. 1+ edema of the legs b/l. ROM intact in all 4 extremities  PSYCH: AAOx3      LABS:                        13.7   13.56 )-----------( 247      ( 31 Dec 2019 06:08 )             40.4     12-31    142  |  109<H>  |  34<H>  ----------------------------<  155<H>  4.0   |  22  |  1.30    Ca    7.6<L>      31 Dec 2019 06:08  Phos  4.2     12-31  Mg     2.0     12-31      PT/INR - ( 31 Dec 2019 06:08 )   PT: 11.2 SEC;   INR: 0.98          PTT - ( 31 Dec 2019 06:08 )  PTT:29.3 SEC    RADIOLOGY & ADDITIONAL TESTS:    Procedure: Real-time grayscale and color Duplex  ultrasonography was used to interrogate the abdominal  arterial system including the mesentric and renal  arteries.  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)  ------------------------------------------------------------------------  ------------------------------------------------------------------------  Summary/Impressions:  1.  No evidence of hemodynamically significant stenoses in  the proximal right and left renal arteries.  2.  The right and left renal veins appear patent, without  obvious evidence of thrombosis.      Imaging Personally Reviewed:  Consultant(s) Notes Reviewed:    Care Discussed with Consultants/Other Providers:

## 2019-12-31 NOTE — PROGRESS NOTE ADULT - SUBJECTIVE AND OBJECTIVE BOX
[facility] Division of Kidney Diseases & Hypertension  FOLLOW UP NOTE  [phone]--------------------------------------------------------------------------------  Chief Complaint:Localized edema    HPI: 23 yo M with known class 3 lupus nephritis 2/2 SLE is admitted to [facility] with b/l LE swelling and intermittent sharp low back pain. On admission (12/28/19), pt. found to have a Scr of 1.09, serum albumin of 2.1, an UA significant for moderate blood/RBCs and 600 protein. Nephrology team was consulted for lupus nephritis and volume overload. Pt. states that he was first diagnosed with SLE about 3 years ago, and was diagnosed with lupus nephritis in March 2019. Pt. was subsequently started on MMF 1G BID by his rheumatologist, and lisinopril 5 mg daily. Pt. was stopped of his lisinopril due to improvement of his BP. Pt. also states that he has gained a significant amount of weight in the past several months and feels that his face is swollen. [facility]. with normal Scr, last found to be 0.76 on 7/30/19. Pt. with history of proteinuria, with last spot urine TP/CR on 7/31/19 was 0.3, however was noted to be as high as 2.1 on 3/11/19. Pt. underwent kidney biopsy on 3/15/19 which confirmed class 3 lupus nephritis without cellular crescents however with 10 % global glomerulosclerosis in sampled glomeruli.    Patient seen and examined at bedside. States he has foot pain and some swelling in the legs as well but otherwise asymptomatic. Labs, vitals, and medications reviewed. Vital signs show patient is normotensive with -140s. Labs show slightly elevated creatinine 1.3.      PAST HISTORY  --------------------------------------------------------------------------------  No significant changes to PMH, PSH, FHx, SHx, unless otherwise noted    ALLERGIES & MEDICATIONS  --------------------------------------------------------------------------------  Allergies    No Known Allergies    Intolerances      Standing Inpatient Medications  enoxaparin Injectable 40 milliGRAM(s) SubCutaneous daily  furosemide   Injectable 40 milliGRAM(s) IV Push two times a day  influenza   Vaccine 0.5 milliLiter(s) IntraMuscular once  labetalol 200 milliGRAM(s) Oral every 8 hours  methylPREDNISolone sodium succinate Injectable 50 milliGRAM(s) IV Push two times a day  mycophenolate mofetil 1000 milliGRAM(s) Oral two times a day  pantoprazole    Tablet 40 milliGRAM(s) Oral before breakfast  polyethylene glycol 3350 17 Gram(s) Oral daily  trimethoprim  160 mG/sulfamethoxazole 800 mG 1 Tablet(s) Oral <User Schedule>    PRN Inpatient Medications  acetaminophen   Tablet .. 650 milliGRAM(s) Oral every 6 hours PRN      REVIEW OF SYSTEMS  --------------------------------------------------------------------------------  Gen: No  fevers/chills  Head/Eyes/Ears/Mouth: No headache  Respiratory: No dyspnea  CV: No chest pain  GI: No abdominal pain, diarrhea, constipation, nausea, vomiting  MSK: Pedal edema and pain in left foot however improving.  Neuro: No dizziness/lightheadedness    All other systems were reviewed and are negative, except as noted.    VITALS/PHYSICAL EXAM  --------------------------------------------------------------------------------  T(C): 36.6 (12-31-19 @ 05:27), Max: 36.7 (12-30-19 @ 22:08)  HR: 80 (12-31-19 @ 08:57) (80 - 95)  BP: 148/97 (12-31-19 @ 08:57) (121/63 - 148/97)  RR: 16 (12-31-19 @ 08:57) (16 - 19)  SpO2: 95% (12-31-19 @ 08:57) (95% - 98%)  Wt(kg): --        Physical Exam:  	Gen: NAD, well-appearing  	HEENT: Anicteric  	Pulm: CTA B/L  	CV: RRR, S1S2;  	Abd: +BS, soft, nondistended              Extremities: significant pedal edema noted L > R however improving.              Neuro: No focal deficits, intact gait  	Skin: Warm  	Vascular: No cyanosis    LABS/STUDIES  --------------------------------------------------------------------------------              13.7   13.56 >-----------<  247      [12-31-19 @ 06:08]              40.4     142  |  109  |  34  ----------------------------<  155      [12-31-19 @ 06:08]  4.0   |  22  |  1.30        Ca     7.6     [12-31-19 @ 06:08]      Mg     2.0     [12-31-19 @ 06:08]      Phos  4.2     [12-31-19 @ 06:08]      PT/INR: PT 11.2 , INR 0.98       [12-31-19 @ 06:08]  PTT: 29.3       [12-31-19 @ 06:08]      Creatinine Trend:  SCr 1.30 [12-31 @ 06:08]  SCr 1.08 [12-29 @ 06:35]  SCr 1.09 [12-28 @ 16:30]    Urinalysis - [12-28-19 @ 16:30]      Color YELLOW / Appearance Lt TURBID / SG 1.022 / pH 6.5      Gluc NEGATIVE / Ketone NEGATIVE  / Bili NEGATIVE / Urobili NORMAL       Blood MODERATE / Protein 600 / Leuk Est NEGATIVE / Nitrite NEGATIVE      RBC >50 / WBC 11-25 / Hyaline 2+ / Gran  / Sq Epi MODERATE / Non Sq Epi  / Bacteria FEW    Urine Creatinine 139.10      [12-28-19 @ 20:02]  Urine Protein > 600      [12-28-19 @ 20:02]      HBsAb Indeterminate Test repeated.      [12-29-19 @ 06:35]  HCV 0.23, Nonreactive Hepatitis C AB  S/CO Ratio                        Interpretation  < 1.00                                   Non-Reactive  1.00 - 4.99                         Weakly-Reactive  >= 5.00                                Reactive  Non-Reactive: Aperson with a non-reactive HCV antibody  result is considered uninfected.  No further action is  needed unless recent infection is suspected.  In these  cases, consider repeat testing later to detect  seroconversion..  Weakly-Reactive: HCV antibody test is abnormal, HCV RNA  Qualitative test will follow.  Reactive: HCV antibody test is abnormal, HCV RNA  Qualitative test will follow.  Note: HCV antibody testing is performed on the Abbott   system.      [12-29-19 @ 06:35]    dsDNA 254      [12-29-19 @ 06:35]  C3 Complement 41.2      [12-29-19 @ 06:35]  C4 Complement 4.9      [12-29-19 @ 06:35] [facility] Division of Kidney Diseases & Hypertension  FOLLOW UP NOTE  [phone]--------------------------------------------------------------------------------  Chief Complaint:Localized edema    HPI: 23 yo M with known class 3 lupus nephritis 2/2 SLE is admitted to [facility] with b/l LE swelling and intermittent sharp low back pain. On admission (12/28/19), pt. found to have a Scr of 1.09, serum albumin of 2.1, an UA significant for moderate blood/RBCs and 600 protein. Nephrology team was consulted for lupus nephritis and volume overload. Pt. states that he was first diagnosed with SLE about 3 years ago, and was diagnosed with lupus nephritis in March 2019. Pt. was subsequently started on MMF 1G BID by his rheumatologist, and lisinopril 5 mg daily. Pt. was stopped of his lisinopril due to improvement of his BP. Pt. also states that he has gained a significant amount of weight in the past several months and feels that his face is swollen. [facility] with normal Scr, last found to be 0.76 on 7/30/19. Pt. with history of proteinuria, with last spot urine TP/CR on 7/31/19 was 0.3, however was noted to be as high as 2.1 on 3/11/19. Pt. underwent kidney biopsy on 3/15/19 which confirmed class 3 lupus nephritis without cellular crescents however with 10 % global glomerulosclerosis in sampled glomeruli.    Patient seen and examined at bedside. States he has foot pain and some swelling in the legs as well but otherwise asymptomatic. Labs, vitals, and medications reviewed. Vital signs show patient is normotensive with -140s. Labs show slightly elevated creatinine 1.3.      PAST HISTORY  --------------------------------------------------------------------------------  No significant changes to PMH, PSH, FHx, SHx, unless otherwise noted    ALLERGIES & MEDICATIONS  --------------------------------------------------------------------------------  Allergies    No Known Allergies    Intolerances      Standing Inpatient Medications  enoxaparin Injectable 40 milliGRAM(s) SubCutaneous daily  furosemide   Injectable 40 milliGRAM(s) IV Push two times a day  influenza   Vaccine 0.5 milliLiter(s) IntraMuscular once  labetalol 200 milliGRAM(s) Oral every 8 hours  methylPREDNISolone sodium succinate Injectable 50 milliGRAM(s) IV Push two times a day  mycophenolate mofetil 1000 milliGRAM(s) Oral two times a day  pantoprazole    Tablet 40 milliGRAM(s) Oral before breakfast  polyethylene glycol 3350 17 Gram(s) Oral daily  trimethoprim  160 mG/sulfamethoxazole 800 mG 1 Tablet(s) Oral <User Schedule>    PRN Inpatient Medications  acetaminophen   Tablet .. 650 milliGRAM(s) Oral every 6 hours PRN      REVIEW OF SYSTEMS  --------------------------------------------------------------------------------  Gen: No  fevers/chills  Head/Eyes/Ears/Mouth: No headache  Respiratory: No dyspnea  CV: No chest pain  GI: No abdominal pain, diarrhea, constipation, nausea, vomiting  MSK: Pedal edema and pain in left foot however improving.  Neuro: No dizziness/lightheadedness    VITALS/PHYSICAL EXAM  --------------------------------------------------------------------------------  T(C): 36.6 (12-31-19 @ 05:27), Max: 36.7 (12-30-19 @ 22:08)  HR: 80 (12-31-19 @ 08:57) (80 - 95)  BP: 148/97 (12-31-19 @ 08:57) (121/63 - 148/97)  RR: 16 (12-31-19 @ 08:57) (16 - 19)  SpO2: 95% (12-31-19 @ 08:57) (95% - 98%)  Wt(kg): --        Physical Exam:  	Gen: NAD, well-appearing  	HEENT: Anicteric  	Pulm: CTA B/L  	CV: RRR, S1S2;  	Abd: +BS, soft, nondistended              Extremities: significant pedal edema noted L > R however improving.              Neuro: No focal deficits, intact gait  	Skin: Warm  	Vascular: No cyanosis    LABS/STUDIES  --------------------------------------------------------------------------------              13.7   13.56 >-----------<  247      [12-31-19 @ 06:08]              40.4     142  |  109  |  34  ----------------------------<  155      [12-31-19 @ 06:08]  4.0   |  22  |  1.30        Ca     7.6     [12-31-19 @ 06:08]      Mg     2.0     [12-31-19 @ 06:08]      Phos  4.2     [12-31-19 @ 06:08]      PT/INR: PT 11.2 , INR 0.98       [12-31-19 @ 06:08]  PTT: 29.3       [12-31-19 @ 06:08]      Creatinine Trend:  SCr 1.30 [12-31 @ 06:08]  SCr 1.08 [12-29 @ 06:35]  SCr 1.09 [12-28 @ 16:30]    Urinalysis - [12-28-19 @ 16:30]      Color YELLOW / Appearance Lt TURBID / SG 1.022 / pH 6.5      Gluc NEGATIVE / Ketone NEGATIVE  / Bili NEGATIVE / Urobili NORMAL       Blood MODERATE / Protein 600 / Leuk Est NEGATIVE / Nitrite NEGATIVE      RBC >50 / WBC 11-25 / Hyaline 2+ / Gran  / Sq Epi MODERATE / Non Sq Epi  / Bacteria FEW    Urine Creatinine 139.10      [12-28-19 @ 20:02]  Urine Protein > 600      [12-28-19 @ 20:02]      HBsAb Indeterminate Test repeated.      [12-29-19 @ 06:35]  HCV 0.23, Nonreactive Hepatitis C AB  S/CO Ratio                        Interpretation  < 1.00                                   Non-Reactive  1.00 - 4.99                         Weakly-Reactive  >= 5.00                                Reactive  Non-Reactive: Aperson with a non-reactive HCV antibody  result is considered uninfected.  No further action is  needed unless recent infection is suspected.  In these  cases, consider repeat testing later to detect  seroconversion..  Weakly-Reactive: HCV antibody test is abnormal, HCV RNA  Qualitative test will follow.  Reactive: HCV antibody test is abnormal, HCV RNA  Qualitative test will follow.  Note: HCV antibody testing is performed on the Abbott   system.      [12-29-19 @ 06:35]    dsDNA 254      [12-29-19 @ 06:35]  C3 Complement 41.2      [12-29-19 @ 06:35]  C4 Complement 4.9      [12-29-19 @ 06:35]

## 2019-12-31 NOTE — PROGRESS NOTE ADULT - PROBLEM SELECTOR PLAN 1
Suspect acute flare given elevated dsDNA and low complement levels. Significant proteinuria on UA. Last bx done on March 2019. 12/30 Renal US unremarkable. Renal following, recs appreciated.  - decrease to lasix 40mg IV daily; monitor I&Os  - continue holding lisinopril as per renal. Avoid nephrotoxins, such as NSAIDs  - planned for IR-guided renal biopsy tomorrow   - [facility] for renal vein/artery assessment prior to biopsy  -  c/w MMF 1 gm BID   - c/w methylprednisolone 50mg IV BID  - c/w pantoprazole for GI ppx and Bactrim DS for PCP ppx

## 2020-01-01 ENCOUNTER — TRANSCRIPTION ENCOUNTER (OUTPATIENT)
Age: 25
End: 2020-01-01

## 2020-01-01 ENCOUNTER — OUTPATIENT (OUTPATIENT)
Dept: OUTPATIENT SERVICES | Facility: HOSPITAL | Age: 25
LOS: 1 days | End: 2020-01-01
Payer: MEDICAID

## 2020-01-01 LAB
ANION GAP SERPL CALC-SCNC: 10 MMO/L — SIGNIFICANT CHANGE UP (ref 7–14)
BUN SERPL-MCNC: 39 MG/DL — HIGH (ref 7–23)
CALCIUM SERPL-MCNC: 7.5 MG/DL — LOW (ref 8.4–10.5)
CHLORIDE SERPL-SCNC: 104 MMOL/L — SIGNIFICANT CHANGE UP (ref 98–107)
CO2 SERPL-SCNC: 21 MMOL/L — LOW (ref 22–31)
CREAT SERPL-MCNC: 1.28 MG/DL — SIGNIFICANT CHANGE UP (ref 0.5–1.3)
GLUCOSE SERPL-MCNC: 210 MG/DL — HIGH (ref 70–99)
HCT VFR BLD CALC: 39.3 % — SIGNIFICANT CHANGE UP (ref 39–50)
HGB BLD-MCNC: 13.1 G/DL — SIGNIFICANT CHANGE UP (ref 13–17)
MAGNESIUM SERPL-MCNC: 2 MG/DL — SIGNIFICANT CHANGE UP (ref 1.6–2.6)
MCHC RBC-ENTMCNC: 26.4 PG — LOW (ref 27–34)
MCHC RBC-ENTMCNC: 33.3 % — SIGNIFICANT CHANGE UP (ref 32–36)
MCV RBC AUTO: 79.2 FL — LOW (ref 80–100)
NRBC # FLD: 0 K/UL — SIGNIFICANT CHANGE UP (ref 0–0)
PHOSPHATE SERPL-MCNC: 3.3 MG/DL — SIGNIFICANT CHANGE UP (ref 2.5–4.5)
PLATELET # BLD AUTO: 192 K/UL — SIGNIFICANT CHANGE UP (ref 150–400)
PMV BLD: 11.8 FL — SIGNIFICANT CHANGE UP (ref 7–13)
POTASSIUM SERPL-MCNC: 4.1 MMOL/L — SIGNIFICANT CHANGE UP (ref 3.5–5.3)
POTASSIUM SERPL-SCNC: 4.1 MMOL/L — SIGNIFICANT CHANGE UP (ref 3.5–5.3)
RBC # BLD: 4.96 M/UL — SIGNIFICANT CHANGE UP (ref 4.2–5.8)
RBC # FLD: 14.8 % — HIGH (ref 10.3–14.5)
SODIUM SERPL-SCNC: 135 MMOL/L — SIGNIFICANT CHANGE UP (ref 135–145)
WBC # BLD: 12.64 K/UL — HIGH (ref 3.8–10.5)
WBC # FLD AUTO: 12.64 K/UL — HIGH (ref 3.8–10.5)

## 2020-01-01 PROCEDURE — 99233 SBSQ HOSP IP/OBS HIGH 50: CPT

## 2020-01-01 PROCEDURE — G9001: CPT

## 2020-01-01 PROCEDURE — 99232 SBSQ HOSP IP/OBS MODERATE 35: CPT | Mod: GC

## 2020-01-01 RX ORDER — GABAPENTIN 400 MG/1
300 CAPSULE ORAL DAILY
Refills: 0 | Status: DISCONTINUED | OUTPATIENT
Start: 2020-01-01 | End: 2020-01-02

## 2020-01-01 RX ORDER — DULOXETINE HYDROCHLORIDE 30 MG/1
30 CAPSULE, DELAYED RELEASE ORAL DAILY
Refills: 0 | Status: DISCONTINUED | OUTPATIENT
Start: 2020-01-01 | End: 2020-01-02

## 2020-01-01 RX ADMIN — Medication 1 TABLET(S): at 11:21

## 2020-01-01 RX ADMIN — MYCOPHENOLATE MOFETIL 1500 MILLIGRAM(S): 250 CAPSULE ORAL at 05:53

## 2020-01-01 RX ADMIN — Medication 200 MILLIGRAM(S): at 21:32

## 2020-01-01 RX ADMIN — PANTOPRAZOLE SODIUM 40 MILLIGRAM(S): 20 TABLET, DELAYED RELEASE ORAL at 05:55

## 2020-01-01 RX ADMIN — Medication 108 MILLIGRAM(S): at 06:15

## 2020-01-01 RX ADMIN — GABAPENTIN 300 MILLIGRAM(S): 400 CAPSULE ORAL at 21:32

## 2020-01-01 RX ADMIN — MYCOPHENOLATE MOFETIL 1500 MILLIGRAM(S): 250 CAPSULE ORAL at 18:27

## 2020-01-01 RX ADMIN — POLYETHYLENE GLYCOL 3350 17 GRAM(S): 17 POWDER, FOR SOLUTION ORAL at 11:21

## 2020-01-01 RX ADMIN — Medication 40 MILLIGRAM(S): at 05:53

## 2020-01-01 NOTE — PROGRESS NOTE ADULT - PROBLEM SELECTOR PLAN 1
Suspect acute flare given elevated dsDNA and low complement levels. Significant proteinuria on UA. Last bx done on March 2019. 12/30 Renal US unremarkable. Renal following, recs appreciated.  - decrease to lasix 40mg IV daily; monitor I&Os  - continue holding lisinopril as per renal. Avoid nephrotoxins, such as NSAIDs  - planned for IR-guided renal biopsy tomorrow   - pending [facility] duplex [facility] abdomen for renal vein/artery assessment prior to biopsy  -  c/w MMF 1 gm BID   - c/w methylprednisolone 50mg IV BID  - c/w pantoprazole for GI ppx and Bactrim DS for PCP ppx Suspect acute flare given elevated dsDNA and low complement levels. Significant proteinuria on UA. Last bx done on March 2019. 12/30 Renal US unremarkable. Renal following, recs appreciated.  - lasix 40mg IV daily; monitor I&Os  - continue holding lisinopril as per renal. Avoid nephrotoxins, such as NSAIDs  - pending [facility] duplex of abdomen for renal vein/artery assessment prior to biopsy  -  c/w MMF 1 gm BID   - c/w methylprednisolone 50mg IV BID for now  - c/w pantoprazole for GI ppx and Bactrim DS for PCP ppx

## 2020-01-01 NOTE — DISCHARGE NOTE PROVIDER - NSDCMRMEDTOKEN_GEN_ALL_CORE_FT
CellCept 500 mg oral tablet: 2 tab(s) orally 2 times a day CellCept 500 mg oral tablet: 3 tab(s) orally 2 times a day   furosemide 40 mg oral tablet: 1 tab(s) orally once a day  labetalol 200 mg oral tablet: 1 tab(s) orally every 8 hours  pantoprazole 40 mg oral delayed release tablet: 1 tab(s) orally once a day (before a meal)  sulfamethoxazole-trimethoprim 800 mg-160 mg oral tablet: 1 tab(s) orally once a day CellCept 500 mg oral tablet: 3 tab(s) orally 2 times a day   furosemide 40 mg oral tablet: 1 tab(s) orally once a day  labetalol 200 mg oral tablet: 1 tab(s) orally every 8 hours  pantoprazole 40 mg oral delayed release tablet: 1 tab(s) orally once a day (before a meal)  predniSONE 20 mg oral tablet: 4 tab(s) orally once a day  sulfamethoxazole-trimethoprim 800 mg-160 mg oral tablet: 1 tab(s) orally once a day CellCept 500 mg oral tablet: 3 tab(s) orally 2 times a day   furosemide 40 mg oral tablet: 1 tab(s) orally once a day  labetalol 200 mg oral tablet: 1 tab(s) orally every 8 hours  omeprazole 40 mg oral delayed release capsule: 1 cap(s) orally once a day   predniSONE 20 mg oral tablet: 4 tab(s) orally once a day  sulfamethoxazole-trimethoprim 800 mg-160 mg oral tablet: 1 tab(s) orally once a day CellCept 500 mg oral tablet: 3 tab(s) orally 2 times a day   DULoxetine 30 mg oral delayed release capsule: 1 cap(s) orally once a day  furosemide 40 mg oral tablet: 1 tab(s) orally once a day  labetalol 200 mg oral tablet: 1 tab(s) orally every 8 hours  omeprazole 40 mg oral delayed release capsule: 1 cap(s) orally once a day   predniSONE 20 mg oral tablet: 4 tab(s) orally once a day  sulfamethoxazole-trimethoprim 800 mg-160 mg oral tablet: 1 tab(s) orally once a day

## 2020-01-01 NOTE — PROGRESS NOTE ADULT - PROBLEM SELECTOR PLAN 3
- Lovenox for DVT ppx  - Ambulate as tolerated  - DASH Diet, Miralax for constipation Likely due to renal disease  - c/w labetalol 200mg TID as per renal  - monitor BP

## 2020-01-01 NOTE — PROGRESS NOTE ADULT - SUBJECTIVE AND OBJECTIVE BOX
Patient is a 24y old  Male who presents with a chief complaint of LE edema, b/l intermittent flank pain (01 Jan 2020 09:44)        SUBJECTIVE / OVERNIGHT EVENTS:      MEDICATIONS  (STANDING):  enoxaparin Injectable 40 milliGRAM(s) SubCutaneous daily  furosemide   Injectable 40 milliGRAM(s) IV Push daily  influenza   Vaccine 0.5 milliLiter(s) IntraMuscular once  labetalol 200 milliGRAM(s) Oral every 8 hours  methylPREDNISolone sodium succinate IVPB 1000 milliGRAM(s) IV Intermittent daily  mycophenolate mofetil 1500 milliGRAM(s) Oral two times a day  pantoprazole    Tablet 40 milliGRAM(s) Oral before breakfast  polyethylene glycol 3350 17 Gram(s) Oral daily  trimethoprim  160 mG/sulfamethoxazole 800 mG 1 Tablet(s) Oral <User Schedule>    MEDICATIONS  (PRN):  acetaminophen   Tablet .. 650 milliGRAM(s) Oral every 6 hours PRN Mild Pain (1 - 3), Moderate Pain (4 - 6)      Vital Signs Last 24 Hrs  T(C): 36.7 (01 Jan 2020 05:47), Max: 36.7 (01 Jan 2020 05:47)  T(F): 98 (01 Jan 2020 05:47), Max: 98 (01 Jan 2020 05:47)  HR: 84 (01 Jan 2020 05:47) (78 - 84)  BP: 121/70 (01 Jan 2020 05:47) (121/70 - 147/94)  BP(mean): --  RR: 18 (01 Jan 2020 05:47) (16 - 18)  SpO2: 95% (01 Jan 2020 05:47) (95% - 97%)  CAPILLARY BLOOD GLUCOSE        I&O's Summary    31 Dec 2019 07:01  -  01 Jan 2020 07:00  --------------------------------------------------------  IN: 0 mL / OUT: 300 mL / NET: -300 mL          PHYSICAL EXAM  GENERAL: NAD, well-developed  HEAD:  Atraumatic, Normocephalic  EYES: EOMI, PERRLA, conjunctiva and sclera clear  NECK: Supple, No JVD  CHEST/LUNG: Clear to auscultation bilaterally; No wheeze  HEART: Regular rate and rhythm; No murmurs, rubs, or gallops  ABDOMEN: Soft, Nontender, Nondistended; Bowel sounds present  EXTREMITIES:  2+ Peripheral Pulses, No clubbing, cyanosis, or edema  PSYCH: AAOx3  SKIN: No rashes or lesions    LABS:                        13.1   12.64 )-----------( 192      ( 01 Jan 2020 05:43 )             39.3     01-01    135  |  104  |  39<H>  ----------------------------<  210<H>  4.1   |  21<L>  |  1.28    Ca    7.5<L>      01 Jan 2020 05:43  Phos  3.3     01-01  Mg     2.0     01-01      PT/INR - ( 31 Dec 2019 06:08 )   PT: 11.2 SEC;   INR: 0.98          PTT - ( 31 Dec 2019 06:08 )  PTT:29.3 SEC          RADIOLOGY & ADDITIONAL TESTS:    Imaging Personally Reviewed:  Consultant(s) Notes Reviewed:    Care Discussed with Consultants/Other Providers: Patient is a 24y old  Male who presents with a chief complaint of LE edema, b/l intermittent flank pain (01 Jan 2020 09:44)    SUBJECTIVE / OVERNIGHT EVENTS:  Patient seen and examined in the morning. Patient feels well but comments on intermittent sharp pain at the plantar surface of left foot. Pain not associated with walking and can occur at rest. No fever or chills.       MEDICATIONS  (STANDING):  enoxaparin Injectable 40 milliGRAM(s) SubCutaneous daily  furosemide   Injectable 40 milliGRAM(s) IV Push daily  influenza   Vaccine 0.5 milliLiter(s) IntraMuscular once  labetalol 200 milliGRAM(s) Oral every 8 hours  methylPREDNISolone sodium succinate IVPB 1000 milliGRAM(s) IV Intermittent daily  mycophenolate mofetil 1500 milliGRAM(s) Oral two times a day  pantoprazole    Tablet 40 milliGRAM(s) Oral before breakfast  polyethylene glycol 3350 17 Gram(s) Oral daily  trimethoprim  160 mG/sulfamethoxazole 800 mG 1 Tablet(s) Oral <User Schedule>    MEDICATIONS  (PRN):  acetaminophen   Tablet .. 650 milliGRAM(s) Oral every 6 hours PRN Mild Pain (1 - 3), Moderate Pain (4 - 6)      Vital Signs Last 24 Hrs  T(C): 36.7 (01 Jan 2020 05:47), Max: 36.7 (01 Jan 2020 05:47)  T(F): 98 (01 Jan 2020 05:47), Max: 98 (01 Jan 2020 05:47)  HR: 84 (01 Jan 2020 05:47) (78 - 84)  BP: 121/70 (01 Jan 2020 05:47) (121/70 - 147/94)  BP(mean): --  RR: 18 (01 Jan 2020 05:47) (16 - 18)  SpO2: 95% (01 Jan 2020 05:47) (95% - 97%)        I&O's Summary    31 Dec 2019 07:01  -  01 Jan 2020 07:00  --------------------------------------------------------  IN: 0 mL / OUT: 300 mL / NET: -300 mL          PHYSICAL EXAM  GENERAL: NAD, well-appearing, obese  CHEST/LUNG: Clear to auscultation bilaterally; No wheeze  HEART: Regular rate and rhythm; No murmurs, rubs, or gallops  ABDOMEN: Soft, Nontender, Nondistended; Bowel sounds present  BACK: Left sided dressing from biopsy site c/d/i, no surrounding ecchymosis, tenderness, or erythema  EXTREMITIES:  2+ Peripheral Pulses, No clubbing, cyanosis. 1+ edema of the legs b/l. ROM intact in all 4 extremities  PSYCH: AAOx3        LABS:                        13.1   12.64 )-----------( 192      ( 01 Jan 2020 05:43 )             39.3     01-01    135  |  104  |  39<H>  ----------------------------<  210<H>  4.1   |  21<L>  |  1.28    Ca    7.5<L>      01 Jan 2020 05:43  Phos  3.3     01-01  Mg     2.0     01-01      PT/INR - ( 31 Dec 2019 06:08 )   PT: 11.2 SEC;   INR: 0.98          PTT - ( 31 Dec 2019 06:08 )  PTT:29.3 SEC          RADIOLOGY & ADDITIONAL TESTS:    Imaging Personally Reviewed:  Consultant(s) Notes Reviewed:    Care Discussed with Consultants/Other Providers:

## 2020-01-01 NOTE — DISCHARGE NOTE PROVIDER - HOSPITAL COURSE
24M with SLE c/b lupus nephritis presents with likely lupus nephritis flare.        SLE w/ nephritis w/ JOYCE presented w/ worsening LE edema and elevated BP; + Frothy urine; Labetolol for BP; Holding ACE-I as per renal; Started on Lasix 40mg IV BID decreased to QD 12/31    - Renal vein/artery duplex neagtive for RVT    - IR for kidney biopsy 12/31 awaiting results ____ **    - Started Solumedrol 1mg/kg daily 12/29 changed to pulse steroids 12/31    - Start pulse steroids; Solumedrol 1g IV daily x 3 starting 12/31    - Rheumatology consulted         Dispo - Home 24M with SLE c/b lupus nephritis presents with likely lupus nephritis flare.        SLE w/ nephritis w/ JOYCE presented w/ worsening LE edema and elevated BP; + Frothy urine; Labetolol for BP; Holding ACE-I as per renal; Started on Lasix 40mg IV BID decreased to QD 12/31    - Renal vein/artery duplex neagtive for RVT    - IR for kidney biopsy 12/31 awaiting results lupus nephritis     - Started Solumedrol 1mg/kg daily 12/29 changed to pulse steroids 12/31    - Start pulse steroids; Solumedrol 1g IV daily x 3 starting 12/31    - Rheumatology consulted         Dispo - Home

## 2020-01-01 NOTE — DISCHARGE NOTE PROVIDER - NSDCFUSCHEDAPPT_GEN_ALL_CORE_FT
DANUTA SCRUGGS ; 01/03/2020 ; [facility] DANUTA SCRUGGS ; 01/03/2020 ; [facility] Rhe[facility] DANUTA SCRUGGS ; 01/03/2020 ; [facility] Rheum [address] [facility] DANUTA SCRUGGS ; 01/03/2020 ; [facility] Rheum [facility] DANUTA SCRUGGS ; 01/03/2020 ; [facility] Rhe[facility] DANUTA SCRUGGS ; 01/03/2020 ; [facility] Rheum [facility] DANUTA SCRUGGS ; 01/03/2020 ; [facility]PP Rheum [address] [facility] DANUTA SCRUGGS ; 01/03/2020 ; NPP Rheum [facility]

## 2020-01-01 NOTE — DISCHARGE NOTE PROVIDER - NSDCCPCAREPLAN_GEN_ALL_CORE_FT
PRINCIPAL DISCHARGE DIAGNOSIS  Diagnosis: Lower extremity edema  Assessment and Plan of Treatment: LASIX _____      SECONDARY DISCHARGE DIAGNOSES  Diagnosis: Lupus nephritis, ISN/RPS class III  Assessment and Plan of Treatment: You underwent a kidney biopsy with interventional radiology on 12/31  RESULTS _____ **    Diagnosis: Systemic lupus erythematosus, unspecified SLE type, unspecified organ involvement status  Assessment and Plan of Treatment: Cellcept and steroids ________  Follow-up with your rheumatology appointment as outpatient for further care.    Diagnosis: Hypertension, unspecified type  Assessment and Plan of Treatment: Continue blood pressure medication regimen as directed. Monitor for any visual changes, headaches or dizziness.  Monitor blood pressure regularly.  Follow up with your primary care provider for further management for high blood pressure. PRINCIPAL DISCHARGE DIAGNOSIS  Diagnosis: Lower extremity edema  Assessment and Plan of Treatment: Continue on lasix 40 mg orally daily.      SECONDARY DISCHARGE DIAGNOSES  Diagnosis: Lupus nephritis, ISN/RPS class III  Assessment and Plan of Treatment: You underwent a kidney biopsy with interventional radiology on 12/31/19.  Preliminary kidney biopsy result showed Class IV lupus nephritis. Continue on lasix 40 mg orally daily.  You need to follow up with primary nephrologist (Pt. planning to go back to [place]).    Diagnosis: Hypertension, unspecified type  Assessment and Plan of Treatment: Continue blood pressure medication regimen as directed. Monitor for any visual changes, headaches or dizziness.  Monitor blood pressure regularly.  Follow up with your primary care provider for further management for high blood pressure.    Diagnosis: Systemic lupus erythematosus, unspecified SLE type, unspecified organ involvement status  Assessment and Plan of Treatment: Continue Cellcept and steroids as prescribed. You need to follow up with your nephrologist and rheumatologist closely.   Follow-up with rheumatology Dr. Guillaume as outpatient for further care within 1-2 weeks. PRINCIPAL DISCHARGE DIAGNOSIS  Diagnosis: Lower extremity edema  Assessment and Plan of Treatment: Continue on lasix 40 mg orally daily. Please follow up with your PCP within 1-2 weeks for further medical recommendations. Follow up for lower extremity neuropathic pain as well.      SECONDARY DISCHARGE DIAGNOSES  Diagnosis: Lupus nephritis, ISN/RPS class III  Assessment and Plan of Treatment: You underwent a kidney biopsy with interventional radiology on 12/31/19.  Preliminary kidney biopsy result showed Class IV lupus nephritis. Continue on lasix 40 mg orally daily.  You need to follow up with primary nephrologist ( planning to go back to [place]).    Diagnosis: Hypertension, unspecified type  Assessment and Plan of Treatment: Continue blood pressure medication regimen as directed. Monitor for any visual changes, headaches or dizziness.  Monitor blood pressure regularly.  Follow up with your primary care provider for further management for high blood pressure.    Diagnosis: Systemic lupus erythematosus, unspecified SLE type, unspecified organ involvement status  Assessment and Plan of Treatment: Continue Cellcept and steroids as prescribed. You need to follow up with your nephrologist and rheumatologist closely.   Follow-up with rheumatology Dr. Guillaume as outpatient for further care within 1-2 weeks.

## 2020-01-01 NOTE — PROGRESS NOTE ADULT - SUBJECTIVE AND OBJECTIVE BOX
DANUTA SCRUGGS  [id number]    INTERVAL HPI/OVERNIGHT EVENTS:    MEDICATIONS  (STANDING):  enoxaparin Injectable 40 milliGRAM(s) SubCutaneous daily  furosemide   Injectable 40 milliGRAM(s) IV Push daily  gabapentin 300 milliGRAM(s) Oral daily  influenza   Vaccine 0.5 milliLiter(s) IntraMuscular once  labetalol 200 milliGRAM(s) Oral every 8 hours  methylPREDNISolone sodium succinate IVPB 1000 milliGRAM(s) IV Intermittent daily  mycophenolate mofetil 1500 milliGRAM(s) Oral two times a day  pantoprazole    Tablet 40 milliGRAM(s) Oral before breakfast  polyethylene glycol 3350 17 Gram(s) Oral daily  trimethoprim  160 mG/sulfamethoxazole 800 mG 1 Tablet(s) Oral <User Schedule>    MEDICATIONS  (PRN):  acetaminophen   Tablet .. 650 milliGRAM(s) Oral every 6 hours PRN Mild Pain (1 - 3), Moderate Pain (4 - 6)      Allergies    No Known Allergies    Intolerances        Review of Systems:   General: No fevers/chills, no fatigue  HEENT: No blurry vision, dysphagia, or odynophagia  CVS: No CP/palpitations  Resp: No SOB/wheezing  GI: No N/V/C/D/abdominal pain  MSK:   Skin: No new rashes  Neuro: No headaches      Vital Signs Last 24 Hrs  T(C): 36.4 (01 Jan 2020 14:28), Max: 36.7 (01 Jan 2020 05:47)  T(F): 97.5 (01 Jan 2020 14:28), Max: 98 (01 Jan 2020 05:47)  HR: 80 (01 Jan 2020 14:28) (78 - 84)  BP: 136/74 (01 Jan 2020 14:28) (121/70 - 142/85)  BP(mean): --  RR: 18 (01 Jan 2020 14:28) (16 - 18)  SpO2: 98% (01 Jan 2020 14:28) (95% - 98%)    Physical Exam:  General: NAD  HEENT: EOMI, MMM  Cardio: +S1/S2, RRR  Resp: CTA b/l  GI: +BS, soft, NT/ND  MSK:  Neuro: AAOx3  Psych: wnl    LABS:                        13.1   12.64 )-----------( 192      ( 01 Jan 2020 05:43 )             39.3     01-01    135  |  104  |  39<H>  ----------------------------<  210<H>  4.1   |  21<L>  |  1.28    Ca    7.5<L>      01 Jan 2020 05:43  Phos  3.3     01-01  Mg     2.0     01-01      PT/INR - ( 31 Dec 2019 06:08 )   PT: 11.2 SEC;   INR: 0.98          PTT - ( 31 Dec 2019 06:08 )  PTT:29.3 SEC        RADIOLOGY & ADDITIONAL TESTS: DANUTA WANGN  [id number]    INTERVAL HPI/OVERNIGHT EVENTS:    C/o left foot pain, no joint swelling  LE swelling improved    MEDICATIONS  (STANDING):  enoxaparin Injectable 40 milliGRAM(s) SubCutaneous daily  furosemide   Injectable 40 milliGRAM(s) IV Push daily  gabapentin 300 milliGRAM(s) Oral daily  influenza   Vaccine 0.5 milliLiter(s) IntraMuscular once  labetalol 200 milliGRAM(s) Oral every 8 hours  methylPREDNISolone sodium succinate IVPB 1000 milliGRAM(s) IV Intermittent daily  mycophenolate mofetil 1500 milliGRAM(s) Oral two times a day  pantoprazole    Tablet 40 milliGRAM(s) Oral before breakfast  polyethylene glycol 3350 17 Gram(s) Oral daily  trimethoprim  160 mG/sulfamethoxazole 800 mG 1 Tablet(s) Oral <User Schedule>    MEDICATIONS  (PRN):  acetaminophen   Tablet .. 650 milliGRAM(s) Oral every 6 hours PRN Mild Pain (1 - 3), Moderate Pain (4 - 6)      Allergies    No Known Allergies    Intolerances        Review of Systems:   General: neg  HEENT: neg  CVS: neg  Resp: Neg  GI: Neg  MSK: neg, but + left foot pain  Skin: No rashes  Neuro:  Left LE pain      Vital Signs Last 24 Hrs  T(C): 36.4 (01 Jan 2020 14:28), Max: 36.7 (01 Jan 2020 05:47)  T(F): 97.5 (01 Jan 2020 14:28), Max: 98 (01 Jan 2020 05:47)  HR: 80 (01 Jan 2020 14:28) (78 - 84)  BP: 136/74 (01 Jan 2020 14:28) (121/70 - 142/85)  BP(mean): --  RR: 18 (01 Jan 2020 14:28) (16 - 18)  SpO2: 98% (01 Jan 2020 14:28) (95% - 98%)    Physical Exam:  General: NAD  HEENT: EOMI, MMM  Cardio: +S1/S2, RRR  Resp: CTA b/l  GI: +BS, soft, NT/ND  Skin: No rashes  Neuro: + hypersthesia - left foot, normal reflexes, normal strength  Extr- no edema    LABS:                        13.1   12.64 )-----------( 192      ( 01 Jan 2020 05:43 )             39.3     01-01    135  |  104  |  39<H>  ----------------------------<  210<H>  4.1   |  21<L>  |  1.28    Creatinine, Serum: 1.30 mg/dL (12.31.19 @ 06:08)  Creatinine, Serum: 1.08 mg/dL (12.29.19 @ 06:35)  Creatinine, Serum: 1.09 mg/dL (12.28.19 @ 16:30)    Ca    7.5<L>      01 Jan 2020 05:43  Phos  3.3     01-01  Mg     2.0     01-01      PT/INR - ( 31 Dec 2019 06:08 )   PT: 11.2 SEC;   INR: 0.98          PTT - ( 31 Dec 2019 06:08 )  PTT:29.3 SEC    Double Stranded DNA Antibody: 254 IU/mL (12.29.19 @ 06:35)  C3 Complement, Serum (12.29.19 @ 06:35)    C3 Complement, Serum: 41.2 mg/dL  C4 Complement, Serum (12.29.19 @ 06:35)    C4 Complement, Serum: 4.9 mg/dL      RADIOLOGY & ADDITIONAL TESTS:

## 2020-01-01 NOTE — DISCHARGE NOTE PROVIDER - CARE PROVIDER_API CALL
Rosemarie Mcbride)  Rheumatology  [address] [facility] [address]  [place] [address]  Phone: [phone]  Fax: [phone]  Follow Up Time:     Giacomo Luis)  Internal Medicine; Nephrology  [address] [place] [address]  Phone: [phone]  Fax: [phone]  Follow Up Time: Giacomo Luis)  Internal Medicine; Nephrology  [address] [place] [address]  Phone: [phone]  Fax: [phone]  Follow Up Time:     Belinda Guillaume)  Internal Medicine; Rheumatology  [address] [place] [address] [address]  [place] [address]  Phone: [phone]  Fax: [phone]  Follow Up Time:

## 2020-01-01 NOTE — PROGRESS NOTE ADULT - PROBLEM SELECTOR PLAN 4
1.  Name of PCP: Rheum - Dr. Ramos, Renal - Dr. Brooks  2.  PCP Contacted on Admission: [ ] Y    [ ] N    3.  PCP contacted at Discharge: [ ] Y    [ ] N    [ ] N/A  4.  Post-Discharge Appointment Date and Location:  5.  Summary of Handoff given to PCP: - Lovenox for DVT ppx  - Ambulate as tolerated  - DASH Diet, Miralax for constipation

## 2020-01-01 NOTE — PROGRESS NOTE ADULT - PROBLEM SELECTOR PLAN 2
Likely due to renal disease  - c/w labetalol 200mg TID as per renal  - monitor BP Unclear etiology. Intermittent. Pt previously on gabapentin. Exam beinghn  - recommend keeping a journal of possibl ei  - gabapentin for now. Will continue to monitor

## 2020-01-01 NOTE — DISCHARGE NOTE PROVIDER - CARE PROVIDERS DIRECT ADDRESSES
,[email or web address],DirectAddress_Unknown ,DirectAddress_Unknown,[email or web address]

## 2020-01-01 NOTE — PROGRESS NOTE ADULT - ASSESSMENT
Mr. Yoo is a 25 yo man with SLE c/b lupus nephritis admitted for likely lupus nephritis flare with noted poorly controlled BP, improved with initiation of lasix and labetalol. Mr. Yoo is a 25 yo man with SLE c/b lupus nephritis admitted for likely lupus nephritis flare with noted poorly controlled BP, improved with initiation of lasix and labetalol. S/p left kidney biopsy on 12/31

## 2020-01-01 NOTE — DISCHARGE NOTE PROVIDER - PROVIDER TOKENS
PROVIDER:[TOKEN:[[id number]]],PROVIDER:[TOKEN:[[id number]]] PROVIDER:[TOKEN:[[id number]]],PROVIDER:[TOKEN:[[id number]]]

## 2020-01-01 NOTE — DISCHARGE NOTE PROVIDER - NSFOLLOWUPCLINICS_GEN_ALL_ED_FT
[facility] Rheumatology  Rheumatology  [address] [address]  Phone: [phone]  Fax:   Follow Up Time:

## 2020-01-01 NOTE — PROGRESS NOTE ADULT - ASSESSMENT
25yo M with SLE with LN Class III ( +ELMO, dsDNA 600s, high titer RNP, hypocomplementemia, arthralgias, elevated LFT; s/p kidney bx 3/2019)   = Now with likely LN flare ( worsening of proteinuria associated with LE edema  and rising creat and active urinary sediment with hypocomplementemia and high ds DNA )/ r/o NSAID related nephropathy as contributing factor   = LE neuropathic pain.   S/p renal bx on 12/31/2019  started on pulse steroids, MMF increased to 3 g a day    Recommendations:  - continue pulse steroids , followed by Prednisone 1mg/ kg  a day  - Cellcept 3 g a day   - fu renal bx results  - for neuropathic pain would try Cymbalta 30 mg a day instead of Gabapentin

## 2020-01-02 ENCOUNTER — TRANSCRIPTION ENCOUNTER (OUTPATIENT)
Age: 25
End: 2020-01-02

## 2020-01-02 VITALS
SYSTOLIC BLOOD PRESSURE: 142 MMHG | TEMPERATURE: 98 F | OXYGEN SATURATION: 96 % | HEART RATE: 89 BPM | DIASTOLIC BLOOD PRESSURE: 84 MMHG | RESPIRATION RATE: 19 BRPM

## 2020-01-02 DIAGNOSIS — M79.672 PAIN IN LEFT FOOT: ICD-10-CM

## 2020-01-02 LAB
ANION GAP SERPL CALC-SCNC: 15 MMO/L — HIGH (ref 7–14)
BUN SERPL-MCNC: 39 MG/DL — HIGH (ref 7–23)
CALCIUM SERPL-MCNC: 7.5 MG/DL — LOW (ref 8.4–10.5)
CHLORIDE SERPL-SCNC: 99 MMOL/L — SIGNIFICANT CHANGE UP (ref 98–107)
CO2 SERPL-SCNC: 18 MMOL/L — LOW (ref 22–31)
CREAT SERPL-MCNC: 1.19 MG/DL — SIGNIFICANT CHANGE UP (ref 0.5–1.3)
GIANT PLATELETS BLD QL SMEAR: PRESENT — SIGNIFICANT CHANGE UP
GLUCOSE SERPL-MCNC: 172 MG/DL — HIGH (ref 70–99)
HCT VFR BLD CALC: 56.6 % — HIGH (ref 39–50)
HGB BLD-MCNC: 18.2 G/DL — HIGH (ref 13–17)
MAGNESIUM SERPL-MCNC: 2 MG/DL — SIGNIFICANT CHANGE UP (ref 1.6–2.6)
MANUAL SMEAR VERIFICATION: SIGNIFICANT CHANGE UP
MCHC RBC-ENTMCNC: 26 PG — LOW (ref 27–34)
MCHC RBC-ENTMCNC: 32.2 % — SIGNIFICANT CHANGE UP (ref 32–36)
MCV RBC AUTO: 81 FL — SIGNIFICANT CHANGE UP (ref 80–100)
NRBC # FLD: 0 K/UL — SIGNIFICANT CHANGE UP (ref 0–0)
PHOSPHATE SERPL-MCNC: 4.2 MG/DL — SIGNIFICANT CHANGE UP (ref 2.5–4.5)
PLATELET # BLD AUTO: 134 K/UL — LOW (ref 150–400)
PLATELET COUNT - ESTIMATE: SIGNIFICANT CHANGE UP
PMV BLD: SIGNIFICANT CHANGE UP FL (ref 7–13)
POTASSIUM SERPL-MCNC: 4.1 MMOL/L — SIGNIFICANT CHANGE UP (ref 3.5–5.3)
POTASSIUM SERPL-SCNC: 4.1 MMOL/L — SIGNIFICANT CHANGE UP (ref 3.5–5.3)
RBC # BLD: 6.99 M/UL — HIGH (ref 4.2–5.8)
RBC # FLD: 17.2 % — HIGH (ref 10.3–14.5)
SODIUM SERPL-SCNC: 132 MMOL/L — LOW (ref 135–145)
WBC # BLD: 9.83 K/UL — SIGNIFICANT CHANGE UP (ref 3.8–10.5)
WBC # FLD AUTO: 9.83 K/UL — SIGNIFICANT CHANGE UP (ref 3.8–10.5)

## 2020-01-02 PROCEDURE — 99233 SBSQ HOSP IP/OBS HIGH 50: CPT | Mod: GC

## 2020-01-02 PROCEDURE — 99239 HOSP IP/OBS DSCHRG MGMT >30: CPT

## 2020-01-02 PROCEDURE — 99232 SBSQ HOSP IP/OBS MODERATE 35: CPT | Mod: GC

## 2020-01-02 RX ORDER — OMEPRAZOLE 10 MG/1
1 CAPSULE, DELAYED RELEASE ORAL
Qty: 30 | Refills: 0
Start: 2020-01-02 | End: 2020-01-31

## 2020-01-02 RX ORDER — DULOXETINE HYDROCHLORIDE 30 MG/1
1 CAPSULE, DELAYED RELEASE ORAL
Qty: 30 | Refills: 0
Start: 2020-01-02 | End: 2020-01-31

## 2020-01-02 RX ORDER — FUROSEMIDE 40 MG
1 TABLET ORAL
Qty: 30 | Refills: 0
Start: 2020-01-02 | End: 2020-01-31

## 2020-01-02 RX ORDER — FUROSEMIDE 40 MG
40 TABLET ORAL DAILY
Refills: 0 | Status: DISCONTINUED | OUTPATIENT
Start: 2020-01-03 | End: 2020-01-02

## 2020-01-02 RX ORDER — PANTOPRAZOLE SODIUM 20 MG/1
1 TABLET, DELAYED RELEASE ORAL
Qty: 30 | Refills: 0
Start: 2020-01-02 | End: 2020-01-31

## 2020-01-02 RX ORDER — MYCOPHENOLATE MOFETIL 250 MG/1
2 CAPSULE ORAL
Qty: 0 | Refills: 0 | DISCHARGE

## 2020-01-02 RX ORDER — MYCOPHENOLATE MOFETIL 250 MG/1
3 CAPSULE ORAL
Qty: 180 | Refills: 0
Start: 2020-01-02 | End: 2020-01-31

## 2020-01-02 RX ORDER — LABETALOL HCL 100 MG
1 TABLET ORAL
Qty: 90 | Refills: 0
Start: 2020-01-02 | End: 2020-01-31

## 2020-01-02 RX ADMIN — Medication 108 MILLIGRAM(S): at 05:26

## 2020-01-02 RX ADMIN — MYCOPHENOLATE MOFETIL 1500 MILLIGRAM(S): 250 CAPSULE ORAL at 05:28

## 2020-01-02 RX ADMIN — DULOXETINE HYDROCHLORIDE 30 MILLIGRAM(S): 30 CAPSULE, DELAYED RELEASE ORAL at 09:11

## 2020-01-02 RX ADMIN — MYCOPHENOLATE MOFETIL 1500 MILLIGRAM(S): 250 CAPSULE ORAL at 17:46

## 2020-01-02 RX ADMIN — ENOXAPARIN SODIUM 40 MILLIGRAM(S): 100 INJECTION SUBCUTANEOUS at 12:53

## 2020-01-02 RX ADMIN — Medication 200 MILLIGRAM(S): at 14:06

## 2020-01-02 RX ADMIN — PANTOPRAZOLE SODIUM 40 MILLIGRAM(S): 20 TABLET, DELAYED RELEASE ORAL at 05:28

## 2020-01-02 RX ADMIN — Medication 40 MILLIGRAM(S): at 05:27

## 2020-01-02 NOTE — PROGRESS NOTE ADULT - NSHPATTENDINGPLANDISCUSS_GEN_ALL_CORE
Medicine attending
patient and primary team
interventional radiology and dr cohen
medical team
patient, family and ACP

## 2020-01-02 NOTE — PROGRESS NOTE ADULT - SUBJECTIVE AND OBJECTIVE BOX
[facility] DIVISION OF KIDNEY DISEASES AND HYPERTENSION -- FOLLOW UP NOTE  --------------------------------------------------------------------------------  HPI: 25 yo M with known class 3 lupus nephritis 2/2 SLE is admitted to [facility] with b/l LE swelling and intermittent sharp low back pain. On admission (12/28/19), pt. found to have a Scr of 1.09, serum albumin of 2.1, an UA significant for moderate blood/RBCs and 600 protein. Nephrology team was consulted for lupus nephritis and volume overload. Pt. being seen for nephrotic syndrome, underwent kidney biopsy on 12/31/19.     Patient seen and examined at bedside. Feels well, offers no complains, preliminary kidney biopsy result showed Class IV Lupus nephritis.     PAST HISTORY  --------------------------------------------------------------------------------  No significant changes to PMH, PSH, FHx, SHx, unless otherwise noted    ALLERGIES & MEDICATIONS  --------------------------------------------------------------------------------  Allergies    No Known Allergies    Intolerances      Standing Inpatient Medications  DULoxetine 30 milliGRAM(s) Oral daily  enoxaparin Injectable 40 milliGRAM(s) SubCutaneous daily  influenza   Vaccine 0.5 milliLiter(s) IntraMuscular once  labetalol 200 milliGRAM(s) Oral every 8 hours  mycophenolate mofetil 1500 milliGRAM(s) Oral two times a day  pantoprazole    Tablet 40 milliGRAM(s) Oral before breakfast  polyethylene glycol 3350 17 Gram(s) Oral daily  predniSONE   Tablet 55 milliGRAM(s) Oral two times a day  trimethoprim  160 mG/sulfamethoxazole 800 mG 1 Tablet(s) Oral <User Schedule>    PRN Inpatient Medications  acetaminophen   Tablet .. 650 milliGRAM(s) Oral every 6 hours PRN      REVIEW OF SYSTEMS  --------------------------------------------------------------------------------  Gen: No  fevers/chills  Head/Eyes/Ears/Mouth: No headache  Respiratory: No dyspnea  CV: No chest pain  GI: No abdominal pain, diarrhea, constipation, nausea, vomiting  MSK: Pedal edema and pain in left foot however improving.  Neuro: No dizziness/lightheadedness    VITALS/PHYSICAL EXAM  --------------------------------------------------------------------------------  T(C): 36.6 (01-02-20 @ 13:03), Max: 37 (01-01-20 @ 20:50)  HR: 89 (01-02-20 @ 13:03) (78 - 89)  BP: 142/84 (01-02-20 @ 13:03) (124/70 - 150/82)  RR: 19 (01-02-20 @ 13:03) (18 - 19)  SpO2: 96% (01-02-20 @ 13:03) (96% - 100%)  Wt(kg): --    Weight (kg): 108.6 (01-02-20 @ 12:30)    Physical Exam:  	Gen: NAD, well-appearing  	HEENT: Anicteric  	Pulm: CTA B/L  	CV: RRR, S1S2;  	Abd: +BS, soft, nondistended              Extremities: significant pedal edema noted L > R however improving.              Neuro: No focal deficits, intact gait  	Skin: Warm  	Vascular: No cyanosis    LABS/STUDIES  --------------------------------------------------------------------------------              18.2   9.83  >-----------<  134      [01-02-20 @ 05:15]              56.6     132  |  99  |  39  ----------------------------<  172      [01-02-20 @ 05:15]  4.1   |  18  |  1.19        Ca     7.5     [01-02-20 @ 05:15]      Mg     2.0     [01-02-20 @ 05:15]      Phos  4.2     [01-02-20 @ 05:15]    Creatinine Trend:  SCr 1.19 [01-02 @ 05:15]  SCr 1.28 [01-01 @ 05:43]  SCr 1.30 [12-31 @ 06:08]  SCr 1.08 [12-29 @ 06:35]  SCr 1.09 [12-28 @ 16:30]

## 2020-01-02 NOTE — PROGRESS NOTE ADULT - PROBLEM SELECTOR PLAN 1
Suspect acute flare given elevated dsDNA and low complement levels. Significant proteinuria on UA. Last bx done on March 2019. 12/30 Renal US unremarkable. Renal following, recs appreciated. As per nephrologist, prelim read of renal biopsy revealed progression to class 4 lupus nephritis. Likely due to pt's non-adherence to meds  - transition to oral lasix 40mg daily  - continue holding lisinopril as per renal. Avoid nephrotoxins, such as NSAIDs  -  c/w MMF 1500mg BID   - discontinue methylprednisolone 50mg IV BID --> transition to oral prednisone 80mg daily as per rheum  - c/w pantoprazole for GI ppx and Bactrim DS for PCP ppx  - pt to f/u with rheum in about 2 weeks

## 2020-01-02 NOTE — DISCHARGE NOTE NURSING/CASE MANAGEMENT/SOCIAL WORK - PATIENT PORTAL LINK FT
You can access the FollowMyHealth Patient Portal offered by [facility] by registering at the following website: [email or web address]. By joining [facility]’s FollowMyHealth portal, you will also be able to view your health information using other applications (apps) compatible with our system.

## 2020-01-02 NOTE — PROGRESS NOTE ADULT - PROBLEM SELECTOR PLAN 5
Patient medically stable to be discharged home with close outpatient f/u. Spent 38 min coordinating discharge plan    1.  Name of PCP: Rheum - Dr. Ramos, Renal - Dr. Brooks  2.  PCP Contacted on Admission: [ ] Y    [ ] N    3.  PCP contacted at Discharge: [ ] Y    [ ] N    [ ] N/A  4.  Post-Discharge Appointment Date and Location:  5.  Summary of Handoff given to PCP:
1.  Name of PCP: Rheum - Dr. Ramos, Renal - Dr. Brooks  2.  PCP Contacted on Admission: [ ] Y    [ ] N    3.  PCP contacted at Discharge: [ ] Y    [ ] N    [ ] N/A  4.  Post-Discharge Appointment Date and Location:  5.  Summary of Handoff given to PCP:

## 2020-01-02 NOTE — PROGRESS NOTE ADULT - ATTENDING COMMENTS
as above
as above
Prelim bx results: Class 4 LN    Cont cellcept, steroids, lasix daily. Will need close follow up with nephrology as outpatient to start ACE-I/ARB.
as above
as above

## 2020-01-02 NOTE — PROGRESS NOTE ADULT - ASSESSMENT
Mr. Yoo is a 23 yo man with SLE c/b lupus nephritis admitted for likely lupus nephritis flare with noted poorly controlled BP, improved with initiation of lasix and labetalol. S/p left kidney biopsy on 12/31

## 2020-01-02 NOTE — PROGRESS NOTE ADULT - PROBLEM SELECTOR PROBLEM 1
Lupus nephritis, ISN/RPS class III
R/O Nephrotic syndrome
Lupus nephritis, ISN/RPS class III
Lupus nephritis, ISN/RPS class III

## 2020-01-02 NOTE — PROGRESS NOTE ADULT - ASSESSMENT
Pt. with lupus nephritis presents with LE edema, HTN, and intermittent back pain.    Assessment and Recommendation:   · Assessment		  Pt. with lupus nephritis presents with LE edema, HTN, and intermittent back pain.     Problem/Recommendation - 1:  Problem: Nephrotic syndrome. Recommendation: Pt. with proteinuria in the setting of lupus nephritis. Pt. found to have ~10 g protein, along with moderate blood and RBCs on UA from admission (12/28/19). Pt. with history of proteinuria, with last spot urine TP/CR on 7/31/19 was 0.3, however was noted to be as high as 2.1 on 3/11/19. Pt. previously on lisinopril, however discontinued due to normal BP. Patient underwent US of kidney with dopplers with no appreciable sign of RV thrombosis. Preliminary kidney biopsy result showed Class IV lupus nephritis. Pt. can be discharge on lasix 40 mg PO daily. Monitor daily weights and UOP. Pt. needs follow up with primary nephrologist (Pt. planning to go back to [place]).     Problem/Recommendation - 2:  ·  Problem: R/O JOYCE (acute kidney injury).  Recommendation: Pt. with likely JOYCE in the setting of worsening lupus nephritis/nephrotic syndrome. Upon lab review of [facility], pt. with normal Scr, last found to be 0.76 on 7/30/19. Scr on admission (12/28/19) was normal but elevated to 1.08. Creatinine elevated to 1.30 on 12/31/19, likely in the setting of diuretic use. Scr improved to 1.19 today. Monitor labs and urine output. Avoid nephrotoxins. f/u biopsy.     Problem/Recommendation - 3:  ·  Problem: Lupus nephritis, ISN/RPS class III.  Recommendation: Pt. with known class 3 lupus nephritis 2/2 SLE. Pt. diagnosed with SLE 3 years ago.  Pt. underwent kidney biopsy (d/t proteinuria) on 3/15/19 which confirmed class 3 lupus nephritis without cellular crescents however with 10 % global glomerulosclerosis in sampled glomeruli. Pt. was then started on MMF 1 G BID by his rheumatologist. Received solumedrol pulse. Kidney biopsy results as above.   Continue infectious prophylaxis. Monitor labs.      Problem/Recommendation - 4:  ·  Problem: HTN (hypertension).  Recommendation:  BP improved. Monitor BP on current BP medication. Low salt diet. Low salt and restricted fluid diet. Monitor BP.

## 2020-01-02 NOTE — PROGRESS NOTE ADULT - SUBJECTIVE AND OBJECTIVE BOX
Patient is a 24y old  Male who presents with a chief complaint of LE edema, b/l intermittent flank pain (02 Jan 2020 20:33)    SUBJECTIVE / OVERNIGHT EVENTS:  Patient seen and examined in the afternoon. Patient wants to go home. Left foot pain returned, sharp in nature. Patient reports trial of gabapentin did not relieve pain and neither did warm/cold packs. Patient denies LE swelling and urinating well.     MEDICATIONS  (STANDING):  DULoxetine 30 milliGRAM(s) Oral daily  enoxaparin Injectable 40 milliGRAM(s) SubCutaneous daily  labetalol 200 milliGRAM(s) Oral every 8 hours  mycophenolate mofetil 1500 milliGRAM(s) Oral two times a day  pantoprazole    Tablet 40 milliGRAM(s) Oral before breakfast  polyethylene glycol 3350 17 Gram(s) Oral daily  predniSONE   Tablet 80 milliGRAM(s) Oral daily  trimethoprim  160 mG/sulfamethoxazole 800 mG 1 Tablet(s) Oral <User Schedule>    MEDICATIONS  (PRN):  acetaminophen   Tablet .. 650 milliGRAM(s) Oral every 6 hours PRN Mild Pain (1 - 3), Moderate Pain (4 - 6)      Vital Signs Last 24 Hrs  T(C): 36.6 (02 Jan 2020 13:03), Max: 36.9 (02 Jan 2020 05:23)  T(F): 97.9 (02 Jan 2020 13:03), Max: 98.4 (02 Jan 2020 05:23)  HR: 89 (02 Jan 2020 13:03) (78 - 89)  BP: 142/84 (02 Jan 2020 13:03) (124/70 - 142/84)  BP(mean): --  RR: 19 (02 Jan 2020 13:03) (18 - 19)  SpO2: 96% (02 Jan 2020 13:03) (96% - 97%)          PHYSICAL EXAM  GENERAL: NAD, well-appearing, obese  CHEST/LUNG: Clear to auscultation bilaterally; No wheeze  HEART: Regular rate and rhythm; No murmurs, rubs, or gallops  ABDOMEN: Soft, Nontender, Nondistended; Bowel sounds present  EXTREMITIES:  2+ Peripheral Pulses, No clubbing, cyanosis or edema. ROM intact in all 4 extremities. Left foot without swelling, erythema or warmth to touch  PSYCH: AAOx3    LABS:                        18.2   9.83  )-----------( 134      ( 02 Jan 2020 05:15 )             56.6     01-02    132<L>  |  99  |  39<H>  ----------------------------<  172<H>  4.1   |  18<L>  |  1.19    Ca    7.5<L>      02 Jan 2020 05:15  Phos  4.2     01-02  Mg     2.0     01-02          Consultant(s) Notes Reviewed: yes   Care Discussed with Consultants/Other Providers:

## 2020-01-02 NOTE — PROGRESS NOTE ADULT - SUBJECTIVE AND OBJECTIVE BOX
DANUTA SCRUGGS  [id number]    INTERVAL HPI/OVERNIGHT EVENTS:    MEDICATIONS  (STANDING):  DULoxetine 30 milliGRAM(s) Oral daily  enoxaparin Injectable 40 milliGRAM(s) SubCutaneous daily  influenza   Vaccine 0.5 milliLiter(s) IntraMuscular once  labetalol 200 milliGRAM(s) Oral every 8 hours  mycophenolate mofetil 1500 milliGRAM(s) Oral two times a day  pantoprazole    Tablet 40 milliGRAM(s) Oral before breakfast  polyethylene glycol 3350 17 Gram(s) Oral daily  trimethoprim  160 mG/sulfamethoxazole 800 mG 1 Tablet(s) Oral <User Schedule>    MEDICATIONS  (PRN):  acetaminophen   Tablet .. 650 milliGRAM(s) Oral every 6 hours PRN Mild Pain (1 - 3), Moderate Pain (4 - 6)      Allergies    No Known Allergies    Intolerances        Review of Systems:   General: No fevers/chills, no fatigue  HEENT: No blurry vision, dysphagia, or odynophagia  CVS: No CP/palpitations  Resp: No SOB/wheezing  GI: No N/V/C/D/abdominal pain  MSK:   Skin: No new rashes  Neuro: No headaches      Vital Signs Last 24 Hrs  T(C): 36.6 (02 Jan 2020 13:03), Max: 37 (01 Jan 2020 20:50)  T(F): 97.9 (02 Jan 2020 13:03), Max: 98.6 (01 Jan 2020 20:50)  HR: 89 (02 Jan 2020 13:03) (78 - 89)  BP: 142/84 (02 Jan 2020 13:03) (124/70 - 150/82)  BP(mean): --  RR: 19 (02 Jan 2020 13:03) (18 - 19)  SpO2: 96% (02 Jan 2020 13:03) (96% - 100%)    Physical Exam:  General: NAD  HEENT: EOMI, MMM  Cardio: +S1/S2, RRR  Resp: CTA b/l  GI: +BS, soft, NT/ND  MSK:  Neuro: AAOx3  Psych: wnl    LABS:                        18.2   9.83  )-----------( 134      ( 02 Jan 2020 05:15 )             56.6     01-02    132<L>  |  99  |  39<H>  ----------------------------<  172<H>  4.1   |  18<L>  |  1.19    Ca    7.5<L>      02 Jan 2020 05:15  Phos  4.2     01-02  Mg     2.0     01-02              RADIOLOGY & ADDITIONAL TESTS: DANUTA WANGN  [id number]    INTERVAL HPI/OVERNIGHT EVENTS:    completed pulse steroids    MEDICATIONS  (STANDING):  DULoxetine 30 milliGRAM(s) Oral daily  enoxaparin Injectable 40 milliGRAM(s) SubCutaneous daily  influenza   Vaccine 0.5 milliLiter(s) IntraMuscular once  labetalol 200 milliGRAM(s) Oral every 8 hours  mycophenolate mofetil 1500 milliGRAM(s) Oral two times a day  pantoprazole    Tablet 40 milliGRAM(s) Oral before breakfast  polyethylene glycol 3350 17 Gram(s) Oral daily  trimethoprim  160 mG/sulfamethoxazole 800 mG 1 Tablet(s) Oral <User Schedule>    MEDICATIONS  (PRN):  acetaminophen   Tablet .. 650 milliGRAM(s) Oral every 6 hours PRN Mild Pain (1 - 3), Moderate Pain (4 - 6)      Allergies    No Known Allergies    Intolerances        Review of Systems:   General: No fevers/chills, no fatigue  HEENT: No blurry vision, dysphagia, or odynophagia  CVS: No CP/palpitations  Resp: No SOB/wheezing  GI: No N/V/C/D/abdominal pain  MSK: left foot pain/ no swelling  Skin: No new rashes  Neuro: No headaches      Vital Signs Last 24 Hrs  T(C): 36.6 (02 Jan 2020 13:03), Max: 37 (01 Jan 2020 20:50)  T(F): 97.9 (02 Jan 2020 13:03), Max: 98.6 (01 Jan 2020 20:50)  HR: 89 (02 Jan 2020 13:03) (78 - 89)  BP: 142/84 (02 Jan 2020 13:03) (124/70 - 150/82)  BP(mean): --  RR: 19 (02 Jan 2020 13:03) (18 - 19)  SpO2: 96% (02 Jan 2020 13:03) (96% - 100%)    Physical Exam:  General: NAD  HEENT: EOMI, MMM  Cardio: +S1/S2, RRR  Resp: CTA b/l  GI: +BS, soft, NT/ND  MSK: no synovitis  Neuro: AAOx3  Psych: wnl    LABS:                        18.2   9.83  )-----------( 134      ( 02 Jan 2020 05:15 )             56.6     01-02    132<L>  |  99  |  39<H>  ----------------------------<  172<H>  4.1   |  18<L>  |  1.19    Ca    7.5<L>      02 Jan 2020 05:15  Phos  4.2     01-02  Mg     2.0     01-02              RADIOLOGY & ADDITIONAL TESTS:

## 2020-01-02 NOTE — PROGRESS NOTE ADULT - ASSESSMENT
JOYCE (acute kidney injury) in the setting of worsening lupus nephritis/nephrotic syndrome.   HTN - improved  Creat - improved  s/p pulse steroids    - ok to d/c home with rheum outpatient fu in 2 weeks with Dr Guillaume - [phone]  - continue Prednisone 80 mg a day and PPI prophylaxis  - continue Cellcept 3 g a day  - PCP prophylaxis with Bactrim DS TIW  - Ca and VIT D supplementation to avoid steroids induced osteoporosis

## 2020-01-02 NOTE — PROGRESS NOTE ADULT - PROBLEM SELECTOR PLAN 2
Unclear etiology. Intermittent. Pt previously on gabapentin. Exam benign  - recommend keeping a journal of when symptom occurs and associated activity to help distinguish any patterns that may lead to diagnosis  - trial of Cymbalta as recommended by rheum

## 2020-01-03 ENCOUNTER — APPOINTMENT (OUTPATIENT)
Dept: RHEUMATOLOGY | Facility: CLINIC | Age: 25
End: 2020-01-03
Payer: MEDICAID

## 2020-01-03 VITALS
SYSTOLIC BLOOD PRESSURE: 148 MMHG | HEIGHT: 67 IN | WEIGHT: 230 LBS | HEART RATE: 89 BPM | BODY MASS INDEX: 36.1 KG/M2 | OXYGEN SATURATION: 93 % | DIASTOLIC BLOOD PRESSURE: 83 MMHG

## 2020-01-03 DIAGNOSIS — Z71.89 OTHER SPECIFIED COUNSELING: ICD-10-CM

## 2020-01-03 DIAGNOSIS — R76.8 OTHER SPECIFIED ABNORMAL IMMUNOLOGICAL FINDINGS IN SERUM: ICD-10-CM

## 2020-01-03 PROBLEM — M32.9 SYSTEMIC LUPUS ERYTHEMATOSUS, UNSPECIFIED: Chronic | Status: ACTIVE | Noted: 2019-12-28

## 2020-01-03 PROBLEM — M32.14 GLOMERULAR DISEASE IN SYSTEMIC LUPUS ERYTHEMATOSUS: Chronic | Status: ACTIVE | Noted: 2019-12-28

## 2020-01-03 PROCEDURE — 99215 OFFICE O/P EST HI 40 MIN: CPT

## 2020-01-06 ENCOUNTER — INBOUND DOCUMENT (OUTPATIENT)
Age: 25
End: 2020-01-06

## 2020-01-06 PROBLEM — R76.8 ANA POSITIVE: Status: RESOLVED | Noted: 2017-09-12 | Resolved: 2020-01-06

## 2020-01-06 NOTE — REVIEW OF SYSTEMS
[As Noted in HPI] : as noted in HPI [Negative] : Heme/Lymph [FreeTextEntry4] : no oral ulcers [FreeTextEntry8] : denies foamy urine [FreeTextEntry9] : no recurrent swelling [de-identified] : denines photosensitivity /rash

## 2020-01-06 NOTE — ASSESSMENT
[FreeTextEntry1] : 23 yo man SLE (2017) with LN now in flare\par \par # SLE - class III nephritis, myositis  - ELMO, RNP, ds DNA positive\par - will f/u repeat renal biopsy but patient believes was changed to a IV or V\par - ? related to cellcept non-adherence VS NSAID use\par - on cellcept 3 g bid - reinforced adherence\par - will readdress HCQ\par -? ACE - lisinipril - was on this in the past - will readdress with renal\par \par #HBP.  2/2 kidney issues. may also be partially elevated from the steroids\par - now on labetolol - still elevated no cp, sob, visual issues or headache\par - continue lasix\par - patient feels this goes down after a while\par - will recheck - if remains elevated will call us and renal\par - no nsaid\par \par #foot pain.  non-inflammatory - unclear etiology though doubt lupus releated.  unrelieved by steroids. possibly neuropathic - given location ? montero's neuroma - may need US or MRI.  \par - continue with cymbalta - just started it 1 day ago - d/w patient r/b/a of this med\par - will get repeat labs in 1-2 weeks\par - will consider further imaging and podiatric evaluation to assess non-inflammatory foot pain\par - NO NSAID - d/w patient at length\par \par #migraines\par -worsening late likely due to lupus\par -doing well now \par \par #myositis\par -lupus myopathy with increased cpk, aldolase and subjective weakness\par -given RNP serology - will continue to monitor for evolution to MCTD - \par -no pulmonary symptoms at present.\par \par #medicine monitoring, long term use of meds\par - was on nsaids - likely contributed a bit to disease - although preliminary bx doesn’t appear that way \par - d/w patient no NSAID for pain currently\par - gi omeprazole\par - pcp proph bactrim\par \par #CAM\par - took tumeric for a short while - no longer on it\par all questions answered\par

## 2020-01-06 NOTE — PHYSICAL EXAM
[General Appearance - Alert] : alert [General Appearance - In No Acute Distress] : in no acute distress [General Appearance - Well Nourished] : well nourished [General Appearance - Well Developed] : well developed [General Appearance - Well-Appearing] : healthy appearing [Sclera] : the sclera and conjunctiva were normal [PERRL With Normal Accommodation] : pupils were equal in size, round, and reactive to light [Extraocular Movements] : extraocular movements were intact [Outer Ear] : the ears and nose were normal in appearance [Oropharynx] : the oropharynx was normal [Neck Appearance] : the appearance of the neck was normal [Neck Cervical Mass (___cm)] : no neck mass was observed [Jugular Venous Distention Increased] : there was no jugular-venous distention [Auscultation Breath Sounds / Voice Sounds] : lungs were clear to auscultation bilaterally [Heart Rate And Rhythm] : heart rate was normal and rhythm regular [Heart Sounds] : normal S1 and S2 [Heart Sounds Gallop] : no gallops [Murmurs] : no murmurs [Heart Sounds Pericardial Friction Rub] : no pericardial rub [Cervical Lymph Nodes Enlarged Posterior Bilaterally] : posterior cervical [Cervical Lymph Nodes Enlarged Anterior Bilaterally] : anterior cervical [Supraclavicular Lymph Nodes Enlarged Bilaterally] : supraclavicular [No CVA Tenderness] : no ~M costovertebral angle tenderness [No Spinal Tenderness] : no spinal tenderness [Abnormal Walk] : normal gait [Nail Clubbing] : no clubbing  or cyanosis of the fingernails [Musculoskeletal - Swelling] : no joint swelling seen [Motor Tone] : muscle strength and tone were normal [Skin Turgor] : normal skin turgor [Skin Color & Pigmentation] : normal skin color and pigmentation [] : no rash [Motor Exam] : the motor exam was normal [No Focal Deficits] : no focal deficits [Oriented To Time, Place, And Person] : oriented to person, place, and time [Impaired Insight] : insight and judgment were intact [Affect] : the affect was normal [Mood] : the mood was normal [Memory Recent] : recent memory was not impaired [Edema] : there was no peripheral edema [FreeTextEntry1] : MS 5/5 bilaterally ue and LE

## 2020-01-06 NOTE — CONSULT LETTER
[Courtesy Letter:] : I had the pleasure of seeing your patient, [unfilled], in my office today. [Dear  ___] : Dear  [unfilled], [Sincerely,] : Sincerely, [Please see my note below.] : Please see my note below. [FreeTextEntry2] : Giuseppe Brooks

## 2020-01-06 NOTE — HISTORY OF PRESENT ILLNESS
[Currently Experiencing] : currently experiencing [Headache] : headache [Arthritis] : arthritis [Arthralgias] : arthralgias [None] : The patient is currently asymptomatic [Rash] : no rash [Chest Pain] : no chest pain [Sicca] : no sicca [Shortness of Breath] : no shortness of breath [Psychological Symptoms] : no psychological symptoms [Sun Sensitivity] : no sun sensitivity [FreeTextEntry7] : +ELMO, +dsDNA [FreeTextEntry1] : INTERVAL HX\par SLE/LN - not doing welll - here for urgent visit\par - returned from school in [place] with worsening BP and LE edema.\par - went to [facility] and was felt to be in lupus flare and was treated with pulse steroids 1 g x 3d (12/31 - 1/2).  MMF was increased to 3 g a day and biopsy was repeated. \par -patient notes that at times forgets to take the second dose of cellcept.  \par - also notes that he was taking NSAID for foot pain\par - had bx at the [facility] \par - no rash or oral ulcers.  denies fever. \par - denies headache or weakness this run.  tolerates increased cellcept\par - edema improved\par -discharged late last night\par HBP- now on labetolol for bp\par - denies cp, sob, visual changes.  no headache\par foot pain.  \par - has had pain on the bottom of thefoot before - years ago - 2017.   \par - no swellig or redness.  no heat\par - was taking NSAID for relief\par - the high doses of steroids did not improve it at all\par - started cymbalta x 1dose in the hospital\par

## 2020-01-06 NOTE — REASON FOR VISIT
[FreeTextEntry1] : here for urgent post-hospitalization visit [Post Hospitalization] : a post hospitalization visit

## 2020-01-16 ENCOUNTER — APPOINTMENT (OUTPATIENT)
Dept: RHEUMATOLOGY | Facility: CLINIC | Age: 25
End: 2020-01-16

## 2020-01-24 ENCOUNTER — APPOINTMENT (OUTPATIENT)
Dept: NEPHROLOGY | Facility: CLINIC | Age: 25
End: 2020-01-24
Payer: MEDICAID

## 2020-01-24 ENCOUNTER — LABORATORY RESULT (OUTPATIENT)
Age: 25
End: 2020-01-24

## 2020-01-24 ENCOUNTER — APPOINTMENT (OUTPATIENT)
Dept: RHEUMATOLOGY | Facility: CLINIC | Age: 25
End: 2020-01-24
Payer: MEDICAID

## 2020-01-24 VITALS
WEIGHT: 259.04 LBS | HEIGHT: 67 IN | OXYGEN SATURATION: 98 % | HEART RATE: 105 BPM | DIASTOLIC BLOOD PRESSURE: 96 MMHG | SYSTOLIC BLOOD PRESSURE: 144 MMHG | BODY MASS INDEX: 40.66 KG/M2

## 2020-01-24 VITALS
HEIGHT: 67 IN | HEART RATE: 107 BPM | DIASTOLIC BLOOD PRESSURE: 95 MMHG | SYSTOLIC BLOOD PRESSURE: 148 MMHG | OXYGEN SATURATION: 98 % | WEIGHT: 259 LBS | BODY MASS INDEX: 40.65 KG/M2

## 2020-01-24 PROCEDURE — 99215 OFFICE O/P EST HI 40 MIN: CPT | Mod: 25

## 2020-01-24 PROCEDURE — 36415 COLL VENOUS BLD VENIPUNCTURE: CPT

## 2020-01-24 PROCEDURE — 99214 OFFICE O/P EST MOD 30 MIN: CPT

## 2020-01-24 RX ORDER — FUROSEMIDE 40 MG/1
40 TABLET ORAL DAILY
Qty: 14 | Refills: 0 | Status: DISCONTINUED | COMMUNITY
Start: 2020-01-24 | End: 2020-01-24

## 2020-01-24 NOTE — ASSESSMENT
[FreeTextEntry1] : Mr. SCRUGGS is a 24 year old male with hx of SLE with prior class 3 but not active class IV( non cscentic) on steroids and MMF. \par Cont MMF at this dose\par decrease pred to 60mg po qd, pt to see rheum today for all labs\par advised to change lasix to torsemide 20mg BID dosing\par ACIE once crt is stable for bp control\par might require vitamin D , calcium and bactrim once labs return today\par PPI on board for ppx for GERD\par unclear rash- pt to show Rheum today, could be steroid related.\par If no response to MMF, will need rituxan but appears that he had responded in the hospital in terms of proteinuria.\par \par d/w with mother who is an OR nurse in detail\par fu in 2 months

## 2020-01-24 NOTE — HISTORY OF PRESENT ILLNESS
[FreeTextEntry1] : Mr. SCRUGGS is a 24 year old male with known SLE here for post hs discharge. He was dx with SLE 3 years ago with symptoms of jt pains and was prescribed plaquenil and steroids, at that time he was ELMO, dsDNA and RNP positive, His urinalysis at that time was neg. He didn't take his prescribed meds and just worked on getting his diet and exercise regimen. He did take NSAIDS on and off for years for the pain.\par In 2019, was placed on MMF for class 3 focal prolif and was doing well till he gets admitted to [facility] with SOB and proteinuria of 2gm and active urine sediement in Dec 2019 and SLE flare. Rpt Kidney bx shows( scanned) diffuse global prof Type IV-G(A) and small crescents 8% and NIH activity 10/24.  5% or less chronicity and NIH chronicty score was 0/12. He was given pulse steroids and sent home on pred 80mg and MMF 1000mg TID. He is here f.u with proteinuria down to 0.4gm and crt normal but gained 20lbs. he is taking lasix but not working well. he also has a new rash on his abdomen. He is scared and not sure what to do. 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

## 2020-01-24 NOTE — PHYSICAL EXAM
[General Appearance - Alert] : alert [General Appearance - In No Acute Distress] : in no acute distress [Outer Ear] : the ears and nose were normal in appearance [Oropharynx] : the oropharynx was normal [Neck Appearance] : the appearance of the neck was normal [Neck Cervical Mass (___cm)] : no neck mass was observed [Thyroid Nodule] : there were no palpable thyroid nodules [Thyroid Diffuse Enlargement] : the thyroid was not enlarged [Jugular Venous Distention Increased] : there was no jugular-venous distention [Auscultation Breath Sounds / Voice Sounds] : lungs were clear to auscultation bilaterally [Heart Rate And Rhythm] : heart rate was normal and rhythm regular [Heart Sounds] : normal S1 and S2 [Heart Sounds Gallop] : no gallops [Heart Sounds Pericardial Friction Rub] : no pericardial rub [Murmurs] : no murmurs [Bowel Sounds] : normal bowel sounds [Abdomen Tenderness] : non-tender [Abdomen Soft] : soft [] : no hepato-splenomegaly [Abdomen Mass (___ Cm)] : no abdominal mass palpated [Abnormal Walk] : normal gait [No CVA Tenderness] : no ~M costovertebral angle tenderness [Musculoskeletal - Swelling] : no joint swelling seen [Nail Clubbing] : no clubbing  or cyanosis of the fingernails [Motor Tone] : muscle strength and tone were normal [Cranial Nerves] : cranial nerves 2-12 were intact [FreeTextEntry1] : straie like rash on abdomen lower only. non prurutic [Oriented To Time, Place, And Person] : oriented to person, place, and time

## 2020-01-25 LAB
BASOPHILS # BLD AUTO: 0 K/UL
BASOPHILS NFR BLD AUTO: 0 %
CK SERPL-CCNC: 126 U/L
CRP SERPL-MCNC: 0.18 MG/DL
EOSINOPHIL # BLD AUTO: 0.35 K/UL
EOSINOPHIL NFR BLD AUTO: 2.4 %
ERYTHROCYTE [SEDIMENTATION RATE] IN BLOOD BY WESTERGREN METHOD: 22 MM/HR
HCT VFR BLD CALC: 39.4 %
HGB BLD-MCNC: 12.3 G/DL
LYMPHOCYTES # BLD AUTO: 6.51 K/UL
LYMPHOCYTES NFR BLD AUTO: 44.5 %
MAN DIFF?: NORMAL
MCHC RBC-ENTMCNC: 26.9 PG
MCHC RBC-ENTMCNC: 31.2 GM/DL
MCV RBC AUTO: 86.2 FL
MONOCYTES # BLD AUTO: 1.02 K/UL
MONOCYTES NFR BLD AUTO: 7 %
NEUTROPHILS # BLD AUTO: 6.51 K/UL
NEUTROPHILS NFR BLD AUTO: 44.5 %
PLATELET # BLD AUTO: 210 K/UL
RBC # BLD: 4.57 M/UL
RBC # FLD: 16.7 %
WBC # FLD AUTO: 14.62 K/UL

## 2020-01-27 LAB
25(OH)D3 SERPL-MCNC: 8.6 NG/ML
C3 SERPL-MCNC: 76 MG/DL
C4 SERPL-MCNC: 17 MG/DL

## 2020-01-27 NOTE — ASSESSMENT
[FreeTextEntry1] : 23 yo man SLE (2017) with LN now in flare\par \par # SLE - class III / IV nephritis, myositis  - ELMO, RNP, ds DNA positive\par - on cellcept 3 g bid - reinforced adherence\par - readdressed HCQ today - willling to try it \par - taper prednisone to 60 mg daily\par - Will readdress ACEI with neprho next visit\par - check inflammatory markers and complement\par - reviewed with nephro\par \par #Rash.  likely 2/2 steroids (acne and abdominal striae)\par - discussed with patient reduction in steroids should help \par - will observe for now on reduction\par \par #hand cramps.  intermittent\par - ? related to electroylte changes\par - check k, mg and ca\par \par #foot pain.  non-inflammatory - unclear etiology though doubt lupus releated.  unrelieved by steroids. possibly neuropathic - given location ? montero's neuroma - may need US or MRI.  \par - continue with cymbalta - just started it 1 day ago - d/w patient r/b/a of this med\par - will get repeat labs in 1-2 weeks\par - will consider further imaging and podiatric evaluation to assess non-inflammatory foot pain\par - NO NSAID - d/w patient at length\par \par #migraines\par -doing well now \par \par #myositis\par -given RNP serology - will continue to monitor for evolution to MCTD - \par -no pulmonary symptoms at present.\par \par #medicine monitoring, long term use of meds\par - was on nsaids - likely contributed a bit to disease \par - d/w patient NO NSAID for pain currently\par - gi omeprazole\par - pcp proph bactrim - order sent\par - steroids now with very pronounced weight gain, acne and striae - tapering as quickly as possible\par \par  \par

## 2020-01-27 NOTE — CONSULT LETTER
[Dear  ___] : Dear  [unfilled], [Courtesy Letter:] : I had the pleasure of seeing your patient, [unfilled], in my office today. [Please see my note below.] : Please see my note below. [Sincerely,] : Sincerely, [FreeTextEntry2] : Giuseppe Brooks

## 2020-01-27 NOTE — REVIEW OF SYSTEMS
[As Noted in HPI] : as noted in HPI [Negative] : Heme/Lymph [FreeTextEntry4] : no oral ulcers [FreeTextEntry8] : denies foamy urine [FreeTextEntry9] : no recurrent swelling [de-identified] : denines photosensitivity /rash

## 2020-01-27 NOTE — HISTORY OF PRESENT ILLNESS
[Currently Experiencing] : currently experiencing [Headache] : headache [Arthritis] : arthritis [Arthralgias] : arthralgias [None] : The patient is currently asymptomatic [Sicca] : no sicca [Rash] : no rash [Psychological Symptoms] : no psychological symptoms [Chest Pain] : no chest pain [Shortness of Breath] : no shortness of breath [FreeTextEntry1] : INTERVAL HX\par SLE/LN - \par - feels better than a month ago in most ways - denies joint pain or difficulty breathing\par - adherent to MMF 1 gm TID and steroids at 80 mg sweet\par - however has developed diffuse facial rash and abodminal changes, gained close to 30 pounds and edema is worsened. \par - is adherent to the lasix - but feels like it is not working\par - developing cramping of the hands\par - no photosensitivity or rp - muscles feel strong and there is no headache, which in the past all correlated with his flares.\par - no oral ulcers.  denies fever. \par -discharged late last night\par HBP- now on labetolol for bp\par - denies cp, sob, visual changes.  no headache\par foot pain.  \par - has had pain on the bottom of thefoot before - years ago - 2017.   \par - no swellig or redness.  no heat\par - was taking NSAID for relief prior to  hospitalization in december\par \par  [FreeTextEntry7] : +ELMO, +dsDNA [Sun Sensitivity] : no sun sensitivity

## 2020-01-27 NOTE — PHYSICAL EXAM
[General Appearance - Alert] : alert [General Appearance - In No Acute Distress] : in no acute distress [General Appearance - Well Nourished] : well nourished [General Appearance - Well Developed] : well developed [General Appearance - Well-Appearing] : healthy appearing [Sclera] : the sclera and conjunctiva were normal [PERRL With Normal Accommodation] : pupils were equal in size, round, and reactive to light [Extraocular Movements] : extraocular movements were intact [Outer Ear] : the ears and nose were normal in appearance [Oropharynx] : the oropharynx was normal [Neck Appearance] : the appearance of the neck was normal [Neck Cervical Mass (___cm)] : no neck mass was observed [Jugular Venous Distention Increased] : there was no jugular-venous distention [Auscultation Breath Sounds / Voice Sounds] : lungs were clear to auscultation bilaterally [Heart Rate And Rhythm] : heart rate was normal and rhythm regular [Heart Sounds] : normal S1 and S2 [Heart Sounds Gallop] : no gallops [Murmurs] : no murmurs [Heart Sounds Pericardial Friction Rub] : no pericardial rub [Cervical Lymph Nodes Enlarged Anterior Bilaterally] : anterior cervical [Cervical Lymph Nodes Enlarged Posterior Bilaterally] : posterior cervical [Supraclavicular Lymph Nodes Enlarged Bilaterally] : supraclavicular [No CVA Tenderness] : no ~M costovertebral angle tenderness [No Spinal Tenderness] : no spinal tenderness [Abnormal Walk] : normal gait [Nail Clubbing] : no clubbing  or cyanosis of the fingernails [Musculoskeletal - Swelling] : no joint swelling seen [Motor Tone] : muscle strength and tone were normal [Skin Color & Pigmentation] : normal skin color and pigmentation [Skin Turgor] : normal skin turgor [] : no rash [Oriented To Time, Place, And Person] : oriented to person, place, and time [Affect] : the affect was normal [Impaired Insight] : insight and judgment were intact [Memory Recent] : recent memory was not impaired [Mood] : the mood was normal [FreeTextEntry1] : acne changes on face - abdominal striae throughout

## 2020-01-28 ENCOUNTER — TRANSCRIPTION ENCOUNTER (OUTPATIENT)
Age: 25
End: 2020-01-28

## 2020-01-28 ENCOUNTER — CLINICAL ADVICE (OUTPATIENT)
Age: 25
End: 2020-01-28

## 2020-01-28 ENCOUNTER — MESSAGE (OUTPATIENT)
Age: 25
End: 2020-01-28

## 2020-01-28 LAB — DSDNA AB SER-ACNC: 12 IU/ML

## 2020-02-13 ENCOUNTER — LABORATORY RESULT (OUTPATIENT)
Age: 25
End: 2020-02-13

## 2020-02-14 ENCOUNTER — APPOINTMENT (OUTPATIENT)
Dept: RHEUMATOLOGY | Facility: CLINIC | Age: 25
End: 2020-02-14
Payer: MEDICAID

## 2020-02-14 VITALS
DIASTOLIC BLOOD PRESSURE: 86 MMHG | SYSTOLIC BLOOD PRESSURE: 143 MMHG | WEIGHT: 259 LBS | HEART RATE: 104 BPM | BODY MASS INDEX: 40.65 KG/M2 | HEIGHT: 67 IN | RESPIRATION RATE: 14 BRPM | OXYGEN SATURATION: 99 %

## 2020-02-14 PROCEDURE — 99215 OFFICE O/P EST HI 40 MIN: CPT

## 2020-02-14 NOTE — PHYSICAL EXAM
[General Appearance - Alert] : alert [General Appearance - In No Acute Distress] : in no acute distress [General Appearance - Well Nourished] : well nourished [General Appearance - Well Developed] : well developed [General Appearance - Well-Appearing] : healthy appearing [Sclera] : the sclera and conjunctiva were normal [PERRL With Normal Accommodation] : pupils were equal in size, round, and reactive to light [Extraocular Movements] : extraocular movements were intact [Outer Ear] : the ears and nose were normal in appearance [Oropharynx] : the oropharynx was normal [Neck Appearance] : the appearance of the neck was normal [Neck Cervical Mass (___cm)] : no neck mass was observed [Jugular Venous Distention Increased] : there was no jugular-venous distention [Heart Rate And Rhythm] : heart rate was normal and rhythm regular [Heart Sounds] : normal S1 and S2 [Auscultation Breath Sounds / Voice Sounds] : lungs were clear to auscultation bilaterally [Heart Sounds Pericardial Friction Rub] : no pericardial rub [Murmurs] : no murmurs [Heart Sounds Gallop] : no gallops [Cervical Lymph Nodes Enlarged Posterior Bilaterally] : posterior cervical [Cervical Lymph Nodes Enlarged Anterior Bilaterally] : anterior cervical [No CVA Tenderness] : no ~M costovertebral angle tenderness [Supraclavicular Lymph Nodes Enlarged Bilaterally] : supraclavicular [No Spinal Tenderness] : no spinal tenderness [Abnormal Walk] : normal gait [Nail Clubbing] : no clubbing  or cyanosis of the fingernails [Motor Tone] : muscle strength and tone were normal [Musculoskeletal - Swelling] : no joint swelling seen [Skin Color & Pigmentation] : normal skin color and pigmentation [Skin Turgor] : normal skin turgor [] : no rash [FreeTextEntry1] : acne changes on face - abdominal striae throughout [Oriented To Time, Place, And Person] : oriented to person, place, and time [Impaired Insight] : insight and judgment were intact [Affect] : the affect was normal [Memory Recent] : recent memory was not impaired [Mood] : the mood was normal

## 2020-02-14 NOTE — REVIEW OF SYSTEMS
[As Noted in HPI] : as noted in HPI [Negative] : Heme/Lymph [FreeTextEntry4] : no oral ulcers [FreeTextEntry8] : denies foamy urine [FreeTextEntry9] : no recurrent swelling [de-identified] : denines photosensitivity /rash

## 2020-02-14 NOTE — HISTORY OF PRESENT ILLNESS
[Currently Experiencing] : currently experiencing [Headache] : headache [Sicca] : no sicca [Rash] : no rash [Chest Pain] : no chest pain [Shortness of Breath] : no shortness of breath [Psychological Symptoms] : no psychological symptoms [Arthritis] : arthritis [Arthralgias] : arthralgias [Sun Sensitivity] : no sun sensitivity [FreeTextEntry1] : INTERVAL HX\par SLE/LN - \par - feelig much better compared to last vist in terms of resolution of several steroid related side effects.   decreased steroids to 40 mg daily.  no issues with drop - denies joint pain \par - dropped weight - no salt, green juice, working out regularly, maybe 30 pounds\par - tolerates meds - taking all of them now - adherent\par - some swelig in ankles - but intermittent.  today worse as just flew in from [place] for the visit\par CRAMPS\par - cramps improved after vit dpounds and edema is worsened. \par - is adherent to the lasix - but feels like it is not working\par - developing cramping of the hands\par - no photosensitivity or rp - muscles feel strong and there is no headache, which in the past all correlated with his flares.\par - no oral ulcers.  denies fever. \par -discharged late last night\par HBP- now on labetolol for bp\par - denies cp, sob, visual changes.  no headache\par foot pain.  \par - has had pain on the bottom of thefoot before - years ago - 2017.   \par - no swellig or redness.  no heat\par \par  [FreeTextEntry7] : +ELMO, +dsDNA [None] : The patient is currently asymptomatic

## 2020-02-14 NOTE — ASSESSMENT
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 \par - improved edema and ds dna now negative\par - started lisinopril\par - still on lasxi - continue for now - may be able to decrease dose in march if continues to do this well\par \par #Rash.  likely 2/2 steroids (acne and abdominal striae)\par - much improved with reduction of steroids\par \par #hand cramps.  intermittent\par - much imprved after vit d\par \par #migraines\par -doing well now \par \par #myositis\par -given RNP serology - will continue to monitor for evolution to MCTD - \par -no pulmonary symptoms at present.\par \par #medicine monitoring, long term use of meds\par - was on nsaids - likely contributed a bit to disease \par - d/w patient NO NSAID for pain currently\par - gi omeprazole\par - pcp proph bactrim - order sent\par - steroids now with very pronounced weight gain, acne and striae - tapering as quickly as possible\par \par  \par

## 2020-02-18 ENCOUNTER — TRANSCRIPTION ENCOUNTER (OUTPATIENT)
Age: 25
End: 2020-02-18

## 2020-02-18 LAB
25(OH)D3 SERPL-MCNC: 14.2 NG/ML
ALBUMIN SERPL ELPH-MCNC: 3.3 G/DL
ALP BLD-CCNC: 34 U/L
ALT SERPL-CCNC: 31 U/L
ANION GAP SERPL CALC-SCNC: 19 MMOL/L
AST SERPL-CCNC: 12 U/L
BASOPHILS # BLD AUTO: 0.12 K/UL
BASOPHILS NFR BLD AUTO: 0.8 %
BILIRUB SERPL-MCNC: 0.3 MG/DL
BUN SERPL-MCNC: 31 MG/DL
C3 SERPL-MCNC: 96 MG/DL
C4 SERPL-MCNC: 18 MG/DL
CALCIUM SERPL-MCNC: 8.9 MG/DL
CHLORIDE SERPL-SCNC: 100 MMOL/L
CO2 SERPL-SCNC: 20 MMOL/L
COMPREHENSIVE ARRHYTHMIA PANEL: NEGATIVE
CREAT SERPL-MCNC: 0.81 MG/DL
CRP SERPL-MCNC: 0.53 MG/DL
DSDNA AB SER-ACNC: <12 IU/ML
EOSINOPHIL # BLD AUTO: 0 K/UL
EOSINOPHIL NFR BLD AUTO: 0 %
ERYTHROCYTE [SEDIMENTATION RATE] IN BLOOD BY WESTERGREN METHOD: 40 MM/HR
ESTIMATED AVERAGE GLUCOSE: 157 MG/DL
HBA1C MFR BLD HPLC: 7.1 %
HCT VFR BLD CALC: 37.8 %
HGB BLD-MCNC: 12.2 G/DL
LYMPHOCYTES # BLD AUTO: 2.27 K/UL
LYMPHOCYTES NFR BLD AUTO: 15.4 %
M TB IFN-G BLD-IMP: ABNORMAL
MAN DIFF?: NORMAL
MCHC RBC-ENTMCNC: 27.3 PG
MCHC RBC-ENTMCNC: 32.3 GM/DL
MCV RBC AUTO: 84.6 FL
MONOCYTES # BLD AUTO: 0.75 K/UL
MONOCYTES NFR BLD AUTO: 5.1 %
NEUTROPHILS # BLD AUTO: 11.11 K/UL
NEUTROPHILS NFR BLD AUTO: 75.2 %
PLATELET # BLD AUTO: 140 K/UL
POTASSIUM SERPL-SCNC: 4 MMOL/L
PROT SERPL-MCNC: 5.2 G/DL
QUANTIFERON TB PLUS MITOGEN MINUS NIL: 0.2 IU/ML
QUANTIFERON TB PLUS NIL: 0.02 IU/ML
QUANTIFERON TB PLUS TB1 MINUS NIL: 0 IU/ML
QUANTIFERON TB PLUS TB2 MINUS NIL: 0 IU/ML
RBC # BLD: 4.47 M/UL
RBC # FLD: 15.2 %
SODIUM SERPL-SCNC: 139 MMOL/L
WBC # FLD AUTO: 14.77 K/UL

## 2020-02-19 ENCOUNTER — TRANSCRIPTION ENCOUNTER (OUTPATIENT)
Age: 25
End: 2020-02-19

## 2020-03-27 NOTE — ED PROVIDER NOTE - CROS ED PSYCH ALL NEG
Requested Prescriptions     Signed Prescriptions Disp Refills    metoprolol succinate (TOPROL-XL) 50 mg XL tablet 30 Tab 5     Sig: Take 1 tablet by mouth once daily     Authorizing Provider: Zenia Mendez     Ordering User: Whitney Lambert    Per Dr. Erica Montalvo verbal orders negative...

## 2020-04-14 ENCOUNTER — RX RENEWAL (OUTPATIENT)
Age: 25
End: 2020-04-14

## 2020-04-14 ENCOUNTER — TRANSCRIPTION ENCOUNTER (OUTPATIENT)
Age: 25
End: 2020-04-14

## 2020-04-17 ENCOUNTER — APPOINTMENT (OUTPATIENT)
Dept: RHEUMATOLOGY | Facility: CLINIC | Age: 25
End: 2020-04-17
Payer: MEDICAID

## 2020-04-17 ENCOUNTER — NON-APPOINTMENT (OUTPATIENT)
Age: 25
End: 2020-04-17

## 2020-04-17 DIAGNOSIS — M79.672 PAIN IN LEFT FOOT: ICD-10-CM

## 2020-04-17 DIAGNOSIS — R74.8 ABNORMAL LEVELS OF OTHER SERUM ENZYMES: ICD-10-CM

## 2020-04-17 DIAGNOSIS — Z87.898 PERSONAL HISTORY OF OTHER SPECIFIED CONDITIONS: ICD-10-CM

## 2020-04-17 PROCEDURE — 99213 OFFICE O/P EST LOW 20 MIN: CPT | Mod: 95

## 2020-04-17 RX ORDER — PREDNISONE 10 MG/1
10 TABLET ORAL
Qty: 90 | Refills: 1 | Status: COMPLETED | COMMUNITY
Start: 2020-04-14 | End: 2020-04-17

## 2020-04-17 NOTE — ASSESSMENT
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 improved\par - continue lisinopril.   \par - off diuretic without edema\par \par #hypertension.   improved and normal per patient checks off labetolol\par - continue lisinopril at this time and reassess next visit\par \par #Rash.  likely 2/2 steroids (acne and abdominal striae)\par - gone with cessation of steroids\par \par #migraines\par -doing well now \par \par #myositis\par -given RNP serology - will continue to monitor for evolution to MCTD - \par -no pulmonary symptoms at present.\par \par #vit d def. slowly imrpoving\par - check level and continue to replace\par \par #medicine monitoring, long term use of meds\par - was on nsaids - likely contributed a bit to disease \par - d/w patient NO NSAID for pain currently\par - gi omeprazole\par - pcp proph bactrim - order sent\par - steroids now with very pronounced weight gain, acne and striae - tapering as quickly as possible\par - Quant tb indeterminate Feb 2020 - likely 2/Steroids - recheck\par - elevated hgbaic - likely 2/2 steroids and weight gain (now 230).  vida monitor now off steroids.  d/w patient exercise\par  \par

## 2020-04-17 NOTE — PHYSICAL EXAM
[General Appearance - Alert] : alert [General Appearance - In No Acute Distress] : in no acute distress [General Appearance - Well Nourished] : well nourished [General Appearance - Well Developed] : well developed [General Appearance - Well-Appearing] : healthy appearing [Sclera] : the sclera and conjunctiva were normal [Neck Appearance] : the appearance of the neck was normal [Edema] : there was no peripheral edema [Musculoskeletal - Swelling] : no joint swelling seen [Skin Color & Pigmentation] : normal skin color and pigmentation [] : no rash [Oriented To Time, Place, And Person] : oriented to person, place, and time [Impaired Insight] : insight and judgment were intact [Affect] : the affect was normal [Mood] : the mood was normal [FreeTextEntry1] : no edema no raynauds

## 2020-04-17 NOTE — HISTORY OF PRESENT ILLNESS
[Home] : at home, [unfilled] , at the time of the visit. [Other Location: e.g. Home (Enter Location, City,State)___] : at [unfilled] [Patient] : the patient [Currently Experiencing] : currently experiencing [Headache] : headache [Arthritis] : arthritis [Arthralgias] : arthralgias [None] : The patient is currently asymptomatic [Sicca] : no sicca [Rash] : no rash [Chest Pain] : no chest pain [Shortness of Breath] : no shortness of breath [Psychological Symptoms] : no psychological symptoms [Sun Sensitivity] : no sun sensitivity [FreeTextEntry1] : INTERVAL HX\par SLE/LN - \par - ffeels normal at this time.denies weakness, edema or increase bp (checking regularly with home bp cuff)\par - ran out of labetolol - no change in symptoms and bp remained normal\par - started the hcq - but didn’t want to do both pills - taking one a day\par - adherent to the MMF \par - weight had gained to 230 ound s- exercising at home now\par - taking the vit d\par - reviewed all labs\par \par sle ros is normal now and denies s/s of active lupus [FreeTextEntry7] : +ELMO, +dsDNA

## 2020-05-30 ENCOUNTER — EMERGENCY (EMERGENCY)
Facility: HOSPITAL | Age: 25
LOS: 1 days | End: 2020-05-30
Attending: EMERGENCY MEDICINE
Payer: MEDICAID

## 2020-05-30 VITALS
DIASTOLIC BLOOD PRESSURE: 90 MMHG | SYSTOLIC BLOOD PRESSURE: 143 MMHG | HEART RATE: 94 BPM | OXYGEN SATURATION: 99 % | TEMPERATURE: 98 F | RESPIRATION RATE: 17 BRPM

## 2020-05-30 VITALS
HEART RATE: 112 BPM | TEMPERATURE: 98 F | HEIGHT: 68 IN | DIASTOLIC BLOOD PRESSURE: 85 MMHG | SYSTOLIC BLOOD PRESSURE: 133 MMHG | WEIGHT: 225.09 LBS | RESPIRATION RATE: 15 BRPM

## 2020-05-30 DIAGNOSIS — M62.82 RHABDOMYOLYSIS: ICD-10-CM

## 2020-05-30 LAB
ALBUMIN SERPL ELPH-MCNC: 3.3 G/DL — SIGNIFICANT CHANGE UP (ref 3.3–5)
ALBUMIN SERPL ELPH-MCNC: 3.8 G/DL — SIGNIFICANT CHANGE UP (ref 3.3–5)
ALP SERPL-CCNC: 53 U/L — SIGNIFICANT CHANGE UP (ref 40–120)
ALP SERPL-CCNC: 59 U/L — SIGNIFICANT CHANGE UP (ref 40–120)
ALT FLD-CCNC: 45 U/L — SIGNIFICANT CHANGE UP (ref 10–45)
ALT FLD-CCNC: 51 U/L — HIGH (ref 10–45)
ANION GAP SERPL CALC-SCNC: 12 MMOL/L — SIGNIFICANT CHANGE UP (ref 5–17)
ANION GAP SERPL CALC-SCNC: 15 MMOL/L — SIGNIFICANT CHANGE UP (ref 5–17)
APPEARANCE UR: CLEAR — SIGNIFICANT CHANGE UP
AST SERPL-CCNC: 110 U/L — HIGH (ref 10–40)
AST SERPL-CCNC: 128 U/L — HIGH (ref 10–40)
BACTERIA # UR AUTO: NEGATIVE — SIGNIFICANT CHANGE UP
BASOPHILS # BLD AUTO: 0.04 K/UL — SIGNIFICANT CHANGE UP (ref 0–0.2)
BASOPHILS NFR BLD AUTO: 0.5 % — SIGNIFICANT CHANGE UP (ref 0–2)
BILIRUB SERPL-MCNC: 0.5 MG/DL — SIGNIFICANT CHANGE UP (ref 0.2–1.2)
BILIRUB SERPL-MCNC: 0.7 MG/DL — SIGNIFICANT CHANGE UP (ref 0.2–1.2)
BILIRUB UR-MCNC: NEGATIVE — SIGNIFICANT CHANGE UP
BUN SERPL-MCNC: 12 MG/DL — SIGNIFICANT CHANGE UP (ref 7–23)
BUN SERPL-MCNC: 8 MG/DL — SIGNIFICANT CHANGE UP (ref 7–23)
CALCIUM SERPL-MCNC: 8.4 MG/DL — SIGNIFICANT CHANGE UP (ref 8.4–10.5)
CALCIUM SERPL-MCNC: 9.2 MG/DL — SIGNIFICANT CHANGE UP (ref 8.4–10.5)
CHLORIDE SERPL-SCNC: 103 MMOL/L — SIGNIFICANT CHANGE UP (ref 96–108)
CHLORIDE SERPL-SCNC: 97 MMOL/L — SIGNIFICANT CHANGE UP (ref 96–108)
CK SERPL-CCNC: CRITICAL HIGH U/L (ref 30–200)
CO2 SERPL-SCNC: 24 MMOL/L — SIGNIFICANT CHANGE UP (ref 22–31)
CO2 SERPL-SCNC: 25 MMOL/L — SIGNIFICANT CHANGE UP (ref 22–31)
COLOR SPEC: YELLOW — SIGNIFICANT CHANGE UP
CREAT SERPL-MCNC: 0.71 MG/DL — SIGNIFICANT CHANGE UP (ref 0.5–1.3)
CREAT SERPL-MCNC: 0.82 MG/DL — SIGNIFICANT CHANGE UP (ref 0.5–1.3)
DIFF PNL FLD: ABNORMAL
EOSINOPHIL # BLD AUTO: 0.15 K/UL — SIGNIFICANT CHANGE UP (ref 0–0.5)
EOSINOPHIL NFR BLD AUTO: 1.8 % — SIGNIFICANT CHANGE UP (ref 0–6)
EPI CELLS # UR: 2 /HPF — SIGNIFICANT CHANGE UP
GLUCOSE SERPL-MCNC: 114 MG/DL — HIGH (ref 70–99)
GLUCOSE SERPL-MCNC: 135 MG/DL — HIGH (ref 70–99)
GLUCOSE UR QL: NEGATIVE — SIGNIFICANT CHANGE UP
HCT VFR BLD CALC: 44.1 % — SIGNIFICANT CHANGE UP (ref 39–50)
HGB BLD-MCNC: 14.7 G/DL — SIGNIFICANT CHANGE UP (ref 13–17)
HYALINE CASTS # UR AUTO: 6 /LPF — HIGH (ref 0–2)
IMM GRANULOCYTES NFR BLD AUTO: 0.4 % — SIGNIFICANT CHANGE UP (ref 0–1.5)
KETONES UR-MCNC: NEGATIVE — SIGNIFICANT CHANGE UP
LEUKOCYTE ESTERASE UR-ACNC: NEGATIVE — SIGNIFICANT CHANGE UP
LYMPHOCYTES # BLD AUTO: 3.36 K/UL — HIGH (ref 1–3.3)
LYMPHOCYTES # BLD AUTO: 41.3 % — SIGNIFICANT CHANGE UP (ref 13–44)
MCHC RBC-ENTMCNC: 26.7 PG — LOW (ref 27–34)
MCHC RBC-ENTMCNC: 33.3 GM/DL — SIGNIFICANT CHANGE UP (ref 32–36)
MCV RBC AUTO: 80 FL — SIGNIFICANT CHANGE UP (ref 80–100)
MONOCYTES # BLD AUTO: 0.67 K/UL — SIGNIFICANT CHANGE UP (ref 0–0.9)
MONOCYTES NFR BLD AUTO: 8.2 % — SIGNIFICANT CHANGE UP (ref 2–14)
NEUTROPHILS # BLD AUTO: 3.89 K/UL — SIGNIFICANT CHANGE UP (ref 1.8–7.4)
NEUTROPHILS NFR BLD AUTO: 47.8 % — SIGNIFICANT CHANGE UP (ref 43–77)
NITRITE UR-MCNC: NEGATIVE — SIGNIFICANT CHANGE UP
NRBC # BLD: 0 /100 WBCS — SIGNIFICANT CHANGE UP (ref 0–0)
PH UR: 6.5 — SIGNIFICANT CHANGE UP (ref 5–8)
PLATELET # BLD AUTO: 269 K/UL — SIGNIFICANT CHANGE UP (ref 150–400)
POTASSIUM SERPL-MCNC: 3.2 MMOL/L — LOW (ref 3.5–5.3)
POTASSIUM SERPL-MCNC: 3.3 MMOL/L — LOW (ref 3.5–5.3)
POTASSIUM SERPL-SCNC: 3.2 MMOL/L — LOW (ref 3.5–5.3)
POTASSIUM SERPL-SCNC: 3.3 MMOL/L — LOW (ref 3.5–5.3)
PROT SERPL-MCNC: 5.9 G/DL — LOW (ref 6–8.3)
PROT SERPL-MCNC: 6.8 G/DL — SIGNIFICANT CHANGE UP (ref 6–8.3)
PROT UR-MCNC: >600
RBC # BLD: 5.51 M/UL — SIGNIFICANT CHANGE UP (ref 4.2–5.8)
RBC # FLD: 13.2 % — SIGNIFICANT CHANGE UP (ref 10.3–14.5)
RBC CASTS # UR COMP ASSIST: 122 /HPF — HIGH (ref 0–4)
SARS-COV-2 RNA SPEC QL NAA+PROBE: SIGNIFICANT CHANGE UP
SODIUM SERPL-SCNC: 136 MMOL/L — SIGNIFICANT CHANGE UP (ref 135–145)
SODIUM SERPL-SCNC: 140 MMOL/L — SIGNIFICANT CHANGE UP (ref 135–145)
SP GR SPEC: 1.03 — HIGH (ref 1.01–1.02)
UROBILINOGEN FLD QL: NEGATIVE — SIGNIFICANT CHANGE UP
WBC # BLD: 8.14 K/UL — SIGNIFICANT CHANGE UP (ref 3.8–10.5)
WBC # FLD AUTO: 8.14 K/UL — SIGNIFICANT CHANGE UP (ref 3.8–10.5)
WBC UR QL: 9 /HPF — HIGH (ref 0–5)

## 2020-05-30 PROCEDURE — 84156 ASSAY OF PROTEIN URINE: CPT

## 2020-05-30 PROCEDURE — 99284 EMERGENCY DEPT VISIT MOD MDM: CPT | Mod: GC

## 2020-05-30 PROCEDURE — 93010 ELECTROCARDIOGRAM REPORT: CPT

## 2020-05-30 PROCEDURE — G0378: CPT

## 2020-05-30 PROCEDURE — 81001 URINALYSIS AUTO W/SCOPE: CPT

## 2020-05-30 PROCEDURE — 93005 ELECTROCARDIOGRAM TRACING: CPT

## 2020-05-30 PROCEDURE — 86225 DNA ANTIBODY NATIVE: CPT

## 2020-05-30 PROCEDURE — 82570 ASSAY OF URINE CREATININE: CPT

## 2020-05-30 PROCEDURE — 82550 ASSAY OF CK (CPK): CPT

## 2020-05-30 PROCEDURE — 83874 ASSAY OF MYOGLOBIN: CPT

## 2020-05-30 PROCEDURE — 99284 EMERGENCY DEPT VISIT MOD MDM: CPT | Mod: 25

## 2020-05-30 PROCEDURE — 85027 COMPLETE CBC AUTOMATED: CPT

## 2020-05-30 PROCEDURE — 86160 COMPLEMENT ANTIGEN: CPT

## 2020-05-30 PROCEDURE — 99220: CPT

## 2020-05-30 PROCEDURE — 80053 COMPREHEN METABOLIC PANEL: CPT

## 2020-05-30 RX ORDER — SODIUM CHLORIDE 9 MG/ML
1000 INJECTION INTRAMUSCULAR; INTRAVENOUS; SUBCUTANEOUS ONCE
Refills: 0 | Status: COMPLETED | OUTPATIENT
Start: 2020-05-30 | End: 2020-05-30

## 2020-05-30 RX ORDER — HYDROXYCHLOROQUINE SULFATE 200 MG
2 TABLET ORAL
Qty: 0 | Refills: 0 | DISCHARGE

## 2020-05-30 RX ORDER — POTASSIUM CHLORIDE 20 MEQ
40 PACKET (EA) ORAL ONCE
Refills: 0 | Status: COMPLETED | OUTPATIENT
Start: 2020-05-30 | End: 2020-05-30

## 2020-05-30 RX ORDER — SODIUM CHLORIDE 9 MG/ML
1000 INJECTION INTRAMUSCULAR; INTRAVENOUS; SUBCUTANEOUS
Refills: 0 | Status: DISCONTINUED | OUTPATIENT
Start: 2020-05-30 | End: 2020-06-03

## 2020-05-30 RX ORDER — CITRIC ACID/SODIUM CITRATE 300-500 MG
30 SOLUTION, ORAL ORAL
Qty: 840 | Refills: 0
Start: 2020-05-30 | End: 2020-06-12

## 2020-05-30 RX ADMIN — SODIUM CHLORIDE 250 MILLILITER(S): 9 INJECTION INTRAMUSCULAR; INTRAVENOUS; SUBCUTANEOUS at 19:09

## 2020-05-30 RX ADMIN — SODIUM CHLORIDE 250 MILLILITER(S): 9 INJECTION INTRAMUSCULAR; INTRAVENOUS; SUBCUTANEOUS at 19:37

## 2020-05-30 RX ADMIN — SODIUM CHLORIDE 125 MILLILITER(S): 9 INJECTION INTRAMUSCULAR; INTRAVENOUS; SUBCUTANEOUS at 13:45

## 2020-05-30 RX ADMIN — Medication 40 MILLIEQUIVALENT(S): at 21:29

## 2020-05-30 RX ADMIN — SODIUM CHLORIDE 1000 MILLILITER(S): 9 INJECTION INTRAMUSCULAR; INTRAVENOUS; SUBCUTANEOUS at 11:02

## 2020-05-30 NOTE — ED PROVIDER NOTE - PHYSICAL EXAMINATION
GENERAL: A&Ox3, non-toxic appearing, no acute distress  HEENT: NCAT, EOMI, oral mucosa moist, normal conjunctiva  RESP: CTAB, no respiratory distress, no wheezes/rhonchi/rales, speaking in full sentences  CV: RRR, no murmurs/rubs/gallops, no peripheral edema  ABDOMEN: soft, non-tender, non-distended, no guarding, no CVA tenderness  MSK: no visible deformities, no muscle tenderness  NEURO: no focal sensory or motor deficits, GREER, steady gait  SKIN: warm, normal color, well perfused, no rash  PSYCH: normal affect    -Rodríguez Rodriguez, PGY-1

## 2020-05-30 NOTE — CONSULT NOTE ADULT - SUBJECTIVE AND OBJECTIVE BOX
[facility] Division of Kidney Diseases & Hypertension  INITIAL CONSULT NOTE  [phone]--------------------------------------------------------------------------------  HPI: 24 year old male with the history of SLE and lupus nephritis who presents to the hospital after outpatient labs revealed that patient had a CPK level of 78339. Patient had stated he was working out more recently however did not complain of any worsening muscle fatigue, weakness, dark urine or decrease in urination. When patient presented to the hospital he was found to have a CPK of 68175 and producing urine. No major complaints. Creatinine noted to be 0.8. Nephrology consulted for management of rhabdomyolysis.        PAST HISTORY  --------------------------------------------------------------------------------  PAST MEDICAL & SURGICAL HISTORY:  SLE (systemic lupus erythematosus)  Lupus nephritis  No significant past surgical history    FAMILY HISTORY:  FH: diabetes mellitus: in Father and Mother    PAST SOCIAL HISTORY:    ALLERGIES & MEDICATIONS  --------------------------------------------------------------------------------  Allergies    No Known Allergies    Intolerances      Standing Inpatient Medications  sodium chloride 0.9%. 1000 milliLiter(s) IV Continuous <Continuous>    PRN Inpatient Medications      REVIEW OF SYSTEMS  --------------------------------------------------------------------------------  Gen: No  fevers/chills, weakness  Skin: No rashes  Head/Eyes/Ears/Mouth: No headache; Normal hearing; Normal vision w/o blurriness;   Respiratory: No dyspnea, cough, wheezing, hemoptysis  CV: No chest pain,   GI: No abdominal pain, diarrhea, constipation, nausea, vomiting  : No increased frequency, dysuria, hematuria, nocturia  MSK: No joint pain/swelling;   Neuro: No dizziness/lightheadedness, weakness, seizures  Psych: No issues with affect    All other systems were reviewed and are negative, except as noted.    VITALS/PHYSICAL EXAM  --------------------------------------------------------------------------------  T(C): 36.8 (05-30-20 @ 13:41), Max: 36.8 (05-30-20 @ 10:22)  HR: 95 (05-30-20 @ 13:41) (95 - 112)  BP: 129/79 (05-30-20 @ 13:41) (128/75 - 133/85)  RR: 16 (05-30-20 @ 13:41) (15 - 16)  SpO2: 99% (05-30-20 @ 13:41) (98% - 99%)  Wt(kg): --  Height (cm): 172.72 (05-30-20 @ 10:22)      Physical Exam:  	Gen: NAD, well-appearing  	HEENT: PERRL, supple neck     	Pulm: CTA B/L  	CV:  S1S2  	Abd: +BS, soft   	Ext: No B/L Lower ext edema  	Neuro: No focal deficits  	Skin: Warm, without rashes  	Vascular: No cyanosis    LABS/STUDIES  --------------------------------------------------------------------------------              14.7   8.14  >-----------<  269      [05-30-20 @ 11:07]              44.1     136  |  97  |  12  ----------------------------<  135      [05-30-20 @ 11:07]  3.3   |  24  |  0.82        Ca     9.2     [05-30-20 @ 11:07]    TPro  6.8  /  Alb  3.8  /  TBili  0.7  /  DBili  x   /  AST  128  /  ALT  51  /  AlkPhos  59  [05-30-20 @ 11:07]        CK 65024      [05-30-20 @ 11:07]    Creatinine Trend:  SCr 0.82 [05-30 @ 11:07]    Urinalysis - [05-30-20 @ 11:41]      Color Yellow / Appearance Clear / SG 1.026 / pH 6.5      Gluc Negative / Ketone Negative  / Bili Negative / Urobili Negative       Blood Large / Protein >600 / Leuk Est Negative / Nitrite Negative       / WBC 9 / Hyaline 6 / Gran  / Sq Epi  / Non Sq Epi 2 / Bacteria Negative    Urine Creatinine 219      [05-30-20 @ 11:41]

## 2020-05-30 NOTE — ED PROVIDER NOTE - CLINICAL SUMMARY MEDICAL DECISION MAKING FREE TEXT BOX
DH: 24 year old male PMH Lupus nephropathy, RA on Mycophenolate and HCQ, p/w elevated CK on outpatient labs, sent by rheum for IVF. Recently started exercising. VSS, non-toxic appearing. Will obtain labs, urine, give IVF, reassess.

## 2020-05-30 NOTE — ED ADULT TRIAGE NOTE - CHIEF COMPLAINT QUOTE
I was called by my RA MD for elevated muscle enzymes. Pt states he's been lifting weights a lot recently.

## 2020-05-30 NOTE — ED ADULT NURSE NOTE - OBJECTIVE STATEMENT
23y/o male presented to the ED from home with complaint of abnormal labs. A&Ox3, ambulatory. PMH- Lupus, RA. Patient was seen by PATRICK MAYFIELD after experiencing muscle weakness. Labs obtained. CHUCK MAYFIELD informed patient on elevated CK levels. Patient reports increasing weight lifting exercises recently. Denies chest pain, SOB, fever, chills N/V/D, headache. 23y/o male presented to the ED from home with complaint of abnormal labs. A&Ox3, ambulatory. PMH- Lupus, RA. Patient was seen by PATRICK MAYFIELD after experiencing muscle weakness. Labs obtained. CHUCK MAYFIELD informed patient on elevated CK levels. Patient reports increasing weight lifting exercises recently. Denies pain. Denies chest pain, SOB, fever, chills N/V/D, headache. 23y/o male presented to the ED from home with complaint of abnormal labs. A&Ox3, ambulatory. PMH- Lupus, RA. Patient was seen by PATRICK MAYFIELD after experiencing muscle weakness. Labs obtained. CHUCK MAYFIELD informed patient on elevated CK levels. Patient reports increasing weight lifting exercises recently. Using free weights, denies supplement use.  Denies muscle pain, urinary/ frequency/ hematuria. Denies CVA tenderness. . Denies chest pain, SOB, fever, chills N/V/D, headache.

## 2020-05-30 NOTE — ED CDU PROVIDER INITIAL DAY NOTE - PHYSICAL EXAMINATION
Troponin stable at 0.6 x5. Cardiology consulted: suspect ADHF initiated by tachyarrhythmia, and interrogated device 8/31. Interrogation revealed no tachyarrhythmia. Repeat echo showing EF 20%, grade 2 diastolic dysfunction. Continuing to diurese. Had episode of vtach and chest pain overnight 9/1. Troponin stable at 0.5 during this occurrence. Oxygen was weaned off, and home oxygen assessment showed >92% on room air during exercise. Pt was discharged home with close cardiology follow up and instruction to continue her current regimen.    GENERAL: A&Ox3, non-toxic appearing, no acute distress  HEENT: NCAT, EOMI, oral mucosa moist, normal conjunctiva  RESP: CTAB, no respiratory distress, no wheezes/rhonchi/rales, speaking in full sentences  CV: RRR, no murmurs/rubs/gallops, no peripheral edema  ABDOMEN: soft, non-tender, non-distended, no guarding, no CVA tenderness  MSK: no visible deformities, no muscle tenderness  NEURO: no focal sensory or motor deficits, GREER, steady gait  SKIN: warm, normal color, well perfused, no rash  PSYCH: normal affect

## 2020-05-30 NOTE — ED CDU PROVIDER INITIAL DAY NOTE - NS ED ROS FT
GENERAL: no fever, no chills  EYES: no change in vision  HEENT: no trouble swallowing or speaking  CARDIAC: no chest pain, no palpitations   PULMONARY: no cough, no shortness of breath, no wheezing  GI: no abdominal pain, no nausea, no vomiting, no diarrhea, no constipation  : no changes in urination, no dysuria, no frequency, no hematuria, no discharge  SKIN: no rashes  NEURO: no headache, no numbness, no weakness  MSK: no joint pain, +muscle soreness, no back pain, no calf pain

## 2020-05-30 NOTE — ED CDU PROVIDER INITIAL DAY NOTE - ATTENDING CONTRIBUTION TO CARE
25yo male pmh SLE, lupus nephritis, TA on mycophenolate and plaquenil p/w rhabdo, elevated CK in office yesterday. Pt has been lifting weights for the past 10 days in row, usually does not lift weights. Denies n/v/d, dysuria, change in urine color, fevers, chest pain, cough, dyspnea. Exam unremarkable. CK from 16k to 10k with minimal elevation in AST. Due to history of renal disease, will monitor in CDU for IVF and nephrology consult.

## 2020-05-30 NOTE — ED CDU PROVIDER DISPOSITION NOTE - PATIENT PORTAL LINK FT
You can access the FollowMyHealth Patient Portal offered by [facility] by registering at the following website: [email or web address]. By joining [facility]’s FollowMyHealth portal, you will also be able to view your health information using other applications (apps) compatible with our system.

## 2020-05-30 NOTE — ED CDU PROVIDER INITIAL DAY NOTE - OBJECTIVE STATEMENT
24 year old male PMH Lupus nephropathy, RA on Mycophenolate and HCQ, p/w "elevated CK". Patient had outpatient labs drawn yesterday by his rheumatologist Dr. Guillaume and was called today for elevated CK and to go to the ER for IVF. Patient states he started exercising 1 week ago, heavy weights and running. No supplement or hormone use. C/o typical muscle soreness after a workout but otherwise no complaints. Denies dark urine or dysuria. Denies F/C, cp, sob, abd pain, back pain, muscle cramps, N/V, or weakness.

## 2020-05-30 NOTE — ED CDU PROVIDER INITIAL DAY NOTE - PROGRESS NOTE DETAILS
Pt comfortable. No complaints. Vital signs stable. Will continue to observe and reassess. Pt seen by rheumatolgy and nephrology. Nephrology recommended increasing IVF to 250cc/hr and rechecking CPK after fluids are complete. -Shameka Walton PA-C Pt comfortable. No complaints. Vital signs stable. Will continue to observe and reassess. Pt seen by rheumatolgy and nephrology. Nephrology recommended increasing IVF to 250cc/hr and checking CPK after 2 liters of IV fluids are complete. -Shameka Walton PA-C CK improved to 7797, Cr 0.71, K 3.2. Pt seen at bedside. Pt in NAD, comfortable. VS stable from last reading. Pt has no complaints at this time, pt states his muscle soreness has resolved, pt passing urine, states was initially slightly dark but is becoming more clear. d/w nephrology fellow Jessica, states pt is clear for DC from nephro perspective, take Bicitra 30mL BID x 2 weeks and f/u with nephro on Monday. Will give 40mEq KCl PO to replete. Discussed plan with pt. All questions answered. d/w Dr. Weems. Pt stable and ready for DC.

## 2020-05-30 NOTE — ED CDU PROVIDER DISPOSITION NOTE - NSFOLLOWUPINSTRUCTIONS_ED_ALL_ED_FT
1) Follow-up with your primary care provider in 1-2 days.    2) Continue to take all medications as prescribed.    3) Rest and drink plenty of fluids. When cleared to return to exercise make sure to start at a manageable intensity and duration and slowly progress.    4) Return to the ER for any new or worsening symptoms, abdominal or back pain, chest pain, shortness of breath, muscle cramps or aches, dizziness, lightheadedness, difficulty urinating, pain with urination, or if any other concerns. 1) Follow-up with your primary care provider in 1-2 days.      Follow-up with ?nephrology?      Follow-up with rheumatology as directed.    2) Take Bicitra 30mL, twice per day for the next 5 days. Continue to take all other medications as prescribed.    3) Rest and drink plenty of fluids. When cleared to return to exercise make sure to start at a manageable intensity and duration and slowly progress.    4) Return to the ER for any new or worsening symptoms, abdominal or back pain, chest pain, shortness of breath, muscle cramps or aches, dizziness, lightheadedness, difficulty urinating, pain with urination, or if any other concerns. 1) Follow-up with your primary care provider in 1-2 days.      Follow-up with your nephrologist, Dr. Brooks, on Monday 6/1.      Follow-up with your rheumatologist as directed.    2) Take Bicitra 30mL, twice per day for the next 2 weeks or as directed by your nephrologist. Continue to take all other medications as prescribed.    3) Rest and drink plenty of fluids. When cleared to return to exercise make sure to start at a manageable intensity and duration and slowly progress.    4) Return to the ER for any new or worsening symptoms, abdominal or back pain, chest pain, shortness of breath, muscle cramps or aches, dizziness, lightheadedness, difficulty urinating, pain with urination, or if any other concerns.

## 2020-05-30 NOTE — ED ADULT NURSE REASSESSMENT NOTE - COMFORT CARE
ambulated to bathroom/plan of care explained/warm blanket provided/darkened lights/po fluids offered/repositioned

## 2020-05-30 NOTE — ED PROVIDER NOTE - NS ED ROS FT
GENERAL: no fever, no chills  EYES: no change in vision  HEENT: no trouble swallowing or speaking  CARDIAC: no chest pain, no palpitations   PULMONARY: no cough, no shortness of breath, no wheezing  GI: no abdominal pain, no nausea, no vomiting, no diarrhea, no constipation  : no changes in urination, no dysuria, no frequency, no hematuria, no discharge  SKIN: no rashes  NEURO: no headache, no numbness, no weakness  MSK: no joint pain, +muscle soreness, no back pain, no calf pain     -Rodríguez Rodriguez, PGY-1

## 2020-05-30 NOTE — ED PROVIDER NOTE - ATTENDING CONTRIBUTION TO CARE
23yo male pmh SLE, lupus nephritis, TA on mycophenolate and plaquenil p/w rhabdo, elevated CK in office yesterday. Pt has been lifting weights for the past 10 days in row, usually does not lift weights. Denies n/v/d, dysuria, change in urine color, fevers, chest pain, cough, dyspnea. Exam unremarkable. Labs with CK and UA, IVF bolus. Pt agreed with overnight stay for CDU vs admission for IVF and nephrology consult.

## 2020-05-30 NOTE — ED ADULT NURSE NOTE - ED STAT RN HANDOFF DETAILS
Bedside report given to on coming nurse Soheila EMANUEL. Understands PMHx, medications given, & POC for pt. Patient in stable condition, VS updated, has no complaints at this time and has been updated on care plan. Explained to pt that he is going to CDU for observation.

## 2020-05-30 NOTE — ED ADULT NURSE REASSESSMENT NOTE - NS ED NURSE REASSESS COMMENT FT1
24y M PMHx lupus neuropathy & arthritis on Mycophenolate & Plaquenil presents to ED with "elevated muscle bloodwork" from outpatient rheumatology telehealth visit. Per pt, rheumatology MD recommended he come to ED for IVF. Pt reports heavy weight lifting but denies any muscle pain besides typical muscle soreness or weight lifting supplements. Denies CP, SOB, H/A, N/V/D, abdominal pain, f/c, dizziness, & urinary symptoms. A&Ox4. Lungs CTA & no respiratory distress noted on RA. Abdomen soft, nondistened, & nontender to palpation. Urine appears mildly dark yellow. Skin warm, dry, & intact. MAEx4. No LE edema noted on exam. Pt resting comfortably with no complaints at this time. Pt's bed in the lowest position & explained POC to pt. Will continue to reassess.
Pt d/c home per PA Popeye VSS no complaints IV lock removed. D/C paperwork given to pt by PA. Pt ambulated out of unit with own belongings left hospital via private car.
Updated pt that he will be going to CDU for observation to trend CK & urine studies per rheumatology MD & that he is currently pending nephrology consult; pt verbalized understanding. Pt resting comfortably without complains & does not appear to be in any acute distress at this time with VSS. Will continue to reassess.
Received pt from ADELFO Parnell , received pt alert and responsive, oriented x4, denies any respiratory distress, SOB, or difficulty breathing. Pt transferred to CDU for abnormal lab value. Pt is asymptomatic with no complaints of pain or discomfort. Pending stat labs post IVF.  Received with IVF infusing tolerating well. IV in place, patent and free of signs of infiltration, pt denies chest pain or palpitations, V/S stable, pt afebrile, pt denies pain at this time. Pt educated on unit and unit rules, instructed patient to notify RN of any needed assistance, Pt verbalizes understanding, Call bell placed within reach. Safety maintained. Will continue to monitor.

## 2020-05-30 NOTE — ED PROVIDER NOTE - PROGRESS NOTE DETAILS
DH: Nephrology resident aware, will see patient in ED. DH: Nephrology resident aware, will see patient in ED. Cr normal, Ck 10k down from 16k yesterday. Plan for IVF and repeat CK. Does not appear to be having SLE flare. DH: Patient seen by rheum - asking to obtain urine creatinine and protein. Will cs nephro to see patient in ED given hx of SLE and elevated CK. Plan to obs patient in CDU for down-trend of CK and IVF.

## 2020-05-30 NOTE — CONSULT NOTE ADULT - PROBLEM SELECTOR RECOMMENDATION 9
Patient with rhabdomyolysis in the setting increased workout regimen. No use of statin as outpatient however is on Cellcept and Prednisone. CPK [place] arrival [place] [address] and creatinine stable at 0.8. Agree with continuing IV hydration and can start Bicitra 30 mL BID for alkalinization of urine to help with better excretion of myoglobin. If patient cannot obtain Bicitra as outpatient can start NaCl tabs 2g TID for further volume expansion and would encourage staying well hydrated as outpatient and not working out until CPK is rechecked and normal as outpatient. Will touch base with primary nephrologist about making appointment for follow-up.

## 2020-05-30 NOTE — CONSULT NOTE ADULT - ATTENDING COMMENTS
Rhabdomyolysis in setting of SLE without renal failure  1.  Rhabdomyolysis--Check PO4, TSH, no statin.  ? predisposition with cell cept rx but likely activity related.  Volume optimization IV or if PO bicitra 30 cc PO BID, or NaCl tabs 2gTID (either for 5 days) and trend CPK, renal function

## 2020-05-30 NOTE — ED PROVIDER NOTE - OBJECTIVE STATEMENT
24 year old male PMH Lupus nephropathy, RA on Mycophenolate and HCQ, p/w "elevated CK". Patient had outpatient labs drawn yesterday by his rheumatologist Dr. Guillaume and was called today for elevated CK and to go to the ER for IVF. Patient states he started exercising 1 week ago, heavy weights and running. No supplement or hormone use. C/o typical muscle soreness but otherwise no complaints. Denies dark urine or dysuria. Denies F/C, cp, sob, abd pain, back pain, muscle cramps, N/V, or weakness.

## 2020-05-30 NOTE — ED CDU PROVIDER INITIAL DAY NOTE - FAMILY HISTORY
Father  Still living? Unknown  FH: diabetes mellitus, Age at diagnosis: Age Unknown     Mother  Still living? Unknown  FH: diabetes mellitus, Age at diagnosis: Age Unknown

## 2020-05-30 NOTE — CONSULT NOTE ADULT - SUBJECTIVE AND OBJECTIVE BOX
DANUTA SCRUGGS  [id number]    HISTORY OF PRESENT ILLNESS:    23 y/o M x of SLE (ELMO, DsDNA, arthralgias, LN class IV bx ), sent to ER by [facility] for elevated CKs. Pt went to outpat rheumatologist yesterday, Dr. Guillaume. Bloodwork was done, and he was called for CKs in [id number]. Pt denies any oral ulcers, fevers, chills, CP, SOB, joint pain, swelling, abdominal pain, n/v/d, rashes, Rayanuds, hair loss, focal weakness, sensory loss, foamy urine, hematuria.  No sick contacts. Works at home. Pt feels well. No trauma. Has been lifting weights. Has been compliant with medication. No steroids for several months now after been weaned off.        SLE history   - diagnosed around  - prescribed plaquneil and steroids - though didn’t start it.   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   - 2019 SLE flare with worsening proteinuria, BP and LE edema. rpt kidney bx with diffuse global prol type IV- G(A) and small crescents - chronicity score 0/12. Treated with pulse steroids and increased MMF dosing.      Outpat labs:  20  CKs: 15501b  Urine protein/Cr 2.1 (increased from previous labs <0.5)  ESR: 83  ALT/AST: 150s/50s  Vit D 25: 12       PAST MEDICAL & SURGICAL HISTORY:  SLE (systemic lupus erythematosus)  Lupus nephritiss  No significant past surgical history      Review of Systems:  as per HPI, otherwise negative    MEDICATIONS  (STANDING):  sodium chloride 0.9%. 1000 milliLiter(s) (125 mL/Hr) IV Continuous <Continuous>    MEDICATIONS  (PRN):      Allergies    No Known Allergies    Intolerances        PERTINENT MEDICATION HISTORY:  MMF 3 g daily   Plaquenil 400mg    SOCIAL HISTORY:  No toxic habits    FAMILY HISTORY:  FH: diabetes mellitus: in Father and Mother      Vital Signs Last 24 Hrs  T(C): 36.8 (30 May 2020 13:41), Max: 36.8 (30 May 2020 10:22)  T(F): 98.2 (30 May 2020 13:41), Max: 98.2 (30 May 2020 10:22)  HR: 95 (30 May 2020 13:41) (95 - 112)  BP: 129/79 (30 May 2020 13:41) (128/75 - 133/85)  BP(mean): --  RR: 16 (30 May 2020 13:41) (15 - 16)  SpO2: 99% (30 May 2020 13:41) (98% - 99%)    Daily Height in cm: 172.72 (30 May 2020 10:22)    Daily     Physical Exam:  General: Obese  HEENT: no oral ulcers  CVS: +S1/S2, RRR  Resp: CTA b/l, no w,c   GI: Soft, NT/ND  MSK: no joint swelling, tenderness  Neuro: AAOx3  muscle strength: 5/5 X 4 extremeties  Skin: no  rashes    LABS:                        14.7   8.14  )-----------( 269      ( 30 May 2020 11:07 )             44.1     05    136  |  97  |  12  ----------------------------<  135<H>  3.3<L>   |  24  |  0.82    Ca    9.2      30 May 2020 11:07    TPro  6.8  /  Alb  3.8  /  TBili  0.7  /  DBili  x   /  AST  128<H>  /  ALT  51<H>  /  AlkPhos  59  05-30        Urinalysis Basic - ( 30 May 2020 11:41 )    Color: Yellow / Appearance: Clear / S.026 / pH: x  Gluc: x / Ketone: Negative  / Bili: Negative / Urobili: Negative   Blood: x / Protein: >600 / Nitrite: Negative   Leuk Esterase: Negative / RBC: 122 /hpf / WBC 9 /HPF   Sq Epi: x / Non Sq Epi: 2 /hpf / Bacteria: Negative        RADIOLOGY & ADDITIONAL STUDIES:

## 2020-05-30 NOTE — ED CDU PROVIDER DISPOSITION NOTE - CLINICAL COURSE
24 year old male PMH Lupus nephropathy, RA on Mycophenolate and HCQ, p/w "elevated CK". Patient had outpatient labs drawn yesterday by his rheumatologist Dr. Guillaume and was called today for elevated CK and to go to the ER for IVF. Patient states he started exercising 1 week ago, heavy weights and running. No supplement or hormone use. C/o typical muscle soreness after a workout but otherwise no complaints. Denies dark urine or dysuria. Denies F/C, cp, sob, abd pain, back pain, muscle cramps, N/V, or weakness.  ED Course: K 3.3, [place] [address], , ALT 51. UA: Proteinuria >600, large blood. Pt sent to CDU for frequent eval, IVF, repeat labs, nephro and rheum following.  CDU Course: Repeat CK ___. Repeat K___. AST and ALT___. 24 year old male PMH Lupus nephropathy, RA on Mycophenolate and HCQ, p/w "elevated CK". Patient had outpatient labs drawn yesterday by his rheumatologist Dr. Guillaume and was called today for elevated CK and to go to the ER for IVF. Patient states he started exercising 1 week ago, heavy weights and running. No supplement or hormone use. C/o typical muscle soreness after a workout but otherwise no complaints. Denies dark urine or dysuria. Denies F/C, cp, sob, abd pain, back pain, muscle cramps, N/V, or weakness.  ED Course: [place] 3.3[place] [address], , ALT 51. UA: Proteinuria >600, large blood. Pt sent to CDU for frequent eval, IVF, repeat labs, nephro and rheum following.  CDU Course: CK improved to 7797, Cr 0.71, K 3.2. K repleted orally. Pt without complaints muscle soreness resolved, pt passing urine. d/w nephrology fellow Jessica, states pt is clear for DC from nephro perspective, take Bicitra 30mL BID x 2 weeks and f/u with nephro on Monday 6/1. All questions answered. Pt stable and ready for DC

## 2020-06-01 ENCOUNTER — TRANSCRIPTION ENCOUNTER (OUTPATIENT)
Age: 25
End: 2020-06-01

## 2020-06-01 LAB
25(OH)D3 SERPL-MCNC: 12.2 NG/ML
ALBUMIN SERPL ELPH-MCNC: 4 G/DL
ALP BLD-CCNC: 60 U/L
ALT SERPL-CCNC: 58 U/L
ANION GAP SERPL CALC-SCNC: 16 MMOL/L
AST SERPL-CCNC: 157 U/L
BASOPHILS # BLD AUTO: 0.05 K/UL
BASOPHILS NFR BLD AUTO: 0.5 %
BILIRUB SERPL-MCNC: 0.4 MG/DL
BUN SERPL-MCNC: 12 MG/DL
C3 SERPL-MCNC: 118 MG/DL — SIGNIFICANT CHANGE UP (ref 81–157)
C3 SERPL-MCNC: 154 MG/DL
C4 SERPL-MCNC: 32 MG/DL — SIGNIFICANT CHANGE UP (ref 13–39)
C4 SERPL-MCNC: 38 MG/DL
CALCIUM SERPL-MCNC: 9.3 MG/DL
CHLORIDE SERPL-SCNC: 97 MMOL/L
CK SERPL-CCNC: ABNORMAL U/L
CO2 SERPL-SCNC: 27 MMOL/L
CREAT SERPL-MCNC: 0.87 MG/DL
CREAT SPEC-SCNC: 252 MG/DL
CREAT/PROT UR: 2.1 RATIO
CRP SERPL-MCNC: 0.73 MG/DL
EOSINOPHIL # BLD AUTO: 0.15 K/UL
EOSINOPHIL NFR BLD AUTO: 1.6 %
ERYTHROCYTE [SEDIMENTATION RATE] IN BLOOD BY WESTERGREN METHOD: 83 MM/HR
HCT VFR BLD CALC: 46.4 %
HGB BLD-MCNC: 14.9 G/DL
IMM GRANULOCYTES NFR BLD AUTO: 0.4 %
LYMPHOCYTES # BLD AUTO: 4.06 K/UL
LYMPHOCYTES NFR BLD AUTO: 44.2 %
MAN DIFF?: NORMAL
MCHC RBC-ENTMCNC: 26.3 PG
MCHC RBC-ENTMCNC: 32.1 GM/DL
MCV RBC AUTO: 81.8 FL
MONOCYTES # BLD AUTO: 0.71 K/UL
MONOCYTES NFR BLD AUTO: 7.7 %
MYOGLOBIN UR-MCNC: 1677 MCG/L — HIGH
NEUTROPHILS # BLD AUTO: 4.18 K/UL
NEUTROPHILS NFR BLD AUTO: 45.6 %
PLATELET # BLD AUTO: 287 K/UL
POTASSIUM SERPL-SCNC: 3.7 MMOL/L
PROT SERPL-MCNC: 6.4 G/DL
PROT UR-MCNC: 536 MG/DL
RBC # BLD: 5.67 M/UL
RBC # FLD: 13.4 %
SODIUM SERPL-SCNC: 140 MMOL/L
WBC # FLD AUTO: 9.19 K/UL

## 2020-06-02 LAB
DSDNA AB SER-ACNC: 19 IU/ML
DSDNA AB SER-ACNC: <12 IU/ML — SIGNIFICANT CHANGE UP

## 2020-06-03 LAB
M TB IFN-G BLD-IMP: NEGATIVE
QUANTIFERON TB PLUS MITOGEN MINUS NIL: 7.78 IU/ML
QUANTIFERON TB PLUS NIL: 0.02 IU/ML
QUANTIFERON TB PLUS TB1 MINUS NIL: 0 IU/ML
QUANTIFERON TB PLUS TB2 MINUS NIL: 0 IU/ML

## 2020-06-05 ENCOUNTER — APPOINTMENT (OUTPATIENT)
Dept: RHEUMATOLOGY | Facility: CLINIC | Age: 25
End: 2020-06-05
Payer: MEDICAID

## 2020-06-05 PROCEDURE — 99214 OFFICE O/P EST MOD 30 MIN: CPT | Mod: 95

## 2020-06-05 NOTE — PHYSICAL EXAM
[General Appearance - Alert] : alert [General Appearance - In No Acute Distress] : in no acute distress [General Appearance - Well Nourished] : well nourished [General Appearance - Well Developed] : well developed [General Appearance - Well-Appearing] : healthy appearing [Sclera] : the sclera and conjunctiva were normal [Neck Appearance] : the appearance of the neck was normal [Edema] : there was no peripheral edema [FreeTextEntry1] : no edema no raynauds [Musculoskeletal - Swelling] : no joint swelling seen [Skin Color & Pigmentation] : normal skin color and pigmentation [] : no rash [Oriented To Time, Place, And Person] : oriented to person, place, and time [Impaired Insight] : insight and judgment were intact [Affect] : the affect was normal [Mood] : the mood was normal

## 2020-06-05 NOTE — CONSULT LETTER
[Dear  ___] : Dear  [unfilled], [Courtesy Letter:] : I had the pleasure of seeing your patient, [unfilled], in my office today. [Sincerely,] : Sincerely, [Please see my note below.] : Please see my note below. [FreeTextEntry2] : Giuseppe Brooks

## 2020-06-05 NOTE — ASSESSMENT
[FreeTextEntry1] : 23 yo man SLE (2017) with LN  \par here for posthospitalizatin after CPK returned at 16K\par \par #rhabdo.  most likely 2/2 weight lifting.   no other sxs of SLE and reduction with IVF.  positive h/o weight loss\par - continues to feel well\par - continues on sodium citrate\par - needs nephro visit -with Dr. Brooks\par \par # SLE - class III / IV nephritis, myositis  - ELMO, RNP, ds DNA positive - now negative on cellcept\par - on CellCept 3 g bid - reinforced adherence\par - started HCQ but only taking 200 mg daily - doubt related to themysoitis at this time - but may need to stop it\par - ds DNA now negative speaking agains flare - normal complemtnet as well. however increased proteinuria - ? 2/2 rhabdo alone - will follow and repeat - \par - will observe off steroids for now\par \par #hypertension.   improved and normal per patient checks off labetolol\par - continue lisinopril at this time and reassess next visit\par \par #myositis - CPK very elevated and went to hospital - was treated with IVF with quick reduction \par -given RNP serology - concern that this is a MCTD \par -no pulmonary symptoms at present.\par \par #vit d def. slowly improving\par - continue replace\par \par #medicine monitoring, long term use of meds\par - was on nsaids - likely contributed a bit to disease - d/w patient NO NSAID for pain currently\par - gi omeprazole\par - pcp proph bactrim - order sent\par - steroids with very pronounced weight gain, acne and striae - tapering as quickly as possible\par - Quant tb negative may 2020\par - elevated hgbaic - likely 2/2 steroids and weight gain (now 230).  vida monitor now off steroids.  d/w patient exercise\par  \par #vit d 12.2\par - replace

## 2020-06-05 NOTE — HISTORY OF PRESENT ILLNESS
[Currently Experiencing] : currently experiencing [Headache] : headache [Sicca] : no sicca [Rash] : no rash [Chest Pain] : no chest pain [Shortness of Breath] : no shortness of breath [Psychological Symptoms] : no psychological symptoms [Arthritis] : arthritis [Arthralgias] : arthralgias [Sun Sensitivity] : no sun sensitivity [FreeTextEntry1] : INTERVAL HX\par here for post hospitalization - ? sle flare ? exercise induced muscle injury\par \par hospitalized OTW for CPK 16K\par - has been increasing work out - lifting weights every day and running - x 2 weeks\par - didn’t notice any change in color of urine\par - didn’t restrat steroids \par - was also given sodium citrate acid to balance out pH\par - no sob, c or rash\par - no cramps no pain\par - drinks a lot of water\par SLE/LN - \par - denies sle s/s feels great - no pain rash or swelling\par - started the hcq - but didn’t want to do both pills - taking one a day\par - adherent to the MMF \par - weight had gained to 230 ound s- exercising at home now\par - taking the vit d\par - reviewed all labs\par \par sle ros is normal now and denies s/s of active lupus [FreeTextEntry7] : +ELMO, +dsDNA [Home] : at home, [unfilled] , at the time of the visit. [Other Location: e.g. Home (Enter Location, City,State)___] : at [unfilled] [Patient] : the patient [None] : The patient is currently asymptomatic

## 2020-06-08 ENCOUNTER — RX RENEWAL (OUTPATIENT)
Age: 25
End: 2020-06-08

## 2020-06-10 LAB
ALBUMIN SERPL ELPH-MCNC: 3.6 G/DL
ALDOLASE SERPL-CCNC: 5.9 U/L
ALP BLD-CCNC: 64 U/L
ALT SERPL-CCNC: 19 U/L
ANION GAP SERPL CALC-SCNC: 12 MMOL/L
AST SERPL-CCNC: 21 U/L
BASOPHILS # BLD AUTO: 0.04 K/UL
BASOPHILS NFR BLD AUTO: 0.4 %
BILIRUB SERPL-MCNC: 0.4 MG/DL
BUN SERPL-MCNC: 8 MG/DL
CALCIUM SERPL-MCNC: 9.5 MG/DL
CHLORIDE SERPL-SCNC: 104 MMOL/L
CK SERPL-CCNC: 231 U/L
CO2 SERPL-SCNC: 25 MMOL/L
CREAT SERPL-MCNC: 0.72 MG/DL
CREAT SPEC-SCNC: 193 MG/DL
CREAT/PROT UR: 4.1 RATIO
EOSINOPHIL # BLD AUTO: 0.1 K/UL
EOSINOPHIL NFR BLD AUTO: 1.1 %
HCT VFR BLD CALC: 42.7 %
HGB BLD-MCNC: 13.9 G/DL
IMM GRANULOCYTES NFR BLD AUTO: 0.3 %
LYMPHOCYTES # BLD AUTO: 3.74 K/UL
LYMPHOCYTES NFR BLD AUTO: 40.5 %
MAN DIFF?: NORMAL
MCHC RBC-ENTMCNC: 26.5 PG
MCHC RBC-ENTMCNC: 32.6 GM/DL
MCV RBC AUTO: 81.3 FL
MONOCYTES # BLD AUTO: 0.66 K/UL
MONOCYTES NFR BLD AUTO: 7.1 %
MYOGLOBIN SERPL-MCNC: 36 NG/ML
NEUTROPHILS # BLD AUTO: 4.67 K/UL
NEUTROPHILS NFR BLD AUTO: 50.6 %
PLATELET # BLD AUTO: 285 K/UL
POTASSIUM SERPL-SCNC: 3.8 MMOL/L
PROT SERPL-MCNC: 6 G/DL
PROT UR-MCNC: 787 MG/DL
RBC # BLD: 5.25 M/UL
RBC # FLD: 14.2 %
SODIUM SERPL-SCNC: 141 MMOL/L
WBC # FLD AUTO: 9.24 K/UL

## 2020-06-11 ENCOUNTER — APPOINTMENT (OUTPATIENT)
Dept: RHEUMATOLOGY | Facility: CLINIC | Age: 25
End: 2020-06-11
Payer: MEDICAID

## 2020-06-11 PROCEDURE — 99214 OFFICE O/P EST MOD 30 MIN: CPT | Mod: 95

## 2020-06-11 NOTE — PHYSICAL EXAM
[General Appearance - Alert] : alert [General Appearance - In No Acute Distress] : in no acute distress [General Appearance - Well Nourished] : well nourished [General Appearance - Well Developed] : well developed [General Appearance - Well-Appearing] : healthy appearing [Skin Color & Pigmentation] : normal skin color and pigmentation [Oriented To Time, Place, And Person] : oriented to person, place, and time [] : no rash [Affect] : the affect was normal [Impaired Insight] : insight and judgment were intact [Mood] : the mood was normal

## 2020-06-11 NOTE — HISTORY OF PRESENT ILLNESS
[Currently Experiencing] : currently experiencing [Headache] : headache [Sicca] : no sicca [Rash] : no rash [Chest Pain] : no chest pain [Shortness of Breath] : no shortness of breath [Psychological Symptoms] : no psychological symptoms [Arthritis] : arthritis [Arthralgias] : arthralgias [Sun Sensitivity] : no sun sensitivity [FreeTextEntry1] : INTERVAL HX\par - in terms of rhabdo - was hospitalized 2 weeks ago -  OTW for CPK 16K after has been increasing work out - lifting weights every day and running - x 2 weeks.  Feels quite well.  denies weakness orpain\par \par In terms of lupus. rising protein: creat ratio - patient notes taking cellcept 5 tabs instead of 6 and stopped lisinopril.  denies edema. \par - no sob, c or rash\par - no cramps no pain\par - drinks a lot of water\par \par sle ros is normal now and denies s/s of active lupus [FreeTextEntry7] : +ELMO, +dsDNA [Home] : at home, [unfilled] , at the time of the visit. [Other Location: e.g. Home (Enter Location, City,State)___] : at [unfilled] [Patient] : the patient [None] : The patient is currently asymptomatic

## 2020-06-11 NOTE — ASSESSMENT
[FreeTextEntry1] : 23 yo man SLE (2017) with LN  \par here for posthospitalizatin after CPK returned at 16K\par \par july 7 at noon\par \par #rhabdo.  most likely 2/2 weight lifting.   no other sxs of SLE and reduction with IVF.  positive h/o weight loss\par - continues to feel well\par - continues on sodium citrate\par -  has improved to 230 with restraint from exercise and fluids alone supporting non-inflammatory muscle injury\par \par # SLE - class III / IV nephritis, myositis  - ELMO, RNP, ds DNA positive - worsening proteinuria\par - started HCQ but only taking 200 mg daily - doubt related to the mysoitis at this time - but may need to stop it\par - ds DNA now negative speaking agains flare - normal complemtnet as well. however increased proteinuria - ? 2/2 rhabdo alone \par - f/u ratio is elevated - discussed at length - only taking cellcept 5 tabs and off the lisinopril. \par - advised patient of increasing the cellcept, restart lisinopril\par - may need a renal US to r/o RVT \par - if persistent LN despite MMF adherence- may need to switch meds - reinforced adherence\par - renal eval next week\par \par #hypertension.   improved and normal per patient checks off labetolol\par - continue lisinopril at this time and reassess next visit\par \par #myositis - CPK very elevated and went to hospital - was treated with IVF with quick reduction \par -given RNP serology - concern that this is a MCTD \par -no pulmonary symptoms at present.\par \par #vit d def. slowly improving\par - continue replace\par \par #medicine monitoring, long term use of meds\par - was on nsaids - likely contributed a bit to disease - d/w patient NO NSAID for pain currently\par - gi omeprazole\par - pcp proph bactrim - order sent\par - steroids with very pronounced weight gain, acne and striae - tapering as quickly as possible\par - Quant tb negative may 2020\par - elevated hgbaic - likely 2/2 steroids and weight gain (now 230).  vida monitor now off steroids.  d/w patient exercise\par  \par #vit d 12.2\par - replace

## 2020-06-17 ENCOUNTER — APPOINTMENT (OUTPATIENT)
Dept: NEPHROLOGY | Facility: CLINIC | Age: 25
End: 2020-06-17
Payer: MEDICAID

## 2020-06-17 VITALS — SYSTOLIC BLOOD PRESSURE: 130 MMHG | DIASTOLIC BLOOD PRESSURE: 80 MMHG

## 2020-06-17 PROCEDURE — 99213 OFFICE O/P EST LOW 20 MIN: CPT | Mod: 95

## 2020-06-17 RX ORDER — TORSEMIDE 20 MG/1
20 TABLET ORAL
Qty: 180 | Refills: 3 | Status: COMPLETED | COMMUNITY
Start: 2020-01-24 | End: 2020-06-17

## 2020-06-17 NOTE — ASSESSMENT
[FreeTextEntry1] : Mr. SCRUGGS is a 24 year old male with hx of SLE with prior class 3 but not active class IV( non crecentic) on steroids and MMF. \par Cont MMF at this dose\par Off torsemide give rhabdo\par Pt restarted ACEI last week. Rpt p/crt ratio first and then adjust\par If no response to MMF, will need rituxan but appears that he had responded in the hospital in terms of proteinuria.\par \par fu in 2 months

## 2020-06-17 NOTE — HISTORY OF PRESENT ILLNESS
[Home] : at home, [unfilled] , at the time of the visit. [Medical Office: ([facility])___] : at the medical office located in  [Verbal consent obtained from patient] : the patient, [unfilled] [FreeTextEntry1] : Mr. SCRUGGS is a 24 year old male with known SLE here for post hs discharge. He was dx with SLE 3 years ago with symptoms of jt pains and was prescribed plaquenil and steroids, at that time he was ELMO, dsDNA and RNP positive, His urinalysis at that time was neg. He didn't take his prescribed meds and just worked on getting his diet and exercise regimen. He did take NSAIDS on and off for years for the pain.\par In 2019, was placed on MMF for class 3 focal prolif and was doing well till he gets admitted to [facility] with SOB and proteinuria of 2gm and active urine sediement in Dec 2019 and SLE flare. Rpt Kidney bx shows( scanned) diffuse global prof Type IV-G(A) and small crescents 8% and NIH activity 10/24.  5% or less chronicity and NIH chronicty score was 0/12. He was given pulse steroids and sent home on pred 80mg and MMF 1000mg TID. He is here f.u with proteinuria down to 0.4gm and crt normal but gained 20lbs. he is taking lasix but not working well. he also has a new rash on his abdomen. He is scared and not sure what to do. \par Since last visit, in terms of rhabdo - was hospitalized 2 weeks ago - OTW for CPK 16K after has been increasing work out - lifting weights every day and running - x 2 weeks. Feels quite well. denies \par In terms of lupus. rising protein: creat ratio but stopped taking his ACEI 2 months ago. His SLE is not flaring. 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

## 2020-06-25 ENCOUNTER — TRANSCRIPTION ENCOUNTER (OUTPATIENT)
Age: 25
End: 2020-06-25

## 2020-06-25 DIAGNOSIS — Z00.00 ENCOUNTER FOR GENERAL ADULT MEDICAL EXAMINATION W/OUT ABNORMAL FINDINGS: ICD-10-CM

## 2020-07-01 NOTE — CONSULT NOTE ADULT - ASSESSMENT
23 y/o M x of SLE (ELMO, DsDNA, arthralgias, LN class IV bx 12/19) presents w elevated CKs, urine sediment, increase in proteinuria, elevated ESR  Give above findings, concern for SLE flare with mm, renal involvement  CKs have slightly gone down w fluids, which can be argue for a rhabdo component but pt does have active urine sediment and proteinuria. Pt likely has SLE flare.   Pt reports compliance with medications.     Recommend  Please obtain DsDNA, C3, C4  Please consult renal  c/w MMF 1.5 mg BID, and plaquenil 400mg daily  IVF for CKs, trend CKs daily  Will follow    Romulo Varma MD PGY5  [phone] 23 y/o M x of SLE (ELMO, DsDNA, arthralgias, LN class IV bx 12/19) presents w elevated CKs, urine sediment, increase in proteinuria, elevated ESR  Give above findings, concern for SLE flare with mm, renal involvement  CKs have slightly gone down w fluids, which can be argue for a rhabdo component but pt does have active urine sediment and proteinuria. Pt likely has SLE flare.   Pt reports compliance with medications.     Low vitamin D    Recommend  Please obtain DsDNA, C3, C4  Please consult renal  c/w MMF 1.5 mg BID, and plaquenil 400mg daily  Start Vit D 50K weekly  IVF for CKs, trend CKs daily  Will follow    Romulo Vrama MD PGY5  [phone] abd pain

## 2020-07-02 LAB — ENA JO1 AB SER IA-ACNC: NORMAL

## 2020-07-07 ENCOUNTER — APPOINTMENT (OUTPATIENT)
Dept: RHEUMATOLOGY | Facility: CLINIC | Age: 25
End: 2020-07-07

## 2020-09-23 ENCOUNTER — TRANSCRIPTION ENCOUNTER (OUTPATIENT)
Age: 25
End: 2020-09-23

## 2020-09-24 ENCOUNTER — TRANSCRIPTION ENCOUNTER (OUTPATIENT)
Age: 25
End: 2020-09-24

## 2020-09-25 ENCOUNTER — TRANSCRIPTION ENCOUNTER (OUTPATIENT)
Age: 25
End: 2020-09-25

## 2020-09-25 ENCOUNTER — APPOINTMENT (OUTPATIENT)
Dept: RHEUMATOLOGY | Facility: CLINIC | Age: 25
End: 2020-09-25
Payer: MEDICAID

## 2020-09-25 DIAGNOSIS — Z11.59 ENCOUNTER FOR SCREENING FOR OTHER VIRAL DISEASES: ICD-10-CM

## 2020-09-25 LAB
25(OH)D3 SERPL-MCNC: 13.2 NG/ML
ALBUMIN SERPL ELPH-MCNC: 3.6 G/DL
ALP BLD-CCNC: 84 U/L
ALT SERPL-CCNC: 19 U/L
ANION GAP SERPL CALC-SCNC: 16 MMOL/L
AST SERPL-CCNC: 20 U/L
BASOPHILS # BLD AUTO: 0.06 K/UL
BASOPHILS NFR BLD AUTO: 0.5 %
BILIRUB SERPL-MCNC: 0.7 MG/DL
BUN SERPL-MCNC: 10 MG/DL
CALCIUM SERPL-MCNC: 9.3 MG/DL
CHLORIDE SERPL-SCNC: 99 MMOL/L
CK SERPL-CCNC: 237 U/L
CO2 SERPL-SCNC: 25 MMOL/L
CREAT SERPL-MCNC: 0.96 MG/DL
CRP SERPL-MCNC: 0.81 MG/DL
EOSINOPHIL # BLD AUTO: 0.11 K/UL
EOSINOPHIL NFR BLD AUTO: 1 %
HCT VFR BLD CALC: 46.9 %
HGB BLD-MCNC: 15 G/DL
IMM GRANULOCYTES NFR BLD AUTO: 0.4 %
LYMPHOCYTES # BLD AUTO: 4.42 K/UL
LYMPHOCYTES NFR BLD AUTO: 39.1 %
MAN DIFF?: NORMAL
MCHC RBC-ENTMCNC: 25.9 PG
MCHC RBC-ENTMCNC: 32 GM/DL
MCV RBC AUTO: 80.9 FL
MONOCYTES # BLD AUTO: 0.82 K/UL
MONOCYTES NFR BLD AUTO: 7.3 %
NEUTROPHILS # BLD AUTO: 5.83 K/UL
NEUTROPHILS NFR BLD AUTO: 51.7 %
PLATELET # BLD AUTO: 330 K/UL
POTASSIUM SERPL-SCNC: 3.8 MMOL/L
PROT SERPL-MCNC: 6.3 G/DL
RBC # BLD: 5.8 M/UL
RBC # FLD: 13.7 %
SODIUM SERPL-SCNC: 140 MMOL/L
WBC # FLD AUTO: 11.29 K/UL

## 2020-09-25 PROCEDURE — 99215 OFFICE O/P EST HI 40 MIN: CPT | Mod: 95

## 2020-09-25 RX ORDER — ERGOCALCIFEROL 1.25 MG/1
1.25 MG CAPSULE, LIQUID FILLED ORAL
Qty: 12 | Refills: 0 | Status: ACTIVE | COMMUNITY
Start: 2020-06-05 | End: 1900-01-01

## 2020-09-25 NOTE — ASSESSMENT
[FreeTextEntry1] : 21 yo man SLE (2017) with LN  \par here for urgent visit after not feeling well in [facility] so came home this am urgently for evaluation given severity of his underlying ctd and previous severe flares\par \par note for school \par possible flare on Monday- Friday - send to patient \par \par # SLE/MCTD - class III / IV nephritis, myositis  - ELMO, RNP, ds DNA positive, myositis \par - Now with ha and fatigue - last time he felt this way he had a lupus flare, though he doesn’t currently have other manifestations typical of his flares (muscle weakness, edema)\par - check lupus labs (patient on way now)\par \par #hypertension.   improved and normal per patient checks off labetolol\par - continue lisinopril at this time and reassess next visit\par - patinet to check home bp\par \par #fatigue and headache.   was in [place] though denies covid contacts\par - will get the nasal swab this am\par - will also get the IgG for past exposure while at school\par \par #vit d def. slowly improving\par - continue replace\par \par #medicine monitoring, long term use of meds\par - was on nsaids - likely contributed a bit to disease - d/w patient NO NSAID for pain currently\par - gi omeprazole\par - pcp proph bactrim - order sent\par - steroids with very pronounced weight gain, acne and striae - now off reluctant to start until labs back 2/2 severity of side effects last time\par - Quant tb negative may 2020\par - elevated hgbaic - likely 2/2 steroids and weight gain (now 230).  vida monitor now off steroids.  d/w patient exercise\par  \par #vit d 12.2\par - replace

## 2020-09-25 NOTE — CONSULT LETTER
[FreeTextEntry2] : Giuseppe Brooks CONJUNCTIVA ERYTHEMA/pupils equal, round, and reactive to light/CHEMOSIS CHEMOSIS/subconj hemorrhage noted on inferior 4p-->9p.  Soft globes.  No coloboma.  No hyphema/hypopyon./pupils equal, round, and reactive to light

## 2020-09-28 ENCOUNTER — TRANSCRIPTION ENCOUNTER (OUTPATIENT)
Age: 25
End: 2020-09-28

## 2020-09-28 ENCOUNTER — APPOINTMENT (OUTPATIENT)
Dept: RHEUMATOLOGY | Facility: CLINIC | Age: 25
End: 2020-09-28

## 2020-09-28 LAB
C3 SERPL-MCNC: 153 MG/DL
C4 SERPL-MCNC: 38 MG/DL
CREAT SPEC-SCNC: 277 MG/DL
CREAT/PROT UR: 3.9 RATIO
DSDNA AB SER-ACNC: 41 IU/ML
ERYTHROCYTE [SEDIMENTATION RATE] IN BLOOD BY WESTERGREN METHOD: 80 MM/HR
M TB IFN-G BLD-IMP: NEGATIVE
PROT UR-MCNC: 1072 MG/DL
QUANTIFERON TB PLUS MITOGEN MINUS NIL: 1.52 IU/ML
QUANTIFERON TB PLUS NIL: 0.01 IU/ML
QUANTIFERON TB PLUS TB1 MINUS NIL: 0 IU/ML
QUANTIFERON TB PLUS TB2 MINUS NIL: 0 IU/ML
SARS-COV-2 IGG SERPL IA-ACNC: <0.1 INDEX
SARS-COV-2 IGG SERPL QL IA: NEGATIVE
SARS-COV-2 N GENE NPH QL NAA+PROBE: NOT DETECTED

## 2020-10-01 ENCOUNTER — APPOINTMENT (OUTPATIENT)
Dept: NEPHROLOGY | Facility: CLINIC | Age: 25
End: 2020-10-01
Payer: MEDICAID

## 2020-10-01 PROCEDURE — 99213 OFFICE O/P EST LOW 20 MIN: CPT | Mod: 95

## 2020-10-01 NOTE — ASSESSMENT
[FreeTextEntry1] : 1. Nephrotic range proteinuria in a patient with Class IV lupus nephritis.  Normal renal function.  We discussed that the first step is to reintroduce antiproteinuric therapy w/ Lisinopril and Vitamin D3.  To repeat Urine studies in 2 to 3 weeks.  If proteinuria does NOT improve, a repeat renal bx will be needed before entertaining Rituxan therapy.\par 2. Hypertension.  Discussed with the patient target BP is less than 120/80.  May need further medications if BP is not in target with the addition of Lisonpril.\par The patient will come to the office next week to drop a urine sample for microscopic evaluation. \par Follow up w/ Dr. Omalley and Dr. Brooks

## 2020-10-01 NOTE — REVIEW OF SYSTEMS
[Feeling Tired] : feeling tired [Negative] : Psychiatric [FreeTextEntry8] : Frothy urine [de-identified] : No rush

## 2020-10-01 NOTE — HISTORY OF PRESENT ILLNESS
[Home] : at home, [unfilled] , at the time of the visit. [Medical Office: ([facility])___] : at the medical office located in  [Verbal consent obtained from patient] : the patient, [unfilled] [FreeTextEntry1] : The patient was seen today via Telehealth referred by Dr. Guillaume for worsening proteinuria.  The chart and the labs were reviewed in [facility].  The patient demonstrate a good understanding for the reason for this visit.  In summary, the patient has a 3 yr history of SLE and two kidney biopsies, the last one in December of 2019 showed progression from focal proliferative to diffuse proliferative, with few crescents and a very low chronicity index.  He has been on 1 gram of Cell Cept 3 times per day, without side effects.  Recently his Urine protein creatinine ratio spiked up to almost 4.  His labs showed a normal creatinine and normal C3 and C4.  The patient admits of not taking the ACE inhibitor.  HIs vitamin D3 is low. \par He feels well although stressed by school and work.  No fever, no rush, no edema but frothy urine. \par His BP at home recently checked by his sister who is a RN was 134/102.

## 2020-10-03 ENCOUNTER — APPOINTMENT (OUTPATIENT)
Dept: ULTRASOUND IMAGING | Facility: CLINIC | Age: 25
End: 2020-10-03

## 2020-11-02 ENCOUNTER — INPATIENT (INPATIENT)
Facility: HOSPITAL | Age: 25
LOS: 2 days | Discharge: HOME | End: 2020-11-05
Attending: HOSPITALIST | Admitting: HOSPITALIST
Payer: MEDICAID

## 2020-11-02 VITALS
HEART RATE: 90 BPM | SYSTOLIC BLOOD PRESSURE: 156 MMHG | HEIGHT: 68 IN | TEMPERATURE: 98 F | DIASTOLIC BLOOD PRESSURE: 113 MMHG | RESPIRATION RATE: 17 BRPM | OXYGEN SATURATION: 100 %

## 2020-11-02 DIAGNOSIS — I21.4 NON-ST ELEVATION (NSTEMI) MYOCARDIAL INFARCTION: ICD-10-CM

## 2020-11-02 LAB
ALBUMIN SERPL ELPH-MCNC: 3.4 G/DL — SIGNIFICANT CHANGE UP (ref 3.3–5)
ALP SERPL-CCNC: 90 U/L — SIGNIFICANT CHANGE UP (ref 40–120)
ALT FLD-CCNC: 27 U/L — SIGNIFICANT CHANGE UP (ref 4–41)
ANION GAP SERPL CALC-SCNC: 15 MMO/L — HIGH (ref 7–14)
APPEARANCE UR: CLEAR — SIGNIFICANT CHANGE UP
AST SERPL-CCNC: 40 U/L — SIGNIFICANT CHANGE UP (ref 4–40)
B-OH-BUTYR SERPL-SCNC: 0.2 MMOL/L — SIGNIFICANT CHANGE UP (ref 0–0.4)
BACTERIA # UR AUTO: SIGNIFICANT CHANGE UP
BASE EXCESS BLDV CALC-SCNC: 1.5 MMOL/L — SIGNIFICANT CHANGE UP
BASOPHILS # BLD AUTO: 0.06 K/UL — SIGNIFICANT CHANGE UP (ref 0–0.2)
BASOPHILS NFR BLD AUTO: 0.4 % — SIGNIFICANT CHANGE UP (ref 0–2)
BILIRUB SERPL-MCNC: 0.5 MG/DL — SIGNIFICANT CHANGE UP (ref 0.2–1.2)
BILIRUB UR-MCNC: NEGATIVE — SIGNIFICANT CHANGE UP
BLOOD GAS VENOUS - CREATININE: SIGNIFICANT CHANGE UP MG/DL (ref 0.5–1.3)
BLOOD GAS VENOUS - FIO2: 21 — SIGNIFICANT CHANGE UP
BLOOD UR QL VISUAL: HIGH
BUN SERPL-MCNC: 12 MG/DL — SIGNIFICANT CHANGE UP (ref 7–23)
CALCIUM SERPL-MCNC: 9.6 MG/DL — SIGNIFICANT CHANGE UP (ref 8.4–10.5)
CHLORIDE BLDV-SCNC: 101 MMOL/L — SIGNIFICANT CHANGE UP (ref 96–108)
CHLORIDE SERPL-SCNC: 99 MMOL/L — SIGNIFICANT CHANGE UP (ref 98–107)
CK MB BLD-MCNC: 10.4 — HIGH (ref 0–2.5)
CK MB BLD-MCNC: 80 NG/ML — HIGH (ref 1–6.6)
CK SERPL-CCNC: 767 U/L — HIGH (ref 30–200)
CO2 SERPL-SCNC: 18 MMOL/L — LOW (ref 22–31)
COLOR SPEC: YELLOW — SIGNIFICANT CHANGE UP
CREAT SERPL-MCNC: 0.71 MG/DL — SIGNIFICANT CHANGE UP (ref 0.5–1.3)
EOSINOPHIL # BLD AUTO: 0.06 K/UL — SIGNIFICANT CHANGE UP (ref 0–0.5)
EOSINOPHIL NFR BLD AUTO: 0.4 % — SIGNIFICANT CHANGE UP (ref 0–6)
GAS PNL BLDV: 132 MMOL/L — LOW (ref 136–146)
GLUCOSE BLDV-MCNC: 152 MG/DL — HIGH (ref 70–99)
GLUCOSE SERPL-MCNC: 215 MG/DL — HIGH (ref 70–99)
GLUCOSE UR-MCNC: 30 — SIGNIFICANT CHANGE UP
HCO3 BLDV-SCNC: 23 MMOL/L — SIGNIFICANT CHANGE UP (ref 20–27)
HCT VFR BLD CALC: 47 % — SIGNIFICANT CHANGE UP (ref 39–50)
HCT VFR BLDV CALC: 48.5 % — SIGNIFICANT CHANGE UP (ref 39–51)
HGB BLD-MCNC: 15.5 G/DL — SIGNIFICANT CHANGE UP (ref 13–17)
HGB BLDV-MCNC: 15.9 G/DL — SIGNIFICANT CHANGE UP (ref 13–17)
IMM GRANULOCYTES NFR BLD AUTO: 1 % — SIGNIFICANT CHANGE UP (ref 0–1.5)
KETONES UR-MCNC: SIGNIFICANT CHANGE UP
LACTATE BLDV-MCNC: 3.1 MMOL/L — HIGH (ref 0.5–2)
LEUKOCYTE ESTERASE UR-ACNC: NEGATIVE — SIGNIFICANT CHANGE UP
LIDOCAIN IGE QN: 26.4 U/L — SIGNIFICANT CHANGE UP (ref 7–60)
LYMPHOCYTES # BLD AUTO: 20.7 % — SIGNIFICANT CHANGE UP (ref 13–44)
LYMPHOCYTES # BLD AUTO: 3.4 K/UL — HIGH (ref 1–3.3)
MCHC RBC-ENTMCNC: 26 PG — LOW (ref 27–34)
MCHC RBC-ENTMCNC: 33 % — SIGNIFICANT CHANGE UP (ref 32–36)
MCV RBC AUTO: 78.7 FL — LOW (ref 80–100)
MONOCYTES # BLD AUTO: 0.94 K/UL — HIGH (ref 0–0.9)
MONOCYTES NFR BLD AUTO: 5.7 % — SIGNIFICANT CHANGE UP (ref 2–14)
NEUTROPHILS # BLD AUTO: 11.8 K/UL — HIGH (ref 1.8–7.4)
NEUTROPHILS NFR BLD AUTO: 71.8 % — SIGNIFICANT CHANGE UP (ref 43–77)
NITRITE UR-MCNC: NEGATIVE — SIGNIFICANT CHANGE UP
NRBC # FLD: 0 K/UL — SIGNIFICANT CHANGE UP (ref 0–0)
PCO2 BLDV: 52 MMHG — HIGH (ref 41–51)
PH BLDV: 7.33 PH — SIGNIFICANT CHANGE UP (ref 7.32–7.43)
PH UR: 7.5 — SIGNIFICANT CHANGE UP (ref 5–8)
PLATELET # BLD AUTO: 185 K/UL — SIGNIFICANT CHANGE UP (ref 150–400)
PMV BLD: 11.5 FL — SIGNIFICANT CHANGE UP (ref 7–13)
PO2 BLDV: 24 MMHG — LOW (ref 35–40)
POTASSIUM BLDV-SCNC: 3.8 MMOL/L — SIGNIFICANT CHANGE UP (ref 3.4–4.5)
POTASSIUM SERPL-MCNC: 4.6 MMOL/L — SIGNIFICANT CHANGE UP (ref 3.5–5.3)
POTASSIUM SERPL-SCNC: 4.6 MMOL/L — SIGNIFICANT CHANGE UP (ref 3.5–5.3)
PROT SERPL-MCNC: 7.2 G/DL — SIGNIFICANT CHANGE UP (ref 6–8.3)
PROT UR-MCNC: 600 — HIGH
RBC # BLD: 5.97 M/UL — HIGH (ref 4.2–5.8)
RBC # FLD: 13.6 % — SIGNIFICANT CHANGE UP (ref 10.3–14.5)
RBC CASTS # UR COMP ASSIST: HIGH (ref 0–?)
SAO2 % BLDV: 31.5 % — LOW (ref 60–85)
SODIUM SERPL-SCNC: 132 MMOL/L — LOW (ref 135–145)
SP GR SPEC: 1.02 — SIGNIFICANT CHANGE UP (ref 1–1.04)
SQUAMOUS # UR AUTO: SIGNIFICANT CHANGE UP
TROPONIN T, HIGH SENSITIVITY: 269 NG/L — CRITICAL HIGH (ref ?–14)
TROPONIN T, HIGH SENSITIVITY: 87 NG/L — CRITICAL HIGH (ref ?–14)
UROBILINOGEN FLD QL: NORMAL — SIGNIFICANT CHANGE UP
WBC # BLD: 16.42 K/UL — HIGH (ref 3.8–10.5)
WBC # FLD AUTO: 16.42 K/UL — HIGH (ref 3.8–10.5)
WBC UR QL: SIGNIFICANT CHANGE UP (ref 0–?)

## 2020-11-02 PROCEDURE — 99223 1ST HOSP IP/OBS HIGH 75: CPT

## 2020-11-02 PROCEDURE — 71275 CT ANGIOGRAPHY CHEST: CPT | Mod: 26

## 2020-11-02 PROCEDURE — 71046 X-RAY EXAM CHEST 2 VIEWS: CPT | Mod: 26

## 2020-11-02 PROCEDURE — 74177 CT ABD & PELVIS W/CONTRAST: CPT | Mod: 26

## 2020-11-02 PROCEDURE — 99285 EMERGENCY DEPT VISIT HI MDM: CPT

## 2020-11-02 RX ORDER — MORPHINE SULFATE 50 MG/1
4 CAPSULE, EXTENDED RELEASE ORAL ONCE
Refills: 0 | Status: DISCONTINUED | OUTPATIENT
Start: 2020-11-02 | End: 2020-11-02

## 2020-11-02 RX ORDER — SODIUM CHLORIDE 9 MG/ML
1000 INJECTION INTRAMUSCULAR; INTRAVENOUS; SUBCUTANEOUS ONCE
Refills: 0 | Status: COMPLETED | OUTPATIENT
Start: 2020-11-02 | End: 2020-11-02

## 2020-11-02 RX ORDER — FAMOTIDINE 10 MG/ML
20 INJECTION INTRAVENOUS ONCE
Refills: 0 | Status: COMPLETED | OUTPATIENT
Start: 2020-11-02 | End: 2020-11-02

## 2020-11-02 RX ORDER — ACETAMINOPHEN 500 MG
975 TABLET ORAL ONCE
Refills: 0 | Status: COMPLETED | OUTPATIENT
Start: 2020-11-02 | End: 2020-11-02

## 2020-11-02 RX ORDER — ASPIRIN/CALCIUM CARB/MAGNESIUM 324 MG
324 TABLET ORAL ONCE
Refills: 0 | Status: COMPLETED | OUTPATIENT
Start: 2020-11-02 | End: 2020-11-02

## 2020-11-02 RX ORDER — ONDANSETRON 8 MG/1
4 TABLET, FILM COATED ORAL ONCE
Refills: 0 | Status: COMPLETED | OUTPATIENT
Start: 2020-11-02 | End: 2020-11-02

## 2020-11-02 RX ORDER — LIDOCAINE 4 G/100G
1 CREAM TOPICAL EVERY 24 HOURS
Refills: 0 | Status: DISCONTINUED | OUTPATIENT
Start: 2020-11-02 | End: 2020-11-04

## 2020-11-02 RX ADMIN — FAMOTIDINE 20 MILLIGRAM(S): 10 INJECTION INTRAVENOUS at 12:00

## 2020-11-02 RX ADMIN — ONDANSETRON 4 MILLIGRAM(S): 8 TABLET, FILM COATED ORAL at 12:00

## 2020-11-02 RX ADMIN — Medication 975 MILLIGRAM(S): at 18:01

## 2020-11-02 RX ADMIN — MORPHINE SULFATE 4 MILLIGRAM(S): 50 CAPSULE, EXTENDED RELEASE ORAL at 17:26

## 2020-11-02 RX ADMIN — Medication 324 MILLIGRAM(S): at 20:57

## 2020-11-02 RX ADMIN — Medication 30 MILLILITER(S): at 12:00

## 2020-11-02 RX ADMIN — SODIUM CHLORIDE 1000 MILLILITER(S): 9 INJECTION INTRAMUSCULAR; INTRAVENOUS; SUBCUTANEOUS at 18:45

## 2020-11-02 RX ADMIN — Medication 30 MILLILITER(S): at 17:33

## 2020-11-02 RX ADMIN — MORPHINE SULFATE 4 MILLIGRAM(S): 50 CAPSULE, EXTENDED RELEASE ORAL at 13:13

## 2020-11-02 RX ADMIN — MORPHINE SULFATE 4 MILLIGRAM(S): 50 CAPSULE, EXTENDED RELEASE ORAL at 15:56

## 2020-11-02 RX ADMIN — SODIUM CHLORIDE 1000 MILLILITER(S): 9 INJECTION INTRAMUSCULAR; INTRAVENOUS; SUBCUTANEOUS at 12:00

## 2020-11-02 RX ADMIN — MORPHINE SULFATE 4 MILLIGRAM(S): 50 CAPSULE, EXTENDED RELEASE ORAL at 15:49

## 2020-11-02 RX ADMIN — LIDOCAINE 1 PATCH: 4 CREAM TOPICAL at 23:49

## 2020-11-02 RX ADMIN — Medication 975 MILLIGRAM(S): at 19:04

## 2020-11-02 NOTE — H&P ADULT - PROBLEM SELECTOR PROBLEM 1
ADMISSION DATE:  2020

 

CHIEF COMPLAINT:  Numbness in left hand and face.

 

HISTORY OF PRESENT ILLNESS:  Mr. Flannery is a 75-year-old man from [place] with a

history of a TIA back in 2018.  At that time, he presented to the ER in [place]

with left hand and left face numbness and slightly slurred speech.  He had

underwent extensive Neurology workup including CT angiogram, which showed no

acute cerebral infarcts or bleeds, but a partial obstruction of his left

vertebral artery.  He was discharged on aspirin, blood pressure control,

diabetes control, and has done well.

 

According to the patient, 2 days ago he began to have some left hand numbness as

well as some perioral numbness, this persisted, and yesterday he came into the

emergency room for evaluation where he was seen by Dr. Boles, evaluated with CT

showing no acute stroke and after phone consultation with Neurology, he was

admitted for observation overnight.

 

Mr. Flannery states that his numbness has mostly gone now.  He will occasionally

feel a slight tingling in the back of his left hand, index, and thumb areas.  He

has not noted any weakness, headaches.  No recent illness, infections, etc.  He

states his diabetes has been under fair control.  He has received steroid, both

topical and injection in the last few weeks for a widespread dermatitis.

 

PAST MEDICAL HISTORY:  Significant for the TIA in 2018 as mentioned.  He also is

status post appendectomy, removal of pilonidal cyst.  He has had longstanding

irritable bowel syndrome that has improved some in the last few years.  He has

type 2 diabetes, hypertension, obesity, and the recent dermatitis as mentioned.

 

MEDICATIONS:

1. Lipitor 40 mg daily.

2. Glyburide 5 mg daily.

3. Lisinopril 10 mg daily.

4. Metformin 500 mg b.i.d.

 

ALLERGIES:  Sulfa.  He was told he was allergic when he was a child.

 

HABITS:  Nonsmoker for 35 years, approximately 2 beers per day average,

occasional caffeine.

 

FAMILY AND SOCIAL HISTORY:  The patient's father  at age 48 of a ruptured

appendix with peritonitis and cerebral occlusion.  Mother  at age 99 and had

irritable bowel syndrome.  He has 1 sister who is healthy.  The patient has been

 for 63 years.  His wife moved in to [facility] last year

because of extensive medical problems with diabetes, obesity, poor ambulation,

etc.

 

The patient has 3 children.  One son, Duc, is retired [employer]; one son

lives in [place] with his wife, who works; and a daughter is a  in [place].

 

REVIEW OF SYSTEMS:  GENERAL:  No seizure, syncope, or recent significant weight

change.  SKIN:  Positive for chronic itchy dermatitis.  HEENT:  Positive for

chronic hearing loss with wearing hearing aids.  No recent change in vision.  No

sore throat or URI.  No cough or purulent sputum.  No chest pain or

palpitations.  No abdominal pain, nausea, or diarrhea.  He has nocturia x2 to 3.

No joint inflammation, swelling, or skin rash.

 

PHYSICAL EXAMINATION:  GENERAL:  He is alert and comfortable.  He is hard of

hearing, but a good historian.  VITAL SIGNS:  Blood pressure 148/76, pulse 80

and regular, respirations 16, O2 saturation 97% on room air, temperature 98,

weight 226 pounds.  SKIN:  Shows fading dermatitis.  No significant active

lesions.  HEENT:  Show hearing aids in place.  These were not removed for exam.

Pupils are equal and reactive.  Oropharynx clear.  LUNGS:  Clear to the bases.

HEART:  Regular.  There is a 2/6 systolic murmur over the mitral area.  Carotids

are brisk.  No bruits could be heard, although it is difficult to auscultate

because of his facial hair.  ABDOMEN:  Obese, soft, nontender.  No masses.  No

organomegaly.  EXTREMITIES:  Warm, well perfused.  No edema.  He has excellent

dorsalis pedis and posterior tibial pulses on the left foot.  He has a good

posterior tibial pulse on the right foot, but I could not palpate his right

dorsalis pedis.  Ankle jerk reflexes are absent.  Knee jerks 1+, symmetric.

NEUROLOGIC:  He has a very tiny left facial droop.

 

ASSESSMENT:  A 75-year-old man with:

1. Cerebrovascular disease with symptoms of transient ischemic attack.

2. History of transient ischemic attack in the right middle cerebral

    distribution and left vertebral artery obstruction on CT angiogram.

3. Type 2 diabetes.

4. Hypertension.

5. Hyperlipidemia.

6. Recent rash.

 

PLAN:  He is admitted to telemetry, serial exams, and repeat neurologic exam.

He is given Plavix.  If no signs of progression, we will plan to discharge

within 24 hours and have followup as an outpatient.

 

Job#: [id number]

DD: 2020 [id number]

DT: 2020 1029 KOMAL/JAN Systemic lupus erythematosus, unspecified SLE type, unspecified organ involvement status

## 2020-11-02 NOTE — ED PROVIDER NOTE - NSFOLLOWUPINSTRUCTIONS_ED_ALL_ED_FT
Follow up with your primary care doctor in 2-3 days.  Take your omeprazole daily as directed for the next 2 weeks.  Return to the ED for any emergent concerns.

## 2020-11-02 NOTE — H&P ADULT - HISTORY OF PRESENT ILLNESS
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 Improved with inspiration ad sitting up. Denies fever, cough, sob, leg swelling. CTA negative for PE or pericardial effusion. Denies personal or family h/o cad, or smoking history. EKG sinus tach, HS trop 87, 269. 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 Improved with inspiration and sitting up. Denies fever, cough, sob, leg swelling. CTA negative for PE (limited eval of subsegmental arteries) or pericardial effusion. Denies personal or family h/o cad, or smoking history. EKG sinus tach, HS trop 87, 269. Seen by cardiology who deem this to be more consistent with pericarditis or myocarditis than ACS

## 2020-11-02 NOTE — CONSULT NOTE ADULT - SUBJECTIVE AND OBJECTIVE BOX
Silvia Arevalo NP  Cardiology   Spectra # [id number]    CC: Chest pain  HPI: 6 y/o M with PMHx of SLE and lupus nephritis presently on immunosuppression presents to ED with acute onset of epigastric pain. Pt states that he woke up around 4am with chest pain in the epigastric area, describes it as a burning pain, which was worse lying down flat and slightly improved upon sitting up. Pt also had one episode of NBNB vomiting around the same time. Currently he is denying CP/SOB/n/v/d.  Of note patient has been tested 3 times for covid as mother had covid and has been negative all.  ED course: Pt presents with CP, CTA negative for PE, hSt , cards consulted. On exam pt was comfortable and chest pain free.       Allergies No Known Allergies    MEDICATIONS:    PAST MEDICAL & SURGICAL HISTORY:  SLE (systemic lupus erythematosus)  Lupus nephritis  No significant past surgical history    FAMILY HISTORY:  FH: diabetes mellitus (Father, Mother)  in Father and Mother    SOCIAL HISTORY:    [ x] live with: family  [ ] Non-smoker,[ x] smoker (marijuana and hookah, denies cigarette use)  [ ] no alcohol use [x] alcohol use: socially, 1-2 drinks on the weekend      REVIEW OF SYSTEMS:  General: no fatigue/malaise, weight loss/gain.  Skin: no rashes.  Ophthalmologic: no blurred vision, no loss of vision. 	  ENT: no sore throat, rhinorrhea, sinus congestion.  Cardiovascular:no chest pain ,no palpitation,no dizziness,no diaphoresis,no edema  Respiratory: no SOB, cough or wheeze.  Gastrointestinal:  no N/V/D, no melena/hematemesis/hematochezia.  Genitourinary: no dysuria/hesitancy or hematuria.  Musculoskeletal: no myalgias or arthralgias.  Neurological: no changes in vision or hearing, no lightheadedness/dizziness, no syncope/near syncope	  Psychiatric: no unusual stress/anxiety.       PHYSICAL EXAM:  T(C): 36.7 (11-02-20 @ 15:56), Max: 36.7 (11-02-20 @ 15:56)  HR: 123 (11-02-20 @ 20:58) (90 - 125)  BP: 158/104 (11-02-20 @ 20:58) (156/113 - 177/129)  RR: 20 (11-02-20 @ 20:58) (17 - 20)  SpO2: 100% (11-02-20 @ 20:58) (99% - 100%)  Wt(kg): --  I&O's Summary      Appearance: Normal	  HEENT:   Normal oral mucosa, PERRL, EOMI	  Lymphatic: No lymphadenopathy  Cardiovascular: Normal S1 S2, No JVD, No murmurs, No edema  Respiratory: Lungs clear to auscultation	  Psychiatry: A & O x 3, Mood & affect appropriate  Gastrointestinal:  Soft, Non-tender, + BS	  Skin: No rashes, No ecchymoses, No cyanosis	  Neurologic: Non-focal  Extremities: Normal range of motion, No clubbing, cyanosis or edema  Vascular: Peripheral pulses palpable 2+ bilaterally        LABS:	 	    CBC Full  -  ( 02 Nov 2020 11:40 )  WBC Count : 16.42 K/uL  Hemoglobin : 15.5 g/dL  Hematocrit : 47.0 %  Platelet Count - Automated : 185 K/uL  Mean Cell Volume : 78.7 fL  Mean Cell Hemoglobin : 26.0 pg  Mean Cell Hemoglobin Concentration : 33.0 %  Auto Neutrophil # : 11.80 K/uL  Auto Lymphocyte # : 3.40 K/uL  Auto Monocyte # : 0.94 K/uL  Auto Eosinophil # : 0.06 K/uL  Auto Basophil # : 0.06 K/uL  Auto Neutrophil % : 71.8 %  Auto Lymphocyte % : 20.7 %  Auto Monocyte % : 5.7 %  Auto Eosinophil % : 0.4 %  Auto Basophil % : 0.4 %    11-02    132<L>  |  99  |  12  ----------------------------<  215<H>  4.6   |  18<L>  |  0.71    Ca    9.6      02 Nov 2020 11:40    TPro  7.2  /  Alb  3.4  /  TBili  0.5  /  DBili  x   /  AST  40  /  ALT  27  /  AlkPhos  90  11-02      High sensitive trop 87>269      TELEMETRY: sinus tachy	    ECG:  sinus tachy 128 with non-specific t wave inversions in the inferior leads       Silvia Arevalo NP  Cardiology   Spectra # [id number]    CC: Chest pain  HPI: 4 y/o M with PMHx of SLE and lupus nephritis presently on immunosuppression presents to ED with acute onset of epigastric pain. Pt states that he woke up around 4am with severe chest pain in the epigastric area, describes it as a burning pain, which was worse lying down flat and slightly improved upon sitting up. Pt states he also felt discomfort in his back.  Pt also had one episode of NBNB vomiting around the same time.  Currently he is denying CP/SOB/n/v/d.  Of note patient has been tested 3 times for covid as mother had covid and has been negative all.  ED course: Pt presents with CP, CTA negative for PE, no evidence of pericardial effusion on CT,  hSt 87>269, cards consulted. On exam pt was comfortable and chest pain free.       Allergies No Known Allergies    MEDICATIONS:    PAST MEDICAL & SURGICAL HISTORY:  SLE (systemic lupus erythematosus)  Lupus nephritis  No significant past surgical history    FAMILY HISTORY:  FH: diabetes mellitus (Father, Mother)  in Father and Mother    SOCIAL HISTORY:    [ x] live with: family  [ ] Non-smoker,[ x] smoker (marijuana and hookah, denies cigarette use)  [ ] no alcohol use [x] alcohol use: socially, 1-2 drinks on the weekend      REVIEW OF SYSTEMS:  General: no fatigue/malaise, weight loss/gain.  Skin: no rashes.  Ophthalmologic: no blurred vision, no loss of vision. 	  ENT: no sore throat, rhinorrhea, sinus congestion.  Cardiovascular:no chest pain ,no palpitation,no dizziness,no diaphoresis,no edema  Respiratory: no SOB, cough or wheeze.  Gastrointestinal:  no N/V/D, no melena/hematemesis/hematochezia.  Genitourinary: no dysuria/hesitancy or hematuria.  Musculoskeletal: no myalgias or arthralgias.  Neurological: no changes in vision or hearing, no lightheadedness/dizziness, no syncope/near syncope	  Psychiatric: no unusual stress/anxiety.       PHYSICAL EXAM:  T(C): 36.7 (11-02-20 @ 15:56), Max: 36.7 (11-02-20 @ 15:56)  HR: 123 (11-02-20 @ 20:58) (90 - 125)  BP: 158/104 (11-02-20 @ 20:58) (156/113 - 177/129)  RR: 20 (11-02-20 @ 20:58) (17 - 20)  SpO2: 100% (11-02-20 @ 20:58) (99% - 100%)  Wt(kg): --  I&O's Summary      Appearance: Normal	  HEENT:   Normal oral mucosa, PERRL, EOMI	  Lymphatic: No lymphadenopathy  Cardiovascular: Normal S1 S2, No JVD, No murmurs, No edema  Respiratory: Lungs clear to auscultation	  Psychiatry: A & O x 3, Mood & affect appropriate  Gastrointestinal:  Soft, Non-tender, + BS	  Skin: No rashes, No ecchymoses, No cyanosis	  Neurologic: Non-focal  Extremities: Normal range of motion, No clubbing, cyanosis or edema  Vascular: Peripheral pulses palpable 2+ bilaterally        LABS:	 	    CBC Full  -  ( 02 Nov 2020 11:40 )  WBC Count : 16.42 K/uL  Hemoglobin : 15.5 g/dL  Hematocrit : 47.0 %  Platelet Count - Automated : 185 K/uL  Mean Cell Volume : 78.7 fL  Mean Cell Hemoglobin : 26.0 pg  Mean Cell Hemoglobin Concentration : 33.0 %  Auto Neutrophil # : 11.80 K/uL  Auto Lymphocyte # : 3.40 K/uL  Auto Monocyte # : 0.94 K/uL  Auto Eosinophil # : 0.06 K/uL  Auto Basophil # : 0.06 K/uL  Auto Neutrophil % : 71.8 %  Auto Lymphocyte % : 20.7 %  Auto Monocyte % : 5.7 %  Auto Eosinophil % : 0.4 %  Auto Basophil % : 0.4 %    11-02    132<L>  |  99  |  12  ----------------------------<  215<H>  4.6   |  18<L>  |  0.71    Ca    9.6      02 Nov 2020 11:40    TPro  7.2  /  Alb  3.4  /  TBili  0.5  /  DBili  x   /  AST  40  /  ALT  27  /  AlkPhos  90  11-02      High sensitive trop 87>269      TELEMETRY: sinus tachy	    ECG:  sinus tachy 128 with non-specific t wave inversions in the inferior leads

## 2020-11-02 NOTE — ED ADULT NURSE NOTE - NSIMPLEMENTINTERV_GEN_ALL_ED
Implemented All Universal Safety Interventions:  [place] to call system. Call bell, personal items and telephone within reach. Instruct patient to call for assistance. Room bathroom lighting operational. Non-slip footwear when patient is off stretcher. Physically safe environment: no spills, clutter or unnecessary equipment. Stretcher in lowest position, wheels locked, appropriate side rails in place.

## 2020-11-02 NOTE — ED ADULT NURSE NOTE - OBJECTIVE STATEMENT
Pt rec'd in intake, c/o productive cough with chest and back pain. Tested negative for COVID a few days ago, but mother previously sick with covid. Pt rec'd in intake, c/o productive cough with chest and back pain. Symptoms started with a cough last week and progressed to chest and back pain for the past day. Pt states he has chest pain when he bends forward and back pain when he sits back. Tested negative for COVID a few days ago, but mother previously recently sick with covid.

## 2020-11-02 NOTE — H&P ADULT - PROBLEM SELECTOR PLAN 1
-cardiac manifestation of lupus more consistent with myocarditis  or pericarditis rather than ACS  -pain control with opioids prn, trial of solumedrol; will need to contact rheum in am  -dsDNA, monica, C3/4 ordered  -given that Plaquenil can cause myocarditis, will hold for now. C/w Cellcept   -cardiac MRI ordered to evaluate for myocarditis  -TTE ordered, continue with tele

## 2020-11-02 NOTE — ED PROVIDER NOTE - OBJECTIVE STATEMENT
24 y/o M with PMHx of lupus nephritis presently on immunosuppression presents to ED with acute onset of epigastric pain and chest pain. Pt has associated vomiting with symptoms. Reports last night ate at a [employer] and is unsure if the other people with him are sick as well. Pt presents into the ED dry heaving and states having active chest pain. Denies having any SOB, hx of PE, or other medical complaints. Of note patient has been tested 3 times for covid as mother had covid and has been negative all 3 times.

## 2020-11-02 NOTE — ED ADULT TRIAGE NOTE - CHIEF COMPLAINT QUOTE
Pt brought by EMS from home for chest pain, back pain, and nausea vomiting starting 4 hours ago. Pt admits to having slight cough last week. Pt tested negative for COVID on October 28th. Pt appears pale in triage. Pt received 162 asa by ems. PMH HTN

## 2020-11-02 NOTE — H&P ADULT - NSHPSOCIALHISTORY_GEN_ALL_CORE
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 lives with parents  occasional etoh use

## 2020-11-02 NOTE — ED PROVIDER NOTE - CLINICAL SUMMARY MEDICAL DECISION MAKING FREE TEXT BOX
24 y/o M presents to ED with likely gastritis. Due to history given will treat with Maalox, Pepcid, and IV fluid. Plan to obtain labs. EKG is NSR with no ST deviations.

## 2020-11-02 NOTE — H&P ADULT - NSHPPHYSICALEXAM_GEN_ALL_CORE
PHYSICAL EXAM:      Constitutional: NAD, well-groomed, well-developed  HEENT: , EOMI, Normal Hearing  Neck: No LAD, No JVD  Back: Normal spine flexure, No CVA tenderness  Respiratory: CTAB  Cardiovascular: S1 and S2, RRR,   Gastrointestinal: BS+, soft, NT/ND  Extremities: No peripheral edema  Vascular: 2+ peripheral pulses  Neurological: A/O x 3, no focal deficits  Psychiatric: Normal mood, normal affect  Musculoskeletal: 5/5 strength b/l upper and lower extremities  Skin: No rashes

## 2020-11-02 NOTE — H&P ADULT - PROBLEM SELECTOR PLAN 2
-c/w lisinopril  -acute elevation in BP likely influence by chest pain; if BP still elevated despite pain control, will introduce labetalol  that pt has taken in past

## 2020-11-02 NOTE — H&P ADULT - NSICDXFAMILYHX_GEN_ALL_CORE_FT
FAMILY HISTORY:  Father  Still living? Unknown  FH: diabetes mellitus, Age at diagnosis: Age Unknown    Mother  Still living? Unknown  FH: diabetes mellitus, Age at diagnosis: Age Unknown

## 2020-11-02 NOTE — H&P ADULT - NSHPLABSRESULTS_GEN_ALL_CORE
Vital Signs Last 24 Hrs  T(C): 36.8 (2020 00:52), Max: 36.8 (2020 00:52)  T(F): 98.3 (2020 00:52), Max: 98.3 (2020 00:52)  HR: 117 (2020 00:52) (90 - 125)  BP: 155/112 (2020 00:52) (155/112 - 177/129)  BP(mean): --  RR: 20 (2020 00:52) (17 - 20)  SpO2: 100% (2020 00:52) (99% - 100%)                          15.5   16.42 )-----------( 185      ( 2020 11:40 )             47.0     11-02    132<L>  |  99  |  12  ----------------------------<  215<H>  4.6   |  18<L>  |  0.71    Ca    9.6      2020 11:40    TPro  7.2  /  Alb  3.4  /  TBili  0.5  /  DBili  x   /  AST  40  /  ALT  27  /  AlkPhos  90  11-02    CAPILLARY BLOOD GLUCOSE          Urinalysis Basic - ( 2020 16:00 )    Color: YELLOW / Appearance: CLEAR / S.025 / pH: 7.5  Gluc: 30 / Ketone: SMALL  / Bili: NEGATIVE / Urobili: NORMAL   Blood: MODERATE / Protein: 600 / Nitrite: NEGATIVE   Leuk Esterase: NEGATIVE / RBC: 26-50 / WBC 3-5   Sq Epi: FEW / Non Sq Epi: x / Bacteria: FEW

## 2020-11-02 NOTE — CONSULT NOTE ADULT - ATTENDING COMMENTS
EKG, Echo reviewed, patient seen and examined, he is post MRI from early this am. Awaiting result to determine with Interventionalist if will proceed with C. Would start Metoprolol 25 BID. Discussed with Nephrologist, if dx is myocarditis, plan is for steroids.  Please repeat EKG, trend CE's

## 2020-11-02 NOTE — ED PROVIDER NOTE - PROGRESS NOTE DETAILS
Patient still now having pain, presently c/o lower chest pain and epigastric pain. Plan to obtain CTA to r/o PE and CT of abdomen with IV contrast. Burris: pt still in pain; VBG shows no evidence of DKA; CTA negative for PE or acute abdominal pathology; will sign out to incoming attending; continued pain control if not improved CDU Isauro:  pt signed out to me.  states he is feeling better at this time.  tolerating PO ginger ale.  will give another dose of maalox and likely discharge with outpatient followup. Isauro:  pt signed out to me.  states he is feeling better at this time.  tolerating PO ginger ale.  will give another dose of maalox and likely discharge with outpatient followup.  still has some pain, but unlikely to be due to an emergent medical condition.  Labs unremarkable and CTA chest negative for PE Isauro:  pt signed out to me.  states he is feeling better at this time.  tolerating PO ginger ale.  will give another dose of maalox.  still has some pain, but unlikely to be due to an emergent medical condition.  Labs unremarkable and CTA chest negative for PE.  will add on troponin Acerra:  troponin noted.  repeat EKG in progress.  will call cardiology for evaluation.  pt actually states he feels much better and pain is improving at this time. acerra:  pt seen by cardiology.  recommending no treatment for acs at this time and admission for echo.  will admit to [facility] hospitalist.  aspirin given.

## 2020-11-02 NOTE — ED ADULT NURSE REASSESSMENT NOTE - NS ED NURSE REASSESS COMMENT FT1
Report given to [facility] ADELFO Easley. Pt. appears comfortable resting in stretcher. ED Tech transporting patient to [facility].
Pt states he feels much better, no longer coughing, no longer has pain. Pt had an elevated troponin per earlier labs, repeat troponin sent, pt on cardiac monitor in sinus tach. Denies palpitations or SOB. Pt hypertensive, missed his BP meds today, awaiting orders from MD who is aware.

## 2020-11-02 NOTE — H&P ADULT - NSHPREVIEWOFSYSTEMS_GEN_ALL_CORE
Review of Systems:   CONSTITUTIONAL: No fever, weight loss, or fatigue  EYES: No eye pain, visual disturbances, or discharge  ENMT:  No difficulty hearing, tinnitus, vertigo; No sinus or throat pain  NECK: No pain or stiffness  RESPIRATORY: No cough, wheezing, chills or hemoptysis; No shortness of breath  CARDIOVASCULAR: + chest pain, no palpitations, dizziness, or leg swelling  GASTROINTESTINAL: No abdominal or epigastric pain. No nausea, vomiting, or hematemesis; No diarrhea or constipation. No melena or hematochezia.  GENITOURINARY: No dysuria, frequency, hematuria, or incontinence  NEUROLOGICAL: No headaches, memory loss, loss of strength, numbness, or tremors  SKIN: No itching, burning, rashes, or lesions   LYMPH NODES: No enlarged glands  ENDOCRINE: No heat or cold intolerance; No hair loss  MUSCULOSKELETAL: No joint pain or swelling; No muscle, back, or extremity pain

## 2020-11-02 NOTE — CONSULT NOTE ADULT - ASSESSMENT
24 y/o M with PMHx of SLE and lupus nephritis presently on immunosuppression presents to ED with acute onset of epigastric pain, hsT 87>269, no acute ECG changes (non-specific t wave inversions) and leukocytosis likely 2/2 pericarditis.    #pericarditis-likely in the setting of SLE and given positional nature of chest pain described by the patient, please send lupus markers and if they are consistent with a flare, defer treatment to the rheumatology team to manage  -admit to tele and TTE in the am  -doubt acute atherosclerotic event at this time, patient is currently CP free and there are no acute ECG changes, troponemia can be 2/2 pericarditis; -continue to trend hsT and serial EKG prn chest pain    Case discussed with Dr. Warner Del Castillo 26 y/o M with PMHx of SLE and lupus nephritis presently on immunosuppression presents to ED with acute onset of epigastric pain, hsT 87>269, CK-MB 80, ckmb relative index 10.4, no acute ECG changes (non-specific t wave inversions) and leukocytosis likely 2/2 clari-pericarditis.    #[id number] in the setting of SLE, elevated CE, and given positional nature of chest pain described by the patient, please send lupus markers and if they are consistent with a flare, defer treatment to the rheumatology team to manage  -admit to tele and TTE in the am  -doubt acute atherosclerotic event at this time, patient is currently CP free and there are no acute ECG changes, troponemia can be 2/2 pericarditis; -continue to trend hsT and serial EKG prn chest pain    Case discussed with Dr. Warner Del Castillo 26 y/o M with PMHx of SLE and lupus nephritis presently on immunosuppression presents to ED with acute onset of epigastric pain, hsT 87>269, CK-MB 80, ckmb relative index 10.4, no acute ECG changes (non-specific t wave inversions) and leukocytosis likely 2/2 clari-pericarditis.    #[id number]-[id number] in the setting of SLE, elevated CE, and given positional nature of chest pain described by the patient, please send lupus markers and if they are consistent with a flare, defer treatment to the rheumatology team to manage  -admit to tele and cardiac MRI in the am  -doubt acute atherosclerotic event at this time, patient is currently CP free and there are no acute ECG changes, troponemia can be 2/2 pericarditis; -continue to trend hsT and serial EKG prn chest pain    Case discussed with Dr. Warner Del Castillo 24 y/o M with PMHx of SLE and lupus nephritis presently on immunosuppression presents to ED with acute onset of epigastric pain, hsT 87>269, CK-MB 80, ckmb relative index 10.4, no acute ECG changes (non-specific t wave inversions) and leukocytosis likely 2/2 myocarditis    #myocarditis-likely in the setting of SLE, elevated CE, and given positional nature of chest pain described by the patient, please send lupus markers and if they are consistent with a flare, defer treatment to the rheumatology team to manage  -admit to tele and cardiac MRI in the am  -doubt acute atherosclerotic event at this time, patient is currently CP free and there are no acute ECG changes, troponemia can be 2/2 pericarditis; -continue to trend hsT and serial EKG prn chest pain    Case discussed with Dr. Warner Del Castillo 26 y/o M with PMHx of SLE and lupus nephritis presently on immunosuppression presents to ED with acute onset of epigastric pain, hsT 87>269, CK-MB 80, ckmb relative index 10.4, no acute ECG changes (non-specific t wave inversions) and leukocytosis likely 2/2 myocarditis    #myocarditis-likely in the setting of SLE, elevated CE, and given positional nature of chest pain described by the patient, please send lupus markers   (ELMO, dsDNA, C3/C4) and if they are consistent with a flare, defer treatment to the rheumatology team to manage  -admit to tele and cardiac MRI in the am  -doubt acute atherosclerotic event at this time, patient is currently CP free and there are no acute ECG changes, troponemia can be 2/2 myocarditis; -continue to trend hsT and serial EKG prn chest pain    Case discussed with Dr. Warner Del Castillo 26 y/o M with PMHx of SLE and lupus nephritis presently on immunosuppression presents to ED with acute onset of epigastric pain, hsT 87>269, CK-MB 80, ckmb relative index 10.4, no acute ECG changes (non-specific t wave inversions) and leukocytosis likely 2/2 pericarditis vs myocarditis    #pericarditis vs myocarditis-likely in the setting of SLE, elevated CE, and given positional nature of chest pain described by the patient, please send lupus markers   (ELMO, dsDNA, C3/C4) and if they are consistent with a flare, defer treatment to the rheumatology team to manage  -admit to tele and cardiac MRI in the am  -doubt acute atherosclerotic event at this time, patient is currently CP free and there are no acute ECG changes, troponemia can be 2/2 myocarditis;   -continue to trend hsT and serial EKG prn chest pain    Case discussed with Dr. Warner Del Castillo 26 y/o M with PMHx of SLE and lupus nephritis presently on immunosuppression presents to ED with acute onset of epigastric pain, hsT 87>269, CK-MB 80, ckmb relative index 10.4, no acute ECG changes (non-specific t wave inversions) and leukocytosis likely 2/2 pericarditis vs myocarditis    #pericarditis vs myocarditis  -pericarditis more likely in the setting of SLE, elevated CE, and given positional nature of chest pain as described by the patient, please send lupus markers (ELMO, dsDNA, C3/C4) and if they are consistent with a flare, defer treatment to the rheumatology team to manage- may trial NSAIDS for now while pending further w/u  -admit to tele and cardiac MRI in the am  -doubt acute atherosclerotic event at this time, patient is currently CP free and there are no acute ECG changes, troponemia can be 2/2 myocarditis; continue to trend hsT and serial EKG prn chest pain      Case discussed with Dr. Warner Del Castillo 26 y/o M with PMHx of SLE and lupus nephritis presently on immunosuppression presents to ED with acute onset of epigastric pain, hsT 87>269, CK-MB 80, ckmb relative index 10.4, no acute ECG changes (non-specific t wave inversions) and leukocytosis likely 2/2 pericarditis vs myocarditis    #pericarditis vs myocarditis likely in the setting of SLE, elevated CE, and given positional nature of chest pain as described by the patient, please send lupus markers (ELMO, dsDNA, C3/C4) and if they are consistent with a flare, defer treatment to the rheumatology team to manage  -admit to tele, order TTE first thing in the morning then plan for cardiac MRI  -doubt acute atherosclerotic event at this time, patient is currently CP free and there are no acute ECG changes, troponemia can be 2/2 myocarditis; continue to trend hsT and serial EKG prn chest pain      Case discussed with Dr. Warner Del Castillo

## 2020-11-03 DIAGNOSIS — M32.14 GLOMERULAR DISEASE IN SYSTEMIC LUPUS ERYTHEMATOSUS: ICD-10-CM

## 2020-11-03 DIAGNOSIS — I10 ESSENTIAL (PRIMARY) HYPERTENSION: ICD-10-CM

## 2020-11-03 DIAGNOSIS — Z29.9 ENCOUNTER FOR PROPHYLACTIC MEASURES, UNSPECIFIED: ICD-10-CM

## 2020-11-03 DIAGNOSIS — M32.9 SYSTEMIC LUPUS ERYTHEMATOSUS, UNSPECIFIED: ICD-10-CM

## 2020-11-03 DIAGNOSIS — I21.4 NON-ST ELEVATION (NSTEMI) MYOCARDIAL INFARCTION: ICD-10-CM

## 2020-11-03 LAB
ALBUMIN SERPL ELPH-MCNC: 3.1 G/DL — LOW (ref 3.3–5)
ALP SERPL-CCNC: 74 U/L — SIGNIFICANT CHANGE UP (ref 40–120)
ALT FLD-CCNC: 44 U/L — HIGH (ref 4–41)
ANION GAP SERPL CALC-SCNC: 10 MMO/L — SIGNIFICANT CHANGE UP (ref 7–14)
AST SERPL-CCNC: 161 U/L — HIGH (ref 4–40)
BILIRUB SERPL-MCNC: 0.5 MG/DL — SIGNIFICANT CHANGE UP (ref 0.2–1.2)
BUN SERPL-MCNC: 10 MG/DL — SIGNIFICANT CHANGE UP (ref 7–23)
C3 SERPL-MCNC: 149.7 MG/DL — SIGNIFICANT CHANGE UP (ref 90–180)
C4 SERPL-MCNC: 32.7 MG/DL — SIGNIFICANT CHANGE UP (ref 10–40)
CALCIUM SERPL-MCNC: 8.7 MG/DL — SIGNIFICANT CHANGE UP (ref 8.4–10.5)
CHLORIDE SERPL-SCNC: 101 MMOL/L — SIGNIFICANT CHANGE UP (ref 98–107)
CHOLEST SERPL-MCNC: 231 MG/DL — HIGH (ref 120–199)
CK MB BLD-MCNC: 10.3 — HIGH (ref 0–2.5)
CK MB BLD-MCNC: 213.3 NG/ML — HIGH (ref 1–6.6)
CK SERPL-CCNC: 2080 U/L — HIGH (ref 30–200)
CO2 SERPL-SCNC: 24 MMOL/L — SIGNIFICANT CHANGE UP (ref 22–31)
CREAT ?TM UR-MCNC: 95.3 MG/DL — SIGNIFICANT CHANGE UP
CREAT SERPL-MCNC: 0.66 MG/DL — SIGNIFICANT CHANGE UP (ref 0.5–1.3)
DIRECT LDL: 190 MG/DL — SIGNIFICANT CHANGE UP
GLUCOSE SERPL-MCNC: 168 MG/DL — HIGH (ref 70–99)
HBA1C BLD-MCNC: 6.3 % — HIGH (ref 4–5.6)
HCT VFR BLD CALC: 44.4 % — SIGNIFICANT CHANGE UP (ref 39–50)
HDLC SERPL-MCNC: 64 MG/DL — HIGH (ref 35–55)
HGB BLD-MCNC: 14.1 G/DL — SIGNIFICANT CHANGE UP (ref 13–17)
MAGNESIUM SERPL-MCNC: 2 MG/DL — SIGNIFICANT CHANGE UP (ref 1.6–2.6)
MCHC RBC-ENTMCNC: 25.7 PG — LOW (ref 27–34)
MCHC RBC-ENTMCNC: 31.8 % — LOW (ref 32–36)
MCV RBC AUTO: 80.9 FL — SIGNIFICANT CHANGE UP (ref 80–100)
NRBC # FLD: 0 K/UL — SIGNIFICANT CHANGE UP (ref 0–0)
PHOSPHATE SERPL-MCNC: 2.7 MG/DL — SIGNIFICANT CHANGE UP (ref 2.5–4.5)
PLATELET # BLD AUTO: 285 K/UL — SIGNIFICANT CHANGE UP (ref 150–400)
PMV BLD: 10.2 FL — SIGNIFICANT CHANGE UP (ref 7–13)
POTASSIUM SERPL-MCNC: 4.2 MMOL/L — SIGNIFICANT CHANGE UP (ref 3.5–5.3)
POTASSIUM SERPL-SCNC: 4.2 MMOL/L — SIGNIFICANT CHANGE UP (ref 3.5–5.3)
PROT SERPL-MCNC: 6.4 G/DL — SIGNIFICANT CHANGE UP (ref 6–8.3)
PROT UR-MCNC: 841.7 MG/DL — SIGNIFICANT CHANGE UP
RBC # BLD: 5.49 M/UL — SIGNIFICANT CHANGE UP (ref 4.2–5.8)
RBC # FLD: 13.8 % — SIGNIFICANT CHANGE UP (ref 10.3–14.5)
SARS-COV-2 IGG SERPL QL IA: NEGATIVE — SIGNIFICANT CHANGE UP
SARS-COV-2 IGM SERPL IA-ACNC: <3.8 AU/ML — SIGNIFICANT CHANGE UP
SARS-COV-2 RNA SPEC QL NAA+PROBE: SIGNIFICANT CHANGE UP
SODIUM SERPL-SCNC: 135 MMOL/L — SIGNIFICANT CHANGE UP (ref 135–145)
TRIGL SERPL-MCNC: 101 MG/DL — SIGNIFICANT CHANGE UP (ref 10–149)
TROPONIN T, HIGH SENSITIVITY: 1992 NG/L — CRITICAL HIGH (ref ?–14)
TROPONIN T, HIGH SENSITIVITY: 785 NG/L — CRITICAL HIGH (ref ?–14)
TSH SERPL-MCNC: 0.86 UIU/ML — SIGNIFICANT CHANGE UP (ref 0.27–4.2)
WBC # BLD: 17.21 K/UL — HIGH (ref 3.8–10.5)
WBC # FLD AUTO: 17.21 K/UL — HIGH (ref 3.8–10.5)

## 2020-11-03 PROCEDURE — 99234 HOSP IP/OBS SM DT SF/LOW 45: CPT

## 2020-11-03 PROCEDURE — 92941 PRQ TRLML REVSC TOT OCCL AMI: CPT | Mod: RC

## 2020-11-03 PROCEDURE — 92978 ENDOLUMINL IVUS OCT C 1ST: CPT | Mod: 26,RC

## 2020-11-03 PROCEDURE — 99152 MOD SED SAME PHYS/QHP 5/>YRS: CPT

## 2020-11-03 PROCEDURE — 99223 1ST HOSP IP/OBS HIGH 75: CPT

## 2020-11-03 PROCEDURE — 93458 L HRT ARTERY/VENTRICLE ANGIO: CPT | Mod: 26,59

## 2020-11-03 PROCEDURE — 99223 1ST HOSP IP/OBS HIGH 75: CPT | Mod: GC

## 2020-11-03 PROCEDURE — 75561 CARDIAC MRI FOR MORPH W/DYE: CPT | Mod: 26

## 2020-11-03 PROCEDURE — 93306 TTE W/DOPPLER COMPLETE: CPT | Mod: 26

## 2020-11-03 RX ORDER — TICAGRELOR 90 MG/1
90 TABLET ORAL EVERY 12 HOURS
Refills: 0 | Status: DISCONTINUED | OUTPATIENT
Start: 2020-11-04 | End: 2020-11-05

## 2020-11-03 RX ORDER — INFLUENZA VIRUS VACCINE 15; 15; 15; 15 UG/.5ML; UG/.5ML; UG/.5ML; UG/.5ML
0.5 SUSPENSION INTRAMUSCULAR ONCE
Refills: 0 | Status: DISCONTINUED | OUTPATIENT
Start: 2020-11-03 | End: 2020-11-05

## 2020-11-03 RX ORDER — LABETALOL HCL 100 MG
10 TABLET ORAL ONCE
Refills: 0 | Status: COMPLETED | OUTPATIENT
Start: 2020-11-03 | End: 2020-11-03

## 2020-11-03 RX ORDER — PANTOPRAZOLE SODIUM 20 MG/1
1 TABLET, DELAYED RELEASE ORAL
Qty: 0 | Refills: 0 | DISCHARGE

## 2020-11-03 RX ORDER — ASPIRIN/CALCIUM CARB/MAGNESIUM 324 MG
81 TABLET ORAL DAILY
Refills: 0 | Status: DISCONTINUED | OUTPATIENT
Start: 2020-11-04 | End: 2020-11-05

## 2020-11-03 RX ORDER — HYDROXYCHLOROQUINE SULFATE 200 MG
400 TABLET ORAL ONCE
Refills: 0 | Status: DISCONTINUED | OUTPATIENT
Start: 2020-11-03 | End: 2020-11-03

## 2020-11-03 RX ORDER — METOPROLOL TARTRATE 50 MG
12.5 TABLET ORAL EVERY 12 HOURS
Refills: 0 | Status: DISCONTINUED | OUTPATIENT
Start: 2020-11-03 | End: 2020-11-03

## 2020-11-03 RX ORDER — PANTOPRAZOLE SODIUM 20 MG/1
40 TABLET, DELAYED RELEASE ORAL ONCE
Refills: 0 | Status: COMPLETED | OUTPATIENT
Start: 2020-11-03 | End: 2020-11-03

## 2020-11-03 RX ORDER — HYDROMORPHONE HYDROCHLORIDE 2 MG/ML
1 INJECTION INTRAMUSCULAR; INTRAVENOUS; SUBCUTANEOUS ONCE
Refills: 0 | Status: DISCONTINUED | OUTPATIENT
Start: 2020-11-03 | End: 2020-11-03

## 2020-11-03 RX ORDER — METOPROLOL TARTRATE 50 MG
25 TABLET ORAL
Refills: 0 | Status: DISCONTINUED | OUTPATIENT
Start: 2020-11-03 | End: 2020-11-04

## 2020-11-03 RX ORDER — HYDROXYCHLOROQUINE SULFATE 200 MG
400 TABLET ORAL DAILY
Refills: 0 | Status: DISCONTINUED | OUTPATIENT
Start: 2020-11-03 | End: 2020-11-03

## 2020-11-03 RX ORDER — MYCOPHENOLATE MOFETIL 250 MG/1
1500 CAPSULE ORAL
Refills: 0 | Status: DISCONTINUED | OUTPATIENT
Start: 2020-11-03 | End: 2020-11-03

## 2020-11-03 RX ORDER — ATORVASTATIN CALCIUM 80 MG/1
80 TABLET, FILM COATED ORAL AT BEDTIME
Refills: 0 | Status: DISCONTINUED | OUTPATIENT
Start: 2020-11-03 | End: 2020-11-05

## 2020-11-03 RX ORDER — CHLORHEXIDINE GLUCONATE 213 G/1000ML
1 SOLUTION TOPICAL
Refills: 0 | Status: DISCONTINUED | OUTPATIENT
Start: 2020-11-03 | End: 2020-11-05

## 2020-11-03 RX ADMIN — Medication 100 MILLIGRAM(S): at 20:02

## 2020-11-03 RX ADMIN — HYDROMORPHONE HYDROCHLORIDE 1 MILLIGRAM(S): 2 INJECTION INTRAMUSCULAR; INTRAVENOUS; SUBCUTANEOUS at 04:47

## 2020-11-03 RX ADMIN — MYCOPHENOLATE MOFETIL 1500 MILLIGRAM(S): 250 CAPSULE ORAL at 05:22

## 2020-11-03 RX ADMIN — Medication 25 MILLIGRAM(S): at 21:21

## 2020-11-03 RX ADMIN — PANTOPRAZOLE SODIUM 40 MILLIGRAM(S): 20 TABLET, DELAYED RELEASE ORAL at 01:28

## 2020-11-03 RX ADMIN — HYDROMORPHONE HYDROCHLORIDE 1 MILLIGRAM(S): 2 INJECTION INTRAMUSCULAR; INTRAVENOUS; SUBCUTANEOUS at 01:28

## 2020-11-03 RX ADMIN — Medication 10 MILLIGRAM(S): at 04:40

## 2020-11-03 RX ADMIN — Medication 100 MILLIGRAM(S): at 04:47

## 2020-11-03 RX ADMIN — HYDROMORPHONE HYDROCHLORIDE 1 MILLIGRAM(S): 2 INJECTION INTRAMUSCULAR; INTRAVENOUS; SUBCUTANEOUS at 05:00

## 2020-11-03 RX ADMIN — ATORVASTATIN CALCIUM 80 MILLIGRAM(S): 80 TABLET, FILM COATED ORAL at 21:21

## 2020-11-03 NOTE — PROVIDER CONTACT NOTE (OTHER) - BACKGROUND
26 yo M with hx of lupus nephritis, GERD, HTN. Admitted for substernal chest pain associated with nausea and vomiting

## 2020-11-03 NOTE — CHART NOTE - NSCHARTNOTEFT_GEN_A_CORE
Spoke with Cards fellow, cardiac MRI done and read. Suspicion for myocarditis however greater suspicion of MI per cards. Pts mother does not want pt to have cardiac catherization as she wants to avoid any invasive procedures. Coronary CT ordered per cardiology. will follow up results and continue to monitor.

## 2020-11-03 NOTE — CHART NOTE - NSCHARTNOTEFT_GEN_A_CORE
Chika NGUYEN paged our cards team for increasing hsT 269>785 and persistent chest discomfort. Pt seen and examined at the bedside, exam unchanged with CP positional and worsening when lying flat. Pt explains the pain as a burning sensation in his epigastric area 3/10. We suggest to give IV Tylenol or low dose opiates for pain management. Cardiac MRI in the morning for ? myocarditis and please f/u lupus markers + rheumatology recs. At this time, we do not suspect ACS. Chika NGUYEN paged our cards team for increasing hsT 269>785 and persistent chest discomfort. Pt seen and examined at the bedside, exam unchanged with CP positional and worsening when lying flat. Pt explains the pain as a burning sensation in his epigastric area 3/10. We suggest to give IV Tylenol or low dose opiates for pain management. Cardiac MRI in the morning for ? myocarditis and please f/u lupus markers + rheumatology recs. At this time, we do not suspect ACS. Case progression discussed with Dr. Warner Del Castillo PGY-5.

## 2020-11-03 NOTE — PROGRESS NOTE ADULT - ASSESSMENT
24 yo M with h/o lupus nephritis on Cellcept and Plaquenil, gerd on ppi, HTN,  p/w with sudden onset substernal chest pain that woke him from sleep Monday morning; associated with 1 bout nausea, vomiting. Improved with inspiration and sitting up.

## 2020-11-03 NOTE — PROVIDER CONTACT NOTE (OTHER) - ASSESSMENT
Patient A+Ox4, denies chest pain or SOB. Patient denies any symptoms of tachycardia. Tachy to 130-140s on telemetry monitor, sustaining in 120s for a few minutes at rest.

## 2020-11-03 NOTE — CONSULT NOTE ADULT - ASSESSMENT
25 Y M with a PMHx of SLE, Lupus Nephritis (LN) presented with CP.   Rheumatology consulted for possible flare of SLE causing myocarditis.     Problem List:   # Hx of SLE  # Myocarditis.    Impression/Recs:   Elevated trops, myocarditis on cardiac MRI (normal EF).   Differential for myocarditis: top differential is SLE mediated inflammation vs viral infection mediated inflammation vs idiopathic myocarditis.  Primary team was concerned about Plaquenil causing myocarditis; Plaquenil usually does not cause myocarditis, rather it can cause dilated cardiomyopathy, hence Plaquenil is less likely a culprit.   Recommend sending SLE disease activity labs: dsDNA, C3, C4, urine protein/creatinine ratio.   Recommend broad infectious w/u to make sure there is no concurrent infection.   There are no guidelines for SLE mediated Myocarditis treatment, there are only expert opinions that suggest pulsed steroids in combination with Cytoxan.   Will discuss with attending about initiating this treatment course.     Recs not final, please wait for attending addendum    Pricila Herzog MD  Rheum fellow PGY 4  Pager [phone] 25 Y M with a PMHx of SLE, Lupus Nephritis (LN) presented with CP.   Rheumatology consulted for possible flare of SLE causing myocarditis.     Problem List:   # Hx of SLE  # Hx of class 4 LN  # Myocarditis.    Impression/Recs:   Elevated trops, myocarditis on cardiac MRI (normal EF).   Differential for myocarditis: top differential is SLE mediated inflammation vs viral infection mediated inflammation vs idiopathic myocarditis.  Primary team was concerned about Plaquenil causing myocarditis; Plaquenil usually does not cause myocarditis, rather it can cause dilated cardiomyopathy, hence Plaquenil is less likely a culprit.   Recommend sending SLE disease activity labs: dsDNA, C3, C4.   Recommend broad infectious w/u to make sure there is no concurrent infection.   There are no guidelines for SLE mediated Myocarditis treatment, there are only expert opinions that suggest pulsed steroids in combination with Cytoxan.   Will discuss with attending about initiating this treatment course.   Creatine levels stable, but per recent labs from outpatient records he still has significant proteinuria, recommend urine protein/creatinine ratio     Recs not final, please wait for attending addendum    Pricila Herzog MD  Rheum fellow PGY 4  Pager [phone] 25 Y M with a PMHx of SLE, Lupus Nephritis (LN) presented with CP.   Rheumatology consulted for possible flare of SLE causing myocarditis.     Problem List:   # Hx of SLE  # Hx of class 4 LN  # Myocarditis.    Impression/Recs:   Elevated trops, myocarditis on cardiac MRI (normal EF).   Differential for myocarditis: top differential is SLE mediated inflammation vs viral infection mediated inflammation vs idiopathic myocarditis.  Primary team was concerned about Plaquenil causing myocarditis; Plaquenil usually does not cause myocarditis, rather it can cause dilated cardiomyopathy, hence Plaquenil is less likely a culprit.   Recommend sending SLE disease activity labs: dsDNA, C3, C4.   Recommend broad infectious w/u to make sure there is no concurrent infection.   There are no guidelines for SLE mediated Myocarditis treatment, there are only expert opinions.  Some options to consider would be pulsed steroids in combination with Cytoxan vs Rituxan or even IVIG.   Will discuss with patient regarding therapeutic options and proceed thereafter.    Creatine levels stable, but per recent labs from outpatient records he still has significant proteinuria, recommend urine protein/creatinine ratio     DW Dr Mary Herzog MD  Rheum fellow PGY 4  Pager [phone] 25 Y M with a PMHx of SLE, Lupus Nephritis (LN) presented with CP.   Rheumatology consulted for possible flare of SLE causing myocarditis.     Problem List:   # Hx of SLE  # Hx of class 4 LN  # Myocarditis.    Impression/Recs:   Elevated trops, myocarditis on cardiac MRI (normal EF).   Differential for myocarditis: top differential is SLE mediated inflammation vs viral infection mediated inflammation vs idiopathic myocarditis.  Primary team was concerned about Plaquenil causing myocarditis; Plaquenil usually does not cause myocarditis, rather it can cause dilated cardiomyopathy, hence Plaquenil is less likely a culprit.   Recommend sending SLE disease activity labs: dsDNA, C3, C4.   Recommend broad infectious w/u to make sure there is no concurrent infection, w/u could include testing for etiologies such as coxsackie virus, EBV, CMV and GC.   There are no guidelines for SLE mediated Myocarditis treatment, there are only expert opinions.  Some options to consider would be pulsed steroids in combination with Cytoxan vs Rituxan or even IVIG.   Will discuss with patient regarding therapeutic options and proceed thereafter.    Please start 1 mg/kg IV Solumedrol from tonight  Creatine levels stable, but per recent labs from outpatient records he still has significant proteinuria, recommend urine protein/creatinine ratio     DW Dr Mary Herzog MD  Rheum fellow PGY 4  Pager [phone]

## 2020-11-03 NOTE — PROVIDER CONTACT NOTE (OTHER) - RECOMMENDATIONS
LUCIANO Savage made aware of HR with activity and at rest. No interventions made. Patient asymptomatic.

## 2020-11-03 NOTE — CONSULT NOTE ADULT - SUBJECTIVE AND OBJECTIVE BOX
[facility] DIVISION OF KIDNEY DISEASES AND HYPERTENSION -- INITIAL CONSULT NOTE  --------------------------------------------------------------------------------  HPI:   26 yo m with h/o lupus nephritis on Cellcept and Plaquenil, gerd on ppi, HTN,  p/w with sudden onset substernal chest pain that woke him from sleep Monday morning; associated with 1 bout nausea, vomiting. Improved with inspiration and sitting up. Denies fever, cough, sob, leg swelling. CTA negative for PE (limited eval of subsegmental arteries) or pericardial effusion. Denies personal or family h/o cad, or smoking history. EKG sinus tach, HS trop 87, 269. Seen by cardiology who deem this to be more consistent with pericarditis or myocarditis than ACS (02 Nov 2020 23:56)        PAST HISTORY  --------------------------------------------------------------------------------  PAST MEDICAL & SURGICAL HISTORY:  SLE (systemic lupus erythematosus)    Lupus nephritis    No significant past surgical history      FAMILY HISTORY:  FH: diabetes mellitus (Father, Mother)  in Father and Mother      PAST SOCIAL HISTORY:    ALLERGIES & MEDICATIONS  --------------------------------------------------------------------------------  Allergies    No Known Allergies    Intolerances      Standing Inpatient Medications  influenza   Vaccine 0.5 milliLiter(s) IntraMuscular once  lidocaine   Patch 1 Patch Transdermal every 24 hours  mycophenolate mofetil 1500 milliGRAM(s) Oral two times a day    PRN Inpatient Medications      REVIEW OF SYSTEMS  --------------------------------------------------------------------------------  Gen: No weight changes, fatigue, fevers/chills, weakness  Skin: No rashes  Head/Eyes/Ears/Mouth: No headache; Normal hearing; Normal vision w/o blurriness; No sinus pain/discomfort, sore throat  Respiratory: No dyspnea, cough, wheezing, hemoptysis  CV: +chest pain  GI: +N/V  : No increased frequency, dysuria, hematuria, nocturia  MSK: No joint pain/swelling; no back pain; no edema  Neuro: No dizziness/lightheadedness, weakness, seizures, numbness, tingling  Heme: No easy bruising or bleeding  Endo: No heat/cold intolerance  Psych: No significant nervousness, anxiety, stress, depression    All other systems were reviewed and are negative, except as noted.    VITALS/PHYSICAL EXAM  --------------------------------------------------------------------------------  T(C): 36.6 (11-03-20 @ 03:49), Max: 36.8 (11-03-20 @ 00:52)  HR: 88 (11-03-20 @ 04:55) (88 - 125)  BP: 131/85 (11-03-20 @ 04:55) (131/85 - 177/129)  RR: 17 (11-03-20 @ 03:49) (17 - 20)  SpO2: 100% (11-03-20 @ 03:49) (99% - 100%)  Wt(kg): --  Height (cm): 172.7 (11-03-20 @ 04:55)  Weight (kg): 104.3 (11-03-20 @ 04:55)  BMI (kg/m2): 35 (11-03-20 @ 04:55)  BSA (m2): 2.17 (11-03-20 @ 04:55)      Physical Exam:  	Gen: NAD, well-appearing  	HEENT: PERRL, supple neck, clear oropharynx  	Pulm:   	CV: RRR, S1S2; no rub  	Back: No spinal or CVA tenderness; no sacral edema  	Abd: +BS, soft, nontender/nondistended  	: No suprapubic tenderness  	UE: Warm, FROM, no clubbing, intact strength; no asterixis  	LE: Warm, FROM, no clubbing, intact strength;   	Neuro: No focal deficits, intact gait  	Psych: Normal affect and mood  	Skin: Warm, without rashes  	Vascular access:    LABS/STUDIES  --------------------------------------------------------------------------------              15.5   16.42 >-----------<  185      [11-02-20 @ 11:40]              47.0     132  |  99  |  12  ----------------------------<  215      [11-02-20 @ 11:40]  4.6   |  18  |  0.71        Ca     9.6     [11-02-20 @ 11:40]    TPro  7.2  /  Alb  3.4  /  TBili  0.5  /  DBili  x   /  AST  40  /  ALT  27  /  AlkPhos  90  [11-02-20 @ 11:40]              [11-02-20 @ 20:00]    Creatinine Trend:  SCr 0.71 [11-02 @ 11:40]    Urinalysis - [11-02-20 @ 16:00]      Color YELLOW / Appearance CLEAR / SG 1.025 / pH 7.5      Gluc 30 / Ketone SMALL  / Bili NEGATIVE / Urobili NORMAL       Blood MODERATE / Protein 600 / Leuk Est NEGATIVE / Nitrite NEGATIVE      RBC 26-50 / WBC 3-5 / Hyaline  / Gran  / Sq Epi FEW / Non Sq Epi  / Bacteria FEW        HBsAb Indeterminate Test repeated.      [12-29-19 @ 06:35]  HCV 0.23, Nonreactive Hepatitis C AB  S/CO Ratio                        Interpretation  < 1.00                                   Non-Reactive  1.00 - 4.99                         Weakly-Reactive  >= 5.00                                Reactive  Non-Reactive: Aperson with a non-reactive HCV antibody  result is considered uninfected.  No further action is  needed unless recent infection is suspected.  In these  cases, consider repeat testing later to detect  seroconversion..  Weakly-Reactive: HCV antibody test is abnormal, HCV RNA  Qualitative test will follow.  Reactive: HCV antibody test is abnormal, HCV RNA  Qualitative test will follow.  Note: HCV antibody testing is performed on the [employer]   system.      [12-29-19 @ 06:35]    ELMO: titer 1:1280, pattern Homogeneous      [12-29-19 @ 06:35]  dsDNA <12      [05-30-20 @ 22:46]  C3 Complement 118      [05-30-20 @ 22:46]  C4 Complement 32      [05-30-20 @ 22:46]   [facility] DIVISION OF KIDNEY DISEASES AND HYPERTENSION -- INITIAL CONSULT NOTE  --------------------------------------------------------------------------------  HPI:   26 yo m with h/o lupus nephritis on Cellcept and Plaquenil, gerd on ppi, HTN,  p/w with sudden onset substernal chest pain that woke him from sleep Monday morning; associated with 1 bout nausea, vomiting. Improved with inspiration and sitting up. Denies fever, cough, sob, leg swelling. CTA negative for PE (limited eval of subsegmental arteries) or pericardial effusion. Denies personal or family h/o cad, or smoking history. EKG sinus tach, HS trop 87, 269. Seen by cardiology who deem this to be more consistent with pericarditis or myocarditis than ACS (02 Nov 2020 23:56)    Nephrology consulted for lupus nephritis. Patient was seen and examined at bedside. He states his chest pain has resolved, still has mild back pain. Chest pain was sharp and burning, similar to heartburn but much worse. Denies LE edema, no changes in urinary output/frequency, no dysuria. Does endorse some frothy urine. Denies joint pain. States that during his lupus flares, he usually has joint pains and headaches. Of note patient states he had felt fatigued, with productive cough (white phegm) for last few days; his mom was COVID positive but he tested negative. Patient denies orthopnea. He states he takes lisinopril 5mg and cellcept consistently but does not take plaquenil consistently. Patient was evaluated by cardiology, symptoms thought to be 2/2 pericarditis vs myocarditis, less likely ACS. However, TTE showed EF 46%, mild global segmental LVSD and hypokinesis. Patient had cardiac MRI done today.       PAST HISTORY  --------------------------------------------------------------------------------  PAST MEDICAL & SURGICAL HISTORY:  SLE (systemic lupus erythematosus)    Lupus nephritis    No significant past surgical history      FAMILY HISTORY:  FH: diabetes mellitus (Father, Mother)  in Father and Mother      PAST SOCIAL HISTORY:    ALLERGIES & MEDICATIONS  --------------------------------------------------------------------------------  Allergies    No Known Allergies    Intolerances      Standing Inpatient Medications  influenza   Vaccine 0.5 milliLiter(s) IntraMuscular once  lidocaine   Patch 1 Patch Transdermal every 24 hours  mycophenolate mofetil 1500 milliGRAM(s) Oral two times a day    PRN Inpatient Medications      REVIEW OF SYSTEMS  --------------------------------------------------------------------------------  Gen: No weight changes, fatigue, fevers/chills, weakness  Skin: No rashes  Head/Eyes/Ears/Mouth: No headache; Normal hearing; Normal vision w/o blurriness; No sinus pain/discomfort, sore throat  Respiratory: +productive cough and tachypnea; no wheezing, hemoptysis  CV: +sharp burning chest pain   GI: +N/V (one episode of vomiting yesterday)  : +frothy urine; No increased frequency, dysuria, hematuria, nocturia  MSK: +mild back pain; No joint pain/swelling; no edema  Neuro: No dizziness/lightheadedness, weakness, seizures, numbness, tingling  Heme: No easy bruising or bleeding  Endo: No heat/cold intolerance  Psych: No significant nervousness, anxiety, stress, depression    All other systems were reviewed and are negative, except as noted.    VITALS/PHYSICAL EXAM  --------------------------------------------------------------------------------  T(C): 36.6 (11-03-20 @ 03:49), Max: 36.8 (11-03-20 @ 00:52)  HR: 88 (11-03-20 @ 04:55) (88 - 125)  BP: 131/85 (11-03-20 @ 04:55) (131/85 - 177/129)  RR: 17 (11-03-20 @ 03:49) (17 - 20)  SpO2: 100% (11-03-20 @ 03:49) (99% - 100%)  Wt(kg): --  Height (cm): 172.7 (11-03-20 @ 04:55)  Weight (kg): 104.3 (11-03-20 @ 04:55)  BMI (kg/m2): 35 (11-03-20 @ 04:55)  BSA (m2): 2.17 (11-03-20 @ 04:55)      Physical Exam:  	Gen: NAD, obese male, well-appearing  	HEENT: PERRL, supple neck, clear oropharynx  	Pulm: CTAB b/l, no wheezing or crackles    	CV: RRR, S1S2; no rub  	Back: No spinal tenderness; no sacral edema  	Abd: +BS, soft, nontender/nondistended  	: No suprapubic tenderness  	UE: Warm, FROM, no clubbing, intact strength; no asterixis  	LE: Warm, FROM, no clubbing, intact strength; no edema  	Neuro: No focal deficits, intact gait  	Psych: Normal affect and mood  	Skin: Warm, without rashes  	Vascular access:     LABS/STUDIES  --------------------------------------------------------------------------------              15.5   16.42 >-----------<  185      [11-02-20 @ 11:40]              47.0     132  |  99  |  12  ----------------------------<  215      [11-02-20 @ 11:40]  4.6   |  18  |  0.71        Ca     9.6     [11-02-20 @ 11:40]    TPro  7.2  /  Alb  3.4  /  TBili  0.5  /  DBili  x   /  AST  40  /  ALT  27  /  AlkPhos  90  [11-02-20 @ 11:40]              [11-02-20 @ 20:00]    Creatinine Trend:  SCr 0.71 [11-02 @ 11:40]    Urinalysis - [11-02-20 @ 16:00]      Color YELLOW / Appearance CLEAR / SG 1.025 / pH 7.5      Gluc 30 / Ketone SMALL  / Bili NEGATIVE / Urobili NORMAL       Blood MODERATE / Protein 600 / Leuk Est NEGATIVE / Nitrite NEGATIVE      RBC 26-50 / WBC 3-5 / Hyaline  / Gran  / Sq Epi FEW / Non Sq Epi  / Bacteria FEW        HBsAb Indeterminate Test repeated.      [12-29-19 @ 06:35]  HCV 0.23, Nonreactive Hepatitis C AB  S/CO Ratio                        Interpretation  < 1.00                                   Non-Reactive  1.00 - 4.99                         Weakly-Reactive  >= 5.00                                Reactive  Non-Reactive: Aperson with a non-reactive HCV antibody  result is considered uninfected.  No further action is  needed unless recent infection is suspected.  In these  cases, consider repeat testing later to detect  seroconversion..  Weakly-Reactive: HCV antibody test is abnormal, HCV RNA  Qualitative test will follow.  Reactive: HCV antibody test is abnormal, HCV RNA  Qualitative test will follow.  Note: HCV antibody testing is performed on the [place] system.      [12-29-19 @ 06:35]    ELMO: titer 1:1280, pattern Homogeneous      [12-29-19 @ 06:35]  dsDNA <12      [05-30-20 @ 22:46]  C3 Complement 118      [05-30-20 @ 22:46]  C4 Complement 32      [05-30-20 @ 22:46]   [facility] DIVISION OF KIDNEY DISEASES AND HYPERTENSION -- INITIAL CONSULT NOTE  --------------------------------------------------------------------------------  HPI:   24 yo m with h/o lupus nephritis on Cellcept and Plaquenil, gerd on ppi, HTN,  p/w with sudden onset substernal chest pain that woke him from sleep Monday morning; associated with 1 bout nausea, vomiting. Improved with inspiration and sitting up. Denies fever, cough, sob, leg swelling. CTA negative for PE (limited eval of subsegmental arteries) or pericardial effusion. Denies personal or family h/o cad, or smoking history. EKG sinus tach, HS trop 87, 269. Seen by cardiology who deem this to be more consistent with pericarditis or myocarditis than ACS (02 Nov 2020 23:56)    Nephrology consulted for lupus nephritis. Patient was seen and examined at bedside. He states his chest pain has resolved, still has mild back pain. Chest pain was sharp and burning, similar to heartburn but much worse. Denies LE edema, no changes in urinary output/frequency, no dysuria. Does endorse some frothy urine. Denies joint pain. States that during his lupus flares, he usually has joint pains and headaches. Of note patient states he had felt fatigued, with productive cough (white phegm) for last few days; his mom was COVID positive but he tested negative. Patient denies orthopnea. He states he takes lisinopril 5mg and cellcept consistently but does not take plaquenil consistently. Patient was evaluated by cardiology, symptoms thought to be 2/2 pericarditis vs myocarditis, less likely ACS. However, TTE showed EF 46%, mild global segmental LVSD and hypokinesis. Patient had cardiac MRI done today.       PAST HISTORY  --------------------------------------------------------------------------------  PAST MEDICAL & SURGICAL HISTORY:  SLE (systemic lupus erythematosus)    Lupus nephritis    No significant past surgical history      FAMILY HISTORY:  FH: diabetes mellitus (Father, Mother)  in Father and Mother      PAST SOCIAL HISTORY: in school    ALLERGIES & MEDICATIONS  --------------------------------------------------------------------------------  Allergies    No Known Allergies    Intolerances      Standing Inpatient Medications  influenza   Vaccine 0.5 milliLiter(s) IntraMuscular once  lidocaine   Patch 1 Patch Transdermal every 24 hours  mycophenolate mofetil 1500 milliGRAM(s) Oral two times a day    PRN Inpatient Medications      REVIEW OF SYSTEMS  --------------------------------------------------------------------------------  Gen: No weight changes, fatigue, fevers/chills, weakness  Skin: No rashes  Head/Eyes/Ears/Mouth: No headache; Normal hearing; Normal vision w/o blurriness; No sinus pain/discomfort, sore throat  Respiratory: +productive cough and tachypnea; no wheezing, hemoptysis  CV: +sharp burning chest pain   GI: +N/V (one episode of vomiting yesterday)  : +frothy urine; No increased frequency, dysuria, hematuria, nocturia  MSK: +mild back pain; No joint pain/swelling; no edema  Neuro: No dizziness/lightheadedness, weakness, seizures, numbness, tingling  Heme: No easy bruising or bleeding  Endo: No heat/cold intolerance  Psych: No significant nervousness, anxiety, stress, depression    All other systems were reviewed and are negative, except as noted.    VITALS/PHYSICAL EXAM  --------------------------------------------------------------------------------  T(C): 36.6 (11-03-20 @ 03:49), Max: 36.8 (11-03-20 @ 00:52)  HR: 88 (11-03-20 @ 04:55) (88 - 125)  BP: 131/85 (11-03-20 @ 04:55) (131/85 - 177/129)  RR: 17 (11-03-20 @ 03:49) (17 - 20)  SpO2: 100% (11-03-20 @ 03:49) (99% - 100%)  Wt(kg): --  Height (cm): 172.7 (11-03-20 @ 04:55)  Weight (kg): 104.3 (11-03-20 @ 04:55)  BMI (kg/m2): 35 (11-03-20 @ 04:55)  BSA (m2): 2.17 (11-03-20 @ 04:55)      Physical Exam:  	Gen: NAD, obese male, well-appearing  	HEENT: PERRL, supple neck, clear oropharynx  	Pulm: CTAB b/l, no wheezing or crackles    	CV: RRR, S1S2; no rub  	Back: No spinal tenderness; no sacral edema  	Abd: +BS, soft, nontender/nondistended  	: No suprapubic tenderness  	UE: Warm, FROM, no clubbing, intact strength; no asterixis  	LE: Warm, FROM, no clubbing, intact strength; no edema  	Neuro: No focal deficits, intact gait  	Psych: Normal affect and mood  	Skin: Warm, without rashes  	Vascular access:     LABS/STUDIES  --------------------------------------------------------------------------------              15.5   16.42 >-----------<  185      [11-02-20 @ 11:40]              47.0     132  |  99  |  12  ----------------------------<  215      [11-02-20 @ 11:40]  4.6   |  18  |  0.71        Ca     9.6     [11-02-20 @ 11:40]    TPro  7.2  /  Alb  3.4  /  TBili  0.5  /  DBili  x   /  AST  40  /  ALT  27  /  AlkPhos  90  [11-02-20 @ 11:40]              [11-02-20 @ 20:00]    Creatinine Trend:  SCr 0.71 [11-02 @ 11:40]    Urinalysis - [11-02-20 @ 16:00]      Color YELLOW / Appearance CLEAR / SG 1.025 / pH 7.5      Gluc 30 / Ketone SMALL  / Bili NEGATIVE / Urobili NORMAL       Blood MODERATE / Protein 600 / Leuk Est NEGATIVE / Nitrite NEGATIVE      RBC 26-50 / WBC 3-5 / Hyaline  / Gran  / Sq Epi FEW / Non Sq Epi  / Bacteria FEW        HBsAb Indeterminate Test repeated.      [12-29-19 @ 06:35]  HCV 0.23, Nonreactive Hepatitis C AB  S/CO Ratio                        Interpretation  < 1.00                                   Non-Reactive  1.00 - 4.99                         Weakly-Reactive  >= 5.00                                Reactive  Non-Reactive: Aperson with a non-reactive HCV antibody  result is considered uninfected.  No further action is  needed unless recent infection is suspected.  In these  cases, consider repeat testing later to detect  seroconversion..  Weakly-Reactive: HCV antibody test is abnormal, HCV RNA  Qualitative test will follow.  Reactive: HCV antibody test is abnormal, HCV RNA  Qualitative test will follow.  Note: HCV antibody testing is performed on the [employer]   system.      [12-29-19 @ 06:35]    ELMO: titer 1:1280, pattern Homogeneous      [12-29-19 @ 06:35]  dsDNA <12      [05-30-20 @ 22:46]  C3 Complement 118      [05-30-20 @ 22:46]  C4 Complement 32      [05-30-20 @ 22:46]

## 2020-11-03 NOTE — CHART NOTE - NSCHARTNOTEFT_GEN_A_CORE
Rt radial band d/ryan. No hematoma or bleeding noted . Rt radial pulse + with good capillary refills to all fingers. 4x4 gauze dsg  applied.

## 2020-11-03 NOTE — CONSULT NOTE ADULT - ATTENDING COMMENTS
No objection to cardiac cath.  continue cellcept.  hold ACE for now can restart if cr stable after cath.

## 2020-11-03 NOTE — CONSULT NOTE ADULT - PROBLEM SELECTOR RECOMMENDATION 2
Pt on lisinopril 5mg daily for elevated BP and proteinuria. Received labetalol 10mg IVP x1. Last /85.  - ctm BP closely Pt on lisinopril 5mg daily for elevated BP and proteinuria. Received labetalol 10mg IVP x1. Last /85. Ctm BP closely, if persistently elevated and crt stable, would restart home lisinopril. Pt on lisinopril 5mg daily for elevated BP and proteinuria. Received labetalol 10mg IVP x1. Last /85. Ctm BP closely. Would hold lisinopril for now as pt planned for cath. Can c/w PRN labetalol as needed.

## 2020-11-03 NOTE — CONSULT NOTE ADULT - ATTENDING COMMENTS
Patient was in cath lab during rounds. Case discussed with Dr. Mcnulty. Agree with assessment and plan as above. Patient was in cath lab during rounds. Case discussed with Dr. Herzog. Agree with assessment and plan as above.

## 2020-11-03 NOTE — PROGRESS NOTE ADULT - PROBLEM SELECTOR PLAN 1
-cardiac manifestation of lupus more consistent with myocarditis or pericarditis rather than ACS  - trop uptrending but no further chest pain.   -pain control with opioids prn, s/p trial of solumedrol (received 100mg x 1)  - house rheum eval   -dsDNA, monica, C3/4 ordered  -given that Plaquenil can cause myocarditis, will hold for now. C/w Cellcept   -cardiac MRI ordered to evaluate for myocarditis  -TTE shows mild segmental  left ventricular systolic dysfunction. Hypokinesis of the inferior and inferolateral walls.

## 2020-11-03 NOTE — CONSULT NOTE ADULT - PROBLEM SELECTOR RECOMMENDATION 3
Pt p/w acute CP with no EKG changes, had elevated cardiac enzymes with trops trending up. TTE notable for LVSD and 46% EF; no prior TTE in system. Pt p/w acute CP with no EKG changes, had elevated cardiac enzymes with trops trending up. TTE notable for LVSD and 46% EF; no prior TTE in system. Follow up cardiac MRI results. Cards following. Pt p/w acute CP with no EKG changes, had elevated cardiac enzymes with trops trending up. TTE notable for LVSD and 46% EF; no prior TTE in system. Cardiac MRI indeterminate. Cards following and planning to cath.

## 2020-11-03 NOTE — CHART NOTE - NSCHARTNOTEFT_GEN_A_CORE
Spoke with patient and Mother (by phone). Mother was initially hesitant to cath, explained to her that Coronary CT would be unable to be done if HRs were elevated, would need Betablockers for HR control, and the definitive diagnosis may still not be guaranteed.   She is agreeable as well her patient to Cath.   If results show no obstructive CAD, diagnosis will likely be myocarditis, will defer to Nephrologists for therapy ie steroids. Plan discussed with Nephrology Attending. Spoke with patient and Mother (by phone). Mother was initially hesitant to cath, explained to her that Coronary CT would be unable to be done if HRs were elevated, would need Betablockers for HR control, and the definitive diagnosis may still not be guaranteed.   She is agreeable as well her patient to Cath.   If results show no obstructive CAD, diagnosis will likely be myocarditis, will defer to Rheumatology  for therapy ie steroids. Spoke with patient and Mother (by phone). Mother was initially hesitant to cath, explained to her that Coronary CT would be unable to be done if HRs were elevated, would need Betablockers for HR control, and the definitive diagnosis may still not be guaranteed.   She is agreeable as well her patient to Cath.   If results show no obstructive CAD, diagnosis will likely be myocarditis, will defer to Primary team/ Rheumatology  for further workup/ therapy.   Can start low dose Metoprolol 12.5 mg BID, if patient is otherwise stable post cath Spoke with patient and Mother (by phone). Mother was initially hesitant to cath, explained to her that Coronary CT would be unable to be done if HRs were elevated, would need Betablockers for HR control, and the definitive diagnosis may still not be guaranteed.   She is agreeable as well as patient to Cath.   If results show no obstructive CAD, diagnosis will likely be myocarditis, will defer to Primary team/ Rheumatology  for further workup/ therapy.   Can start low dose Metoprolol 12.5 mg BID, if patient is otherwise stable post cath

## 2020-11-03 NOTE — PROGRESS NOTE ADULT - SUBJECTIVE AND OBJECTIVE BOX
Patient is a 25y old  Male who presents with a chief complaint of chest pain (2020 10:12)      SUBJECTIVE / OVERNIGHT EVENTS:  Pt seen and examined at bedside.   No overnight event.  Feeling better.  no cp, no sob, no n/v/d.   chest pain is resolved today      Vital Signs Last 24 Hrs  T(C): 36.6 (2020 13:30), Max: 36.8 (2020 00:52)  T(F): 97.8 (2020 13:30), Max: 98.3 (2020 00:52)  HR: 119 (2020 13:30) (88 - 125)  BP: 130/79 (2020 13:30) (130/79 - 177/129)  BP(mean): --  RR: 18 (2020 13:30) (17 - 20)  SpO2: 97% (2020 13:30) (97% - 100%)  I&O's Summary      PHYSICAL EXAM:  GENERAL: NAD, Comfortable  HEAD:  Atraumatic, Normocephalic  EYES: EOMI, PERRLA, conjunctiva and sclera clear  NECK: Supple, No JVD  CHEST/LUNG: Clear to auscultation bilaterally; No wheeze  HEART: Regular rate and rhythm; No murmurs, rubs, or gallops  ABDOMEN: Soft, Nontender, Nondistended; Bowel sounds present  Neuro: AAO x 3, no focal deficit, 5/5 b/l extremities  EXTREMITIES:  2+ Peripheral Pulses, No clubbing, cyanosis, or edema  SKIN: No rashes or lesions    LABS:                        15.5   16.42 )-----------( 185      ( 2020 11:40 )             47.0     11-02    132<L>  |  99  |  12  ----------------------------<  215<H>  4.6   |  18<L>  |  0.71    Ca    9.6      2020 11:40    TPro  7.2  /  Alb  3.4  /  TBili  0.5  /  DBili  x   /  AST  40  /  ALT  27  /  AlkPhos  90  11-02      CAPILLARY BLOOD GLUCOSE        CARDIAC MARKERS ( 2020 20:00 )  x     / x     / 767 u/L / 80.00 ng/mL / x          Urinalysis Basic - ( 2020 16:00 )    Color: YELLOW / Appearance: CLEAR / S.025 / pH: 7.5  Gluc: 30 / Ketone: SMALL  / Bili: NEGATIVE / Urobili: NORMAL   Blood: MODERATE / Protein: 600 / Nitrite: NEGATIVE   Leuk Esterase: NEGATIVE / RBC: 26-50 / WBC 3-5   Sq Epi: FEW / Non Sq Epi: x / Bacteria: FEW        RADIOLOGY & ADDITIONAL TESTS:    Imaging Personally Reviewed:  [x] YES  [ ] NO    Consultant(s) Notes Reviewed:  [x] YES  [ ] NO      MEDICATIONS  (STANDING):  influenza   Vaccine 0.5 milliLiter(s) IntraMuscular once  lidocaine   Patch 1 Patch Transdermal every 24 hours  mycophenolate mofetil 1500 milliGRAM(s) Oral two times a day    MEDICATIONS  (PRN):      Care Discussed with Consultants/Other Providers [x] YES  [ ] NO

## 2020-11-03 NOTE — CONSULT NOTE ADULT - PROBLEM SELECTOR RECOMMENDATION 9
SLE diagnosed in 2016. Had kidney bx in 3/2019 that showed class III LN with focal proliferation, was started on MMF. Had another kidney bx in 12/2019 which showed diffuse global proliferative type and small crescents, class IV G(A); received pulse steroids and increased dose of MMF. Patient currently on cellcept 1500mg BID, plaquenil 400mg daily but not adherent to plaquenil. S/p 100mg solumedrol IVP x1. UA notable for 26-50 RBC, 600 protein, small ketones, 3-5 WBC.  - would check C3, C4, ELMO, dsDNA, urine protein/crt ratio SLE diagnosed in 2016. Had kidney bx in 3/2019 that showed class III LN with focal proliferation, was started on MMF. Had another kidney bx in 12/2019 which showed diffuse global proliferative type and small crescents, class IV G(A); received pulse steroids and increased dose of MMF. Patient currently on cellcept 1500mg BID, plaquenil 400mg daily but not adherent to plaquenil. S/p 100mg solumedrol IVP x1. UA notable for 26-50 RBC, 600 protein, small ketones, 3-5 WBC. Would check C3, C4, ELMO, dsDNA, urine protein/crt ratio. SLE diagnosed in 2016. Had kidney bx in 3/2019 that showed class III LN with focal proliferation, was started on MMF. Had another kidney bx in 12/2019 which showed diffuse global proliferative type and small crescents, class IV G(A); received pulse steroids and increased dose of MMF. Patient currently on cellcept 1500mg BID, plaquenil 400mg daily but not adherent to plaquenil. S/p 100mg solumedrol IVP x1. UA notable for 26-50 RBC, 600 protein, small ketones, 3-5 WBC. Would check C3, C4, ELMO, dsDNA, urine protein/crt ratio. Continue cellcept. Rheum input appreciated.

## 2020-11-03 NOTE — CONSULT NOTE ADULT - ASSESSMENT
24 yo M with PMH of lupus nephritis (class IV) on Cellcept and Plaquenil, GERD, HTN, p/w chest pain. 26 yo M with PMH of lupus nephritis (class IV) on Cellcept and Plaquenil, GERD, HTN, p/w chest pain 2/2 myopericarditis vs ACS in setting of possible lupus flare. Nephrology consulted for lupus nephritis. 24 yo M with PMH of lupus nephritis (class IV) on Cellcept and Plaquenil, GERD, HTN, p/w chest pain 2/2 myopericarditis vs ACS in setting of possible lupus flare. Nephrology consulted for lupus nephritis.

## 2020-11-04 LAB
ALBUMIN SERPL ELPH-MCNC: 2.9 G/DL — LOW (ref 3.3–5)
ALP SERPL-CCNC: 67 U/L — SIGNIFICANT CHANGE UP (ref 40–120)
ALT FLD-CCNC: 38 U/L — SIGNIFICANT CHANGE UP (ref 4–41)
ANION GAP SERPL CALC-SCNC: 10 MMO/L — SIGNIFICANT CHANGE UP (ref 7–14)
AST SERPL-CCNC: 107 U/L — HIGH (ref 4–40)
BILIRUB SERPL-MCNC: 0.6 MG/DL — SIGNIFICANT CHANGE UP (ref 0.2–1.2)
BUN SERPL-MCNC: 14 MG/DL — SIGNIFICANT CHANGE UP (ref 7–23)
CALCIUM SERPL-MCNC: 8.7 MG/DL — SIGNIFICANT CHANGE UP (ref 8.4–10.5)
CHLORIDE SERPL-SCNC: 102 MMOL/L — SIGNIFICANT CHANGE UP (ref 98–107)
CK MB BLD-MCNC: 66.58 NG/ML — HIGH (ref 1–6.6)
CK MB BLD-MCNC: 7.2 — HIGH (ref 0–2.5)
CK SERPL-CCNC: 931 U/L — HIGH (ref 30–200)
CO2 SERPL-SCNC: 24 MMOL/L — SIGNIFICANT CHANGE UP (ref 22–31)
CREAT SERPL-MCNC: 0.71 MG/DL — SIGNIFICANT CHANGE UP (ref 0.5–1.3)
DSDNA AB SER-ACNC: 29 IU/ML — SIGNIFICANT CHANGE UP
EBV EA AB TITR SER IF: POSITIVE — HIGH
EBV EA IGG SER-ACNC: POSITIVE — HIGH
EBV PATRN SPEC IB-IMP: SIGNIFICANT CHANGE UP
EBV VCA IGG AVIDITY SER QL IA: POSITIVE — HIGH
EBV VCA IGM TITR FLD: NEGATIVE — SIGNIFICANT CHANGE UP
GLUCOSE SERPL-MCNC: 203 MG/DL — HIGH (ref 70–99)
HCT VFR BLD CALC: 44.8 % — SIGNIFICANT CHANGE UP (ref 39–50)
HGB BLD-MCNC: 14.3 G/DL — SIGNIFICANT CHANGE UP (ref 13–17)
MAGNESIUM SERPL-MCNC: 2.2 MG/DL — SIGNIFICANT CHANGE UP (ref 1.6–2.6)
MCHC RBC-ENTMCNC: 26 PG — LOW (ref 27–34)
MCHC RBC-ENTMCNC: 31.9 % — LOW (ref 32–36)
MCV RBC AUTO: 81.5 FL — SIGNIFICANT CHANGE UP (ref 80–100)
NRBC # FLD: 0 K/UL — SIGNIFICANT CHANGE UP (ref 0–0)
PHOSPHATE SERPL-MCNC: 4 MG/DL — SIGNIFICANT CHANGE UP (ref 2.5–4.5)
PLATELET # BLD AUTO: 289 K/UL — SIGNIFICANT CHANGE UP (ref 150–400)
PMV BLD: 10.4 FL — SIGNIFICANT CHANGE UP (ref 7–13)
POTASSIUM SERPL-MCNC: 4.2 MMOL/L — SIGNIFICANT CHANGE UP (ref 3.5–5.3)
POTASSIUM SERPL-SCNC: 4.2 MMOL/L — SIGNIFICANT CHANGE UP (ref 3.5–5.3)
PROT SERPL-MCNC: 6.2 G/DL — SIGNIFICANT CHANGE UP (ref 6–8.3)
RBC # BLD: 5.5 M/UL — SIGNIFICANT CHANGE UP (ref 4.2–5.8)
RBC # FLD: 14.1 % — SIGNIFICANT CHANGE UP (ref 10.3–14.5)
SODIUM SERPL-SCNC: 136 MMOL/L — SIGNIFICANT CHANGE UP (ref 135–145)
TROPONIN T, HIGH SENSITIVITY: 2097 NG/L — CRITICAL HIGH (ref ?–14)
WBC # BLD: 19.39 K/UL — HIGH (ref 3.8–10.5)
WBC # FLD AUTO: 19.39 K/UL — HIGH (ref 3.8–10.5)

## 2020-11-04 PROCEDURE — 99232 SBSQ HOSP IP/OBS MODERATE 35: CPT

## 2020-11-04 PROCEDURE — 99232 SBSQ HOSP IP/OBS MODERATE 35: CPT | Mod: GC

## 2020-11-04 RX ORDER — HYDROXYCHLOROQUINE SULFATE 200 MG
400 TABLET ORAL DAILY
Refills: 0 | Status: DISCONTINUED | OUTPATIENT
Start: 2020-11-04 | End: 2020-11-05

## 2020-11-04 RX ORDER — PANTOPRAZOLE SODIUM 20 MG/1
40 TABLET, DELAYED RELEASE ORAL
Refills: 0 | Status: DISCONTINUED | OUTPATIENT
Start: 2020-11-04 | End: 2020-11-05

## 2020-11-04 RX ORDER — MYCOPHENOLATE MOFETIL 250 MG/1
1500 CAPSULE ORAL
Refills: 0 | Status: DISCONTINUED | OUTPATIENT
Start: 2020-11-04 | End: 2020-11-05

## 2020-11-04 RX ORDER — METOPROLOL TARTRATE 50 MG
37.5 TABLET ORAL
Refills: 0 | Status: DISCONTINUED | OUTPATIENT
Start: 2020-11-04 | End: 2020-11-05

## 2020-11-04 RX ADMIN — Medication 100 MILLIGRAM(S): at 06:20

## 2020-11-04 RX ADMIN — TICAGRELOR 90 MILLIGRAM(S): 90 TABLET ORAL at 17:17

## 2020-11-04 RX ADMIN — TICAGRELOR 90 MILLIGRAM(S): 90 TABLET ORAL at 06:20

## 2020-11-04 RX ADMIN — Medication 25 MILLIGRAM(S): at 06:20

## 2020-11-04 RX ADMIN — PANTOPRAZOLE SODIUM 40 MILLIGRAM(S): 20 TABLET, DELAYED RELEASE ORAL at 06:22

## 2020-11-04 RX ADMIN — ATORVASTATIN CALCIUM 80 MILLIGRAM(S): 80 TABLET, FILM COATED ORAL at 21:32

## 2020-11-04 RX ADMIN — MYCOPHENOLATE MOFETIL 1500 MILLIGRAM(S): 250 CAPSULE ORAL at 18:13

## 2020-11-04 RX ADMIN — Medication 100 MILLIGRAM(S): at 16:25

## 2020-11-04 RX ADMIN — Medication 37.5 MILLIGRAM(S): at 17:16

## 2020-11-04 RX ADMIN — CHLORHEXIDINE GLUCONATE 1 APPLICATION(S): 213 SOLUTION TOPICAL at 06:22

## 2020-11-04 RX ADMIN — Medication 81 MILLIGRAM(S): at 17:17

## 2020-11-04 RX ADMIN — Medication 100 MILLIGRAM(S): at 06:19

## 2020-11-04 RX ADMIN — Medication 400 MILLIGRAM(S): at 18:14

## 2020-11-04 NOTE — DISCHARGE NOTE PROVIDER - CARE PROVIDERS DIRECT ADDRESSES
,DirectAddress_Unknown ,DirectAddress_Unknown,[email or web address] ,DirectAddress_Unknown,[email or web address]

## 2020-11-04 NOTE — PROGRESS NOTE ADULT - SUBJECTIVE AND OBJECTIVE BOX
Patient seen and examined at bedside.    Overnight Events:   S/p NAYE to RCA yesterday  Overnight downgraded from CCU  Doing well on the floors  No events on tele    Current Meds:  aspirin  chewable 81 milliGRAM(s) Oral daily  atorvastatin 80 milliGRAM(s) Oral at bedtime  chlorhexidine 4% Liquid 1 Application(s) Topical <User Schedule>  influenza   Vaccine 0.5 milliLiter(s) IntraMuscular once  methylPREDNISolone sodium succinate Injectable 100 milliGRAM(s) IV Push daily  metoprolol tartrate 25 milliGRAM(s) Oral two times a day  pantoprazole    Tablet 40 milliGRAM(s) Oral before breakfast  ticagrelor 90 milliGRAM(s) Oral every 12 hours        Vitals:  T(F): 98.4 (), Max: 99.8 ()  HR: 95 () (95 - 140)  BP: 128/78 () (110/62 - 141/90)  RR: 18 ()  SpO2: 98% ()  I&O's Summary    2020 07:01  -  2020 07:00  --------------------------------------------------------  IN: 490 mL / OUT: 850 mL / NET: -360 mL        Physical Exam:  Appearance: No acute distress; well appearing  Eyes: PERRL, EOMI, pink conjunctiva  HENT: Normal oral mucosa  Cardiovascular: RRR, S1, S2, no murmurs, rubs, or gallops; no edema; no JVD  Respiratory: Clear to auscultation bilaterally  Gastrointestinal: soft, non-tender, non-distended with normal bowel sounds  Musculoskeletal: No clubbing; no joint deformity   Neurologic: Non-focal  Lymphatic: No lymphadenopathy  Psychiatry: AAOx3, mood & affect appropriate  Skin: No rashes, ecchymoses, or cyanosis                          14.3   19.39 )-----------( 289      ( 2020 05:50 )             44.8         136  |  102  |  14  ----------------------------<  203<H>  4.2   |  24  |  0.71    Ca    8.7      2020 05:50  Phos  4.0     11-04  Mg     2.2     11-04    TPro  6.2  /  Alb  2.9<L>  /  TBili  0.6  /  DBili  x   /  AST  107<H>  /  ALT  38  /  AlkPhos  67  11-04      CARDIAC MARKERS ( 2020 05:50 )  x     / x     / 931 u/L / 66.58 ng/mL / x      CARDIAC MARKERS ( 2020 13:20 )  x     / x     / 2080 u/L / 213.30 ng/mL / x      CARDIAC MARKERS ( 2020 20:00 )  x     / x     / 767 u/L / 80.00 ng/mL / x            Total Cholesterol: 231  LDL: 190  HDL: 64  T        New ECG(s): Personally reviewed    Echo:  < from: TTE with Doppler (w/Cont) (20 @ 07:41) >  1. Normal mitral valve. Minimal mitral regurgitation.  2. Normal left ventricular internal dimensions and wall  thicknesses.  3. Endocardium not well visualized; grossly mild segmental  left ventricular systolic dysfunction.  Hypokinesis of the  inferior and inferolateral walls.  Endocardial  visualization enhanced with intravenous injection of echo  contrast (Definity).  4. The right ventricle is not well visualized; grossly  normal right ventricular systolic function.  5. Normal pericardium with no pericardial effusion.  *** No previous Echo exam.    < end of copied text >    Stress Testing:     Cath:    Imaging:    Interpretation of Telemetry:  sinus tachycardia

## 2020-11-04 NOTE — PROGRESS NOTE ADULT - ATTENDING COMMENTS
Patient seen and examined at bedside. Agree with assessment and plan as above.
Jj Prather will be covering for me starting 11/4/20. He can be reached at  if needed.     - Dr. LUANNE Beach ([facility])  - [phone]

## 2020-11-04 NOTE — PROGRESS NOTE ADULT - ASSESSMENT
25 Y M with a PMHx of SLE, Lupus Nephritis (LN) presented with CP.   Rheumatology consulted for possible flare of SLE causing myocarditis.     Problem List:   # Hx of SLE.        # Hx of class 4 LN.    # Myocarditis.     # CAD.    Impression/Recs:   Elevated trops, myocarditis on cardiac MRI, echo showed normal EF but some wall motion abnormalities.  Differential for myocarditis: top differential is SLE mediated inflammation vs viral infection mediated inflammation vs idiopathic myocarditis.  Primary team was concerned about Plaquenil causing myocarditis; Plaquenil usually does not cause myocarditis, rather it can cause dilated cardiomyopathy, hence Plaquenil is less likely a culprit.   C3 and C4 levels normal, dsDNA level pending.   Recommend broad infectious w/u to make sure there is no concurrent infection, w/u could include testing for etiologies such as coxsackie virus, EBV, CMV and GC.   There are no guidelines for SLE mediated Myocarditis treatment, there are only expert opinions.  Some options to consider would be pulsed steroids in combination with Cytoxan vs Rituxan or even IVIG.   Will discuss with patient regarding therapeutic options and proceed thereafter.    For now continue 1 mg/kg IV Solumedrol.   Cardiology took patient for cath to rule out CAD, they found 95% occlusio in RCA was stented, now on beta blocker and DAPT.  Pt also has significant proteinuria (above 8) from uncontrolled LN.     Recs not final please wait for attending addendum.     Pricila Herzog MD  Rheum fellow PGY 4  Pager [phone]   25 Y M with a PMHx of SLE, Lupus Nephritis (LN) presented with CP.   Rheumatology consulted for possible flare of SLE causing myocarditis.     Problem List:   # Hx of SLE.        # Hx of class 4 LN.    # Myocarditis.     # CAD.    Impression/Recs:   Elevated trops, inferior wall enhancement on cardiac MRI; echo showed normal EF but some wall motion abnormalities.  Cardiology took patient for cath to rule out CAD, they found 95% occlusio in RCA was stented, now on beta blocker and DAPT.  Findings on MRI and echo likely due to NSTEMI and not SLE mediated myocarditis; this was discussed with radiology and cardiology services.   SLE disease activity labs normal.   Primary team was concerned about Plaquenil causing myocarditis; Plaquenil usually does not cause myocarditis, rather it can cause dilated cardiomyopathy, hence Plaquenil is less likely a culprit, Plaquenil needs to be restarted.   Recommend to stop Steroids, continue CellCept and Plaquenil at home doses.  Pt can DC from rheum perspective, will need outpatient f/u with Dr. Aundrea Herzog MD  Rheum fellow PGY 4  Pager [phone]   25 Y M with a PMHx of SLE, Lupus Nephritis (LN) presented with CP.   Rheumatology consulted for possible flare of SLE causing myocarditis.     Problem List:   # Hx of SLE.        # Hx of class 4 LN.    # Myocarditis.     # CAD.    Impression/Recs:   Elevated trops, inferior wall enhancement on cardiac MRI; echo showed normal EF but some wall motion abnormalities.  Cardiology took patient for cath to rule out CAD, they found 95% occlusion in RCA was stented, now on beta blocker and DAPT.  Findings on MRI and echo likely due to NSTEMI and not SLE mediated myocarditis; this was discussed with radiology and cardiology services.   SLE disease activity labs normal.   Primary team was concerned about Plaquenil causing myocarditis; Plaquenil usually does not cause myocarditis, rather it can cause dilated cardiomyopathy, hence Plaquenil is less likely a culprit, Plaquenil needs to be restarted.   Recommend to stop Steroids, continue CellCept and Plaquenil at home doses.  Pt can DC from rheum perspective, will need outpatient f/u with Dr. Aundrea Herzog MD  Rheum fellow PGY 4  Pager [phone]

## 2020-11-04 NOTE — DISCHARGE NOTE PROVIDER - PROVIDER TOKENS
PROVIDER:[TOKEN:[[id number]]] PROVIDER:[TOKEN:[[id number]]],PROVIDER:[TOKEN:[[id number]]] PROVIDER:[TOKEN:[[id number]]],PROVIDER:[TOKEN:[[id number]]],PROVIDER:[TOKEN:[[id number]]]

## 2020-11-04 NOTE — DISCHARGE NOTE PROVIDER - NSDCMRMEDTOKEN_GEN_ALL_CORE_FT
aspirin 81 mg oral tablet, chewable: 1 tab(s) orally once a day  atorvastatin 80 mg oral tablet: 1 tab(s) orally once a day (at bedtime)  CellCept 500 mg oral tablet: 3 tab(s) orally 2 times a day   Plaquenil 200 mg oral tablet: 2 tab(s) orally once a day  Protonix 40 mg oral delayed release tablet: 1 tab(s) orally once a day  ticagrelor 90 mg oral tablet: 1 tab(s) orally every 12 hours   aspirin 81 mg oral tablet, chewable: 1 tab(s) orally once a day  atorvastatin 80 mg oral tablet: 1 tab(s) orally once a day (at bedtime)  CellCept 500 mg oral tablet: 3 tab(s) orally 2 times a day   Plaquenil 200 mg oral tablet: 2 tab(s) orally once a day  Protonix 40 mg oral delayed release tablet: 1 tab(s) orally once a day  ticagrelor 90 mg oral tablet: 1 tab(s) orally every 12 hours  Toprol-XL 25 mg oral tablet, extended release: 3 tab(s) orally once a day

## 2020-11-04 NOTE — PROGRESS NOTE ADULT - ASSESSMENT
24 YO M hx Lupus, and lupus nephritis presents to the ED with acid reflux and epigastric pain on monday. By the time in the ED chest pain free though occasionally pleuritic and positional. Echo with notable wall motion abnormalities and MRI performed that could not exclude thrombus. patient taken to cath lab and now s/p stent to RCA.    chest pain  Likely 2/2 acute thrombotic event in RCA  s/p NAYE to RCA  c/w aspirin 81mg daily  c/w Brilinta 90mg BID  c/w atorvastatin 80mg daily  currently on metoprolol 25mg daily  HR tachycardic. No clear signs of heart failure. Would uptitrate GDMT cautiously given recent coronary event  would increase to 37.5mg BID  currently on steroids for presumed myocarditis would appreciate rheum/renal recs regarding discontinuation and starting ACEI/ARB.   continue to monitor on tele      Johnny Zamora  Cardiology Fellow  [phone]    All Cardiology service information can be found 24/7 on [email or web address], password: [email or web address]

## 2020-11-04 NOTE — CHART NOTE - NSCHARTNOTEFT_GEN_A_CORE
25 M h/o lupus nephritis, GERD, HTN presents with sudden onset substernal CP, admitted for myocarditis possibly 2/2 SLE flare. Admit to CCU s/p LHC with stent and balloon angioplasty on 95% dRCA. Pt. hemodynamically stable and right radial band removed without complications.     #CAD-Echo shows EF 46%, grossly mild seg LVS dysfunction, hypokinesis inferior and inferolateral walls. cMRI suspicious for myocarditis. s/p LHC w/ stent and balloon angioplasty to 95% dRCA.  -ASA 81mg daily  -Brilinta 90mg po q12 hours  -atorvastatin 80mg daily  -trend CE to peak    #HTN  -Metoprolol 25mg po BID    #GERD  -pantoprazole 40mg po daily    #Lupus  -methylprednisone 100mg IVP daily  -f/u rheum rec.

## 2020-11-04 NOTE — PROGRESS NOTE ADULT - SUBJECTIVE AND OBJECTIVE BOX
He feels well today  No recurrence of chest pain   No SOB    Vital Signs Last 24 Hrs  T(C): 36.9 (2020 04:45), Max: 37.7 (2020 20:00)  T(F): 98.4 (2020 04:45), Max: 99.8 (2020 20:00)  HR: 95 (2020 06:15) (95 - 140)  BP: 128/78 (2020 06:15) (110/62 - 141/90)  BP(mean): 85 (2020 04:00) (72 - 107)  RR: 18 (2020 06:15) (18 - 26)  SpO2: 98% (2020 06:15) (95% - 100%)    I&O's Summary    20 @ 07:01  -  20 @ 07:00  --------------------------------------------------------  IN: 490 mL / OUT: 850 mL / NET: -360 mL        PHYSICAL EXAM:  GENERAL: NAD, Comfortable  HEAD:  Atraumatic, Normocephalic  EYES: EOMI, PERRLA, conjunctiva and sclera clear  NECK: Supple, No JVD  CHEST/LUNG: Clear to auscultation bilaterally; No wheeze  HEART: Regular rate and rhythm; No murmurs, rubs, or gallops  ABDOMEN: Soft, Nontender, Nondistended; Bowel sounds present  Neuro: AAO x 3, no focal deficit, 5/5 b/l extremities  EXTREMITIES:  2+ Peripheral Pulses, No clubbing, cyanosis, or edema  SKIN: No rashes or lesions    LABS:                        14.3   19.39 )-----------( 289      ( 2020 05:50 )             44.8     11-04    136  |  102  |  14  ----------------------------<  203<H>  4.2   |  24  |  0.71    Ca    8.7      2020 05:50  Phos  4.0     11-04  Mg     2.2     11-04    TPro  6.2  /  Alb  2.9<L>  /  TBili  0.6  /  DBili  x   /  AST  107<H>  /  ALT  38  /  AlkPhos  67  11-04      CAPILLARY BLOOD GLUCOSE        CARDIAC MARKERS ( 2020 05:50 )  x     / x     / 931 u/L / 66.58 ng/mL / x      CARDIAC MARKERS ( 2020 13:20 )  x     / x     / 2080 u/L / 213.30 ng/mL / x      CARDIAC MARKERS ( 2020 20:00 )  x     / x     / 767 u/L / 80.00 ng/mL / x          Urinalysis Basic - ( 2020 16:00 )    Color: YELLOW / Appearance: CLEAR / S.025 / pH: 7.5  Gluc: 30 / Ketone: SMALL  / Bili: NEGATIVE / Urobili: NORMAL   Blood: MODERATE / Protein: 600 / Nitrite: NEGATIVE   Leuk Esterase: NEGATIVE / RBC: 26-50 / WBC 3-5   Sq Epi: FEW / Non Sq Epi: x / Bacteria: FEW        RADIOLOGY & ADDITIONAL TESTS:    Imaging Personally Reviewed:  [x] YES  [ ] NO    Consultant(s) Notes Reviewed:  [x] YES  [ ] NO

## 2020-11-04 NOTE — PROGRESS NOTE ADULT - SUBJECTIVE AND OBJECTIVE BOX
DANUTA WANGN  [id number]    INTERVAL HPI/OVERNIGHT EVENTS:    The patient is lying in bed, says he feels better.    No CP, SOB, n/v/d, abdominal pain, fever or chills.     MEDICATIONS  (STANDING):  aspirin  chewable 81 milliGRAM(s) Oral daily  atorvastatin 80 milliGRAM(s) Oral at bedtime  chlorhexidine 4% Liquid 1 Application(s) Topical <User Schedule>  influenza   Vaccine 0.5 milliLiter(s) IntraMuscular once  methylPREDNISolone sodium succinate Injectable 100 milliGRAM(s) IV Push daily  metoprolol tartrate 37.5 milliGRAM(s) Oral two times a day  pantoprazole    Tablet 40 milliGRAM(s) Oral before breakfast  ticagrelor 90 milliGRAM(s) Oral every 12 hours    MEDICATIONS  (PRN):    Vital Signs Last 24 Hrs  T(C): 36.7 (2020 12:43), Max: 37.7 (2020 20:00)  T(F): 98.1 (2020 12:43), Max: 99.8 (2020 20:00)  HR: 99 (2020 12:43) (95 - 130)  BP: 122/79 (2020 12:43) (110/62 - 141/90)  BP(mean): 85 (2020 04:00) (72 - 107)  RR: 18 (2020 12:43) (17 - 26)  SpO2: 98% (2020 12:43) (95% - 100%)    Physical Exam:  General: No apparent distress  HEENT: EOMI, MMM  CVS: +S1/S2, RRR  Resp: CTA b/l  GI: Soft, NT/ND  MSK: No signs of synovitis, limitation of ROM or deformity in any joint.   Neuro: AAOx3  Skin: no  rashes    LABS:                        14.3   19.39 )-----------( 289      ( 2020 05:50 )             44.8     11-04    136  |  102  |  14  ----------------------------<  203<H>  4.2   |  24  |  0.71    Ca    8.7      2020 05:50  Phos  4.0     11-04  Mg     2.2     11-04    TPro  6.2  /  Alb  2.9<L>  /  TBili  0.6  /  DBili  x   /  AST  107<H>  /  ALT  38  /  AlkPhos  67      C3 Complement, Serum: 149.7 mg/dL (11.03.20 @ 16:00)   C4 Complement, Serum: 32.7 mg/dL (11.03.20 @ 16:00)     Urinalysis Basic - ( 2020 16:00 )    Color: YELLOW / Appearance: CLEAR / S.025 / pH: 7.5  Gluc: 30 / Ketone: SMALL  / Bili: NEGATIVE / Urobili: NORMAL   Blood: MODERATE / Protein: 600 / Nitrite: NEGATIVE   Leuk Esterase: NEGATIVE / RBC: 26-50 / WBC 3-5   Sq Epi: FEW / Non Sq Epi: x / Bacteria: FEW    RADIOLOGY & ADDITIONAL STUDIES:    EXAM:  MR CARD MORPH FUNCTION WAW IC        PROCEDURE DATE:  Nov  3 2020         INTERPRETATION:  ACC# [id number]    CARDIAC MRI WITH AND WITHOUT CONTRAST    CLINICAL INDICATION: Chest pain, evaluate for lupus myocarditis    COMPARISON: None    TECHNIQUE: Multiplanar short axis and two-, three- and four-chamber long axis views of the heart were obtained using cine imaging to assess wall motion. MR imaging was performed with a 3D gradient echo technique after the uneventful administration of 10 mlof Gadavist. Multiplanar delayed enhancement inversion recovery images were obtained approximately 10-15 minutes following the administration of the gadolinium agent. Postprocessing including measurement of cardiac functional parameters was performedon an independent workstation.    FINDINGS:    The left ventricle is qualitatively normal in size with borderline systolic function.    The right ventricle is qualitatively normal in size with normal systolic function.    The left atrium is normal insize. There is qualitatively no significant mitral regurgitation.    The right atrium is normal in size. There is qualitatively no significant tricuspid regurgitation.    Qualitatively, there is no significant aortic regurgitation and no significant aortic stenosis.    The thoracic aorta is normal in diameter.    No pericardial effusion.    REGIONAL FUNCTION    Inferior wall hypokinesia.    T2 SEQUENCE    No abnormal T2 signal to demonstrate myocardial inflammation or edema.    PERFUSION:    No perfusion abnormality.    LATE GADOLINIUM ENHANCEMENT (LGE)    On delayed contrast enhanced images, epicardial/transmural late gadolinium enhancement within inferior wall from base to mid-level suspicious for myocarditis.    QUANTITATIVE ANALYSIS    Quantitative functional parameters obtained from integration of ventricular volumes at end diastole and end systole are as follows (normal range):    BSA: 2.28 m2    LV MEASUREMENTS    LVEF: 56% (normal: male = 56-78%; female = 56-78%)    LVEDV: 174 ml (normal: male =  ml; female =  ml)    LVESV: 77 ml (normal: male = 19-72 ml; female = 13-51 ml)    SV: 97 ml (normal: male =  ml; female = 33-97)    Indexed LVEDVi: 76 ml/m? (normal: male= 47-92 ml/m?; female= 41-81 ml/m?)      ADDITIONAL FINDINGS:    None. Please note this examination is focused on the heart.      IMPRESSION:    1.  The LV is normal in size with borderline systolic function; LVEF: 56%.    2.  Epicardial/transmural late gadolinium enhancement within inferior wallfrom base to mid-level suspicious for myocarditis..    JAYMIE VARGAS MD; Attending Radiologist  This document has been electronically signed. Nov  3 2020  2:13PM

## 2020-11-04 NOTE — DISCHARGE NOTE PROVIDER - HOSPITAL COURSE
25 Y M with a PMHx of SLE, Lupus Nephritis (LN) presented with CP.   CP started yesterday, woke him up from sleep, was located at the junction between the chest and the abdomen, was constant, aggravated by lying down flat, non radiating.  CP was associated with n/d; no abdominal pain, diarrhea, fever, chills or URI symptoms recently.   Pt has never had these symptoms previously; presented to the ER, EKG showed non specific changes but he had elevated Trops; echo did not show any abnormality (no signs of pericardial effusion); cardiology was consulted were suspicious for possible myocarditis secondary to SLE.   Rheumatology consulted for possible flare of SLE causing myocarditis and steroids were started. Pt's troponins were rising, and pt was taken to the cath lab where he got a stent to the RCA. After the procedure, steroids were stopped because it was not thought to be due to a SLE flare; rather an MI. Cellcept and Plaquenil were resumed. Pt is now medically cleared for discharge with cardiology and rheumatology follow up.

## 2020-11-04 NOTE — DISCHARGE NOTE PROVIDER - NSDCCPCAREPLAN_GEN_ALL_CORE_FT
PRINCIPAL DISCHARGE DIAGNOSIS  Diagnosis: NSTEMI (non-ST elevated myocardial infarction)  Assessment and Plan of Treatment: Continue aspirin and Brilinta, do not stop unless instructed by your physician.  Continue low salt, fat, cholesterol and carbohydrate diet. Follow up with cardiologist and primary care physician's routine appointment.         PRINCIPAL DISCHARGE DIAGNOSIS  Diagnosis: NSTEMI (non-ST elevated myocardial infarction)  Assessment and Plan of Treatment: Continue aspirin and Brilinta, do not stop unless instructed by your physician.  Continue low salt, fat, cholesterol and carbohydrate diet. Follow up with cardiologist and primary care physician's routine appointment.        SECONDARY DISCHARGE DIAGNOSES  Diagnosis: Lupus nephritis  Assessment and Plan of Treatment:      PRINCIPAL DISCHARGE DIAGNOSIS  Diagnosis: NSTEMI (non-ST elevated myocardial infarction)  Assessment and Plan of Treatment: Continue aspirin and Brilinta, do not stop unless instructed by your physician.  Continue low salt, fat, cholesterol and carbohydrate diet. Follow up with cardiologist and primary care physician's routine appointment.        SECONDARY DISCHARGE DIAGNOSES  Diagnosis: Lupus nephritis  Assessment and Plan of Treatment: You will need to schedule an appointment with Dr. Brooks as soon as possible. Please also follow up with Dr. Guillaume.

## 2020-11-04 NOTE — DISCHARGE NOTE PROVIDER - CARE PROVIDER_API CALL
Belinda Guillaume  [address] [facility], [facility] [address]  [place] [address]  Phone: [phone]  Fax: [phone])-  Follow Up Time:    Belinda Guillaume  [address] [facility] [address]  [place] [address]  Phone: ()-  Fax: ()-  Follow Up Time:     Raheel Sterling  CARDIOVASCULAR DISEASE  [address] [place] [address]  Phone: [phone]  Fax: [phone]  Follow Up Time:    Belinda Guillaume  [address] [facility], [address]  [place] [address]  Phone: ()-  Fax: ()-  Follow Up Time:     Raheel Sterling  CARDIOVASCULAR DISEASE  [address] [place] [address]  Phone: [phone]  Fax: [phone]  Follow Up Time:     Giuseppe Brooks  NEPHROLOGY  [address] [place] [address]  Phone: [phone]  Fax: [phone]  Follow Up Time:

## 2020-11-04 NOTE — PROGRESS NOTE ADULT - PROBLEM SELECTOR PLAN 1
-Echo shows EF 46%, grossly mild seg LVS dysfunction, hypokinesis inferior and inferolateral walls. cMRI suspicious for myocarditis. s/p LHC w/ stent and balloon angioplasty to 95% dRCA.

## 2020-11-05 ENCOUNTER — TRANSCRIPTION ENCOUNTER (OUTPATIENT)
Age: 25
End: 2020-11-05

## 2020-11-05 VITALS
DIASTOLIC BLOOD PRESSURE: 61 MMHG | SYSTOLIC BLOOD PRESSURE: 105 MMHG | HEART RATE: 88 BPM | OXYGEN SATURATION: 100 % | RESPIRATION RATE: 17 BRPM | TEMPERATURE: 98 F

## 2020-11-05 LAB
ALBUMIN SERPL ELPH-MCNC: 2.8 G/DL — LOW (ref 3.3–5)
ALP SERPL-CCNC: 60 U/L — SIGNIFICANT CHANGE UP (ref 40–120)
ALT FLD-CCNC: 30 U/L — SIGNIFICANT CHANGE UP (ref 4–41)
ANA PAT FLD IF-IMP: HIGH
ANA TITR SER: HIGH
ANION GAP SERPL CALC-SCNC: 11 MMO/L — SIGNIFICANT CHANGE UP (ref 7–14)
APTT BLD: 27.5 SEC — SIGNIFICANT CHANGE UP (ref 27–36.3)
AST SERPL-CCNC: 38 U/L — SIGNIFICANT CHANGE UP (ref 4–40)
BILIRUB SERPL-MCNC: 0.5 MG/DL — SIGNIFICANT CHANGE UP (ref 0.2–1.2)
BUN SERPL-MCNC: 17 MG/DL — SIGNIFICANT CHANGE UP (ref 7–23)
CALCIUM SERPL-MCNC: 8.6 MG/DL — SIGNIFICANT CHANGE UP (ref 8.4–10.5)
CHLORIDE SERPL-SCNC: 101 MMOL/L — SIGNIFICANT CHANGE UP (ref 98–107)
CMV DNA CSF QL NAA+PROBE: SIGNIFICANT CHANGE UP IU/ML
CO2 SERPL-SCNC: 23 MMOL/L — SIGNIFICANT CHANGE UP (ref 22–31)
CREAT SERPL-MCNC: 0.74 MG/DL — SIGNIFICANT CHANGE UP (ref 0.5–1.3)
DRVVT SCREEN TO CONFIRM RATIO: 1.19 — SIGNIFICANT CHANGE UP (ref 0–1.2)
GLUCOSE SERPL-MCNC: 165 MG/DL — HIGH (ref 70–99)
HCT VFR BLD CALC: 38.8 % — LOW (ref 39–50)
HGB BLD-MCNC: 12.6 G/DL — LOW (ref 13–17)
INR BLD: 1.14 — SIGNIFICANT CHANGE UP (ref 0.88–1.16)
MAGNESIUM SERPL-MCNC: 2.2 MG/DL — SIGNIFICANT CHANGE UP (ref 1.6–2.6)
MCHC RBC-ENTMCNC: 26.1 PG — LOW (ref 27–34)
MCHC RBC-ENTMCNC: 32.5 % — SIGNIFICANT CHANGE UP (ref 32–36)
MCV RBC AUTO: 80.3 FL — SIGNIFICANT CHANGE UP (ref 80–100)
NORMALIZED SCT PPP-RTO: 1.01 — SIGNIFICANT CHANGE UP (ref 0.86–1.2)
NRBC # FLD: 0 K/UL — SIGNIFICANT CHANGE UP (ref 0–0)
PHOSPHATE SERPL-MCNC: 4.4 MG/DL — SIGNIFICANT CHANGE UP (ref 2.5–4.5)
PLATELET # BLD AUTO: 271 K/UL — SIGNIFICANT CHANGE UP (ref 150–400)
PMV BLD: 10.2 FL — SIGNIFICANT CHANGE UP (ref 7–13)
POTASSIUM SERPL-MCNC: 4.2 MMOL/L — SIGNIFICANT CHANGE UP (ref 3.5–5.3)
POTASSIUM SERPL-SCNC: 4.2 MMOL/L — SIGNIFICANT CHANGE UP (ref 3.5–5.3)
PROT SERPL-MCNC: 5.9 G/DL — LOW (ref 6–8.3)
PROTHROM AB SERPL-ACNC: 13 SEC — SIGNIFICANT CHANGE UP (ref 10.6–13.6)
RBC # BLD: 4.83 M/UL — SIGNIFICANT CHANGE UP (ref 4.2–5.8)
RBC # FLD: 13.7 % — SIGNIFICANT CHANGE UP (ref 10.3–14.5)
SODIUM SERPL-SCNC: 135 MMOL/L — SIGNIFICANT CHANGE UP (ref 135–145)
THROMBIN TIME: 22.4 SEC — SIGNIFICANT CHANGE UP (ref 16–26)
WBC # BLD: 18.31 K/UL — HIGH (ref 3.8–10.5)
WBC # FLD AUTO: 18.31 K/UL — HIGH (ref 3.8–10.5)

## 2020-11-05 PROCEDURE — 99232 SBSQ HOSP IP/OBS MODERATE 35: CPT

## 2020-11-05 RX ORDER — ATORVASTATIN CALCIUM 80 MG/1
1 TABLET, FILM COATED ORAL
Qty: 30 | Refills: 0
Start: 2020-11-05 | End: 2020-12-04

## 2020-11-05 RX ORDER — METOPROLOL TARTRATE 50 MG
3 TABLET ORAL
Qty: 90 | Refills: 0
Start: 2020-11-05 | End: 2020-12-04

## 2020-11-05 RX ORDER — TICAGRELOR 90 MG/1
1 TABLET ORAL
Qty: 60 | Refills: 0
Start: 2020-11-05 | End: 2020-12-04

## 2020-11-05 RX ORDER — ASPIRIN/CALCIUM CARB/MAGNESIUM 324 MG
1 TABLET ORAL
Qty: 30 | Refills: 0
Start: 2020-11-05 | End: 2020-12-04

## 2020-11-05 RX ADMIN — Medication 81 MILLIGRAM(S): at 11:05

## 2020-11-05 RX ADMIN — MYCOPHENOLATE MOFETIL 1500 MILLIGRAM(S): 250 CAPSULE ORAL at 06:05

## 2020-11-05 RX ADMIN — CHLORHEXIDINE GLUCONATE 1 APPLICATION(S): 213 SOLUTION TOPICAL at 06:05

## 2020-11-05 RX ADMIN — Medication 400 MILLIGRAM(S): at 11:05

## 2020-11-05 RX ADMIN — TICAGRELOR 90 MILLIGRAM(S): 90 TABLET ORAL at 05:50

## 2020-11-05 RX ADMIN — PANTOPRAZOLE SODIUM 40 MILLIGRAM(S): 20 TABLET, DELAYED RELEASE ORAL at 05:50

## 2020-11-05 RX ADMIN — Medication 37.5 MILLIGRAM(S): at 05:50

## 2020-11-05 NOTE — DISCHARGE NOTE NURSING/CASE MANAGEMENT/SOCIAL WORK - PATIENT PORTAL LINK FT
You can access the FollowMyHealth Patient Portal offered by [facility] by registering at the following website: [email or web address]. By joining [employer]’s FollowMyHealth portal, you will also be able to view your health information using other applications (apps) compatible with our system.

## 2020-11-05 NOTE — PROVIDER CONTACT NOTE (OTHER) - BACKGROUND
24 yo M with hx of lupus nephritis, GERD, HTN. Admitted for substernal chest pain associated with nausea and vomiting

## 2020-11-05 NOTE — PROGRESS NOTE ADULT - ASSESSMENT
26 YO M hx Lupus, and lupus nephritis presents to the ED with acid reflux and epigastric pain on monday. By the time in the ED chest pain free though occasionally pleuritic and positional. Echo with notable wall motion abnormalities and MRI performed that could not exclude thrombus. patient taken to cath lab and now s/p stent to RCA.    Chest pain  Likely 2/2 acute thrombotic event in RCA  s/p NAYE to RCA  c/w aspirin 81mg daily  c/w Brilinta 90mg BID  c/w atorvastatin 80mg daily  currently on metoprolol 37.5mg BID  HR controlled  Would d/c on metoprolol succinate 75mg daily  would restart home ACI when renal   No further cardiac testing needed at this time      Johnny Zamora  Cardiology Fellow  [phone]    All Cardiology service information can be found 24/7 on [email or web address], password: [email or web address]

## 2020-11-05 NOTE — PROGRESS NOTE ADULT - SUBJECTIVE AND OBJECTIVE BOX
Patient seen and examined at bedside.    Overnight Events:   No overnight events    Current Meds:  aspirin  chewable 81 milliGRAM(s) Oral daily  atorvastatin 80 milliGRAM(s) Oral at bedtime  chlorhexidine 4% Liquid 1 Application(s) Topical <User Schedule>  hydroxychloroquine 400 milliGRAM(s) Oral daily  influenza   Vaccine 0.5 milliLiter(s) IntraMuscular once  metoprolol tartrate 37.5 milliGRAM(s) Oral two times a day  mycophenolate mofetil 1500 milliGRAM(s) Oral two times a day  pantoprazole    Tablet 40 milliGRAM(s) Oral before breakfast  ticagrelor 90 milliGRAM(s) Oral every 12 hours        Vitals:  T(F): 98 (11-05), Max: 98.4 (11-04)  HR: 86 (11-05) (86 - 99)  BP: 106/76 (11-05) (105/54 - 122/79)  RR: 17 (11-05)  SpO2: 100% (11-05)  I&O's Summary    04 Nov 2020 07:01  -  05 Nov 2020 07:00  --------------------------------------------------------  IN: 2020 mL / OUT: 1550 mL / NET: 470 mL        Physical Exam:  Appearance: No acute distress; well appearing  Eyes: PERRL, EOMI, pink conjunctiva  HENT: Normal oral mucosa  Cardiovascular: RRR, S1, S2, no murmurs, rubs, or gallops; no edema; no JVD  Respiratory: Clear to auscultation bilaterally  Gastrointestinal: soft, non-tender, non-distended with normal bowel sounds  Musculoskeletal: No clubbing; no joint deformity   Neurologic: Non-focal  Lymphatic: No lymphadenopathy  Psychiatry: AAOx3, mood & affect appropriate  Skin: No rashes, ecchymoses, or cyanosis                          12.6   18.31 )-----------( 271      ( 05 Nov 2020 02:44 )             38.8     11-05    135  |  101  |  17  ----------------------------<  165<H>  4.2   |  23  |  0.74    Ca    8.6      05 Nov 2020 02:44  Phos  4.4     11-05  Mg     2.2     11-05    TPro  5.9<L>  /  Alb  2.8<L>  /  TBili  0.5  /  DBili  x   /  AST  38  /  ALT  30  /  AlkPhos  60  11-05    PT/INR - ( 05 Nov 2020 02:44 )   PT: 13.0 SEC;   INR: 1.14          PTT - ( 05 Nov 2020 02:44 )  PTT:27.5 SEC  CARDIAC MARKERS ( 04 Nov 2020 05:50 )  x     / x     / 931 u/L / 66.58 ng/mL / x      CARDIAC MARKERS ( 03 Nov 2020 13:20 )  x     / x     / 2080 u/L / 213.30 ng/mL / x                  New ECG(s): Personally reviewed    Echo:  < from: TTE with Doppler (w/Cont) (11.03.20 @ 07:41) >  CONCLUSIONS:  1. Normal mitral valve. Minimal mitral regurgitation.  2. Normal left ventricular internal dimensions and wall  thicknesses.  3. Endocardium not well visualized; grossly mild segmental  left ventricular systolic dysfunction.  Hypokinesis of the  inferior and inferolateral walls.  Endocardial  visualization enhanced with intravenous injection of echo  contrast (Definity).  4. The right ventricle is not well visualized; grossly  normal right ventricular systolic function.  5. Normal pericardium with no pericardial effusion.  *** No previous Echo exam.    < end of copied text >    Stress Testing:     Cath:    Imaging:    Interpretation of Telemetry:  sinus 87  blocked apc overnight likely vagally mediated

## 2020-11-05 NOTE — PROGRESS NOTE ADULT - SUBJECTIVE AND OBJECTIVE BOX
DANUTA SCRUGGS  [id number]    INTERVAL HPI/OVERNIGHT EVENTS:    The patient is lying in bed, says he feels alright.    Says he did not have any CP last night when he had a 3 second pause.     Denies CP, SOB currently as well.    No n/v/d, fever or chills.     MEDICATIONS  (STANDING):  aspirin  chewable 81 milliGRAM(s) Oral daily  atorvastatin 80 milliGRAM(s) Oral at bedtime  chlorhexidine 4% Liquid 1 Application(s) Topical <User Schedule>  hydroxychloroquine 400 milliGRAM(s) Oral daily  influenza   Vaccine 0.5 milliLiter(s) IntraMuscular once  metoprolol tartrate 37.5 milliGRAM(s) Oral two times a day  mycophenolate mofetil 1500 milliGRAM(s) Oral two times a day  pantoprazole    Tablet 40 milliGRAM(s) Oral before breakfast  ticagrelor 90 milliGRAM(s) Oral every 12 hours    MEDICATIONS  (PRN):    Vital Signs Last 24 Hrs  T(C): 36.8 (2020 05:20), Max: 36.9 (2020 16:00)  T(F): 98.3 (2020 05:20), Max: 98.4 (2020 16:00)  HR: 92 (2020 05:20) (86 - 99)  BP: 109/67 (2020 05:20) (105/54 - 125/78)  BP(mean): --  RR: 18 (2020 05:20) (17 - 18)  SpO2: 99% (2020 05:20) (96% - 99%)    Physical Exam:  General: No apparent distress  HEENT: EOMI, MMM  CVS: +S1/S2, RRR  Resp: CTA b/l  GI: Soft, NT/ND  MSK: No signs of synovitis, limitation of ROM or deformity in any joint.   Neuro: AAOx3  Skin: no  rashes    LABS:                        12.6   18.31 )-----------( 271      ( 2020 02:44 )             38.8     11-05    135  |  101  |  17  ----------------------------<  165<H>  4.2   |  23  |  0.74    Ca    8.6      2020 02:44  Phos  4.4     11-05  Mg     2.2     11-05    TPro  5.9<L>  /  Alb  2.8<L>  /  TBili  0.5  /  DBili  x   /  AST  38  /  ALT  30  /  AlkPhos  60  11-05    PT/INR - ( 2020 02:44 )   PT: 13.0 SEC;   INR: 1.14          PTT - ( 2020 02:44 )  PTT:27.5 SEC    C3 Complement, Serum: 149.7 mg/dL (11.03.20 @ 16:00)   C4 Complement, Serum: 32.7 mg/dL (11.03.20 @ 16:00)   Double Stranded DNA Antibody: 29: Method: EIA     Urinalysis Basic - ( 2020 16:00 )    Color: YELLOW / Appearance: CLEAR / S.025 / pH: 7.5  Gluc: 30 / Ketone: SMALL  / Bili: NEGATIVE / Urobili: NORMAL   Blood: MODERATE / Protein: 600 / Nitrite: NEGATIVE   Leuk Esterase: NEGATIVE / RBC: 26-50 / WBC 3-5   Sq Epi: FEW / Non Sq Epi: x / Bacteria: FEW    RADIOLOGY & ADDITIONAL STUDIES:    EXAM:  MR CARD MORPH FUNCTION WAW IC        PROCEDURE DATE:  Nov  3 2020         INTERPRETATION:  ACC# [id number]    CARDIAC MRI WITH AND WITHOUT CONTRAST    CLINICAL INDICATION: Chest pain, evaluate for lupus myocarditis    COMPARISON: None    TECHNIQUE: Multiplanar short axis and two-, three- and four-chamber long axis views of the heart were obtained using cine imaging to assess wall motion. MR imaging was performed with a 3D gradient echo technique after the uneventful administration of 10 mlof Gadavist. Multiplanar delayed enhancement inversion recovery images were obtained approximately 10-15 minutes following the administration of the gadolinium agent. Postprocessing including measurement of cardiac functional parameters was performedon an independent workstation.    FINDINGS:    The left ventricle is qualitatively normal in size with borderline systolic function.    The right ventricle is qualitatively normal in size with normal systolic function.    The left atrium is normal insize. There is qualitatively no significant mitral regurgitation.    The right atrium is normal in size. There is qualitatively no significant tricuspid regurgitation.    Qualitatively, there is no significant aortic regurgitation and no significant aortic stenosis.    The thoracic aorta is normal in diameter.    No pericardial effusion.    REGIONAL FUNCTION    Inferior wall hypokinesia.    T2 SEQUENCE    No abnormal T2 signal to demonstrate myocardial inflammation or edema.    PERFUSION:    No perfusion abnormality.    LATE GADOLINIUM ENHANCEMENT (LGE)    On delayed contrast enhanced images, epicardial/transmural late gadolinium enhancement within inferior wall from base to mid-level suspicious for myocarditis.    QUANTITATIVE ANALYSIS    Quantitative functional parameters obtained from integration of ventricular volumes at end diastole and end systole are as follows (normal range):    BSA: 2.28 m2    LV MEASUREMENTS    LVEF: 56% (normal: male = 56-78%; female = 56-78%)    LVEDV: 174 ml (normal: male =  ml; female =  ml)    LVESV: 77 ml (normal: male = 19-72 ml; female = 13-51 ml)    SV: 97 ml (normal: male =  ml; female = 33-97)    Indexed LVEDVi: 76 ml/m? (normal: male= 47-92 ml/m?; female= 41-81 ml/m?)      ADDITIONAL FINDINGS:    None. Please note this examination is focused on the heart.      IMPRESSION:    1.  The LV is normal in size with borderline systolic function; LVEF: 56%.    2.  Epicardial/transmural late gadolinium enhancement within inferior wallfrom base to mid-level suspicious for myocarditis..    JAYMIE VARGAS MD; Attending Radiologist  This document has been electronically signed. Nov  3 2020  2:13PM      RADIOLOGY & ADDITIONAL TESTS:

## 2020-11-05 NOTE — PROGRESS NOTE ADULT - ASSESSMENT
25 Y M with a PMHx of SLE, Lupus Nephritis (LN) presented with CP.   Rheumatology consulted for possible flare of SLE causing myocarditis.     Problem List:   # Hx of SLE.        # Hx of class 4 LN.    # Myocarditis.     # CAD.    Impression/Recs:   Elevated trops, inferior wall enhancement on cardiac MRI; echo showed normal EF but some wall motion abnormalities.  Cardiology took patient for cath to rule out CAD, they found 95% occlusion in RCA was stented, now on beta blocker and DAPT.  Findings on MRI and echo likely due to NSTEMI and not SLE mediated myocarditis; this was discussed with radiology and cardiology services.   SLE disease activity labs normal.   Primary team was concerned about Plaquenil causing myocarditis; Plaquenil usually does not cause myocarditis, rather it can cause dilated cardiomyopathy; Plaquenil can be resumed.    In view of pauses noted on telemetry, suggest Cardiology evaluation and possible continued monitoring on telemetry.  In view of worsening proteinuria (above 8), suggest Nephrology input; note that pt has had 2 renal biopsies before last renal bx was in 2019 and showed class IV Nephritis for which he was induced with MMF and pulsed steroids.   Currently pt is on max dose of MMF but still has worsening proteinuria, might need repeat bx.   Recommend to continue CellCept and Plaquenil at home doses for now.     KALANI Herzog MD  Rheum fellow PGY 4  Pager [phone]

## 2020-11-06 ENCOUNTER — TRANSCRIPTION ENCOUNTER (OUTPATIENT)
Age: 25
End: 2020-11-06

## 2020-11-06 LAB
C TRACH RRNA SPEC QL NAA+PROBE: SIGNIFICANT CHANGE UP
CK MB BLD-MCNC: NEGATIVE TITER — SIGNIFICANT CHANGE UP
COXSACKIE TYPE A-24: NEGATIVE TITER — SIGNIFICANT CHANGE UP
CV A24 IGG TITR SER IF: NEGATIVE TITER — SIGNIFICANT CHANGE UP
CV A7 AB SER-ACNC: NEGATIVE TITER — SIGNIFICANT CHANGE UP
CV A9 AB TITR FLD: NEGATIVE TITER — SIGNIFICANT CHANGE UP
CV B1 AB TITR FLD: NEGATIVE — SIGNIFICANT CHANGE UP
CV B2 AB TITR FLD: NEGATIVE — SIGNIFICANT CHANGE UP
CV B3 AB TITR FLD: NEGATIVE — SIGNIFICANT CHANGE UP
CV B4 AB TITR FLD: NEGATIVE — SIGNIFICANT CHANGE UP
CV B5 AB TITR FLD: NEGATIVE — SIGNIFICANT CHANGE UP
CV B6 AB TITR FLD: NEGATIVE — SIGNIFICANT CHANGE UP
N GONORRHOEA RRNA SPEC QL NAA+PROBE: SIGNIFICANT CHANGE UP
SPECIMEN SOURCE: SIGNIFICANT CHANGE UP

## 2020-11-10 ENCOUNTER — TRANSCRIPTION ENCOUNTER (OUTPATIENT)
Age: 25
End: 2020-11-10

## 2020-11-10 ENCOUNTER — NON-APPOINTMENT (OUTPATIENT)
Age: 25
End: 2020-11-10

## 2020-11-10 LAB — B2 GLYCOPROT1 AB SER QL: NEGATIVE — SIGNIFICANT CHANGE UP

## 2020-11-11 LAB
CARDIOLIPIN IGM SER-MCNC: 4.59 MPL — SIGNIFICANT CHANGE UP (ref 0–11)
CARDIOLIPIN IGM SER-MCNC: 4.59 MPL — SIGNIFICANT CHANGE UP (ref 0–11)
CARDIOLIPIN IGM SER-MCNC: 7.83 GPL — SIGNIFICANT CHANGE UP (ref 0–23)
CARDIOLIPIN IGM SER-MCNC: 7.83 GPL — SIGNIFICANT CHANGE UP (ref 0–23)

## 2020-11-12 ENCOUNTER — APPOINTMENT (OUTPATIENT)
Dept: ULTRASOUND IMAGING | Facility: IMAGING CENTER | Age: 25
End: 2020-11-12
Payer: MEDICAID

## 2020-11-12 ENCOUNTER — OUTPATIENT (OUTPATIENT)
Dept: OUTPATIENT SERVICES | Facility: HOSPITAL | Age: 25
LOS: 1 days | End: 2020-11-12
Payer: MEDICAID

## 2020-11-12 DIAGNOSIS — M32.14 GLOMERULAR DISEASE IN SYSTEMIC LUPUS ERYTHEMATOSUS: ICD-10-CM

## 2020-11-12 PROCEDURE — 93975 VASCULAR STUDY: CPT

## 2020-11-12 PROCEDURE — 93975 VASCULAR STUDY: CPT | Mod: 26

## 2020-11-13 ENCOUNTER — LABORATORY RESULT (OUTPATIENT)
Age: 25
End: 2020-11-13

## 2020-11-13 ENCOUNTER — APPOINTMENT (OUTPATIENT)
Dept: RHEUMATOLOGY | Facility: CLINIC | Age: 25
End: 2020-11-13
Payer: MEDICAID

## 2020-11-13 VITALS
WEIGHT: 250 LBS | SYSTOLIC BLOOD PRESSURE: 139 MMHG | DIASTOLIC BLOOD PRESSURE: 87 MMHG | HEART RATE: 104 BPM | OXYGEN SATURATION: 99 % | BODY MASS INDEX: 39.16 KG/M2

## 2020-11-13 LAB
BASOPHILS # BLD AUTO: 0.03 K/UL
BASOPHILS NFR BLD AUTO: 0.3 %
EOSINOPHIL # BLD AUTO: 0.12 K/UL
EOSINOPHIL NFR BLD AUTO: 1.3 %
HCT VFR BLD CALC: 44.1 %
HGB BLD-MCNC: 14 G/DL
IMM GRANULOCYTES NFR BLD AUTO: 0.4 %
LYMPHOCYTES # BLD AUTO: 3.25 K/UL
LYMPHOCYTES NFR BLD AUTO: 35.6 %
MAN DIFF?: NORMAL
MCHC RBC-ENTMCNC: 26.4 PG
MCHC RBC-ENTMCNC: 31.7 GM/DL
MCV RBC AUTO: 83.1 FL
MONOCYTES # BLD AUTO: 0.75 K/UL
MONOCYTES NFR BLD AUTO: 8.2 %
NEUTROPHILS # BLD AUTO: 4.95 K/UL
NEUTROPHILS NFR BLD AUTO: 54.2 %
PLATELET # BLD AUTO: 336 K/UL
RBC # BLD: 5.31 M/UL
RBC # FLD: 13.8 %
WBC # FLD AUTO: 9.14 K/UL

## 2020-11-13 PROCEDURE — 99214 OFFICE O/P EST MOD 30 MIN: CPT | Mod: 25

## 2020-11-13 PROCEDURE — 36415 COLL VENOUS BLD VENIPUNCTURE: CPT

## 2020-11-13 PROCEDURE — 99072 ADDL SUPL MATRL&STAF TM PHE: CPT

## 2020-11-13 NOTE — ASSESSMENT
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  not taking the lisinopril per neprho.  discussed concerns with patient.  patient will also d/w renal given new meds.   if this is accurate number may need re-biopsy and rtx therapy\par - us to r/o RVT pending\par \par #hypertension.  \par - now metoprolol\par \par #vit d def. slowly improving\par - continue replace\par \par #medicine monitoring, long term use of meds\par - was on nsaids - likely contributed a bit to disease - d/w patient NO NSAID for pain currently\par - gi omeprazole\par - pcp proph bactrim - order sent\par - steroids resulted in increased weight - now lost 8 pounds - continue to exercise\par - Quant tb negative may 2020\par - elevated hgbaic - likely 2/2 steroids and weight gain (now 230).  vida monitor now off steroids.  d/w patient exercise\par  \par #vit d 12.2\par - replace\par \par #social determinants \par - taking  a leave from school - will send in forms to complete

## 2020-11-13 NOTE — PHYSICAL EXAM
[General Appearance - Alert] : alert [General Appearance - In No Acute Distress] : in no acute distress [General Appearance - Well Nourished] : well nourished [General Appearance - Well Developed] : well developed [General Appearance - Well-Appearing] : healthy appearing [Skin Color & Pigmentation] : normal skin color and pigmentation [] : no rash [Oriented To Time, Place, And Person] : oriented to person, place, and time [Impaired Insight] : insight and judgment were intact [Affect] : the affect was normal [Mood] : the mood was normal [Sclera] : the sclera and conjunctiva were normal [Auscultation Breath Sounds / Voice Sounds] : lungs were clear to auscultation bilaterally [Heart Rate And Rhythm] : heart rate was normal and rhythm regular [Heart Sounds] : normal S1 and S2 [Heart Sounds Gallop] : no gallops [Murmurs] : no murmurs [Heart Sounds Pericardial Friction Rub] : no pericardial rub [Edema] : there was no peripheral edema [FreeTextEntry1] : no c/c/e/rp/lr [Cervical Lymph Nodes Enlarged Posterior Bilaterally] : posterior cervical [Cervical Lymph Nodes Enlarged Anterior Bilaterally] : anterior cervical [Supraclavicular Lymph Nodes Enlarged Bilaterally] : supraclavicular [No CVA Tenderness] : no ~M costovertebral angle tenderness [No Spinal Tenderness] : no spinal tenderness [Abnormal Walk] : normal gait [Nail Clubbing] : no clubbing  or cyanosis of the fingernails [Musculoskeletal - Swelling] : no joint swelling seen [Motor Tone] : muscle strength and tone were normal [Skin Turgor] : normal skin turgor [Memory Recent] : recent memory was not impaired [Memory Remote] : remote memory was not impaired

## 2020-11-13 NOTE — HISTORY OF PRESENT ILLNESS
[Currently Experiencing] : currently experiencing [Headache] : headache [Arthritis] : arthritis [Arthralgias] : arthralgias [None] : The patient is currently asymptomatic [Sicca] : no sicca [Rash] : no rash [Chest Pain] : no chest pain [Shortness of Breath] : no shortness of breath [Psychological Symptoms] : no psychological symptoms [Sun Sensitivity] : no sun sensitivity [FreeTextEntry1] : INTERVAL HX\par > is here for post hospitalization visit.  Developed acute onset of substernal chest pain with radiation to the left shoulder in the AM.. Went to [facility] and was found to have blockage requiring stent.  feels great now on a/c and statin.   denies cp, sob, arm pain\par > in terms of lupus - denies joitn pain or swelling.  feels good in terms of previous lupus symptoms.   adherent to the HCQ twice daily\par > in terms of nephritis and proteinuria.   in the hospital acute increase in the protein: creat ratio (8).  however no edema   adherent to MMF.  not taking the lisinopril however. received my message to get ultrasound and did it last night - awaiting results\par > walking now otherwise not lifting.  lost 8 pounds.   denies rash, edema, sob, de la o, muscle weakness, oral ulcers or joint pain\par > taking a leave from school [FreeTextEntry7] : +ELMO, +dsDNA

## 2020-11-14 LAB
ALBUMIN SERPL ELPH-MCNC: 3.8 G/DL
ALP BLD-CCNC: 88 U/L
ALT SERPL-CCNC: 27 U/L
ANION GAP SERPL CALC-SCNC: 15 MMOL/L
APPEARANCE: CLEAR
AST SERPL-CCNC: 21 U/L
BILIRUB SERPL-MCNC: 0.4 MG/DL
BILIRUBIN URINE: NEGATIVE
BLOOD URINE: ABNORMAL
BUN SERPL-MCNC: 9 MG/DL
C3 SERPL-MCNC: 158 MG/DL
C4 SERPL-MCNC: 43 MG/DL
CALCIUM SERPL-MCNC: 9.6 MG/DL
CHLORIDE SERPL-SCNC: 101 MMOL/L
CO2 SERPL-SCNC: 23 MMOL/L
COLOR: YELLOW
CREAT SERPL-MCNC: 0.76 MG/DL
CREAT SPEC-SCNC: 219 MG/DL
CREAT/PROT UR: 1.9 RATIO
CRP SERPL-MCNC: 0.57 MG/DL
ERYTHROCYTE [SEDIMENTATION RATE] IN BLOOD BY WESTERGREN METHOD: 77 MM/HR
GLUCOSE QUALITATIVE U: NEGATIVE
KETONES URINE: NEGATIVE
LEUKOCYTE ESTERASE URINE: NEGATIVE
NITRITE URINE: NEGATIVE
PH URINE: 6
POTASSIUM SERPL-SCNC: 3.9 MMOL/L
PROT SERPL-MCNC: 6.3 G/DL
PROT UR-MCNC: 422 MG/DL
PROTEIN URINE: ABNORMAL
SODIUM SERPL-SCNC: 140 MMOL/L
SPECIFIC GRAVITY URINE: 1.03
UROBILINOGEN URINE: NORMAL

## 2020-11-15 ENCOUNTER — NON-APPOINTMENT (OUTPATIENT)
Age: 25
End: 2020-11-15

## 2020-11-16 ENCOUNTER — TRANSCRIPTION ENCOUNTER (OUTPATIENT)
Age: 25
End: 2020-11-16

## 2020-11-17 ENCOUNTER — TRANSCRIPTION ENCOUNTER (OUTPATIENT)
Age: 25
End: 2020-11-17

## 2020-11-17 LAB — DSDNA AB SER-ACNC: 21 IU/ML

## 2020-12-01 ENCOUNTER — TRANSCRIPTION ENCOUNTER (OUTPATIENT)
Age: 25
End: 2020-12-01

## 2020-12-02 ENCOUNTER — TRANSCRIPTION ENCOUNTER (OUTPATIENT)
Age: 25
End: 2020-12-02

## 2020-12-02 ENCOUNTER — APPOINTMENT (OUTPATIENT)
Dept: NEPHROLOGY | Facility: CLINIC | Age: 25
End: 2020-12-02
Payer: MEDICAID

## 2020-12-02 VITALS — SYSTOLIC BLOOD PRESSURE: 130 MMHG | BODY MASS INDEX: 35.24 KG/M2 | WEIGHT: 225 LBS | DIASTOLIC BLOOD PRESSURE: 80 MMHG

## 2020-12-02 PROCEDURE — 99213 OFFICE O/P EST LOW 20 MIN: CPT | Mod: 95

## 2020-12-02 RX ORDER — LABETALOL HYDROCHLORIDE 200 MG/1
200 TABLET, FILM COATED ORAL 3 TIMES DAILY
Qty: 270 | Refills: 3 | Status: DISCONTINUED | COMMUNITY
Start: 2020-01-24 | End: 2020-12-02

## 2020-12-02 NOTE — ASSESSMENT
[FreeTextEntry1] : 1. Nephrotic range proteinuria in a patient with Class IV lupus nephritis.  Normal renal function.   To repeat Urine studies in 2 to 3 weeks but meanwhile can maximize ACEI, on only 5mg, can increase to 10mg for now and recheck crt in few weeks with K. BP should tolerate. \par 2. Hypertension. As above\par 3. Overall, on max MMF and for now no role for renal bx as recent cardiac event and on blood thinners. Proteinuria downtrending and crt stable with stable albumin as well\par \par f.u 6 weeks

## 2020-12-02 NOTE — REVIEW OF SYSTEMS
[Feeling Tired] : feeling tired [Negative] : Psychiatric [FreeTextEntry8] : Frothy urine [de-identified] : No rush

## 2020-12-02 NOTE — HISTORY OF PRESENT ILLNESS
[Home] : at home, [unfilled] , at the time of the visit. [Medical Office: ([facility])___] : at the medical office located in  [Verbal consent obtained from patient] : the patient, [unfilled] [FreeTextEntry1] :  In summary, the patient has a 3 yr history of SLE and two kidney biopsies, the last one in December of 2019 showed progression from focal proliferative to diffuse proliferative, with few crescents and a very low chronicity index.  He has been on 1 gram of CellCept 3 times per day, without side effects.  Recently his Urine protein creatinine ratio spiked up to almost 4.  His labs showed a normal creatinine and normal C3 and C4.  The patient admits of not taking the ACE inhibitor.  HIs vitamin D3 is low. \par He feels well although stressed by school and work.  No fever, no rush, no edema but frothy urine. \par BP at good range. Recently had a cardiac event and was in [facility] and had cardiac event now on blood thinners, statin and metoprolol. off labetelol now. He had a stent placed. \par His proteinuria is down to 1.2gm but still present. BP in 130SBP range.

## 2020-12-06 ENCOUNTER — NON-APPOINTMENT (OUTPATIENT)
Age: 25
End: 2020-12-06

## 2020-12-07 ENCOUNTER — TRANSCRIPTION ENCOUNTER (OUTPATIENT)
Age: 25
End: 2020-12-07

## 2021-01-20 ENCOUNTER — NON-APPOINTMENT (OUTPATIENT)
Age: 26
End: 2021-01-20

## 2021-02-18 ENCOUNTER — APPOINTMENT (OUTPATIENT)
Dept: RHEUMATOLOGY | Facility: CLINIC | Age: 26
End: 2021-02-18

## 2021-02-20 ENCOUNTER — TRANSCRIPTION ENCOUNTER (OUTPATIENT)
Age: 26
End: 2021-02-20

## 2021-06-11 ENCOUNTER — TRANSCRIPTION ENCOUNTER (OUTPATIENT)
Age: 26
End: 2021-06-11

## 2021-06-14 ENCOUNTER — TRANSCRIPTION ENCOUNTER (OUTPATIENT)
Age: 26
End: 2021-06-14

## 2021-06-15 ENCOUNTER — TRANSCRIPTION ENCOUNTER (OUTPATIENT)
Age: 26
End: 2021-06-15

## 2021-06-30 ENCOUNTER — APPOINTMENT (OUTPATIENT)
Dept: RHEUMATOLOGY | Facility: CLINIC | Age: 26
End: 2021-06-30

## 2021-07-06 ENCOUNTER — APPOINTMENT (OUTPATIENT)
Dept: RHEUMATOLOGY | Facility: CLINIC | Age: 26
End: 2021-07-06

## 2021-07-13 ENCOUNTER — APPOINTMENT (OUTPATIENT)
Dept: NEPHROLOGY | Facility: CLINIC | Age: 26
End: 2021-07-13
Payer: MEDICAID

## 2021-07-13 VITALS — DIASTOLIC BLOOD PRESSURE: 99 MMHG | SYSTOLIC BLOOD PRESSURE: 148 MMHG

## 2021-07-13 VITALS
HEART RATE: 95 BPM | SYSTOLIC BLOOD PRESSURE: 145 MMHG | OXYGEN SATURATION: 99 % | WEIGHT: 251.33 LBS | BODY MASS INDEX: 39.36 KG/M2 | DIASTOLIC BLOOD PRESSURE: 100 MMHG | TEMPERATURE: 97.4 F

## 2021-07-13 PROCEDURE — 99214 OFFICE O/P EST MOD 30 MIN: CPT | Mod: GC

## 2021-07-13 NOTE — HISTORY OF PRESENT ILLNESS
[FreeTextEntry1] : Mr. Yoo is a 25 y.o. Male with a pmhx of  SLE and has had two kidney biopsies, the last one in December of 2019 showed progression from focal proliferative to diffuse proliferative, with few crescents and a very low chronicity index. He has been on 1 gram of CellCept 3 times per day, without side effects. His Urine protein creatinine ratio spiked up to almost 4. His labs showed a normal creatinine and normal C3 and C4. The patient admits of not taking the ACE inhibitor. He is a student. BP has been well controlled. Recently had a cardiac event and was in [facility] and now on blood thinners, statin and metoprolol. He had a stent placed.  \par \par Since last visit in 12/2020, he is doing well, no recent ED visit, no change in medications. He got COVID vaccine x2. last Cr wnl, last prot/cr ratio 1.9g from 12/2020 -- Denies SOB, chest pain, hematuria, edema of LE, nausea, vomiting, dizziness, numbness, hematuria, frothy urine.

## 2021-07-13 NOTE — ASSESSMENT
[FreeTextEntry1] : 25 y.o. Male with a pmhx of  SLE and has had two kidney biopsies, the last one in December of 2019 showed progression from focal proliferative to diffuse proliferative, with few crescents and a very low chronicity index. \par \par 1. Nephrotic range proteinuria in a patient with Class IV lupus nephritis. \par Normal kidney function. \par last prot/cr ratio 1.9g\par on MMF 1gr tid - will continue at this time. \par continue  ACEI  10mg  \par will recheck renal panel and prot/cr ratio, UA\par \par 2. Hypertension.\par low salt diet \par BP well controlled\par c/w current meds \par \par f.u 6 weeks.

## 2021-07-13 NOTE — PHYSICAL EXAM
[General Appearance - Alert] : alert [General Appearance - In No Acute Distress] : in no acute distress [General Appearance - Well Nourished] : well nourished [General Appearance - Well Developed] : well developed [PERRL With Normal Accommodation] : pupils were equal in size, round, and reactive to light [Extraocular Movements] : extraocular movements were intact [] : the neck was supple [Neck Cervical Mass (___cm)] : no neck mass was observed [Jugular Venous Distention Increased] : there was no jugular-venous distention [Respiration, Rhythm And Depth] : normal respiratory rhythm and effort [Exaggerated Use Of Accessory Muscles For Inspiration] : no accessory muscle use [Auscultation Breath Sounds / Voice Sounds] : lungs were clear to auscultation bilaterally [Heart Sounds] : normal S1 and S2 [Heart Sounds Gallop] : no gallops [Murmurs] : no murmurs [Heart Sounds Pericardial Friction Rub] : no pericardial rub [Abdomen Soft] : soft [Abdomen Tenderness] : non-tender [Abdomen Mass (___ Cm)] : no abdominal mass palpated [No CVA Tenderness] : no ~M costovertebral angle tenderness [Nail Clubbing] : no clubbing  or cyanosis of the fingernails [Musculoskeletal - Swelling] : no joint swelling seen [Motor Tone] : muscle strength and tone were normal

## 2021-07-15 LAB
ALBUMIN SERPL ELPH-MCNC: 3.6 G/DL
ANION GAP SERPL CALC-SCNC: 11 MMOL/L
APPEARANCE: CLEAR
BACTERIA: NEGATIVE
BASOPHILS # BLD AUTO: 0.04 K/UL
BASOPHILS NFR BLD AUTO: 0.4 %
BILIRUBIN URINE: NEGATIVE
BLOOD URINE: ABNORMAL
BUN SERPL-MCNC: 12 MG/DL
C3 SERPL-MCNC: 107 MG/DL
C4 SERPL-MCNC: 16 MG/DL
CALCIUM SERPL-MCNC: 8.8 MG/DL
CHLORIDE SERPL-SCNC: 103 MMOL/L
CO2 SERPL-SCNC: 24 MMOL/L
COLOR: NORMAL
COVID-19 SPIKE DOMAIN ANTIBODY INTERPRETATION: POSITIVE
CREAT SERPL-MCNC: 0.84 MG/DL
CREAT SPEC-SCNC: 75 MG/DL
CREAT/PROT UR: 6.1 RATIO
DSDNA AB SER-ACNC: 14 IU/ML
EOSINOPHIL # BLD AUTO: 0.27 K/UL
EOSINOPHIL NFR BLD AUTO: 3 %
GLUCOSE QUALITATIVE U: NEGATIVE
GLUCOSE SERPL-MCNC: 136 MG/DL
HCT VFR BLD CALC: 47.5 %
HGB BLD-MCNC: 14.8 G/DL
HYALINE CASTS: 1 /LPF
IMM GRANULOCYTES NFR BLD AUTO: 0.9 %
KETONES URINE: NEGATIVE
LEUKOCYTE ESTERASE URINE: NEGATIVE
LYMPHOCYTES # BLD AUTO: 3.53 K/UL
LYMPHOCYTES NFR BLD AUTO: 39.1 %
MAN DIFF?: NORMAL
MCHC RBC-ENTMCNC: 26.1 PG
MCHC RBC-ENTMCNC: 31.2 GM/DL
MCV RBC AUTO: 83.6 FL
MICROSCOPIC-UA: NORMAL
MONOCYTES # BLD AUTO: 0.67 K/UL
MONOCYTES NFR BLD AUTO: 7.4 %
NEUTROPHILS # BLD AUTO: 4.44 K/UL
NEUTROPHILS NFR BLD AUTO: 49.2 %
NITRITE URINE: NEGATIVE
PH URINE: 7
PHOSPHATE SERPL-MCNC: 3.5 MG/DL
PLATELET # BLD AUTO: 260 K/UL
POTASSIUM SERPL-SCNC: 4.6 MMOL/L
PROT UR-MCNC: 458 MG/DL
PROTEIN URINE: ABNORMAL
RBC # BLD: 5.68 M/UL
RBC # FLD: 14.4 %
RED BLOOD CELLS URINE: 28 /HPF
SARS-COV-2 AB SERPL IA-ACNC: >250 U/ML
SODIUM SERPL-SCNC: 139 MMOL/L
SPECIFIC GRAVITY URINE: 1.02
SQUAMOUS EPITHELIAL CELLS: 1 /HPF
UROBILINOGEN URINE: NORMAL
WBC # FLD AUTO: 9.03 K/UL
WHITE BLOOD CELLS URINE: 1 /HPF

## 2021-07-16 ENCOUNTER — TRANSCRIPTION ENCOUNTER (OUTPATIENT)
Age: 26
End: 2021-07-16

## 2021-07-23 ENCOUNTER — APPOINTMENT (OUTPATIENT)
Dept: RHEUMATOLOGY | Facility: CLINIC | Age: 26
End: 2021-07-23
Payer: MEDICAID

## 2021-07-23 VITALS
HEART RATE: 86 BPM | DIASTOLIC BLOOD PRESSURE: 90 MMHG | WEIGHT: 247 LBS | HEIGHT: 78 IN | SYSTOLIC BLOOD PRESSURE: 133 MMHG | OXYGEN SATURATION: 97 % | BODY MASS INDEX: 28.58 KG/M2

## 2021-07-23 PROCEDURE — 99214 OFFICE O/P EST MOD 30 MIN: CPT

## 2021-07-23 NOTE — HISTORY OF PRESENT ILLNESS
[None] : The patient is currently asymptomatic [FreeTextEntry1] : INTERVAL HX\par > in terms of heart issues -  denies cp, sob, arm pain\par > in terms of lupus - denies joitn pain or swelling.  feels good in terms of previous lupus symptoms.   not adherent to the HCQ twice daily but is adherent to the MMF at 3 gms daily\par > in terms of nephritis and proteinuria.   worsening proteinuria still no edema.  patient feels like he doesn’t know if this is accurate because previously he had worsening edema \par > walking now otherwise not lifting. denies rash, edema, sob, de la o, muscle weakness, oral ulcers or joint pain\par > taking a leave from school

## 2021-07-23 NOTE — ASSESSMENT
[FreeTextEntry1] : 24 yo man SLE (2017) with LN  \par \par # SLE/MCTD\par diagnosed by class III / IV nephritis, myositis , positive ELMO, RNP, ds DNA positive, low complements.   currently on MMF and HCQ, though not adherent to the HCQ totally.   nephrology found worsening proteinuria.  patient never had his us for revt in the past.  no edema on exam.  is taking his lisinoprill.  Possible worsening lupus nephritis - though now the ds dna is good as are the complements. \par - continue MMF for now \par - rtx r/b/a discussed at length - paitnet is considering \par - restart hcq \par - given proteinuria and +Acl in the past - check renal us for rvt\par \par #hypertension.  \par - now metoprolol, lisinopril\par \par #vit d def. slowly improving\par - continue replace\par \par #medicine monitoring, long term use of meds\par - no NSAIDs\par - gi omeprazole\par - pcp proph bactrim - order sent\par - Quant tb negative may 2020 - check\par \par #social determinants \par - taking  a leave from school - will send in forms to complete

## 2021-07-23 NOTE — PHYSICAL EXAM
[General Appearance - Alert] : alert [General Appearance - In No Acute Distress] : in no acute distress [General Appearance - Well Nourished] : well nourished [General Appearance - Well Developed] : well developed [General Appearance - Well-Appearing] : healthy appearing [Sclera] : the sclera and conjunctiva were normal [Auscultation Breath Sounds / Voice Sounds] : lungs were clear to auscultation bilaterally [Heart Rate And Rhythm] : heart rate was normal and rhythm regular [Heart Sounds] : normal S1 and S2 [Heart Sounds Gallop] : no gallops [Murmurs] : no murmurs [Heart Sounds Pericardial Friction Rub] : no pericardial rub [Edema] : there was no peripheral edema [Cervical Lymph Nodes Enlarged Posterior Bilaterally] : posterior cervical [Cervical Lymph Nodes Enlarged Anterior Bilaterally] : anterior cervical [Supraclavicular Lymph Nodes Enlarged Bilaterally] : supraclavicular [No CVA Tenderness] : no ~M costovertebral angle tenderness [No Spinal Tenderness] : no spinal tenderness [Abnormal Walk] : normal gait [Nail Clubbing] : no clubbing  or cyanosis of the fingernails [Musculoskeletal - Swelling] : no joint swelling seen [Motor Tone] : muscle strength and tone were normal [Skin Color & Pigmentation] : normal skin color and pigmentation [Skin Turgor] : normal skin turgor [] : no rash [Oriented To Time, Place, And Person] : oriented to person, place, and time [Impaired Insight] : insight and judgment were intact [Affect] : the affect was normal [Mood] : the mood was normal [Memory Recent] : recent memory was not impaired [Memory Remote] : remote memory was not impaired [FreeTextEntry1] : no c/c/e/rp/lr

## 2021-07-24 LAB
25(OH)D3 SERPL-MCNC: 8.2 NG/ML
CD16+CD56+ CELLS # BLD: 178 /UL
CD16+CD56+ CELLS NFR BLD: 4 %
CD19 CELLS NFR BLD: 681 /UL
CD3 CELLS # BLD: 3015 /UL
CD3 CELLS NFR BLD: 77 %
CD3+CD4+ CELLS # BLD: 1158 /UL
CD3+CD4+ CELLS NFR BLD: 31 %
CD3+CD4+ CELLS/CD3+CD8+ CLL SPEC: 0.7 RATIO
CD3+CD8+ CELLS # SPEC: 1660 /UL
CD3+CD8+ CELLS NFR BLD: 44 %
CELLS.CD3-CD19+/CELLS IN BLOOD: 17 %
CK SERPL-CCNC: 186 U/L
CREAT SPEC-SCNC: 119 MG/DL
CREAT/PROT UR: 4.8 RATIO
HAV IGM SER QL: NONREACTIVE
HBV CORE IGG+IGM SER QL: NONREACTIVE
HBV CORE IGM SER QL: NONREACTIVE
HBV SURFACE AG SER QL: NONREACTIVE
HCV AB SER QL: NONREACTIVE
HCV S/CO RATIO: 0.22 S/CO
PROT UR-MCNC: 563 MG/DL

## 2021-07-26 LAB
CARDIOLIPIN AB SER IA-ACNC: NEGATIVE
M TB IFN-G BLD-IMP: NEGATIVE
QUANTIFERON TB PLUS MITOGEN MINUS NIL: 3.12 IU/ML
QUANTIFERON TB PLUS NIL: 0.03 IU/ML
QUANTIFERON TB PLUS TB1 MINUS NIL: -0.01 IU/ML
QUANTIFERON TB PLUS TB2 MINUS NIL: -0.01 IU/ML

## 2021-07-28 LAB
B2 GLYCOPROT1 IGA SERPL IA-ACNC: 5.2 SAU
B2 GLYCOPROT1 IGG SER-ACNC: <5 SGU
B2 GLYCOPROT1 IGM SER-ACNC: <5 SMU
CARDIOLIPIN IGM SER-MCNC: 7.9 MPL
CARDIOLIPIN IGM SER-MCNC: <5 GPL

## 2021-08-04 LAB
ALBUMIN MFR SERPL ELPH: 49 %
ALBUMIN SERPL-MCNC: 3 G/DL
ALBUMIN/GLOB SERPL: 1 RATIO
ALPHA1 GLOB MFR SERPL ELPH: 5 %
ALPHA1 GLOB SERPL ELPH-MCNC: 0.3 G/DL
ALPHA2 GLOB MFR SERPL ELPH: 16.5 %
ALPHA2 GLOB SERPL ELPH-MCNC: 1 G/DL
B-GLOBULIN MFR SERPL ELPH: 15.8 %
B-GLOBULIN SERPL ELPH-MCNC: 1 G/DL
DEPRECATED KAPPA LC FREE/LAMBDA SER: 0.94 RATIO
GAMMA GLOB FLD ELPH-MCNC: 0.8 G/DL
GAMMA GLOB MFR SERPL ELPH: 13.7 %
IGA SER QL IEP: 450 MG/DL
IGG SER QL IEP: 834 MG/DL
IGM SER QL IEP: 141 MG/DL
INTERPRETATION SERPL IEP-IMP: NORMAL
KAPPA LC CSF-MCNC: 2.78 MG/DL
KAPPA LC SERPL-MCNC: 2.61 MG/DL
M PROTEIN SPEC IFE-MCNC: NORMAL
PROT SERPL-MCNC: 6.1 G/DL
PROT SERPL-MCNC: 6.1 G/DL

## 2021-08-05 ENCOUNTER — TRANSCRIPTION ENCOUNTER (OUTPATIENT)
Age: 26
End: 2021-08-05

## 2021-08-06 ENCOUNTER — TRANSCRIPTION ENCOUNTER (OUTPATIENT)
Age: 26
End: 2021-08-06

## 2021-12-07 ENCOUNTER — APPOINTMENT (OUTPATIENT)
Dept: RHEUMATOLOGY | Facility: CLINIC | Age: 26
End: 2021-12-07
Payer: MEDICAID

## 2021-12-07 VITALS
WEIGHT: 255 LBS | SYSTOLIC BLOOD PRESSURE: 138 MMHG | HEIGHT: 68 IN | OXYGEN SATURATION: 97 % | BODY MASS INDEX: 38.65 KG/M2 | DIASTOLIC BLOOD PRESSURE: 88 MMHG | HEART RATE: 105 BPM

## 2021-12-07 PROCEDURE — 99214 OFFICE O/P EST MOD 30 MIN: CPT | Mod: 25

## 2021-12-07 NOTE — ASSESSMENT
[FreeTextEntry1] : 24 yo man SLE (2017) with LN  \par \par # SLE/MCTD\par diagnosed by class III / IV nephritis, myositis , positive ELMO, RNP, ds DNA positive, low complements.   currently on MMF and HCQ, though not adherent to the HCQ totally.   nephrology found worsening proteinuria.  patient never had his us for revt in the past.  no edema on exam.  is taking his lisinoprill.  Possible worsening lupus nephritis - though now the ds dna is good as are the complements.  previously on MMF.  now self-dc all meds.  however wants to restart if needed.  feels good \par -- check inflammatory marker s\par -- check activity monitoring labs \par -- will make treatment decisions based on labs - though patient willing to consider HCQ\par \par #hypertension.  \par was on metoprolol, lisinopril.  off meds - patient aware of risks - today 138/88\par \par #vit d def. slowly improving\par - continue replace\par \par #inc BMI \par patient up a bit in his weight - working out \par -- t/c weight loss if possible\par \par #medicine monitoring, long term use of meds\par -- no NSAIDs\par -- gi omeprazole\par -- pcp proph bactrim - order sent\par -- Quant tb negative may 2020 - check\par \par #social determinants \par -- lives part of the time in [facility] - for grad school \[place] -- [place] \[place] \par More than 50% of the encounter was spent counseling the patient on differential, workup, disease course and treatment/management.  Education was provided to the patient during this encounter.  All questions and concerns were addressed and answered.   The patient verbalized understanding and agreed to the plan. \par \par Patient has been instructed to call for an appointment if new symptoms develop.\par Patient has been instructed to make a followup appointment in 3 months.\par

## 2021-12-07 NOTE — HISTORY OF PRESENT ILLNESS
[None] : The patient is currently asymptomatic [FreeTextEntry1] : INTERVAL HX\par > in terms of heart issues -  denies cp, sob, arm pain\par > in terms of lupus - denies joitn pain or swelling.  feels good in terms of previous lupus symptoms.   stopped all meds in august - no particular reason - wants to try again to get back inot treatment\par > in terms of nephritis and proteinuria.   hasn’t seen renal\par > walking now otherwise not lifting. denies rash, edema, sob, de la o, muscle weakness, oral ulcers or joint pain

## 2021-12-09 ENCOUNTER — TRANSCRIPTION ENCOUNTER (OUTPATIENT)
Age: 26
End: 2021-12-09

## 2021-12-09 LAB
25(OH)D3 SERPL-MCNC: <5 NG/ML
ALBUMIN SERPL ELPH-MCNC: 3.3 G/DL
ALP BLD-CCNC: 103 U/L
ALT SERPL-CCNC: 37 U/L
ANION GAP SERPL CALC-SCNC: 14 MMOL/L
APPEARANCE: CLEAR
AST SERPL-CCNC: 27 U/L
BACTERIA: NEGATIVE
BASOPHILS # BLD AUTO: 0.03 K/UL
BASOPHILS NFR BLD AUTO: 0.3 %
BILIRUB SERPL-MCNC: 0.8 MG/DL
BILIRUBIN URINE: NEGATIVE
BLOOD URINE: ABNORMAL
BUN SERPL-MCNC: 10 MG/DL
C3 SERPL-MCNC: 169 MG/DL
C4 SERPL-MCNC: 42 MG/DL
CALCIUM SERPL-MCNC: 8.5 MG/DL
CHLORIDE SERPL-SCNC: 103 MMOL/L
CK SERPL-CCNC: 140 U/L
CO2 SERPL-SCNC: 21 MMOL/L
COLOR: YELLOW
CREAT SERPL-MCNC: 0.75 MG/DL
CREAT SPEC-SCNC: 237 MG/DL
CREAT/PROT UR: 4.7 RATIO
CRP SERPL-MCNC: 8 MG/L
DSDNA AB SER-ACNC: 14 IU/ML
EOSINOPHIL # BLD AUTO: 0.17 K/UL
EOSINOPHIL NFR BLD AUTO: 1.6 %
ERYTHROCYTE [SEDIMENTATION RATE] IN BLOOD BY WESTERGREN METHOD: 63 MM/HR
GLUCOSE QUALITATIVE U: NORMAL
GRANULAR CASTS: 3 /LPF
HCT VFR BLD CALC: 46.2 %
HGB BLD-MCNC: 15.5 G/DL
HYALINE CASTS: 6 /LPF
IMM GRANULOCYTES NFR BLD AUTO: 1 %
KETONES URINE: NEGATIVE
LEUKOCYTE ESTERASE URINE: NEGATIVE
LYMPHOCYTES # BLD AUTO: 3.91 K/UL
LYMPHOCYTES NFR BLD AUTO: 37.3 %
MAN DIFF?: NORMAL
MCHC RBC-ENTMCNC: 26.5 PG
MCHC RBC-ENTMCNC: 33.5 GM/DL
MCV RBC AUTO: 79 FL
MICROSCOPIC-UA: NORMAL
MONOCYTES # BLD AUTO: 0.69 K/UL
MONOCYTES NFR BLD AUTO: 6.6 %
NEUTROPHILS # BLD AUTO: 5.57 K/UL
NEUTROPHILS NFR BLD AUTO: 53.2 %
NITRITE URINE: NEGATIVE
PH URINE: 6
PLATELET # BLD AUTO: 280 K/UL
POTASSIUM SERPL-SCNC: 4 MMOL/L
PROT SERPL-MCNC: 5.9 G/DL
PROT UR-MCNC: 1110 MG/DL
PROTEIN URINE: ABNORMAL
RBC # BLD: 5.85 M/UL
RBC # FLD: 13.8 %
RED BLOOD CELLS URINE: 14 /HPF
SODIUM SERPL-SCNC: 137 MMOL/L
SPECIFIC GRAVITY URINE: 1.03
SQUAMOUS EPITHELIAL CELLS: 2 /HPF
UROBILINOGEN URINE: NORMAL
WBC # FLD AUTO: 10.47 K/UL
WHITE BLOOD CELLS URINE: 2 /HPF

## 2022-01-04 ENCOUNTER — TRANSCRIPTION ENCOUNTER (OUTPATIENT)
Age: 27
End: 2022-01-04

## 2022-01-05 ENCOUNTER — TRANSCRIPTION ENCOUNTER (OUTPATIENT)
Age: 27
End: 2022-01-05

## 2022-01-06 ENCOUNTER — TRANSCRIPTION ENCOUNTER (OUTPATIENT)
Age: 27
End: 2022-01-06

## 2022-01-06 ENCOUNTER — APPOINTMENT (OUTPATIENT)
Dept: RHEUMATOLOGY | Facility: CLINIC | Age: 27
End: 2022-01-06
Payer: MEDICAID

## 2022-01-06 PROCEDURE — 99214 OFFICE O/P EST MOD 30 MIN: CPT | Mod: 95

## 2022-01-06 NOTE — ASSESSMENT
[FreeTextEntry1] : 26 yo man SLE (2017) with LN  \par \par f/u TEB February 3 at 10:45 AM\par \par # SLE/MCTD\par diagnosed by class III / IV nephritis, myositis , positive ELMO, RNP, ds DNA positive, low complements.   currently on MMF and HCQ, though not adherent to the HCQ totally.   nephrology found worsening proteinuria.  patient never had his us for revt in the past.  no edema on exam.  is taking his lisinoprill.  Possible worsening lupus nephritis - though now the ds dna is good as are the complements.  previously on MMF.  \par -- check inflammatory markers \par -- check activity monitoring labs \par -- continue MMF at 3 tabs BID\par -- patient to connect with renal to ? CNI\par \par #hypertension.  \par was on metoprolol, lisinopril.  off meds - patient aware of risks - today 138/88\par \par #vit d def. slowly improving\par - continue replace\par \par #inc BMI \par patient up a bit in his weight - working out \par -- t/c weight loss if possible\par \par #medicine monitoring, long term use of meds\par -- no NSAIDs\par -- gi omeprazole\par -- pcp proph bactrim - order sent\par -- Quant tb negative may 2020 - check\par \par #social determinants \par -- lives part of the time in [facility] - for grad school \[place] \[place] \par More than 50% of the encounter was spent counseling the patient on differential, workup, disease course and treatment/management.  Education was provided to the patient during this encounter.  All questions and concerns were addressed and answered.   The patient verbalized understanding and agreed to the plan. \par \par Patient has been instructed to call for an appointment if new symptoms develop.\par Patient has been instructed to make a followup appointment in 1 months.\par

## 2022-01-06 NOTE — HISTORY OF PRESENT ILLNESS
[Home] : at home, [unfilled] , at the time of the visit. [Other Location: e.g. Home (Enter Location, City,State)___] : at [unfilled] [Verbal consent obtained from patient] : the patient, [unfilled] [None] : The patient is currently asymptomatic [FreeTextEntry1] : INTERVAL HX\par -feeling okay\par -no swelling, cp, joint pain \par -started the MMF and currently on 6 tablets a day - decided against the HCQ

## 2022-01-12 RX ORDER — RITUXIMAB 10 MG/ML
500 INJECTION, SOLUTION INTRAVENOUS
Qty: 2 | Refills: 0 | Status: DISCONTINUED | COMMUNITY
Start: 2021-07-26 | End: 2022-01-12

## 2022-02-16 ENCOUNTER — LABORATORY RESULT (OUTPATIENT)
Age: 27
End: 2022-02-16

## 2022-02-17 ENCOUNTER — APPOINTMENT (OUTPATIENT)
Dept: RHEUMATOLOGY | Facility: CLINIC | Age: 27
End: 2022-02-17
Payer: MEDICAID

## 2022-02-17 DIAGNOSIS — Z87.39 PERSONAL HISTORY OF OTHER DISEASES OF THE MUSCULOSKELETAL SYSTEM AND CONNECTIVE TISSUE: ICD-10-CM

## 2022-02-17 LAB
ALBUMIN SERPL ELPH-MCNC: 3.2 G/DL
ALP BLD-CCNC: 99 U/L
ALT SERPL-CCNC: 43 U/L
ANION GAP SERPL CALC-SCNC: 15 MMOL/L
APPEARANCE: CLEAR
AST SERPL-CCNC: 32 U/L
BACTERIA: NEGATIVE
BASOPHILS # BLD AUTO: 0.1 K/UL
BASOPHILS NFR BLD AUTO: 0.9 %
BILIRUB SERPL-MCNC: 0.8 MG/DL
BILIRUBIN URINE: NEGATIVE
BLOOD URINE: ABNORMAL
BUN SERPL-MCNC: 11 MG/DL
C3 SERPL-MCNC: 173 MG/DL
C4 SERPL-MCNC: 42 MG/DL
CALCIUM SERPL-MCNC: 8.7 MG/DL
CHLORIDE SERPL-SCNC: 101 MMOL/L
CO2 SERPL-SCNC: 22 MMOL/L
COLOR: YELLOW
CREAT SERPL-MCNC: 0.72 MG/DL
CREAT SPEC-SCNC: 192 MG/DL
CREAT/PROT UR: 6.2 RATIO
CRP SERPL-MCNC: 8 MG/L
EOSINOPHIL # BLD AUTO: 0.45 K/UL
EOSINOPHIL NFR BLD AUTO: 4.3 %
ERYTHROCYTE [SEDIMENTATION RATE] IN BLOOD BY WESTERGREN METHOD: 64 MM/HR
GLUCOSE QUALITATIVE U: NORMAL
HCT VFR BLD CALC: 47.7 %
HGB BLD-MCNC: 15.6 G/DL
HYALINE CASTS: 6 /LPF
KETONES URINE: NEGATIVE
LEUKOCYTE ESTERASE URINE: NEGATIVE
LYMPHOCYTES # BLD AUTO: 2.86 K/UL
LYMPHOCYTES NFR BLD AUTO: 27 %
MAN DIFF?: NORMAL
MCHC RBC-ENTMCNC: 26.5 PG
MCHC RBC-ENTMCNC: 32.7 GM/DL
MCV RBC AUTO: 81.1 FL
MICROSCOPIC-UA: NORMAL
MONOCYTES # BLD AUTO: 0.37 K/UL
MONOCYTES NFR BLD AUTO: 3.5 %
NEUTROPHILS # BLD AUTO: 6.53 K/UL
NEUTROPHILS NFR BLD AUTO: 61.7 %
NITRITE URINE: NEGATIVE
PH URINE: 7
PLATELET # BLD AUTO: 290 K/UL
POTASSIUM SERPL-SCNC: 4 MMOL/L
PROT SERPL-MCNC: 5.9 G/DL
PROT UR-MCNC: 1200 MG/DL
PROTEIN URINE: ABNORMAL
RBC # BLD: 5.88 M/UL
RBC # FLD: 13.6 %
RED BLOOD CELLS URINE: 48 /HPF
SODIUM SERPL-SCNC: 137 MMOL/L
SPECIFIC GRAVITY URINE: 1.03
SQUAMOUS EPITHELIAL CELLS: 2 /HPF
UROBILINOGEN URINE: NORMAL
WBC # FLD AUTO: 10.58 K/UL
WHITE BLOOD CELLS URINE: 2 /HPF

## 2022-02-17 PROCEDURE — 99443: CPT

## 2022-02-18 ENCOUNTER — TRANSCRIPTION ENCOUNTER (OUTPATIENT)
Age: 27
End: 2022-02-18

## 2022-02-18 LAB — DSDNA AB SER-ACNC: <12 IU/ML

## 2022-02-18 NOTE — HISTORY OF PRESENT ILLNESS
[Home] : at home, [unfilled] , at the time of the visit. [Other Location: e.g. Home (Enter Location, City,State)___] : at [unfilled] [Verbal consent obtained from patient] : the patient, [unfilled] [None] : The patient is currently asymptomatic [FreeTextEntry1] : I

## 2022-03-01 NOTE — ED CDU PROVIDER INITIAL DAY NOTE - CHIEF COMPLAINT
Patient Education     Understanding Ankle Sprain    The ankle is the joint where the leg and foot meet. Bones are held in place by connective tissue called ligaments. When ankle ligaments are stretched to the point of pain and injury, it is called an ankle sprain. A sprain can tear the ligaments. These tears can be very small but still cause pain. Ankle sprains can be mild or severe.  What causes an ankle sprain?  A sprain may occur when you twist your ankle or bend it too far. This can happen when you stumble or fall. Things that can make an ankle sprain more likely include:  · Having had an ankle sprain before  · Playing sports that involve running and jumping. Or playing contact sports such as football or hockey.  · Wearing shoes that don’t support your feet and ankles well  · Having ankles with poor strength and flexibility  Symptoms of an ankle sprain  Symptoms may include:  · Pain or soreness in the ankle  · Swelling  · Redness or bruising  · Not being able to walk or put weight on the affected foot  · Reduced range of motion in the ankle  · A popping or tearing feeling at the time the sprain occurs  · An abnormal or dislocated look to the ankle  · Instability or too much range of motion in the ankle  Treatment for an ankle sprain  Treatment focuses on reducing pain and swelling, and avoiding further injury. Treatments may include:  · Resting the ankle. Avoid putting weight on it. This may mean using crutches until the sprain heals.  · Prescription or over-the-counter pain medicines. These help reduce swelling and pain.  · Cold packs. These help reduce pain and swelling.  · Raising your ankle above your heart. This helps reduce swelling.  · Wrapping the ankle with an elastic bandage or ankle brace. This helps reduce swelling and gives some support to the ankle. In rare cases, you may need a cast or boot.  · Stretching and other exercises. These improve flexibility and strength.  · Heat packs. These may be  recommended before doing ankle exercises.  Possible complications of an ankle sprain  An ankle that has been weakened by a sprain can be more likely to have repeated sprains afterward. Doing exercises to strengthen your ankle and improve balance can reduce your risk for repeated sprains. Other possible complications are long-term (chronic) pain or an ankle that remains unstable.  When to call your healthcare provider  Call your healthcare provider right away if you have any of these:  · Fever of 100.4°F (38°C) or higher, or as directed  · Pain, numbness, discoloration, or coldness in the foot or toes  · Pain that gets worse  · Symptoms that don’t get better, or get worse  · New symptoms   Date Last Reviewed: 3/10/2016  © 7003-3055 [employer], [facility]. [address] [place] [address], [place] [address]. All rights reserved. This information is not intended as a substitute for professional medical care. Always follow your healthcare professional's instructions.            The patient is a 24y Male complaining of abnormal lab result.

## 2022-03-10 ENCOUNTER — APPOINTMENT (OUTPATIENT)
Dept: RHEUMATOLOGY | Facility: CLINIC | Age: 27
End: 2022-03-10

## 2022-03-14 ENCOUNTER — TRANSCRIPTION ENCOUNTER (OUTPATIENT)
Age: 27
End: 2022-03-14

## 2022-03-18 ENCOUNTER — APPOINTMENT (OUTPATIENT)
Dept: NEPHROLOGY | Facility: CLINIC | Age: 27
End: 2022-03-18

## 2022-03-21 ENCOUNTER — LABORATORY RESULT (OUTPATIENT)
Age: 27
End: 2022-03-21

## 2022-03-21 ENCOUNTER — APPOINTMENT (OUTPATIENT)
Dept: NEPHROLOGY | Facility: CLINIC | Age: 27
End: 2022-03-21
Payer: MEDICAID

## 2022-03-21 VITALS
DIASTOLIC BLOOD PRESSURE: 97 MMHG | WEIGHT: 264.55 LBS | OXYGEN SATURATION: 97 % | SYSTOLIC BLOOD PRESSURE: 141 MMHG | BODY MASS INDEX: 40.09 KG/M2 | HEIGHT: 68 IN | HEART RATE: 112 BPM | TEMPERATURE: 97.7 F

## 2022-03-21 PROCEDURE — 99214 OFFICE O/P EST MOD 30 MIN: CPT

## 2022-03-21 RX ORDER — LISINOPRIL 10 MG/1
10 TABLET ORAL DAILY
Qty: 90 | Refills: 3 | Status: DISCONTINUED | COMMUNITY
Start: 2019-03-14 | End: 2022-03-21

## 2022-03-21 NOTE — PHYSICAL EXAM
[General Appearance - Alert] : alert [General Appearance - In No Acute Distress] : in no acute distress [Outer Ear] : the ears and nose were normal in appearance [Oropharynx] : the oropharynx was normal [Neck Appearance] : the appearance of the neck was normal [Neck Cervical Mass (___cm)] : no neck mass was observed [Jugular Venous Distention Increased] : there was no jugular-venous distention [Thyroid Diffuse Enlargement] : the thyroid was not enlarged [Thyroid Nodule] : there were no palpable thyroid nodules [] : no respiratory distress [Auscultation Breath Sounds / Voice Sounds] : lungs were clear to auscultation bilaterally [Heart Rate And Rhythm] : heart rate was normal and rhythm regular [Heart Sounds] : normal S1 and S2 [Heart Sounds Gallop] : no gallops [Murmurs] : no murmurs [Heart Sounds Pericardial Friction Rub] : no pericardial rub [Edema] : there was no peripheral edema [No CVA Tenderness] : no ~M costovertebral angle tenderness [Oriented To Time, Place, And Person] : oriented to person, place, and time

## 2022-03-21 NOTE — ASSESSMENT
[FreeTextEntry1] : 1. Nephrotic range proteinuria in a patient with Class IV lupus nephritis.  Normal renal function.   \par Now back on all meds and hopefully responding\par May require voclosporin in addition ot MMF if cannot get proteunuria down\par Change ACEi to ARB as he did not tolerate lisinopril\par \par 2. Hypertension. As above\par 3. Avoid recreational drugs as advised to him\par \par f.u 3 months

## 2022-03-21 NOTE — HISTORY OF PRESENT ILLNESS
[FreeTextEntry1] :  In summary, the patient has a 3.5 yr history of SLE and two kidney biopsies, the last one in December of 2019 showed progression from focal proliferative to diffuse proliferative, with few crescents and a very low chronicity index.  He has been on 1 gram of CellCept 3 times per day, without side effects. \par \par Since last visit, started using "recreational drugs" per mother and stopped taking his meds. His proteinuria tara to 6+ grams. He is now back on MMF and not taking lisinopril. He now understands importance of it. \par \par No n/v. No decrease in appetite, no termors. No edema worsening. no decreased sleep. No foamy urine. no hematuria, no dysuria. no confusion. No SOB, WILLS. No wt loss.\par

## 2022-03-21 NOTE — REVIEW OF SYSTEMS
[Feeling Tired] : feeling tired [Negative] : Psychiatric [FreeTextEntry8] : Frothy urine [de-identified] : No rush

## 2022-03-24 LAB
25(OH)D3 SERPL-MCNC: <5 NG/ML
ALBUMIN SERPL ELPH-MCNC: 3.3 G/DL
ANION GAP SERPL CALC-SCNC: 17 MMOL/L
APPEARANCE: CLEAR
BACTERIA: NEGATIVE
BASOPHILS # BLD AUTO: 0.04 K/UL
BASOPHILS NFR BLD AUTO: 0.4 %
BILIRUBIN URINE: NEGATIVE
BLOOD URINE: ABNORMAL
BUN SERPL-MCNC: 10 MG/DL
C3 SERPL-MCNC: 190 MG/DL
C4 SERPL-MCNC: 43 MG/DL
CALCIUM SERPL-MCNC: 8.9 MG/DL
CALCIUM SERPL-MCNC: 8.9 MG/DL
CHLORIDE SERPL-SCNC: 99 MMOL/L
CO2 SERPL-SCNC: 20 MMOL/L
COLOR: YELLOW
CREAT SERPL-MCNC: 0.75 MG/DL
CREAT SPEC-SCNC: 326 MG/DL
CREAT/PROT UR: 4.8 RATIO
DSDNA AB SER-ACNC: 13 IU/ML
EGFR: 126 ML/MIN/1.73M2
EOSINOPHIL # BLD AUTO: 0.2 K/UL
EOSINOPHIL NFR BLD AUTO: 1.9 %
GLUCOSE QUALITATIVE U: NORMAL
GLUCOSE SERPL-MCNC: 187 MG/DL
HCT VFR BLD CALC: 48.9 %
HGB BLD-MCNC: 15.8 G/DL
HYALINE CASTS: 3 /LPF
IMM GRANULOCYTES NFR BLD AUTO: 0.8 %
KETONES URINE: NEGATIVE
LEUKOCYTE ESTERASE URINE: NEGATIVE
LYMPHOCYTES # BLD AUTO: 4.64 K/UL
LYMPHOCYTES NFR BLD AUTO: 43.2 %
MAN DIFF?: NORMAL
MCHC RBC-ENTMCNC: 26.7 PG
MCHC RBC-ENTMCNC: 32.3 GM/DL
MCV RBC AUTO: 82.6 FL
MICROSCOPIC-UA: NORMAL
MONOCYTES # BLD AUTO: 0.53 K/UL
MONOCYTES NFR BLD AUTO: 4.9 %
NEUTROPHILS # BLD AUTO: 5.23 K/UL
NEUTROPHILS NFR BLD AUTO: 48.8 %
NITRITE URINE: NEGATIVE
PARATHYROID HORMONE INTACT: 30 PG/ML
PH URINE: 6.5
PHOSPHATE SERPL-MCNC: 3.9 MG/DL
PLATELET # BLD AUTO: 287 K/UL
POTASSIUM SERPL-SCNC: 4 MMOL/L
PROT UR-MCNC: 1574 MG/DL
PROTEIN URINE: ABNORMAL
RBC # BLD: 5.92 M/UL
RBC # FLD: 14 %
RED BLOOD CELLS URINE: 5 /HPF
SODIUM SERPL-SCNC: 136 MMOL/L
SPECIFIC GRAVITY URINE: 1.04
SQUAMOUS EPITHELIAL CELLS: 4 /HPF
UROBILINOGEN URINE: NORMAL
WBC # FLD AUTO: 10.73 K/UL
WHITE BLOOD CELLS URINE: 4 /HPF

## 2022-03-28 ENCOUNTER — APPOINTMENT (OUTPATIENT)
Dept: RHEUMATOLOGY | Facility: CLINIC | Age: 27
End: 2022-03-28
Payer: MEDICAID

## 2022-03-28 VITALS
HEART RATE: 92 BPM | OXYGEN SATURATION: 98 % | SYSTOLIC BLOOD PRESSURE: 120 MMHG | BODY MASS INDEX: 40.32 KG/M2 | HEIGHT: 68 IN | WEIGHT: 266 LBS | TEMPERATURE: 97.6 F | DIASTOLIC BLOOD PRESSURE: 70 MMHG

## 2022-03-28 DIAGNOSIS — Z71.9 COUNSELING, UNSPECIFIED: ICD-10-CM

## 2022-03-28 DIAGNOSIS — Z91.19 PATIENT'S NONCOMPLIANCE WITH OTHER MEDICAL TREATMENT AND REGIMEN: ICD-10-CM

## 2022-03-28 PROCEDURE — 99214 OFFICE O/P EST MOD 30 MIN: CPT

## 2022-03-29 PROBLEM — Z71.9 ENCOUNTER FOR EDUCATION: Status: ACTIVE | Noted: 2022-03-29

## 2022-03-29 PROBLEM — Z91.19 NON-ADHERENCE TO MEDICAL TREATMENT: Status: ACTIVE | Noted: 2022-03-29

## 2022-03-29 LAB
ANA PAT FLD IF-IMP: ABNORMAL
ANA SER IF-ACNC: ABNORMAL

## 2022-03-29 NOTE — ASSESSMENT
[FreeTextEntry1] : 25 yo man SLE (2017) with LN  \par \par # SLE/MCTD\par diagnosed by class III / IV nephritis, myositis , positive ELMO, RNP, ds DNA positive, low complements.   currently on MMF and HCQ, though not adherent to the HCQ totally.   nephrology found worsening proteinuria.  patient never had his us for revt in the past.  no edema on exam.  is taking his lisinoprill.  Possible worsening lupus nephritis - though now the ds dna is good as are the complements.  previously on MMF.  restarted MMF with adherence since last visit and declined rtx.   proteinuria increased without change to the inflammatory markers.  Hasnt had OV with renal for CNI.  d/w patietn at length non-adherence and risk for kidney demise and death particualrly given his cardiac issues.   d/w aptietn rtx as an option without pills but he declind in the past 2/2 concern over infectios.  furthermore, discussed risk of covid ab response while on rtx.  patient wants to try MMF and will even start the HCQ at this time.  He will d/w dr king and alternate to lisinopril as well as a CNI.  reviweed b/w done with dr. can and already showing improviement. patient notes he is now adherent to his meds \par -- appreciate renal note\par -- continue MMF at 3 tabs BID\par -- continue HCQ\par -- patient to connect with renal to ? CNI\par \par #hypertension.  \par was on metoprolol, lisinopril.  off meds - patient aware of risks - today 138/88\par \par #vit d def. slowly improving\par - continue replace\par \par #inc BMI \par patient up a bit in his weight - working out \par -- t/c weight loss if possible\par \par #medicine monitoring, long term use of meds\par -- no NSAIDs\par -- gi omeprazole\par -- pcp proph bactrim - order sent\par -- Quant tb negative July 2021\par -- hepatitis negative July 2021\par \par #social determinants \par -- lives part of the time in [facility] - for grad school \[place] -- [place] \[place] -- d/w patient at length adherence issues.   he has expressed understanding at the issues relate dto his adherence lately.  he understands the risk of permanent organ damage and death without appropriate treatment.    he expressed a willingness to continue meds and care for now and will come back from [place] intermittently for treatment and ov\par \par #COVID \par s/p vaccine and booster\par \par \par More than 50% of the encounter was spent counseling the patient on differential, workup, disease course and treatment/management.  Education was provided to the patient during this encounter.  All questions and concerns were addressed and answered.   The patient verbalized understanding and agreed to the plan. \par \par Patient has been instructed to call for an appointment if new symptoms develop.\par Patient has been instructed to make a followup appointment in 3 months.\par \par Time spent on the encounter included, but is not limited to, preparing to see the patient, obtaining and/or reviewing separately obtained history, performing the evaluation, counseling and educating, independently interpreting results with communication to patient, order placement, referring and/or communicating with other health professionals as described, and documenting clinical information in the electronic health record\par \par

## 2022-03-29 NOTE — PHYSICAL EXAM
[General Appearance - Alert] : alert [General Appearance - In No Acute Distress] : in no acute distress [Auscultation Breath Sounds / Voice Sounds] : lungs were clear to auscultation bilaterally [Heart Rate And Rhythm] : heart rate was normal and rhythm regular [Heart Sounds] : normal S1 and S2 [Heart Sounds Gallop] : no gallops [Murmurs] : no murmurs [Heart Sounds Pericardial Friction Rub] : no pericardial rub [Edema] : there was no peripheral edema [FreeTextEntry1] : no RP, LR, C, C, E [Abnormal Walk] : normal gait [Nail Clubbing] : no clubbing  or cyanosis of the fingernails [Musculoskeletal - Swelling] : no joint swelling seen [Motor Tone] : muscle strength and tone were normal [Skin Color & Pigmentation] : normal skin color and pigmentation [Skin Turgor] : normal skin turgor [] : no rash [No Focal Deficits] : no focal deficits [Oriented To Time, Place, And Person] : oriented to person, place, and time [Impaired Insight] : insight and judgment were intact [Affect] : the affect was normal

## 2022-03-29 NOTE — HISTORY OF PRESENT ILLNESS
[None] : The patient is currently asymptomatic [FreeTextEntry1] : INTERVAL HX \par -feels well - no swelling of the legs \par - has been adherent to his meds and also saw the neprhologist\par - he feels like his non-adherence was a causal factor in his flare.\par - he restarted the plaquenil and switched and is not taking his arb\par \par Rheum ROS \par - denies RP, sicca, oral ulcers, rashes, photosensitivity.   \par - denies constitutional symptoms, fatigue, night sweats.  weight is stable \par - Denies psoriasis, IBD, Inflammatory eye disease, STD, infectious diarrhea\par - breathes well without h/o of pleuritis, pericarditis.  renal function is normal and urine is not frothy\par - muscles are strong and there is no neurologic issues

## 2022-03-30 ENCOUNTER — TRANSCRIPTION ENCOUNTER (OUTPATIENT)
Age: 27
End: 2022-03-30

## 2022-04-11 PROBLEM — Z11.59 SCREENING FOR VIRAL DISEASE: Status: ACTIVE | Noted: 2020-09-23

## 2022-04-29 ENCOUNTER — APPOINTMENT (OUTPATIENT)
Dept: RHEUMATOLOGY | Facility: CLINIC | Age: 27
End: 2022-04-29

## 2022-05-02 ENCOUNTER — NON-APPOINTMENT (OUTPATIENT)
Age: 27
End: 2022-05-02

## 2022-05-03 ENCOUNTER — TRANSCRIPTION ENCOUNTER (OUTPATIENT)
Age: 27
End: 2022-05-03

## 2022-05-03 LAB
ALBUMIN SERPL ELPH-MCNC: 3.8 G/DL
ALP BLD-CCNC: 119 U/L
ALT SERPL-CCNC: 95 U/L
ANION GAP SERPL CALC-SCNC: 17 MMOL/L
AST SERPL-CCNC: 69 U/L
BASOPHILS # BLD AUTO: 0.06 K/UL
BASOPHILS NFR BLD AUTO: 0.6 %
BILIRUB SERPL-MCNC: 1.1 MG/DL
BUN SERPL-MCNC: 13 MG/DL
CALCIUM SERPL-MCNC: 9.2 MG/DL
CHLORIDE SERPL-SCNC: 98 MMOL/L
CO2 SERPL-SCNC: 24 MMOL/L
CREAT SERPL-MCNC: 0.92 MG/DL
CREAT SPEC-SCNC: 231 MG/DL
CREAT/PROT UR: 3 RATIO
CRP SERPL-MCNC: 9 MG/L
EGFR: 116 ML/MIN/1.73M2
EOSINOPHIL # BLD AUTO: 0.18 K/UL
EOSINOPHIL NFR BLD AUTO: 1.8 %
HCT VFR BLD CALC: 50.1 %
HGB BLD-MCNC: 16.7 G/DL
IMM GRANULOCYTES NFR BLD AUTO: 0.6 %
LYMPHOCYTES # BLD AUTO: 4.43 K/UL
LYMPHOCYTES NFR BLD AUTO: 45.3 %
MAN DIFF?: NORMAL
MCHC RBC-ENTMCNC: 27.2 PG
MCHC RBC-ENTMCNC: 33.3 GM/DL
MCV RBC AUTO: 81.5 FL
MONOCYTES # BLD AUTO: 0.74 K/UL
MONOCYTES NFR BLD AUTO: 7.6 %
NEUTROPHILS # BLD AUTO: 4.31 K/UL
NEUTROPHILS NFR BLD AUTO: 44.1 %
PLATELET # BLD AUTO: 336 K/UL
POTASSIUM SERPL-SCNC: 4.3 MMOL/L
PROT SERPL-MCNC: 6.9 G/DL
PROT UR-MCNC: 682 MG/DL
RBC # BLD: 6.15 M/UL
RBC # FLD: 13.9 %
SODIUM SERPL-SCNC: 139 MMOL/L
WBC # FLD AUTO: 9.78 K/UL

## 2022-05-04 ENCOUNTER — TRANSCRIPTION ENCOUNTER (OUTPATIENT)
Age: 27
End: 2022-05-04

## 2022-05-04 ENCOUNTER — APPOINTMENT (OUTPATIENT)
Dept: RHEUMATOLOGY | Facility: CLINIC | Age: 27
End: 2022-05-04
Payer: MEDICAID

## 2022-05-04 DIAGNOSIS — R74.8 ABNORMAL LEVELS OF OTHER SERUM ENZYMES: ICD-10-CM

## 2022-05-04 DIAGNOSIS — T88.7XXA UNSPECIFIED ADVERSE EFFECT OF DRUG OR MEDICAMENT, INITIAL ENCOUNTER: ICD-10-CM

## 2022-05-04 LAB
25(OH)D3 SERPL-MCNC: 6.3 NG/ML
APPEARANCE: CLEAR
BACTERIA: NEGATIVE
BILIRUBIN URINE: NEGATIVE
BLOOD URINE: ABNORMAL
C3 SERPL-MCNC: 210 MG/DL
C4 SERPL-MCNC: 48 MG/DL
CHROMATIN AB SERPL-ACNC: 0.4 AL
COLOR: YELLOW
ERYTHROCYTE [SEDIMENTATION RATE] IN BLOOD BY WESTERGREN METHOD: 49 MM/HR
GLUCOSE QUALITATIVE U: NEGATIVE
HYALINE CASTS: 4 /LPF
KETONES URINE: NEGATIVE
LEUKOCYTE ESTERASE URINE: NEGATIVE
MICROSCOPIC-UA: NORMAL
NITRITE URINE: NEGATIVE
PH URINE: 6.5
PROTEIN URINE: ABNORMAL
RED BLOOD CELLS URINE: 36 /HPF
SPECIFIC GRAVITY URINE: 1.03
SQUAMOUS EPITHELIAL CELLS: 2 /HPF
UROBILINOGEN URINE: NORMAL
WHITE BLOOD CELLS URINE: 2 /HPF

## 2022-05-04 PROCEDURE — 99214 OFFICE O/P EST MOD 30 MIN: CPT | Mod: 95

## 2022-05-04 NOTE — HISTORY OF PRESENT ILLNESS
[Home] : at home, [unfilled] , at the time of the visit. [Other Location: e.g. 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   \par - denies constitutional symptoms, fatigue, night sweats.  weight is stable \par - Denies psoriasis, IBD, Inflammatory eye disease, STD, infectious diarrhea\par - breathes well without h/o of pleuritis, pericarditis.  renal function is normal and urine is not frothy\par - muscles are strong and there is no neurologic issues [None] : The patient is currently asymptomatic

## 2022-05-04 NOTE — ASSESSMENT
[FreeTextEntry1] : 27 yo man SLE (2017) with LN  \par \par - [place] may 27 at 9:30 AM - in person \par \par # SLE/MCTD\par diagnosed by class III / IV nephritis, myositis , positive ELMO, RNP, ds DNA positive, low complements.   currently on MMF and HCQ, though not adherent to the HCQ totally.   nephrology found worsening proteinuria.  patient never had his us for revt in the past.  no edema on exam.  is taking his lisinoprill.  Possible worsening lupus nephritis - though now the ds dna is good as are the complements.  previously on MMF.  restarted MMF with adherence since last visit and declined rtx.   proteinuria increased without change to the inflammatory markers.  Hasnt had OV with renal for CNI.  d/w patietn at length non-adherence and risk for kidney demise and death particualrly given his cardiac issues.   d/w aptietn rtx as an option without pills but he declind in the past 2/2 concern over infectios.  furthermore, discussed risk of covid ab response while on rtx.  patient wants to try MMF and will even start the HCQ at this time.  He will d/w dr king and alternate to lisinopril as well as a CNI.  reviweed b/w done with dr. can and already showing improviement. patient notes he is now adherent to his meds \par -- appreciate renal note\par -- continue MMF at 3 tabs BID - adherent over the last month\par -- continue HCQ - adherent over the las month\par -- patient to connect with renal to ? CNI\par -- t/c voclosporin in addition to MMF if cannot get proteinuria down\par -- changed ACEi to ARB and adherent\par -- started the torsemide per renal\par \par #hypertension.  \par was on metoprolol, lisinopril.  off meds - patient aware of risks - today 138/88\par \par #vit d def. slowly improving\par - continue replace\par \par #inc BMI \par patient up a bit in his weight - working out \par -- t/c weight loss if possible\par \par #medicine monitoring, long term use of meds\par -- no NSAIDs\par -- gi omeprazole\par -- pcp proph bactrim - order sent\par -- Quant tb negative July 2021\par -- hepatitis negative July 2021\par \par #social determinants \par -- lives part of the time in [facility] - for grad school \[place] -- [place] \par -- d/w patient at length adherence issues.   he has expressed understanding at the issues relate dto his adherence lately.  he understands the risk of permanent organ damage and death without appropriate treatment.    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  Education was provided to the patient during this encounter.  All questions and concerns were addressed and answered.   The patient verbalized understanding and agreed to the plan. \par \par Patient has been instructed to call for an appointment if new symptoms develop.\par Patient has been instructed to make a followup appointment in 1 months.\par \par Time spent on the encounter included, but is not limited to, preparing to see the patient, obtaining and/or reviewing separately obtained history, performing the evaluation, counseling and educating, independently interpreting results with communication to patient, order placement, referring and/or communicating with other health professionals as described, and documenting clinical information in the electronic health record\par \par

## 2022-05-05 LAB — CK SERPL-CCNC: 126 U/L

## 2022-05-06 LAB — DSDNA AB SER-ACNC: 17 IU/ML

## 2022-05-27 ENCOUNTER — APPOINTMENT (OUTPATIENT)
Dept: RHEUMATOLOGY | Facility: CLINIC | Age: 27
End: 2022-05-27

## 2022-07-05 ENCOUNTER — APPOINTMENT (OUTPATIENT)
Dept: RHEUMATOLOGY | Facility: CLINIC | Age: 27
End: 2022-07-05

## 2022-07-14 ENCOUNTER — APPOINTMENT (OUTPATIENT)
Dept: NEPHROLOGY | Facility: CLINIC | Age: 27
End: 2022-07-14

## 2023-03-02 ENCOUNTER — TRANSCRIPTION ENCOUNTER (OUTPATIENT)
Age: 28
End: 2023-03-02

## 2023-04-11 ENCOUNTER — APPOINTMENT (OUTPATIENT)
Dept: RHEUMATOLOGY | Facility: CLINIC | Age: 28
End: 2023-04-11

## 2023-08-17 NOTE — PROGRESS NOTE ADULT - PROBLEM SELECTOR PLAN 2
-on trial of solumedrol (received 100mg x 1)  -house rheum eval appreciated  -dsDNA, monica ordered  -C3/C4 nl  -given that Plaquenil can cause myocarditis, will hold for now. C/w Cellcept   -cardiac MRI showing epicardial/transmural late gadolinium enhancement within inferior wall from base to mid-level suspicious for myocarditis. Unique Flap 1 Text: An Island Pedicle Transposition Flap was designed. Local anesthesia was obtained. The flap was incised to the underlying adipose tissue except proximally where only epidermis was removed, and the tissue surrounding the operative site was undermined extensively with blunt scissor dissection. The flap was also undermined in the subcutaneous fat at the inferior pole to increase mobility leaving the flap based on a single superior pedicle with a muscular base. Hemostasis was obtained using electro-coagulation. The flap was transposed and secured into place with Prolene sutures. The superficial fascia and subcutaneous layers of the donor site were closed with buried vertical mattress sutures. The epidermis was then approximated with Prolene sutures. Careful attention was given to eversion and even approximation of the edges. A pressure dressing was applied.

## 2023-09-15 ENCOUNTER — APPOINTMENT (OUTPATIENT)
Age: 28
End: 2023-09-15

## 2023-10-13 ENCOUNTER — APPOINTMENT (OUTPATIENT)
Age: 28
End: 2023-10-13
Payer: COMMERCIAL

## 2023-10-13 PROCEDURE — D7210: CPT

## 2023-10-17 NOTE — HISTORY OF PRESENT ILLNESS
She will need to see hematology.  I will make referral to hematology [Sicca] : no sicca [Rash] : no rash [Chest Pain] : no chest pain [Shortness of Breath] : no shortness of breath [Psychological Symptoms] : no psychological symptoms [Sun Sensitivity] : no sun sensitivity [FreeTextEntry1] : INTERVAL HX\par - was in [place] in school.  Working remotely and no sick contacts. no exposures to COVID\par - worsening headaches and fatigue - started Monday through Wednesday (more severe).  Today seems slightly better though. \par - no fevers or chills- breathing comfortable\par - denies muscle weakness\par - no swelling in the legs at all\par - wasn’t able to check bloodpressure\par - just returned from [place] this AM - on the way to the lab to get the blood and urine that we discussed\par - no COVID exposures - was learning remote\par - didn’t run out of meds\par - MMF 3 BID\par - HCQ 2 daily\par - adherent to meds\par - denies rash, edema, sob, de la o, muscle weakness, oral ulcers or joint pain [FreeTextEntry7] : +ELMO, +dsDNA [Other Location: e.g. School (Enter Location, City,State)___] : at [unfilled], at the time of the visit.

## 2023-10-20 ENCOUNTER — APPOINTMENT (OUTPATIENT)
Age: 28
End: 2023-10-20

## 2023-10-27 ENCOUNTER — APPOINTMENT (OUTPATIENT)
Age: 28
End: 2023-10-27
Payer: COMMERCIAL

## 2023-10-27 PROCEDURE — D0170: CPT

## 2023-11-02 ENCOUNTER — APPOINTMENT (OUTPATIENT)
Dept: ORTHOPEDIC SURGERY | Facility: CLINIC | Age: 28
End: 2023-11-02

## 2024-01-26 ENCOUNTER — APPOINTMENT (OUTPATIENT)
Age: 29
End: 2024-01-26

## 2024-03-18 ENCOUNTER — TRANSCRIPTION ENCOUNTER (OUTPATIENT)
Age: 29
End: 2024-03-18

## 2024-03-19 ENCOUNTER — TRANSCRIPTION ENCOUNTER (OUTPATIENT)
Age: 29
End: 2024-03-19

## 2024-03-20 NOTE — PHYSICAL EXAM
[General Appearance - Alert] : alert [General Appearance - In No Acute Distress] : in no acute distress [Auscultation Breath Sounds / Voice Sounds] : lungs were clear to auscultation bilaterally [Abnormal Walk] : normal gait [Nail Clubbing] : no clubbing  or cyanosis of the fingernails [Musculoskeletal - Swelling] : no joint swelling seen [Motor Tone] : muscle strength and tone were normal [Skin Color & Pigmentation] : normal skin color and pigmentation [Skin Turgor] : normal skin turgor [] : no rash [Oriented To Time, Place, And Person] : oriented to person, place, and time [Impaired Insight] : insight and judgment were intact [Affect] : the affect was normal

## 2024-03-22 ENCOUNTER — LABORATORY RESULT (OUTPATIENT)
Age: 29
End: 2024-03-22

## 2024-03-22 ENCOUNTER — APPOINTMENT (OUTPATIENT)
Dept: RHEUMATOLOGY | Facility: CLINIC | Age: 29
End: 2024-03-22
Payer: MEDICAID

## 2024-03-22 VITALS
HEART RATE: 104 BPM | BODY MASS INDEX: 37.44 KG/M2 | HEIGHT: 68 IN | SYSTOLIC BLOOD PRESSURE: 138 MMHG | WEIGHT: 247 LBS | OXYGEN SATURATION: 96 % | DIASTOLIC BLOOD PRESSURE: 94 MMHG

## 2024-03-22 DIAGNOSIS — Z78.9 OTHER SPECIFIED HEALTH STATUS: ICD-10-CM

## 2024-03-22 DIAGNOSIS — F12.90 CANNABIS USE, UNSPECIFIED, UNCOMPLICATED: ICD-10-CM

## 2024-03-22 PROCEDURE — G2211 COMPLEX E/M VISIT ADD ON: CPT | Mod: NC,1L

## 2024-03-22 PROCEDURE — 99215 OFFICE O/P EST HI 40 MIN: CPT

## 2024-03-22 NOTE — ADDENDUM
[FreeTextEntry1] : This note was written by Betty Ramos, acting as the  for Dr. James. This note accurately reflects the work and decisions made by Dr. James.

## 2024-03-22 NOTE — HISTORY OF PRESENT ILLNESS
[___ Month(s) Ago] : [unfilled] month(s) ago [None] : The patient is currently asymptomatic [Dry Mouth] : dry mouth [Currently Experiencing] : currently [FreeTextEntry1] : Mr. Yoo is a male with a history of LSE (+ELMO, +dsDNA, arthralgias, RNP, hypocomplementemia, inc LFT) lupus nephritis.  BACKGROUND SLE history  - diagnosed around 2016 - prescribed Plaquenil and steroids - though didn't start it.   - March 2019 Lupus nephritis class 3 focal prolif - treated with MMF - though not totally adherent.  - December 2019 SLE flare with worsening proteinuria, BP and LE edema.  rpt kidney bx with diffuse global prol type IV- G(A) and small crescents - chronicity score 0/12.  Treated with pulse steroids and increased MMF dosing.   - October/ November 2020 - MI s/p stent placement - 2021 worsening proteinuria off MMF and HCQ - meds restarted  INTERVAL HISTORY:  here to re-establish care complicated h/o SLE with multiple events  lost to f/u during his father's illness and recovery from a cva   He details the following events - after the patient's heart attack 3-4 years ago he had gone to [place] for his Engineering Degree - father had a stroke 2 years ago and is doing better. The patient is back in [place] to help take care of him and has left school in [place] to do so - got a physical done a year ago and was diagnosed with diabetes. states he was fluctuating between managed and unmanaged diabetic levels - states that since his father had the stroke, he stopped talking all of his medications, but would like to start putting his health first again since his father is getting better - no Socorro rashes - heart is okay - has not followed-up with a cardiologist - has a PCP through [facility]. The patient was on Metformin before stopping his medication - started going to the gym consistently for the last month - there is froth in his urine - pain in the back - no swelling in the legs - no blood clots - no sores on the hands - no change in color of the hands in the cold - good range of motion still - dry mouth - mild case of frequent urination - left knee feels off (intermittent left knee pain) - pain in the left leg when he has to go to the bathroom - sleeps on the left leg - pain with touch around the patella - pain when going up and down the stairs - right leg has no pain or issues - no rashes on the leg - occasional vape use - the patient is now living in [place] - patient owns a laundromat and renovates houses - states that when his father got sick his family had to shut down the family business  Rheum ROS  - denies RP, sicca, oral ulcers, rashes, photosensitivity.    - denies skin tightening, ulcerations, nodules - denies constitutional symptoms, fatigue, night sweats.  weight is stable  - Denies psoriasis, IBD, Inflammatory eye disease, STD, infectious diarrhea - breathes well without h/o of pleuritis, pericarditis.  - muscles are strong and there is no neurologic issues  >>>  endorses hi/o of forthy urine recently  >>> currently on no medications >> left knee pain more recently - no trauma or inflamamtory s/s [Fever] : no fever [Chills] : no chills [Oral Ulcers] : no oral ulcers [Cough] : no cough [Shortness of Breath] : no shortness of breath [Chest Pain] : no chest pain [Decreased ROM] : no decreased range of motion [Difficulty Standing] : no difficulty standing [Difficulty Walking] : no difficulty walking

## 2024-03-22 NOTE — ASSESSMENT
[FreeTextEntry1] : Mr. 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  Currently off all meds but wants to re-establish care -- previously on MMF, HCQ, Valsartan and torsemide - currently off everything -- d/w patient f/u with renal - may need bx to assess current activity level of LN as well as DM component  -- d/w patient f/u with cardio - given h/o MI and high risk from SLE for recurrent events -- d/w patient will need to restart meds eg ACE/ARB and immunosuppressants - he says he wants to  -- check inflammatory markers  -- check serologies and sub-serologies -- check activity monitoring labs  # knee pain -- check x-rays  #hypertension.   was on metoprolol, lisinopril.  off meds - patient aware of risks - today 138/94  # DM by history but hasn't had f/u  -- could also contribute to urinary changes  -- check a1c  -- rec f/u PCP   #vit d def. slowly improving - continue replace  #inc BMI  patient up a bit in his weight - working out  -- t/c weight loss if possible  #medicine monitoring, long term use of meds -- no NSAIDs -- gi omeprazole -- pcp proph bactrim - order sent -- Quant tb negative July 2021 -- hepatitis negative July 2021    [facility] serve as the continuing focal point for needed health care services that are part of ongoing care related to a patient's single, serious condition or a complex condition.   More than 50% of the encounter was spent counseling the patient on differential, workup, disease course and treatment/management. Education was provided to the patient during this encounter. All questions and concerns were addressed and answered. The patient verbalized understanding and agreed to the plan.   Patient has been instructed to call for an appointment if new symptoms develop. Patient has been instructed to make a follow-up appointment in 3 months   Time spent on the encounter included, but is not limited to, preparing to see the patient, obtaining and/or reviewing separately obtained history, performing the evaluation, counseling and educating, independently interpreting results with communication to patient, order placement, referring and/or communicating with other health professionals as described, and documenting clinical information in the electronic health record.

## 2024-03-23 LAB
25(OH)D3 SERPL-MCNC: 8.3 NG/ML
ALBUMIN SERPL ELPH-MCNC: 3.7 G/DL
ALP BLD-CCNC: 108 U/L
ALT SERPL-CCNC: 36 U/L
ANION GAP SERPL CALC-SCNC: 12 MMOL/L
APPEARANCE: CLEAR
AST SERPL-CCNC: 29 U/L
BILIRUB SERPL-MCNC: 0.9 MG/DL
BILIRUBIN URINE: NEGATIVE
BLOOD URINE: ABNORMAL
BUN SERPL-MCNC: 15 MG/DL
CALCIUM SERPL-MCNC: 9.2 MG/DL
CHLORIDE SERPL-SCNC: 99 MMOL/L
CK SERPL-CCNC: 827 U/L
CO2 SERPL-SCNC: 25 MMOL/L
COLOR: YELLOW
CREAT SERPL-MCNC: 0.8 MG/DL
CREAT SPEC-SCNC: 145 MG/DL
CREAT/PROT UR: 4.8 RATIO
CRP SERPL-MCNC: 13 MG/L
EGFR: 122 ML/MIN/1.73M2
ERYTHROCYTE [SEDIMENTATION RATE] IN BLOOD BY WESTERGREN METHOD: 62 MM/HR
ESTIMATED AVERAGE GLUCOSE: 209 MG/DL
GLUCOSE QUALITATIVE U: NEGATIVE MG/DL
HBA1C MFR BLD HPLC: 8.9 %
HCT VFR BLD CALC: 51.4 %
HGB BLD-MCNC: 16.5 G/DL
KETONES URINE: NEGATIVE MG/DL
LEUKOCYTE ESTERASE URINE: NEGATIVE
MCHC RBC-ENTMCNC: 26.1 PG
MCHC RBC-ENTMCNC: 32.1 GM/DL
MCV RBC AUTO: 81.3 FL
NITRITE URINE: NEGATIVE
PH URINE: 6
PLATELET # BLD AUTO: 300 K/UL
POTASSIUM SERPL-SCNC: 4.3 MMOL/L
PROT SERPL-MCNC: 7 G/DL
PROT UR-MCNC: 697 MG/DL
PROTEIN URINE: >=1000 MG/DL
RBC # BLD: 6.32 M/UL
RBC # FLD: 14.9 %
RHEUMATOID FACT SER QL: <10 IU/ML
SODIUM SERPL-SCNC: 136 MMOL/L
SPECIFIC GRAVITY URINE: 1.03
UROBILINOGEN URINE: 0.2 MG/DL
WBC # FLD AUTO: 10.8 K/UL

## 2024-03-25 LAB
CHROMATIN AB SERPL-ACNC: 0.3 AL
ENA RNP AB SER IA-ACNC: 0.8 AL
ENA SM AB SER IA-ACNC: <0.2 AL
ENA SS-A AB SER IA-ACNC: <0.2 AL
ENA SS-B AB SER IA-ACNC: <0.2 AL
HISTONE AB SER QL: 2.9 UNITS
THYROGLOB AB SERPL-ACNC: <20 IU/ML
THYROPEROXIDASE AB SERPL IA-ACNC: <10 IU/ML

## 2024-03-26 ENCOUNTER — TRANSCRIPTION ENCOUNTER (OUTPATIENT)
Age: 29
End: 2024-03-26

## 2024-03-26 LAB
C3 SERPL-MCNC: 206 MG/DL
C4 SERPL-MCNC: 47 MG/DL
CH50 SERPL-MCNC: 49 U/ML

## 2024-03-26 RX ORDER — ERGOCALCIFEROL 1.25 MG/1
1.25 MG CAPSULE, LIQUID FILLED ORAL
Qty: 12 | Refills: 2 | Status: ACTIVE | COMMUNITY
Start: 2020-01-27 | End: 1900-01-01

## 2024-03-27 LAB — DSDNA AB SER-ACNC: <12 IU/ML

## 2024-03-28 ENCOUNTER — APPOINTMENT (OUTPATIENT)
Dept: ENDOCRINOLOGY | Facility: CLINIC | Age: 29
End: 2024-03-28

## 2024-03-28 ENCOUNTER — TRANSCRIPTION ENCOUNTER (OUTPATIENT)
Age: 29
End: 2024-03-28

## 2024-03-28 LAB
ALBUMIN MFR SERPL ELPH: 47.7 %
ALBUMIN SERPL-MCNC: 3.3 G/DL
ALBUMIN/GLOB SERPL: 0.9 RATIO
ALPHA1 GLOB MFR SERPL ELPH: 4.7 %
ALPHA1 GLOB SERPL ELPH-MCNC: 0.3 G/DL
ALPHA2 GLOB MFR SERPL ELPH: 15.2 %
ALPHA2 GLOB SERPL ELPH-MCNC: 1.1 G/DL
ANA PAT FLD IF-IMP: ABNORMAL
ANA PATTERN: ABNORMAL
ANA SER IF-ACNC: ABNORMAL
ANA TITER: ABNORMAL
B-GLOBULIN MFR SERPL ELPH: 17.1 %
B-GLOBULIN SERPL ELPH-MCNC: 1.2 G/DL
DEPRECATED KAPPA LC FREE/LAMBDA SER: 1.43 RATIO
GAMMA GLOB FLD ELPH-MCNC: 1.1 G/DL
GAMMA GLOB MFR SERPL ELPH: 15.3 %
IGA SER QL IEP: 530 MG/DL
IGG SER QL IEP: 974 MG/DL
IGM SER QL IEP: 131 MG/DL
INTERPRETATION SERPL IEP-IMP: NORMAL
KAPPA LC CSF-MCNC: 3.38 MG/DL
KAPPA LC SERPL-MCNC: 4.85 MG/DL
M PROTEIN SPEC IFE-MCNC: NORMAL
PROT SERPL-MCNC: 7 G/DL
PROT SERPL-MCNC: 7 G/DL

## 2024-03-29 ENCOUNTER — TRANSCRIPTION ENCOUNTER (OUTPATIENT)
Age: 29
End: 2024-03-29

## 2024-04-01 LAB
ANTI-BETA2 GLYCOPROTEIN 1 IGG CONCENTRATION: 2 U/ML
ANTI-BETA2 GLYCOPROTEIN 1 IGM CONCENTRATION: <2.4 U/ML
ANTI-CARDIOLIPIN IGG CONCENTRATION: 2 GPL
ANTI-CARDIOLIPIN IGM CONCENTRATION: 2 MPL
ANTI-CENP IGG CONCENTRATION: 1 U/ML
ANTI-CYCLIC CITRULLINATED PEPTIDE IGG CONCENTRATION: 1 U/ML
ANTI-DOUBLE-STRANDED DNA IGG CONCENTRATION: 55 IU/ML
ANTI-JO-1 IGG CONCENTRATION: <0.3 U/ML
ANTI-NUCLEAR ANTIBODIES - CYTOPLASMIC PATTERN: NORMAL
ANTI-NUCLEAR ANTIBODIES - PRIMARY NUCLEAR PATTERN: NORMAL
ANTI-NUCLEAR ANTIBODIES - PRIMARY PATTERN TITER: ABNORMAL
ANTI-NUCLEAR ANTIBODIES IGG CONCENTRATION: 48 UNITS
ANTI-RNA POL III IGG CONCENTRATION: <0.7 U/ML
ANTI-RNP70 IGG CONCENTRATION: 1 U/ML
ANTI-RO52 IGG CONCENTRATION: 0 U/ML
ANTI-RO60 IGG CONCENTRATION: <0.4 U/ML
ANTI-SCL-70 IGG CONCENTRATION: <0.6 U/ML
ANTI-SMITH IGG CONCENTRATION: <0.7 U/ML
ANTI-SS-B (LA) IGG CONCENTRATION: 1 U/ML
ANTI-THYROGLOBULIN IGG CONCENTRATION: <12 IU/ML
ANTI-THYROID PEROXIDASE IGG CONCENTRATION: <4 IU/ML
ANTI-U1RNP IGG CONCENTRATION: 2 U/ML
AVISE LUPUS RESULT: NORMAL
B-LYMPHOCYTE-BOUND C4D (BC4D) LEVEL: NORMAL
C1Q SERPL-MCNC: 19.8 MG/DL
C2 SERPL-MCNC: 3.5 MG/DL
ERYTHROCYTE-BOUND C4D (EC4D) LEVEL: 5
RHEUMATOID FACTOR (IGA) CONCENTRATION: 10 IU/ML
RHEUMATOID FACTOR (IGM) CONCENTRATION: 1 IU/ML

## 2024-04-04 ENCOUNTER — APPOINTMENT (OUTPATIENT)
Dept: RHEUMATOLOGY | Facility: CLINIC | Age: 29
End: 2024-04-04

## 2024-04-11 NOTE — DISCHARGE NOTE NURSING/CASE MANAGEMENT/SOCIAL WORK - CAREGIVER ADDRESS
psychomotor retardation  patient appear brighter [phone] [place] [address] "okay", "anxious"  walking improving, using walker

## 2024-04-12 ENCOUNTER — APPOINTMENT (OUTPATIENT)
Dept: NEPHROLOGY | Facility: CLINIC | Age: 29
End: 2024-04-12
Payer: MEDICAID

## 2024-04-12 VITALS
DIASTOLIC BLOOD PRESSURE: 97 MMHG | WEIGHT: 250.22 LBS | SYSTOLIC BLOOD PRESSURE: 140 MMHG | OXYGEN SATURATION: 90 % | HEART RATE: 116 BPM | TEMPERATURE: 97.3 F | BODY MASS INDEX: 37.92 KG/M2 | HEIGHT: 68 IN

## 2024-04-12 LAB
EJ AB SER QL: NEGATIVE
ENA JO1 AB SER IA-ACNC: <20 UNITS
ENA PM/SCL AB SER-ACNC: 22 UNITS
ENA SM+RNP AB SER IA-ACNC: <20 UNITS
ENA SS-A IGG SER QL: <20 UNITS
FIBRILLARIN AB SER QL: ABNORMAL
KU AB SER QL: ABNORMAL
MDA-5 (P140)(CADM-140): <20 UNITS
MI2 AB SER QL: NEGATIVE
NXP-2 (P140): <20 UNITS
OJ AB SER QL: NEGATIVE
PL12 AB SER QL: NEGATIVE
PL7 AB SER QL: NEGATIVE
SRP AB SERPL QL: NEGATIVE
TIF GAMMA (P155/140): <20 UNITS
U2 SNRNP AB SER QL: NEGATIVE

## 2024-04-12 PROCEDURE — G2211 COMPLEX E/M VISIT ADD ON: CPT | Mod: NC,1L

## 2024-04-12 PROCEDURE — 99214 OFFICE O/P EST MOD 30 MIN: CPT

## 2024-04-12 RX ORDER — CARVEDILOL 3.12 MG/1
3.12 TABLET, FILM COATED ORAL
Qty: 90 | Refills: 3 | Status: ACTIVE | COMMUNITY
Start: 2024-04-12 | End: 1900-01-01

## 2024-04-12 RX ORDER — SULFAMETHOXAZOLE AND TRIMETHOPRIM 800; 160 MG/1; MG/1
800-160 TABLET ORAL DAILY
Qty: 90 | Refills: 3 | Status: DISCONTINUED | COMMUNITY
Start: 2020-01-24 | End: 2024-04-12

## 2024-04-12 RX ORDER — MYCOPHENOLATE MOFETIL 500 MG/1
500 TABLET ORAL
Qty: 180 | Refills: 0 | Status: DISCONTINUED | COMMUNITY
Start: 2020-01-24 | End: 2024-04-12

## 2024-04-12 RX ORDER — PANTOPRAZOLE SODIUM 40 MG/1
40 GRANULE, DELAYED RELEASE ORAL DAILY
Qty: 90 | Refills: 3 | Status: DISCONTINUED | COMMUNITY
Start: 2019-03-20 | End: 2024-04-12

## 2024-04-12 RX ORDER — VALSARTAN 40 MG/1
40 TABLET, COATED ORAL
Qty: 90 | Refills: 3 | Status: ACTIVE | COMMUNITY
Start: 2022-03-21 | End: 1900-01-01

## 2024-04-12 RX ORDER — ATORVASTATIN CALCIUM 20 MG/1
20 TABLET, FILM COATED ORAL DAILY
Qty: 90 | Refills: 1 | Status: DISCONTINUED | COMMUNITY
Start: 2020-12-02 | End: 2024-04-12

## 2024-04-12 RX ORDER — METOPROLOL SUCCINATE 50 MG/1
50 TABLET, EXTENDED RELEASE ORAL
Qty: 30 | Refills: 2 | Status: DISCONTINUED | COMMUNITY
Start: 2020-12-02 | End: 2024-04-12

## 2024-04-12 RX ORDER — TORSEMIDE 20 MG/1
20 TABLET ORAL
Qty: 60 | Refills: 3 | Status: DISCONTINUED | COMMUNITY
Start: 2022-05-03 | End: 2024-04-12

## 2024-04-12 RX ORDER — TICAGRELOR 90 MG/1
90 TABLET ORAL TWICE DAILY
Refills: 0 | Status: DISCONTINUED | COMMUNITY
Start: 2020-12-02 | End: 2024-04-12

## 2024-04-12 NOTE — HISTORY OF PRESENT ILLNESS
[FreeTextEntry1] : Mr. SCRUGGS is a 28 year old male with a long history of SLE and two kidney biopsies, the last one in December of 2019 showed progression from focal proliferative to diffuse proliferative, with few crescents and a very low chronicity index.  He has been on 1 gram of CellCept 3 times per day, without side effects.   Since last visit, started using "recreational drugs" per mother and stopped taking his meds. His proteinuria tara to 6+ grams. He is now back on MMF and not taking lisinopril. He now understands importance of it.   Now in April 2024- had not seen us since 2022 and took himself all meds and saw Rheum few weeks ago. He said his dad had a stroke and hence stopped all his meds ( all Bp, statin and MMF and SLE meds) and restarted plaquenil recently by rheum. Most recent proteinuria is 4.8gm and renal function normal. He is now a diabetic as well and has gained wt.  No n/v. No decrease in appetite, no termors. + edema worsening. no decreased sleep. ++ foamy urine. no hematuria, no dysuria. no confusion. No SOB, WILLS. No wt loss.

## 2024-04-12 NOTE — ASSESSMENT
[FreeTextEntry1] : Mr. SCRUGGS is a 28 year old male with hx of nephrotic range proteinuria in a patient with Class IV lupus nephritis.  Normal renal function.   Given haitus of meds for 2 years and not clear if this chronic proteinuria is scar related vs active LN class V, needs a repeat renal biopsy  Check coags and renal panel, CBC and Hep panel and plan for renal bx next 2 weeks Once done, we can decide on MMF with Voclo vs MMF with benlysta Pt not taking anti HTN yet and restart Losartan again Start Coreg BID as he has hx of tachycardia as well Goal SBP<130 before renal bx and HR <100  f/u 3 months

## 2024-04-12 NOTE — REVIEW OF SYSTEMS
[Feeling Tired] : feeling tired [Negative] : Psychiatric [FreeTextEntry8] : Frothy urine [de-identified] : No rush

## 2024-04-15 LAB
ALBUMIN SERPL ELPH-MCNC: 3.5 G/DL
ANION GAP SERPL CALC-SCNC: 14 MMOL/L
APPEARANCE: CLEAR
APTT BLD: 34.1 SEC
BACTERIA: NEGATIVE /HPF
BASOPHILS # BLD AUTO: 0.06 K/UL
BASOPHILS NFR BLD AUTO: 0.7 %
BILIRUBIN URINE: NEGATIVE
BLOOD URINE: ABNORMAL
BUN SERPL-MCNC: 18 MG/DL
C3 SERPL-MCNC: 178 MG/DL
C4 SERPL-MCNC: 42 MG/DL
CALCIUM OXALATE CRYSTALS: PRESENT
CALCIUM SERPL-MCNC: 9.2 MG/DL
CALCIUM SERPL-MCNC: 9.2 MG/DL
CAST: 5 /LPF
CHLORIDE SERPL-SCNC: 105 MMOL/L
CO2 SERPL-SCNC: 21 MMOL/L
COLOR: YELLOW
CREAT SERPL-MCNC: 0.98 MG/DL
CREAT SPEC-SCNC: 163 MG/DL
CREAT/PROT UR: 4.3 RATIO
DSDNA AB SER-ACNC: 2 IU/ML
EGFR: 106 ML/MIN/1.73M2
EOSINOPHIL # BLD AUTO: 0.2 K/UL
EOSINOPHIL NFR BLD AUTO: 2.2 %
EPITHELIAL CELLS: 1 /HPF
FINE GRANULAR CASTS: PRESENT
GLUCOSE QUALITATIVE U: 250 MG/DL
GLUCOSE SERPL-MCNC: 287 MG/DL
HBV CORE IGG+IGM SER QL: NONREACTIVE
HBV CORE IGM SER QL: NONREACTIVE
HBV SURFACE AB SER QL: ABNORMAL
HBV SURFACE AB SERPL IA-ACNC: 10.4 MIU/ML
HBV SURFACE AG SER QL: NONREACTIVE
HCT VFR BLD CALC: 46.1 %
HCV AB SER QL: NONREACTIVE
HCV S/CO RATIO: 0.2 S/CO
HGB BLD-MCNC: 15.1 G/DL
HYALINE CASTS: PRESENT
IMM GRANULOCYTES NFR BLD AUTO: 0.6 %
INR PPP: 0.86 RATIO
KETONES URINE: NEGATIVE MG/DL
LEUKOCYTE ESTERASE URINE: NEGATIVE
LYMPHOCYTES # BLD AUTO: 2.94 K/UL
LYMPHOCYTES NFR BLD AUTO: 32.5 %
M TB IFN-G BLD-IMP: NEGATIVE
MAN DIFF?: NORMAL
MCHC RBC-ENTMCNC: 26.5 PG
MCHC RBC-ENTMCNC: 32.8 GM/DL
MCV RBC AUTO: 81 FL
MICROSCOPIC-UA: NORMAL
MONOCYTES # BLD AUTO: 0.59 K/UL
MONOCYTES NFR BLD AUTO: 6.5 %
NEUTROPHILS # BLD AUTO: 5.2 K/UL
NEUTROPHILS NFR BLD AUTO: 57.5 %
NITRITE URINE: NEGATIVE
PARATHYROID HORMONE INTACT: 16 PG/ML
PH URINE: 6
PHOSPHATE SERPL-MCNC: 4.1 MG/DL
PHOSPHOLIPASE A2 RECEPTOR ELISA: <1.8 RU/ML
PLATELET # BLD AUTO: 296 K/UL
POTASSIUM SERPL-SCNC: 4.3 MMOL/L
PROT UR-MCNC: 706 MG/DL
PROTEIN URINE: 300 MG/DL
PT BLD: 9.8 SEC
QUANTIFERON TB PLUS MITOGEN MINUS NIL: 4.57 IU/ML
QUANTIFERON TB PLUS NIL: 0.08 IU/ML
QUANTIFERON TB PLUS TB1 MINUS NIL: 0.02 IU/ML
QUANTIFERON TB PLUS TB2 MINUS NIL: 0 IU/ML
RBC # BLD: 5.69 M/UL
RBC # FLD: 15.1 %
RED BLOOD CELLS URINE: 6 /HPF
REVIEW: NORMAL
SODIUM SERPL-SCNC: 139 MMOL/L
SPECIFIC GRAVITY URINE: 1.03
UROBILINOGEN URINE: 0.2 MG/DL
WBC # FLD AUTO: 9.04 K/UL
WHITE BLOOD CELLS URINE: 1 /HPF

## 2024-04-16 LAB
ANA PAT FLD IF-IMP: ABNORMAL
ANA SER IF-ACNC: ABNORMAL

## 2024-04-18 ENCOUNTER — APPOINTMENT (OUTPATIENT)
Dept: RHEUMATOLOGY | Facility: CLINIC | Age: 29
End: 2024-04-18

## 2024-04-19 ENCOUNTER — OUTPATIENT (OUTPATIENT)
Dept: OUTPATIENT SERVICES | Facility: HOSPITAL | Age: 29
LOS: 1 days | End: 2024-04-19
Payer: MEDICAID

## 2024-04-19 ENCOUNTER — RESULT REVIEW (OUTPATIENT)
Age: 29
End: 2024-04-19

## 2024-04-19 ENCOUNTER — APPOINTMENT (OUTPATIENT)
Dept: ULTRASOUND IMAGING | Facility: HOSPITAL | Age: 29
End: 2024-04-19
Payer: MEDICAID

## 2024-04-19 DIAGNOSIS — Z00.00 ENCOUNTER FOR GENERAL ADULT MEDICAL EXAMINATION WITHOUT ABNORMAL FINDINGS: ICD-10-CM

## 2024-04-19 DIAGNOSIS — I15.1 HYPERTENSION SECONDARY TO OTHER RENAL DISORDERS: ICD-10-CM

## 2024-04-19 PROCEDURE — 88305 TISSUE EXAM BY PATHOLOGIST: CPT

## 2024-04-19 PROCEDURE — 88305 TISSUE EXAM BY PATHOLOGIST: CPT | Mod: 26

## 2024-04-19 PROCEDURE — 76942 ECHO GUIDE FOR BIOPSY: CPT

## 2024-04-19 PROCEDURE — 88348 ELECTRON MICROSCOPY DX: CPT | Mod: 26

## 2024-04-19 PROCEDURE — 88348 ELECTRON MICROSCOPY DX: CPT

## 2024-04-19 PROCEDURE — 88346 IMFLUOR 1ST 1ANTB STAIN PX: CPT

## 2024-04-19 PROCEDURE — 88350 IMFLUOR EA ADDL 1ANTB STN PX: CPT | Mod: 26

## 2024-04-19 PROCEDURE — 88313 SPECIAL STAINS GROUP 2: CPT

## 2024-04-19 PROCEDURE — 76942 ECHO GUIDE FOR BIOPSY: CPT | Mod: 26

## 2024-04-19 PROCEDURE — 50200 RENAL BIOPSY PERQ: CPT

## 2024-04-19 PROCEDURE — 50200 RENAL BIOPSY PERQ: CPT | Mod: LT

## 2024-04-19 PROCEDURE — 88313 SPECIAL STAINS GROUP 2: CPT | Mod: 26

## 2024-04-19 PROCEDURE — 88350 IMFLUOR EA ADDL 1ANTB STN PX: CPT

## 2024-04-19 PROCEDURE — 88346 IMFLUOR 1ST 1ANTB STAIN PX: CPT | Mod: 26

## 2024-04-23 ENCOUNTER — APPOINTMENT (OUTPATIENT)
Dept: ENDOCRINOLOGY | Facility: CLINIC | Age: 29
End: 2024-04-23
Payer: MEDICAID

## 2024-04-23 VITALS
DIASTOLIC BLOOD PRESSURE: 91 MMHG | SYSTOLIC BLOOD PRESSURE: 136 MMHG | HEART RATE: 102 BPM | BODY MASS INDEX: 37.74 KG/M2 | OXYGEN SATURATION: 98 % | WEIGHT: 249 LBS | RESPIRATION RATE: 18 BRPM | HEIGHT: 68 IN

## 2024-04-23 DIAGNOSIS — I15.1 HYPERTENSION SECONDARY TO OTHER RENAL DISORDERS: ICD-10-CM

## 2024-04-23 PROCEDURE — 99205 OFFICE O/P NEW HI 60 MIN: CPT | Mod: 25

## 2024-04-23 PROCEDURE — G2211 COMPLEX E/M VISIT ADD ON: CPT | Mod: NC,1L

## 2024-04-23 RX ORDER — BLOOD-GLUCOSE SENSOR
EACH MISCELLANEOUS
Qty: 9 | Refills: 2 | Status: ACTIVE | COMMUNITY
Start: 2024-04-23 | End: 1900-01-01

## 2024-04-23 NOTE — ASSESSMENT
[FreeTextEntry1] : 28 year old male with past medical history of Diabetes Mellitus Type 2, SLE, Lupus Nephritis, HTN who presents for management of his diabetes  1. DM 2- uncontrolled, uncomplicated Patient counseled on the importance of diabetic control and risk of complications. We discussed about microvascular disease and macrovascular disease. We discussed the importance of ophthalmology evaluations annually or more frequent as necessary. We also discussed the importance of diabetes foot care. Some form of glucose monitoring is always advised. Maintaining a low carbohydrate/ADA diet and physical activity was discussed. Patient's diet and the necessary changes discussed. Reviewed over Dexcom and use. Reviewed over glycemic goals. Discussed other options for diabetes control.   Will decide on Ozempic vs Jardiance D/w nephrology Start Dexcom G7 Check fsg  Cont ADA diet Cont exercise Opthalmology Visit referral made Foot Exam up at follow up

## 2024-04-23 NOTE — HISTORY OF PRESENT ILLNESS
[FreeTextEntry1] : Mr. DANUTA SCRUGGS  is a 28 year old male with past medical history of Diabetes Mellitus Type 2, SLE, Lupus Nephritis, HTN who presents for management of his diabetes.  Patient was diagnosed last year.  He was on metformin and was following appropriate lifestyle.  His father had a stroke and patient stopped taking his medications and had poor follow-up.  He is not trying to get back on track for his own health.  Patient denies any history of diabetic retinopathy, nephropathy or neuropathy. He denies any blurry vision, polyuria, polydipsia and numbness/tingling in the extremities.   POCT Glucose: mg/dL POCT Hga1c: %   Diabetes first diagnosed: Type:  Current diabetic regimen: metformin (stopped) Other relevant medications:  Self monitoring blood glucose : Glucometer: Does not monitor  SMBG: Pre-breakfast: Pre-lunch: Pre-dinner: bedtime:  Hypoglycemia:   Diet: Breakfast:chicken sausage and eggs Lunch:salmon/chicken and vegetables Dinner:same as lunch Snacks:apples and oranges  Exercise:daily  Urine micro: lipid profile: last hba1c: eye exam: diabetic foot exam/podiatry:

## 2024-04-30 LAB — SURGICAL PATHOLOGY STUDY: SIGNIFICANT CHANGE UP

## 2024-05-02 ENCOUNTER — APPOINTMENT (OUTPATIENT)
Dept: RHEUMATOLOGY | Facility: CLINIC | Age: 29
End: 2024-05-02
Payer: MEDICAID

## 2024-05-02 DIAGNOSIS — Z20.822 CONTACT WITH AND (SUSPECTED) EXPOSURE TO COVID-19: ICD-10-CM

## 2024-05-02 PROCEDURE — G2211 COMPLEX E/M VISIT ADD ON: CPT | Mod: NC,1L

## 2024-05-02 PROCEDURE — 99215 OFFICE O/P EST HI 40 MIN: CPT

## 2024-05-02 RX ORDER — HYDROXYCHLOROQUINE SULFATE 200 MG/1
200 TABLET, FILM COATED ORAL
Qty: 60 | Refills: 7 | Status: ACTIVE | COMMUNITY
Start: 2020-01-24 | End: 1900-01-01

## 2024-05-02 RX ADMIN — HYDROCORTISONE SODIUM SUCCINATE 1 MG: 100 INJECTION, POWDER, FOR SOLUTION INTRAMUSCULAR; INTRAVENOUS at 00:00

## 2024-05-02 RX ADMIN — CETIRIZINE HYDROCHLORIDE 1 MG: 10 TABLET, FILM COATED ORAL at 00:00

## 2024-05-02 RX ADMIN — ACETAMINOPHEN 2 MG: 325 TABLET ORAL at 00:00

## 2024-05-02 RX ADMIN — BELIMUMAB 0 MG: 120 INJECTION, POWDER, LYOPHILIZED, FOR SOLUTION INTRAVENOUS at 00:00

## 2024-05-02 NOTE — HISTORY OF PRESENT ILLNESS
[FreeTextEntry1] : This visit was provided via telehealth using real-time 2-way audio-visual technology.    The patient was located at HOME in [place] at the time of the visit.   The provider was located at HOME in [place] at the time of the visit.   The patient and provider participated in the telehealth encounter.   Verbal consent for telehealth services was given by the patient.Mr. Yoo is a male with a history of LSE (+ELMO, +dsDNA, arthralgias, RNP, hypocomplementemia, inc LFT) lupus nephritis.  BACKGROUND SLE history  - diagnosed around 2016 - prescribed Plaquenil and steroids - though didn't start it.   - March 2019 Lupus nephritis class 3 focal prolif - treated with MMF - though not totally adherent.  - December 2019 SLE flare with worsening proteinuria, BP and LE edema.  rpt kidney bx with diffuse global prol type IV- G(A) and small crescents - chronicity score 0/12.  Treated with pulse steroids and increased MMF dosing.   - October/ November 2020 - MI s/p stent placement - 2021 worsening proteinuria off MMF and HCQ - meds restarted  INTERVAL Hx s/p renal visit and biopsy s/p endo visit reviewed biopsy here to discuss next steps [___ Month(s) Ago] : [unfilled] month(s) ago [Currently Experiencing] : currently [Fever] : no fever [Chills] : no chills [Oral Ulcers] : no oral ulcers [Cough] : no cough [Dry Mouth] : dry mouth [Shortness of Breath] : no shortness of breath [Chest Pain] : no chest pain [Decreased ROM] : no decreased range of motion [Difficulty Standing] : no difficulty standing [Difficulty Walking] : no difficulty walking

## 2024-05-02 NOTE — ASSESSMENT
[FreeTextEntry1] : Mr. 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  Has been treated previously with MMF, HCQ, Valsartan and torsemide - currently off everything and loss to f/u.   Upon re-establishing care, Mr. Yoo was found to have increased ESR and protein:creat raio of 4.3.  complements all increased but now with weak positive PM/scl, RNP and Ku.   s/p kidney bx (2024) -> membranous lupus nephritis, calss V, interstitial lymphocyte infiltrate, arterial sclerosis, mild, global globerulosclerosis NIH activity index 2/24, chronicity index 6/12 -- d/w patient at length organ threatening disease - increased chronicity c/w prior biopsy.  However there is also some activity which makes it hopeful for successful use of immunosuppressive regimen. -- d/w renal and we both agree on plan -- has already started MMF - rec titration to full dose incrementally  -- d/w patient restart HCQ for general SLE control  -- d/w patient start of benlysta - agree with infusion for increased adherence and control.  r/b/a discussed and he wants to proceed  #hypertension.   -- now on carvedilol -- now on valsartan  # DM by history but hasn't had f/u  -- could also contribute to urinary changes  -- check a1c  -- rec f/u PCP   #vit d def. slowly improving - continue replace  #inc BMI  patient up a bit in his weight - working out  -- t/c weight loss if possible  #DM, uncontrolled  -- endo note appreciated -- will hold on the steroids for now - needs better glycemic control  -- will d/w endo when can start   #medicine monitoring, long term use of meds -- no NSAIDs -- gi omeprazole -- pcp proph bactrim - order sent -- Quant tb negative April 2024 -- hepatitis negative April 2024 -- weight 249 pounds = 112 kg.  benlysta 1000 mg monthly -- benlysta r/b/a discusssed.  It is an IV medication given every 2 weeks for the first 3 doses, then every 4 weeks thereafter.  We discussed the possible side effects of infusion reactions, for which pre-medication with tylenol, solucortef and benadryl will be given to minimize this risk.  We discussed the possible side effects including immunosuppression and need for monitoring for risk of infection, need for monitoring cell counts as well as liver and kidney function, as well as possible GI upset - wants to proceed.  Will determine how to sign the consent form as this was a remote enocunter   [facility] medical care services serve as the continuing focal point for needed health care services that are part of ongoing care related to a patient's single, serious condition or a complex condition.   More than 50% of the encounter was spent counseling the patient on differential, workup, disease course and treatment/management. Education was provided to the patient during this encounter. All questions and concerns were addressed and answered. The patient verbalized understanding and agreed to the plan.   Patient has been instructed to call for an appointment if new symptoms develop. Patient has been instructed to make a follow-up appointment in 3 months   Time spent on the encounter included, but is not limited to, preparing to see the patient, obtaining and/or reviewing separately obtained history, performing the evaluation, counseling and educating, independently interpreting results with communication to patient, order placement, referring and/or communicating with other health professionals as described, and documenting clinical information in the electronic health record.

## 2024-05-02 NOTE — PHYSICAL EXAM
[General Appearance - Alert] : alert [General Appearance - In No Acute Distress] : in no acute distress [Auscultation Breath Sounds / Voice Sounds] : lungs were clear to auscultation bilaterally [Skin Color & Pigmentation] : normal skin color and pigmentation [Skin Turgor] : normal skin turgor [] : no rash [Oriented To Time, Place, And Person] : oriented to person, place, and time [Impaired Insight] : insight and judgment were intact [Affect] : the affect was normal [Abnormal Walk] : normal gait [Nail Clubbing] : no clubbing  or cyanosis of the fingernails [Musculoskeletal - Swelling] : no joint swelling seen [Motor Tone] : muscle strength and tone were normal

## 2024-05-08 ENCOUNTER — TRANSCRIPTION ENCOUNTER (OUTPATIENT)
Age: 29
End: 2024-05-08

## 2024-05-08 RX ORDER — SEMAGLUTIDE 0.68 MG/ML
2 INJECTION, SOLUTION SUBCUTANEOUS
Qty: 3 | Refills: 1 | Status: ACTIVE | COMMUNITY
Start: 2024-05-08 | End: 1900-01-01

## 2024-05-08 RX ORDER — HYDROCORTISONE SODIUM SUCCINATE 100 MG/2ML
100 INJECTION, POWDER, FOR SOLUTION INTRAMUSCULAR; INTRAVENOUS
Refills: 0 | Status: ACTIVE | OUTPATIENT
Start: 2024-05-02 | End: 1900-01-01

## 2024-05-15 RX ORDER — ACETAMINOPHEN 325 MG/1
325 TABLET ORAL
Refills: 0 | Status: ACTIVE | OUTPATIENT
Start: 2024-05-02 | End: 1900-01-01

## 2024-05-15 RX ORDER — CETIRIZINE HYDROCHLORIDE 10 MG/1
10 TABLET, FILM COATED ORAL
Refills: 0 | Status: ACTIVE | OUTPATIENT
Start: 2024-05-02 | End: 1900-01-01

## 2024-05-16 ENCOUNTER — APPOINTMENT (OUTPATIENT)
Dept: RHEUMATOLOGY | Facility: CLINIC | Age: 29
End: 2024-05-16
Payer: MEDICAID

## 2024-05-16 PROCEDURE — 99442: CPT

## 2024-05-16 RX ORDER — MYCOPHENOLATE MOFETIL 500 MG/1
500 TABLET ORAL
Qty: 120 | Refills: 5 | Status: ACTIVE | COMMUNITY
Start: 2019-03-20 | End: 1900-01-01

## 2024-05-16 NOTE — HISTORY OF PRESENT ILLNESS
[FreeTextEntry1] : This telephonic visit was provided via audio only technology.  The patient was located at home [place] at the time of the visit. The provider was located at home [place] at the time of the visit. The patient and Provider participated in the telephonic visit.  Verbal consent for telephonic services was given by the patient.   BACKGROUND SLE history  - diagnosed around 2016 - prescribed Plaquenil and steroids - though didn't start it.   - March 2019 Lupus nephritis class 3 focal prolif - treated with MMF - though not totally adherent.  - December 2019 SLE flare with worsening proteinuria, BP and LE edema.  rpt kidney bx with diffuse global prol type IV- G(A) and small crescents - chronicity score 0/12.  Treated with pulse steroids and increased MMF dosing.   - October/ November 2020 - MI s/p stent placement - 2021 worsening proteinuria off MMF and HCQ - meds restarted  INTERVAL Hx has been taking the medication  MMF 2 tablet daily  feeling slightly better - but not much  benlysta approved and scheduled for next week

## 2024-05-16 NOTE — ASSESSMENT
[FreeTextEntry1] : Mr. 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  Has been treated previously with MMF, HCQ, Valsartan and torsemide - currently off everything and loss to f/u.   Upon re-establishing care, Mr. Yoo was found to have increased ESR and protein:creat raio of 4.3.  complements all increased but now with weak positive PM/scl, RNP and Ku.   s/p kidney bx (2024) -> membranous lupus nephritis, calss V, interstitial lymphocyte infiltrate, arterial sclerosis, mild, global globerulosclerosis NIH activity index 2/24, chronicity index 6/12 -- d/w patient at length last visit that he has organ threatening disease - increased chronicity c/w prior biopsy.  However there is also some activity which makes it hopeful for successful use of immunosuppressive regimen. -- increase MMF to 2 tabs BID -- d/w patient restart HCQ for general SLE control  -- benlysta approved and scheduled   #hypertension.   -- now on carvedilol -- now on valsartan  # DM by history but hasn't had f/u  -- could also contribute to urinary changes  -- check a1c  -- rec f/u PCP  -- now on ozempic  #vit d def. slowly improving - continue replace  #inc BMI  patient up a bit in his weight - working out  -- t/c weight loss if possible  #medicine monitoring, long term use of meds -- no NSAIDs -- gi omeprazole -- pcp proph bactrim - order sent -- Quant tb negative April 2024 -- hepatitis negative April 2024 -- weight 249 pounds = 112 kg.  benlysta 1000 mg monthly -- benlysta r/b/a discusssed.  It is an IV medication given every 2 weeks for the first 3 doses, then every 4 weeks thereafter.  We discussed the possible side effects of infusion reactions, for which pre-medication with tylenol, solucortef and benadryl will be given to minimize this risk.  We discussed the possible side effects including immunosuppression and need for monitoring for risk of infection, need for monitoring cell counts as well as liver and kidney function, as well as possible GI upset - wants to proceed.  Will determine how to sign the consent form as this was a remote enocunter   [facility] medical care services serve as the continuing focal point for needed health care services that are part of ongoing care related to a patient's single, serious condition or a complex condition.   More than 50% of the encounter was spent counseling the patient on differential, workup, disease course and treatment/management. Education was provided to the patient during this encounter. All questions and concerns were addressed and answered. The patient verbalized understanding and agreed to the plan.   Patient has been instructed to call for an appointment if new symptoms develop. Patient has been instructed to make a follow-up appointment in 3 months   Time spent on the encounter included, but is not limited to, preparing to see the patient, obtaining and/or reviewing separately obtained history, performing the evaluation, counseling and educating, independently interpreting results with communication to patient, order placement, referring and/or communicating with other health professionals as described, and documenting clinical information in the electronic health record.

## 2024-05-16 NOTE — PHYSICAL EXAM
[General Appearance - Alert] : alert [General Appearance - In No Acute Distress] : in no acute distress [Auscultation Breath Sounds / Voice Sounds] : lungs were clear to auscultation bilaterally

## 2024-05-23 ENCOUNTER — APPOINTMENT (OUTPATIENT)
Dept: RHEUMATOLOGY | Facility: CLINIC | Age: 29
End: 2024-05-23

## 2024-06-06 ENCOUNTER — MED ADMIN CHARGE (OUTPATIENT)
Age: 29
End: 2024-06-06

## 2024-06-06 ENCOUNTER — APPOINTMENT (OUTPATIENT)
Dept: RHEUMATOLOGY | Facility: CLINIC | Age: 29
End: 2024-06-06
Payer: MEDICAID

## 2024-06-06 VITALS
DIASTOLIC BLOOD PRESSURE: 95 MMHG | TEMPERATURE: 97.2 F | SYSTOLIC BLOOD PRESSURE: 145 MMHG | OXYGEN SATURATION: 96 % | HEART RATE: 95 BPM

## 2024-06-06 VITALS — HEART RATE: 76 BPM | DIASTOLIC BLOOD PRESSURE: 88 MMHG | SYSTOLIC BLOOD PRESSURE: 133 MMHG | OXYGEN SATURATION: 96 %

## 2024-06-06 PROCEDURE — 36415 COLL VENOUS BLD VENIPUNCTURE: CPT

## 2024-06-06 PROCEDURE — 96413 CHEMO IV INFUSION 1 HR: CPT

## 2024-06-06 PROCEDURE — 96374 THER/PROPH/DIAG INJ IV PUSH: CPT | Mod: 59

## 2024-06-06 RX ORDER — HYDROCORTISONE SODIUM SUCCINATE 100 MG/2ML
100 INJECTION, POWDER, FOR SOLUTION INTRAMUSCULAR; INTRAVENOUS
Qty: 0 | Refills: 0 | Status: COMPLETED
Start: 2024-05-02

## 2024-06-06 RX ORDER — BELIMUMAB 120 MG/1.5ML
120 INJECTION, POWDER, LYOPHILIZED, FOR SOLUTION INTRAVENOUS
Qty: 1 | Refills: 0 | Status: COMPLETED
Start: 2024-05-02

## 2024-06-06 RX ORDER — ACETAMINOPHEN 325 MG/1
325 TABLET ORAL
Qty: 0 | Refills: 0 | Status: COMPLETED
Start: 2024-05-02

## 2024-06-06 RX ORDER — BELIMUMAB 120 MG/1.5ML
120 INJECTION, POWDER, LYOPHILIZED, FOR SOLUTION INTRAVENOUS
Refills: 0 | Status: ACTIVE | OUTPATIENT
Start: 2024-05-02

## 2024-06-06 RX ORDER — CETIRIZINE HYDROCHLORIDE 10 MG/1
10 TABLET, FILM COATED ORAL
Qty: 0 | Refills: 0 | Status: COMPLETED
Start: 2024-05-02

## 2024-06-06 RX ORDER — BELIMUMAB 400 MG/5ML
400 INJECTION, POWDER, LYOPHILIZED, FOR SOLUTION INTRAVENOUS
Qty: 1 | Refills: 0 | Status: COMPLETED
Start: 2024-05-02

## 2024-06-06 NOTE — HISTORY OF PRESENT ILLNESS
[Denies] : Denies [No] : No [N/A] : N/A [Yes] : Yes [Left upper extremity] : Left upper extremity [22g] : 22g [Start Time: ___] : Medication Start Time: [unfilled] [End Time: ___] : Medication End Time: [unfilled] [Medication Name: ___] : Medication Name: [unfilled] [Total Amount Administered: ___] : Total Amount Administered: [unfilled] [IV discontinued. Intact. No signs or symptoms of IV complications noted. Time: ___] : IV discontinued. Intact. No signs or symptoms of IV complications noted. Time: [unfilled] [Patient  instructed to seek medical attention with signs and symptoms of adverse effects] : Patient  instructed to seek medical attention with signs and symptoms of adverse effects [Patient left unit in no acute distress] : Patient left unit in no acute distress [Medications administered as ordered and tolerated well.] : Medications administered as ordered and tolerated well. [Blood drawn at time of visit] : Blood drawn at time of visit [de-identified] : First infusion [de-identified] : 2117 [de-identified] : Pt tolerated infusion, discussed f/u infusion appointment with patient. Labs drawn at time of infusion. Pt was educated to inform RN of any adverse sx to infusion, and to call office with any sx that may present at home. Pt monitored 30 mins post infusion.

## 2024-06-07 ENCOUNTER — APPOINTMENT (OUTPATIENT)
Dept: NEPHROLOGY | Facility: CLINIC | Age: 29
End: 2024-06-07

## 2024-06-07 LAB
25(OH)D3 SERPL-MCNC: 15 NG/ML
ALBUMIN SERPL ELPH-MCNC: 3.5 G/DL
ALP BLD-CCNC: 77 U/L
ALT SERPL-CCNC: 28 U/L
ANION GAP SERPL CALC-SCNC: 11 MMOL/L
AST SERPL-CCNC: 21 U/L
BASOPHILS # BLD AUTO: 0.06 K/UL
BASOPHILS NFR BLD AUTO: 0.6 %
BILIRUB SERPL-MCNC: 0.4 MG/DL
BUN SERPL-MCNC: 16 MG/DL
CALCIUM SERPL-MCNC: 8.9 MG/DL
CHLORIDE SERPL-SCNC: 105 MMOL/L
CO2 SERPL-SCNC: 20 MMOL/L
CREAT SERPL-MCNC: 0.76 MG/DL
CREAT SPEC-SCNC: 86 MG/DL
CREAT/PROT UR: 3.2 RATIO
CRP SERPL-MCNC: 8 MG/L
EGFR: 124 ML/MIN/1.73M2
EOSINOPHIL # BLD AUTO: 0.3 K/UL
EOSINOPHIL NFR BLD AUTO: 2.9 %
ERYTHROCYTE [SEDIMENTATION RATE] IN BLOOD BY WESTERGREN METHOD: 46 MM/HR
HCT VFR BLD CALC: 44.9 %
HGB BLD-MCNC: 14.5 G/DL
IMM GRANULOCYTES NFR BLD AUTO: 0.5 %
LYMPHOCYTES # BLD AUTO: 3.24 K/UL
LYMPHOCYTES NFR BLD AUTO: 31.5 %
MAN DIFF?: NORMAL
MCHC RBC-ENTMCNC: 26.8 PG
MCHC RBC-ENTMCNC: 32.3 GM/DL
MCV RBC AUTO: 83 FL
MONOCYTES # BLD AUTO: 0.62 K/UL
MONOCYTES NFR BLD AUTO: 6 %
NEUTROPHILS # BLD AUTO: 6.01 K/UL
NEUTROPHILS NFR BLD AUTO: 58.5 %
PLATELET # BLD AUTO: 279 K/UL
POTASSIUM SERPL-SCNC: 4.3 MMOL/L
PROT SERPL-MCNC: 6.2 G/DL
PROT UR-MCNC: 270 MG/DL
RBC # BLD: 5.41 M/UL
RBC # FLD: 14.3 %
SODIUM SERPL-SCNC: 136 MMOL/L
WBC # FLD AUTO: 10.28 K/UL

## 2024-06-09 LAB
C3 SERPL-MCNC: 160 MG/DL
C4 SERPL-MCNC: 39 MG/DL
DSDNA AB SER-ACNC: 2 IU/ML
ESTIMATED AVERAGE GLUCOSE: 183 MG/DL
HBA1C MFR BLD HPLC: 8 %

## 2024-06-12 RX ORDER — BELIMUMAB 400 MG/5ML
400 INJECTION, POWDER, LYOPHILIZED, FOR SOLUTION INTRAVENOUS
Refills: 0 | Status: ACTIVE | OUTPATIENT
Start: 2024-06-11 | End: 1900-01-01

## 2024-06-13 ENCOUNTER — APPOINTMENT (OUTPATIENT)
Dept: RHEUMATOLOGY | Facility: CLINIC | Age: 29
End: 2024-06-13

## 2024-06-20 ENCOUNTER — APPOINTMENT (OUTPATIENT)
Dept: RHEUMATOLOGY | Facility: CLINIC | Age: 29
End: 2024-06-20
Payer: MEDICAID

## 2024-06-20 VITALS
TEMPERATURE: 92 F | SYSTOLIC BLOOD PRESSURE: 128 MMHG | DIASTOLIC BLOOD PRESSURE: 88 MMHG | OXYGEN SATURATION: 98 % | RESPIRATION RATE: 16 BRPM | HEART RATE: 98 BPM

## 2024-06-20 DIAGNOSIS — Z51.81 ENCOUNTER FOR THERAPEUTIC DRUG LVL MONITORING: ICD-10-CM

## 2024-06-20 DIAGNOSIS — E11.69 TYPE 2 DIABETES MELLITUS WITH OTHER SPECIFIED COMPLICATION: ICD-10-CM

## 2024-06-20 DIAGNOSIS — M25.562 PAIN IN LEFT KNEE: ICD-10-CM

## 2024-06-20 DIAGNOSIS — Z87.898 PERSONAL HISTORY OF OTHER SPECIFIED CONDITIONS: ICD-10-CM

## 2024-06-20 DIAGNOSIS — Z79.899 OTHER LONG TERM (CURRENT) DRUG THERAPY: ICD-10-CM

## 2024-06-20 DIAGNOSIS — R21 RASH AND OTHER NONSPECIFIC SKIN ERUPTION: ICD-10-CM

## 2024-06-20 DIAGNOSIS — E55.9 VITAMIN D DEFICIENCY, UNSPECIFIED: ICD-10-CM

## 2024-06-20 DIAGNOSIS — M32.9 SYSTEMIC LUPUS ERYTHEMATOSUS, UNSPECIFIED: ICD-10-CM

## 2024-06-20 DIAGNOSIS — M32.14 GLOMERULAR DISEASE IN SYSTEMIC LUPUS ERYTHEMATOSUS: ICD-10-CM

## 2024-06-20 DIAGNOSIS — R80.9 PROTEINURIA, UNSPECIFIED: ICD-10-CM

## 2024-06-20 PROCEDURE — 96374 THER/PROPH/DIAG INJ IV PUSH: CPT | Mod: 59

## 2024-06-20 PROCEDURE — 36415 COLL VENOUS BLD VENIPUNCTURE: CPT

## 2024-06-20 PROCEDURE — 96413 CHEMO IV INFUSION 1 HR: CPT

## 2024-06-20 PROCEDURE — 99442: CPT

## 2024-06-20 RX ORDER — BELIMUMAB 120 MG/1.5ML
120 INJECTION, POWDER, LYOPHILIZED, FOR SOLUTION INTRAVENOUS
Qty: 1 | Refills: 0 | Status: COMPLETED
Start: 2024-05-02

## 2024-06-20 RX ORDER — BELIMUMAB 400 MG/5ML
400 INJECTION, POWDER, LYOPHILIZED, FOR SOLUTION INTRAVENOUS
Qty: 1 | Refills: 0 | Status: COMPLETED
Start: 2024-06-11

## 2024-06-20 RX ORDER — HYDROCORTISONE SODIUM SUCCINATE 100 MG/2ML
100 INJECTION, POWDER, FOR SOLUTION INTRAMUSCULAR; INTRAVENOUS
Qty: 0 | Refills: 0 | Status: COMPLETED
Start: 2024-05-02

## 2024-06-20 RX ORDER — CETIRIZINE HYDROCHLORIDE 10 MG/1
10 TABLET, FILM COATED ORAL
Qty: 0 | Refills: 0 | Status: COMPLETED
Start: 2024-05-02

## 2024-06-20 NOTE — ASSESSMENT
[FreeTextEntry1] : Mr. 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  Has been treated previously with MMF, HCQ, Valsartan and torsemide - currently off everything and loss to f/u.   Upon re-establishing care, Mr. Yoo was found to have increased ESR and protein:creat raio of 4.3.  complements all increased but now with weak positive PM/scl, RNP and Ku.   s/p kidney bx (2024) -> membranous lupus nephritis, calss V, interstitial lymphocyte infiltrate, arterial sclerosis, mild, global globerulosclerosis NIH activity index 2/24, chronicity index 6/12 -- d/w patient at length last visit that he has organ threatening disease - increased chronicity c/w prior biopsy.  However there is also some activity which makes it hopeful for successful use of immunosuppressive regimen. --continue MMF  -- continue HCQ -- continue benlysta -- f/u renal  #hypertension.   -- now on carvedilol -- now on valsartan  # DM by history but hasn't had f/u  -- could also contribute to urinary changes  -- check a1c  -- rec f/u PCP  -- now on ozempic  #vit d def. slowly improving - continue replace  #inc BMI  patient up a bit in his weight - working out  -- t/c weight loss if possible  #medicine monitoring, long term use of meds -- no NSAIDs -- gi omeprazole -- pcp proph bactrim - order sent -- Quant tb negative April 2024 -- hepatitis negative April 2024 -- weight 249 pounds = 112 kg.  benlysta 1000 mg monthly -- benlysta r/b/a discusssed.  It is an IV medication given every 2 weeks for the first 3 doses, then every 4 weeks thereafter.  We discussed the possible side effects of infusion reactions, for which pre-medication with tylenol, solucortef and benadryl will be given to minimize this risk.  We discussed the possible side effects including immunosuppression and need for monitoring for risk of infection, need for monitoring cell counts as well as liver and kidney function, as well as possible GI upset - wants to proceed.  Will determine how to sign the consent form as this was a remote enocunter   [facility] medical care services serve as the continuing focal point for needed health care services that are part of ongoing care related to a patient's single, serious condition or a complex condition.   More than 50% of the encounter was spent counseling the patient on differential, workup, disease course and treatment/management. Education was provided to the patient during this encounter. All questions and concerns were addressed and answered. The patient verbalized understanding and agreed to the plan.   Patient has been instructed to call for an appointment if new symptoms develop. Patient has been instructed to make a follow-up appointment in 3 months   Time spent on the encounter included, but is not limited to, preparing to see the patient, obtaining and/or reviewing separately obtained history, performing the evaluation, counseling and educating, independently interpreting results with communication to patient, order placement, referring and/or communicating with other health professionals as described, and documenting clinical information in the electronic health record.

## 2024-06-20 NOTE — HISTORY OF PRESENT ILLNESS
[FreeTextEntry1] : This telephonic visit was provided via audio only technology.  The patient was located at home [place] at the time of the visit. The provider was located at home [place] at the time of the visit. The patient and Provider participated in the telephonic visit.  Verbal consent for telephonic services was given by the patient.  INTERVAL Hx in terms of the lupus - feels okay now  - feelingn better  - no joint pain  - no joint swelling - gone  - no rashes  - feel strong  in terms of the medication - started the infusion - 2nd one today   ________________________________________________________________________________________ BACKGROUND SLE history  - diagnosed around 2016 - prescribed Plaquenil and steroids - though didn't start it.   - March 2019 Lupus nephritis class 3 focal prolif - treated with MMF - though not totally adherent.  - December 2019 SLE flare with worsening proteinuria, BP and LE edema.  rpt kidney bx with diffuse global prol type IV- G(A) and small crescents - chronicity score 0/12.  Treated with pulse steroids and increased MMF dosing.   - October/ November 2020 - MI s/p stent placement - 2021 worsening proteinuria off MMF and HCQ - meds restarted - 2024 - worsebubg proteinuria off MMF/HCQ - repeat biopsy this time with more chronicicity - restarted meds - started benlysta

## 2024-06-21 LAB
CREAT SPEC-SCNC: 156 MG/DL
CREAT/PROT UR: 3 RATIO
PROT UR-MCNC: 474 MG/DL

## 2024-06-24 NOTE — HISTORY OF PRESENT ILLNESS
[Denies] : Denies [Yes] : Yes [Declined] : Declined [TB] : Tuberculosis screening [Hep acute panel] : Hepatitis acute panel [Left upper extremity] : Left upper extremity [24g] : 24g [Start Time: ___] : Medication Start Time: [unfilled] [End Time: ___] : Medication End Time: [unfilled] [Medication Name: ___] : Medication Name: [unfilled] [Total Amount Administered: ___] : Total Amount Administered: [unfilled] [IV discontinued. Intact. No signs or symptoms of IV complications noted. Time: ___] : IV discontinued. Intact. No signs or symptoms of IV complications noted. Time: [unfilled] [Patient  instructed to seek medical attention with signs and symptoms of adverse effects] : Patient  instructed to seek medical attention with signs and symptoms of adverse effects [Patient left unit in no acute distress] : Patient left unit in no acute distress [Medications administered as ordered and tolerated well.] : Medications administered as ordered and tolerated well. [Blood drawn at time of visit] : Blood drawn at time of visit [de-identified] : 2:00 PM

## 2024-06-25 LAB
ALBUMIN SERPL ELPH-MCNC: 3.7 G/DL
ALP BLD-CCNC: 89 U/L
ALT SERPL-CCNC: 43 U/L
ANION GAP SERPL CALC-SCNC: 14 MMOL/L
AST SERPL-CCNC: 29 U/L
BASOPHILS # BLD AUTO: 0.06 K/UL
BASOPHILS NFR BLD AUTO: 0.6 %
BILIRUB SERPL-MCNC: 0.4 MG/DL
BUN SERPL-MCNC: 13 MG/DL
C3 SERPL-MCNC: 186 MG/DL
C4 SERPL-MCNC: 46 MG/DL
CALCIUM SERPL-MCNC: 9.5 MG/DL
CHLORIDE SERPL-SCNC: 104 MMOL/L
CO2 SERPL-SCNC: 21 MMOL/L
CREAT SERPL-MCNC: 0.91 MG/DL
CRP SERPL-MCNC: 18 MG/L
DSDNA AB SER-ACNC: 2 IU/ML
EGFR: 116 ML/MIN/1.73M2
EOSINOPHIL # BLD AUTO: 0.33 K/UL
EOSINOPHIL NFR BLD AUTO: 3.2 %
ERYTHROCYTE [SEDIMENTATION RATE] IN BLOOD BY WESTERGREN METHOD: 63 MM/HR
HCT VFR BLD CALC: 46.8 %
HGB BLD-MCNC: 15.3 G/DL
IMM GRANULOCYTES NFR BLD AUTO: 0.5 %
LYMPHOCYTES # BLD AUTO: 3.95 K/UL
LYMPHOCYTES NFR BLD AUTO: 38.3 %
MAN DIFF?: NORMAL
MCHC RBC-ENTMCNC: 26.6 PG
MCHC RBC-ENTMCNC: 32.7 GM/DL
MCV RBC AUTO: 81.3 FL
MONOCYTES # BLD AUTO: 0.64 K/UL
MONOCYTES NFR BLD AUTO: 6.2 %
NEUTROPHILS # BLD AUTO: 5.28 K/UL
NEUTROPHILS NFR BLD AUTO: 51.2 %
PLATELET # BLD AUTO: 331 K/UL
POTASSIUM SERPL-SCNC: 4.4 MMOL/L
PROT SERPL-MCNC: 6.7 G/DL
RBC # BLD: 5.76 M/UL
RBC # FLD: 14 %
SODIUM SERPL-SCNC: 139 MMOL/L
WBC # FLD AUTO: 10.31 K/UL

## 2024-07-11 ENCOUNTER — APPOINTMENT (OUTPATIENT)
Dept: RHEUMATOLOGY | Facility: CLINIC | Age: 29
End: 2024-07-11
Payer: MEDICAID

## 2024-07-11 ENCOUNTER — MED ADMIN CHARGE (OUTPATIENT)
Age: 29
End: 2024-07-11

## 2024-07-11 VITALS
OXYGEN SATURATION: 99 % | SYSTOLIC BLOOD PRESSURE: 124 MMHG | DIASTOLIC BLOOD PRESSURE: 79 MMHG | RESPIRATION RATE: 18 BRPM | HEART RATE: 99 BPM | TEMPERATURE: 98 F

## 2024-07-11 PROCEDURE — 96365 THER/PROPH/DIAG IV INF INIT: CPT

## 2024-07-11 PROCEDURE — 96374 THER/PROPH/DIAG INJ IV PUSH: CPT | Mod: 59

## 2024-07-11 PROCEDURE — 96413 CHEMO IV INFUSION 1 HR: CPT

## 2024-07-11 PROCEDURE — 96375 TX/PRO/DX INJ NEW DRUG ADDON: CPT

## 2024-07-12 RX ORDER — HYDROCORTISONE SODIUM SUCCINATE 100 MG/2ML
100 INJECTION, POWDER, FOR SOLUTION INTRAMUSCULAR; INTRAVENOUS
Qty: 0 | Refills: 0 | Status: COMPLETED
Start: 2024-05-02

## 2024-07-12 RX ORDER — BELIMUMAB 400 MG/5ML
400 INJECTION, POWDER, LYOPHILIZED, FOR SOLUTION INTRAVENOUS
Qty: 1 | Refills: 0 | Status: COMPLETED
Start: 2024-06-11

## 2024-07-12 RX ORDER — CETIRIZINE HYDROCHLORIDE 10 MG/1
10 TABLET, FILM COATED ORAL
Qty: 0 | Refills: 0 | Status: COMPLETED
Start: 2024-05-02

## 2024-07-12 RX ORDER — BELIMUMAB 120 MG/1.5ML
120 INJECTION, POWDER, LYOPHILIZED, FOR SOLUTION INTRAVENOUS
Qty: 1 | Refills: 0 | Status: COMPLETED
Start: 2024-05-02

## 2024-07-22 NOTE — ASSESSMENT
[FreeTextEntry1] : 28 year old male with past medical history of Diabetes Mellitus Type 2, SLE, Lupus Nephritis, HTN who presents for management of his diabetes  1. DM 2- uncontrolled, uncomplicated Patient counseled on the importance of diabetic control and risk of complications. We discussed about microvascular disease and macrovascular disease. We discussed the importance of ophthalmology evaluations annually or more frequent as necessary. We also discussed the importance of diabetes foot care. Some form of glucose monitoring is always advised. Maintaining a low carbohydrate/ADA diet and physical activity was discussed. Patient's diet and the necessary changes discussed. Reviewed over Dexcom and use. Reviewed over glycemic goals. Discussed other options for diabetes control.   Will decide on Ozempic vs Jardiance D/w nephrology Start Dexcom G7 Check fsg  Cont ADA diet Cont exercise Opthalmology Visit referral made Foot Exam up at follow up      Attending and PA/NP shared services statement (NON-critical care):

## 2024-07-22 NOTE — REASON FOR VISIT
[Follow - Up] : a follow-up visit [DM Type 2] : DM Type 2 [Home] : at home, [unfilled] , at the time of the visit. [Medical Office: ([facility])___] : at the medical office located in  [Patient] : the patient

## 2024-07-23 ENCOUNTER — APPOINTMENT (OUTPATIENT)
Dept: ENDOCRINOLOGY | Facility: CLINIC | Age: 29
End: 2024-07-23

## 2024-08-07 ENCOUNTER — APPOINTMENT (OUTPATIENT)
Dept: RHEUMATOLOGY | Facility: CLINIC | Age: 29
End: 2024-08-07

## 2024-08-07 PROCEDURE — 96365 THER/PROPH/DIAG IV INF INIT: CPT

## 2024-08-07 PROCEDURE — 96413 CHEMO IV INFUSION 1 HR: CPT

## 2024-08-07 PROCEDURE — 96374 THER/PROPH/DIAG INJ IV PUSH: CPT | Mod: 59

## 2024-08-07 PROCEDURE — 96375 TX/PRO/DX INJ NEW DRUG ADDON: CPT | Mod: 59

## 2024-08-07 NOTE — HISTORY OF PRESENT ILLNESS
[N/A] : N/A [Denies] : Denies [No] : No [Yes] : Yes [Declined] : Declined [TB] : Tuberculosis screening [Hep acute panel] : Hepatitis acute panel [Right upper extremity] : Right upper extremity [22g] : 22g [Start Time: ___] : Medication Start Time: [unfilled] [End Time: ___] : Medication End Time: [unfilled] [Medication Name: ___] : Medication Name: [unfilled] [Total Amount Administered: ___] : Total Amount Administered: [unfilled] [IV discontinued. Intact. No signs or symptoms of IV complications noted. Time: ___] : IV discontinued. Intact. No signs or symptoms of IV complications noted. Time: [unfilled] [Patient  instructed to seek medical attention with signs and symptoms of adverse effects] : Patient  instructed to seek medical attention with signs and symptoms of adverse effects [Patient left unit in no acute distress] : Patient left unit in no acute distress [Medications administered as ordered and tolerated well.] : Medications administered as ordered and tolerated well. [de-identified] : Right AC [de-identified] : 11:15

## 2024-08-08 ENCOUNTER — APPOINTMENT (OUTPATIENT)
Dept: RHEUMATOLOGY | Facility: CLINIC | Age: 29
End: 2024-08-08

## 2024-09-17 ENCOUNTER — APPOINTMENT (OUTPATIENT)
Dept: RHEUMATOLOGY | Facility: CLINIC | Age: 29
End: 2024-09-17
Payer: MEDICAID

## 2024-09-17 ENCOUNTER — MED ADMIN CHARGE (OUTPATIENT)
Age: 29
End: 2024-09-17

## 2024-09-17 VITALS
OXYGEN SATURATION: 97 % | HEART RATE: 91 BPM | DIASTOLIC BLOOD PRESSURE: 97 MMHG | TEMPERATURE: 98 F | SYSTOLIC BLOOD PRESSURE: 140 MMHG

## 2024-09-17 PROCEDURE — 96413 CHEMO IV INFUSION 1 HR: CPT

## 2024-09-17 PROCEDURE — 96374 THER/PROPH/DIAG INJ IV PUSH: CPT | Mod: 59

## 2024-09-17 PROCEDURE — 96365 THER/PROPH/DIAG IV INF INIT: CPT

## 2024-09-17 RX ORDER — BELIMUMAB 120 MG/1.5ML
120 INJECTION, POWDER, LYOPHILIZED, FOR SOLUTION INTRAVENOUS
Qty: 1 | Refills: 0 | Status: COMPLETED
Start: 2024-05-02

## 2024-09-17 RX ORDER — BELIMUMAB 400 MG/5ML
400 INJECTION, POWDER, LYOPHILIZED, FOR SOLUTION INTRAVENOUS
Qty: 1 | Refills: 0 | Status: COMPLETED
Start: 2024-06-11

## 2024-09-17 RX ORDER — CETIRIZINE HYDROCHLORIDE 10 MG/1
10 TABLET, FILM COATED ORAL
Qty: 0 | Refills: 0 | Status: COMPLETED
Start: 2024-05-02

## 2024-09-17 RX ORDER — ACETAMINOPHEN 325 MG/1
325 TABLET ORAL
Qty: 0 | Refills: 0 | Status: COMPLETED
Start: 2024-05-02

## 2024-09-17 NOTE — HISTORY OF PRESENT ILLNESS
[N/A] : N/A [Denies] : Denies [No] : No [Yes] : Yes [Declined] : Declined [Informed consent documented in EHR.] : Informed consent documented in EHR. [TB] : Tuberculosis screening [Hep acute panel] : Hepatitis acute panel [Total Hep B core AB] : total Hepatitis B Core antibody [Left upper extremity] : Left upper extremity [24g] : 24g [Start Time: ___] : Medication Start Time: [unfilled] [End Time: ___] : Medication End Time: [unfilled] [Medication Name: ___] : Medication Name: [unfilled] [Total Amount Administered: ___] : Total Amount Administered: [unfilled] [IV discontinued. Intact. No signs or symptoms of IV complications noted. Time: ___] : IV discontinued. Intact. No signs or symptoms of IV complications noted. Time: [unfilled] [Patient  instructed to seek medical attention with signs and symptoms of adverse effects] : Patient  instructed to seek medical attention with signs and symptoms of adverse effects [Patient left unit in no acute distress] : Patient left unit in no acute distress [Medications administered as ordered and tolerated well.] : Medications administered as ordered and tolerated well. [de-identified] : 10:05AM [de-identified] : NEXT INFUSION- 10/1524 AT 1:30PM

## 2024-10-15 ENCOUNTER — MED ADMIN CHARGE (OUTPATIENT)
Age: 29
End: 2024-10-15

## 2024-10-15 ENCOUNTER — APPOINTMENT (OUTPATIENT)
Dept: RHEUMATOLOGY | Facility: CLINIC | Age: 29
End: 2024-10-15
Payer: MEDICAID

## 2024-10-15 VITALS
SYSTOLIC BLOOD PRESSURE: 137 MMHG | TEMPERATURE: 97 F | HEART RATE: 90 BPM | DIASTOLIC BLOOD PRESSURE: 87 MMHG | OXYGEN SATURATION: 98 %

## 2024-10-15 PROCEDURE — 96413 CHEMO IV INFUSION 1 HR: CPT

## 2024-10-15 PROCEDURE — 96374 THER/PROPH/DIAG INJ IV PUSH: CPT | Mod: 59

## 2024-10-15 RX ORDER — BELIMUMAB 400 MG/5ML
400 INJECTION, POWDER, LYOPHILIZED, FOR SOLUTION INTRAVENOUS
Qty: 1 | Refills: 0 | Status: COMPLETED
Start: 2024-06-11

## 2024-10-15 RX ORDER — BELIMUMAB 120 MG/1.5ML
120 INJECTION, POWDER, LYOPHILIZED, FOR SOLUTION INTRAVENOUS
Qty: 1 | Refills: 0 | Status: COMPLETED
Start: 2024-05-02

## 2024-10-15 RX ORDER — ACETAMINOPHEN 325 MG/1
325 TABLET ORAL
Qty: 0 | Refills: 0 | Status: COMPLETED
Start: 2024-05-02

## 2024-10-15 RX ORDER — CETIRIZINE HYDROCHLORIDE 10 MG/1
10 TABLET, FILM COATED ORAL
Qty: 0 | Refills: 0 | Status: COMPLETED
Start: 2024-05-02

## 2024-10-18 NOTE — H&P ADULT - PROBLEM SELECTOR PLAN 1
- Likely has flare of his lupus nephritis  - Renal consult appreciated, started patient on lasix 40mg IV BID, holding lisinopril as per renal   - Lab work ordered as per renal, unable to find order for anti-PLA2R ab -> defer that to team in AM  - Ordered US renal, as per US tech they do not perform renal duplex, ordered VA duplex of abdomen and specified for renal vein/artery duplex, will also make patient NPO in AM for his abd imaging test
Stable.

## 2024-10-23 ENCOUNTER — APPOINTMENT (OUTPATIENT)
Dept: ENDOCRINOLOGY | Facility: CLINIC | Age: 29
End: 2024-10-23

## 2024-11-14 ENCOUNTER — APPOINTMENT (OUTPATIENT)
Dept: RHEUMATOLOGY | Facility: CLINIC | Age: 29
End: 2024-11-14

## 2024-12-25 PROBLEM — F10.90 ALCOHOL USE: Status: ACTIVE | Noted: 2017-08-09

## 2025-01-15 ENCOUNTER — TRANSCRIPTION ENCOUNTER (OUTPATIENT)
Age: 30
End: 2025-01-15

## 2025-01-21 ENCOUNTER — TRANSCRIPTION ENCOUNTER (OUTPATIENT)
Age: 30
End: 2025-01-21

## 2025-01-21 ENCOUNTER — APPOINTMENT (OUTPATIENT)
Dept: RHEUMATOLOGY | Facility: CLINIC | Age: 30
End: 2025-01-21

## 2025-01-21 DIAGNOSIS — Z79.899 OTHER LONG TERM (CURRENT) DRUG THERAPY: ICD-10-CM

## 2025-01-21 DIAGNOSIS — M32.9 SYSTEMIC LUPUS ERYTHEMATOSUS, UNSPECIFIED: ICD-10-CM

## 2025-01-21 DIAGNOSIS — Z51.81 ENCOUNTER FOR THERAPEUTIC DRUG LVL MONITORING: ICD-10-CM

## 2025-01-26 ENCOUNTER — INPATIENT (INPATIENT)
Facility: HOSPITAL | Age: 30
LOS: 4 days | Discharge: HOME | End: 2025-01-31
Attending: PSYCHIATRY & NEUROLOGY | Admitting: PSYCHIATRY & NEUROLOGY
Payer: MEDICAID

## 2025-01-26 VITALS
OXYGEN SATURATION: 95 % | WEIGHT: 240.08 LBS | TEMPERATURE: 99 F | DIASTOLIC BLOOD PRESSURE: 126 MMHG | HEART RATE: 135 BPM | RESPIRATION RATE: 19 BRPM | SYSTOLIC BLOOD PRESSURE: 169 MMHG

## 2025-01-26 DIAGNOSIS — T39.1X1A POISONING BY 4-AMINOPHENOL DERIVATIVES, ACCIDENTAL (UNINTENTIONAL), INITIAL ENCOUNTER: ICD-10-CM

## 2025-01-26 DIAGNOSIS — F32.9 MAJOR DEPRESSIVE DISORDER, SINGLE EPISODE, UNSPECIFIED: ICD-10-CM

## 2025-01-26 LAB
A1C WITH ESTIMATED AVERAGE GLUCOSE RESULT: 8.7 % — HIGH (ref 4–5.6)
ALBUMIN SERPL ELPH-MCNC: 3.5 G/DL — SIGNIFICANT CHANGE UP (ref 3.3–5)
ALP SERPL-CCNC: 92 U/L — SIGNIFICANT CHANGE UP (ref 40–120)
ALT FLD-CCNC: 38 U/L — SIGNIFICANT CHANGE UP (ref 4–41)
ANION GAP SERPL CALC-SCNC: 18 MMOL/L — HIGH (ref 7–14)
APAP SERPL-MCNC: 10 UG/ML — LOW (ref 15–25)
APAP SERPL-MCNC: 15 UG/ML — SIGNIFICANT CHANGE UP (ref 15–25)
AST SERPL-CCNC: 23 U/L — SIGNIFICANT CHANGE UP (ref 4–40)
BASOPHILS # BLD AUTO: 0.04 K/UL — SIGNIFICANT CHANGE UP (ref 0–0.2)
BASOPHILS NFR BLD AUTO: 0.3 % — SIGNIFICANT CHANGE UP (ref 0–2)
BILIRUB SERPL-MCNC: 0.4 MG/DL — SIGNIFICANT CHANGE UP (ref 0.2–1.2)
BLOOD GAS VENOUS COMPREHENSIVE RESULT: SIGNIFICANT CHANGE UP
BUN SERPL-MCNC: 16 MG/DL — SIGNIFICANT CHANGE UP (ref 7–23)
CALCIUM SERPL-MCNC: 8.9 MG/DL — SIGNIFICANT CHANGE UP (ref 8.4–10.5)
CHLORIDE SERPL-SCNC: 101 MMOL/L — SIGNIFICANT CHANGE UP (ref 98–107)
CHOLEST SERPL-MCNC: 278 MG/DL — HIGH
CO2 SERPL-SCNC: 19 MMOL/L — LOW (ref 22–31)
CREAT SERPL-MCNC: 0.87 MG/DL — SIGNIFICANT CHANGE UP (ref 0.5–1.3)
EGFR: 118 ML/MIN/1.73M2 — SIGNIFICANT CHANGE UP
EOSINOPHIL # BLD AUTO: 0.13 K/UL — SIGNIFICANT CHANGE UP (ref 0–0.5)
EOSINOPHIL NFR BLD AUTO: 1.1 % — SIGNIFICANT CHANGE UP (ref 0–6)
ESTIMATED AVERAGE GLUCOSE: 203 — SIGNIFICANT CHANGE UP
ETHANOL SERPL-MCNC: <10 MG/DL — SIGNIFICANT CHANGE UP
FLUAV AG NPH QL: SIGNIFICANT CHANGE UP
FLUBV AG NPH QL: SIGNIFICANT CHANGE UP
GLUCOSE SERPL-MCNC: 290 MG/DL — HIGH (ref 70–99)
HCT VFR BLD CALC: 46.7 % — SIGNIFICANT CHANGE UP (ref 39–50)
HDLC SERPL-MCNC: 49 MG/DL — SIGNIFICANT CHANGE UP
HGB BLD-MCNC: 15.3 G/DL — SIGNIFICANT CHANGE UP (ref 13–17)
IANC: 7.96 K/UL — HIGH (ref 1.8–7.4)
IMM GRANULOCYTES NFR BLD AUTO: 0.7 % — SIGNIFICANT CHANGE UP (ref 0–0.9)
LIPID PNL WITH DIRECT LDL SERPL: 181 MG/DL — HIGH
LYMPHOCYTES # BLD AUTO: 2.72 K/UL — SIGNIFICANT CHANGE UP (ref 1–3.3)
LYMPHOCYTES # BLD AUTO: 23.1 % — SIGNIFICANT CHANGE UP (ref 13–44)
MCHC RBC-ENTMCNC: 25.9 PG — LOW (ref 27–34)
MCHC RBC-ENTMCNC: 32.8 G/DL — SIGNIFICANT CHANGE UP (ref 32–36)
MCV RBC AUTO: 79 FL — LOW (ref 80–100)
MONOCYTES # BLD AUTO: 0.82 K/UL — SIGNIFICANT CHANGE UP (ref 0–0.9)
MONOCYTES NFR BLD AUTO: 7 % — SIGNIFICANT CHANGE UP (ref 2–14)
NEUTROPHILS # BLD AUTO: 7.96 K/UL — HIGH (ref 1.8–7.4)
NEUTROPHILS NFR BLD AUTO: 67.8 % — SIGNIFICANT CHANGE UP (ref 43–77)
NON HDL CHOLESTEROL: 229 MG/DL — HIGH
NRBC # BLD AUTO: 0 K/UL — SIGNIFICANT CHANGE UP (ref 0–0)
NRBC # BLD: 0 /100 WBCS — SIGNIFICANT CHANGE UP (ref 0–0)
NRBC # FLD: 0 K/UL — SIGNIFICANT CHANGE UP (ref 0–0)
NRBC BLD-RTO: 0 /100 WBCS — SIGNIFICANT CHANGE UP (ref 0–0)
PLATELET # BLD AUTO: 295 K/UL — SIGNIFICANT CHANGE UP (ref 150–400)
POTASSIUM SERPL-MCNC: 3.9 MMOL/L — SIGNIFICANT CHANGE UP (ref 3.5–5.3)
POTASSIUM SERPL-SCNC: 3.9 MMOL/L — SIGNIFICANT CHANGE UP (ref 3.5–5.3)
PROT SERPL-MCNC: 6.9 G/DL — SIGNIFICANT CHANGE UP (ref 6–8.3)
RBC # BLD: 5.91 M/UL — HIGH (ref 4.2–5.8)
RBC # FLD: 13.7 % — SIGNIFICANT CHANGE UP (ref 10.3–14.5)
RSV RNA NPH QL NAA+NON-PROBE: SIGNIFICANT CHANGE UP
SALICYLATES SERPL-MCNC: <0.3 MG/DL — LOW (ref 15–30)
SARS-COV-2 RNA SPEC QL NAA+PROBE: SIGNIFICANT CHANGE UP
SODIUM SERPL-SCNC: 138 MMOL/L — SIGNIFICANT CHANGE UP (ref 135–145)
TOXICOLOGY SCREEN, DRUGS OF ABUSE, SERUM RESULT: SIGNIFICANT CHANGE UP
TRIGL SERPL-MCNC: 250 MG/DL — HIGH
TSH SERPL-MCNC: 3.56 UIU/ML — SIGNIFICANT CHANGE UP (ref 0.27–4.2)
WBC # BLD: 11.75 K/UL — HIGH (ref 3.8–10.5)
WBC # FLD AUTO: 11.75 K/UL — HIGH (ref 3.8–10.5)

## 2025-01-26 PROCEDURE — 99285 EMERGENCY DEPT VISIT HI MDM: CPT

## 2025-01-26 RX ORDER — SODIUM CHLORIDE 9 G/ML
1000 INJECTION, SOLUTION INTRAVENOUS
Refills: 0 | Status: DISCONTINUED | OUTPATIENT
Start: 2025-01-26 | End: 2025-01-27

## 2025-01-26 RX ORDER — HYDROXYCHLOROQUINE SULFATE 200 MG/1
200 TABLET, FILM COATED ORAL ONCE
Refills: 0 | Status: COMPLETED | OUTPATIENT
Start: 2025-01-26 | End: 2025-01-26

## 2025-01-26 RX ORDER — BACTERIOSTATIC SODIUM CHLORIDE 0.9 %
1000 VIAL (ML) INJECTION ONCE
Refills: 0 | Status: COMPLETED | OUTPATIENT
Start: 2025-01-26 | End: 2025-01-26

## 2025-01-26 RX ORDER — INSULIN LISPRO 100/ML
VIAL (ML) SUBCUTANEOUS
Refills: 0 | Status: DISCONTINUED | OUTPATIENT
Start: 2025-01-26 | End: 2025-01-31

## 2025-01-26 RX ORDER — MYCOPHENOLATE MOFETIL 200 MG/ML
500 POWDER, FOR SUSPENSION ORAL ONCE
Refills: 0 | Status: COMPLETED | OUTPATIENT
Start: 2025-01-26 | End: 2025-01-26

## 2025-01-26 RX ORDER — INSULIN LISPRO 100/ML
VIAL (ML) SUBCUTANEOUS AT BEDTIME
Refills: 0 | Status: DISCONTINUED | OUTPATIENT
Start: 2025-01-26 | End: 2025-01-31

## 2025-01-26 RX ORDER — VALSARTAN 80 MG
40 TABLET ORAL ONCE
Refills: 0 | Status: COMPLETED | OUTPATIENT
Start: 2025-01-26 | End: 2025-01-26

## 2025-01-26 RX ORDER — GLUCAGON 3 MG/1
1 POWDER NASAL ONCE
Refills: 0 | Status: DISCONTINUED | OUTPATIENT
Start: 2025-01-26 | End: 2025-01-27

## 2025-01-26 RX ORDER — ACETAMINOPHEN 160 MG/5ML
650 SUSPENSION ORAL EVERY 6 HOURS
Refills: 0 | Status: DISCONTINUED | OUTPATIENT
Start: 2025-01-26 | End: 2025-01-31

## 2025-01-26 RX ADMIN — Medication 40 MILLIGRAM(S): at 03:29

## 2025-01-26 RX ADMIN — Medication 1 MILLIGRAM(S): at 12:59

## 2025-01-26 RX ADMIN — HYDROXYCHLOROQUINE SULFATE 200 MILLIGRAM(S): 200 TABLET, FILM COATED ORAL at 10:22

## 2025-01-26 RX ADMIN — Medication 1000 MILLILITER(S): at 03:29

## 2025-01-26 RX ADMIN — MYCOPHENOLATE MOFETIL 500 MILLIGRAM(S): 200 POWDER, FOR SUSPENSION ORAL at 11:32

## 2025-01-26 NOTE — ED BEHAVIORAL HEALTH ASSESSMENT NOTE - PSYCHIATRIC ISSUES AND PLAN (INCLUDE STANDING AND PRN MEDICATION)
defer initiation of appropriate psych meds to primary treatment team at [facility].  PRNs: ativan 1mg PO/ ativan 2mg IM q6Hrs for agitation

## 2025-01-26 NOTE — ED PROVIDER NOTE - PROGRESS NOTE DETAILS
SENA Brooks PGY2- patient cleared by tox in regard to tylenol ingestion. medically cleared. discussed with psych Dr. Tovar: pt signed out to me at 8 AM, medically cleared, awaiting psychiatry final recommendations; pt 30 yo male with SLE, CAD with stent, in ED after ingestion of reported APAP "10 tablets" at known time, APAP level below treatable level at 4 hours post-ingestion, and LFTs normal.  Pt cleared from medical perspective and awaiting psychiatric eval due to concern that pt's ingestion was in the context of worsening depression and possible SI.  Pt states he took meds due to body pain related to lupus, denies SI/HI.  At the time of my eval pt without acute complaints.  I ordered pt his normal morning medications.  Pt evaluated by psychiatry and recommending inpatient admission (involuntary). Dr. Tovar: , pt to be placed on insulin sliding scale while at [facility], can be adjusted based on FS at [facility]

## 2025-01-26 NOTE — ED BEHAVIORAL HEALTH ASSESSMENT NOTE - OTHER PAST PSYCHIATRIC HISTORY (INCLUDE DETAILS REGARDING ONSET, COURSE OF ILLNESS, INPATIENT/OUTPATIENT TREATMENT)
no established psych diagnosis  not in treatment  no psych admissions  denied past hx of SA nor engaged in any NSSIB

## 2025-01-26 NOTE — ED BEHAVIORAL HEALTH NOTE - BEHAVIORAL HEALTH NOTE
Spoke with patient's sister, Gail for collateral at [phone]. Gail doesn't live with pt and parents but reports pt has seemed depressed and withdrawn as of late,  hasn't been coming out of his room,  and has been han/irritable with family members. Pt has also been noncompliant with his medical medications.  Whenever family tries to talk to him, he responds with "leave me alone". States she had been trying to call pt yesterday but he wasn't answering, and yesterday evening pt called her back, was yelling over the phone, so she went over to her parents' home, where pt had locked himself inside of his bedroom. States she found a screwdriver that she was able to use to unlock the door. When she entered the room, pt was attempting to make himself vomit and admitted that he had ingested pills.  States he has never been in psychiatric treatment, has never been in counselling/therapy, but has seemed depressed for a long time, though worse lately. States pt is "standoffish" and has a tendency to shut down his emotions.  As far as she know, pt has never attempted suicide, denies hx of NSSIB. Pt uses marijuana but does not believe he uses other substances. States pt lives with mom and dad, unemployed currently.  Pt does at times become irritable and yells, sometimes throws things, but has never been physically violent nor harmed others . Denies hx of AVH or paranoia. Spoke with patient's sister, Gail for collateral at [phone]. Gail doesn't live with pt and parents but reports pt has seemed depressed and withdrawn as of late,  hasn't been coming out of his room,  and has been han/irritable with family members. Pt has also been noncompliant with his medical medications.  Whenever family tries to talk to him, he responds with "leave me alone". States she had been trying to call pt yesterday but he wasn't answering, and yesterday evening pt called her back, was yelling over the phone, so she went over to her parents' home, where pt had locked himself inside of his bedroom. States she found a screwdriver that she was able to use to unlock the door. When she entered the room, pt was attempting to make himself vomit and admitted that he had ingested pills.  States he has never been in psychiatric treatment, has never been in counselling/therapy, but has seemed depressed for a long time, though worse lately. States pt is "standoffish" and has a tendency to shut down his emotions.  As far as she know, pt has never attempted suicide, denies hx of NSSIB. Pt uses marijuana but does not believe he uses other substances. States pt lives with mom and dad, unemployed currently.  Pt does at times become irritable and yells, sometimes throws things, but has never been physically violent nor harmed others . Denies hx of AVH or paranoia.    10:45AM (Aidee)  - called sister, Gail to update her that Pt will be transferred to [facility]

## 2025-01-26 NOTE — ED BEHAVIORAL HEALTH ASSESSMENT NOTE - ADDITIONAL DETAILS ALL
today, had reportedly intentionally ingested 10 acetaminophen-codeine combination pill (around midnight)

## 2025-01-26 NOTE — ED BEHAVIORAL HEALTH ASSESSMENT NOTE - DESCRIPTION
seen by TOX service with recommendations that Pt did not meet criteria for initiation of NAC as his acetaminophen level was not elevated.  more so, Pt did not manifest any signs/ symptoms  of toxicity from acetaminophen/ opioid.  TOX service had Pt cleared.    Since Pt's ED arrival, has been calm and cooperative.  There has been no occurrence of agitation/aggressive behavior.  No verbalization of active SI/HI.   does not appear acutely manic or floridly psychotic. Pt is not showing any signs/symptoms of intoxication or withdrawal.  he is not delirious.. has been easily redirected.  NO PRN meds given.  Overall, there has been no management issues. hx of SLE; hx of Lupus nephritis; HTN, DM, CAD s/p stent x 1.  has NOT BEEN TAKING THE FOLLOWING PRESCRIBED MEDS FOR "FEW WEEKS NOW": Valsartan 40mg AM, mycophenolate 500mg ?TID- QID, hydrochloroquine 200mg daily and Ozempic qweekly.  used to be on Benlysta infusion [id number] but has NOT RECEIVED treatment since 11/2024 single and not .  currently domiciled with parents.  self employed/ manages hjis own laundromat.  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  mother and sister are both RNs.  when euthymic, he  likes playing video games and watching football.  Pt is not Latter day. no reported access to guns.  has pet dog "Vignesh".

## 2025-01-26 NOTE — ED BEHAVIORAL HEALTH ASSESSMENT NOTE - RISK ASSESSMENT
Risk Assessment:  Modifiable risk factors: depression, anxiety, remains intermittently passive SI, acute on chronic pain  Unmodifiable risk factors: young male, hx of being not in psych care, recent OD pills  Protective factors: currently no active SI; no reported hx of SA nor any NSSIB, no family hx of SA, Pt's sense of responsibility to family, social supports, no reported access to guns, is not acutely manic, is not psychotic; not intoxicated or in withdrawal, no pending legal issues, domiciled, employed      At this time given all risks taken into consideration, the Pt is at high acute risk of self harm but does not appear to be at chronically elevated risk of harming self (given that he does not have any established psych hx; has no past hx of SA nor engaged in any NSSIB).  current presentation is not deemed safely  dischargeable back to the community.  these increased risks can be mitigated by a psychiatric admission with supervision, initiation of psych meds (as deemed appropriate for Pt) with titration towards efficacious dosing range, establishing therapeutic milieu

## 2025-01-26 NOTE — ED ADULT NURSE REASSESSMENT NOTE - NS ED NURSE REASSESS COMMENT FT1
belonging (clothes and Iphone) returned to sister, Gail, pt aware. plan of care ongoing.
Received pt from main Room 10, Pt alert, calm and cooperative, with even and unlabored respirations, no apparent distress noted. pt arrives post ingesting 10 tylenol with codiene that he took from a family member. pt states it was for pain r/t Lupus, "it was spur of the moment" as per pt. pt denies SI/HI, Auditory/visual hallucinations, ETOh/drug use. Pt denies any chest pain, SOB, headache, dizziness, N+V, diarrhea, fever, chills. plan of care ongoing
EMS for transfer to [facility]. no acute distress noted. MD Tovar aware of elevated BP, [facility] ADELFO Dickson notified of Vitals prior to transfer.
Patient transferred to , IV discontinued and belongings itemized. report given to RN Danny. Ambulated safely to  with no complaints of pain or SOB. Plan of care ongoing.
Patient resting comfortably in bed, No complaints at this time. Denies any SOB, nausea, dizziness, pain, numbness or tingling. Safety measures maintained. No neuro deficts noted at this time and patient is able to speak in gull sentences. Plan of care ongoing.

## 2025-01-26 NOTE — ED BEHAVIORAL HEALTH ASSESSMENT NOTE - DETAILS
per transfer protocol discussed with ED team; mother and sister are aware that Pt will be transferred to [facility] for further stabilization diabetes mellitus: parents; father - hx of stroke with left sided residual; no reported hx of SA; denied any illicit substance use including alcohol today, overdosed on pills ("I took a lot of pills")  as he felt frustrated, angry following argument with his mother mother reported: wt gain with prednisone

## 2025-01-26 NOTE — ED PROVIDER NOTE - ATTENDING CONTRIBUTION TO CARE
28 y/o M with h/o SLE on hydroxycholor 30 y/o M with h/o SLE on cellcept, HTN (nonadherent to valsartan), CAD s/p stent presents after overdose of acetaminophen-codeine.  Pt took 10 tablets at 12am (father's prescription).  Pt states he has chronic joint pains due to his lupus and because of the pain he took these medications.  Pt denies any concomitant drug or ETOH use.  Denies taking other meds.  Pt states he took the medications due to his pain, but denies attempting to hurt or kill himself.  Pt denies SI/HI/AVH at this time.  Sister at bedside states that the pt has been very withdrawn lately and tonight had an argument with his mother, after which he locked himself in the bedroom.  She is concerned that he took the medications in an attempt at self-harm.    Well appearing, lying comfortably in stretcher, awake and alert, nontoxic.  Low grade fever, tachycardia, hypertension.  NCAT EOMI PERRL no pinpoint pupils.  Neck supple.  Lungs cta bl.  Cards nl S1/S2, tachy reg, no MRG.  Abd soft ntnd.  No pedal edema or calf tenderness.  No focal neuro deficits.    Pt with tylenol OD - will check tox (will need to repeat tylenol level at 4 hours after ingestion); labs, ekg, ua, tox consult.  Pt noted hypertensive here, in setting of medication nonadherence.  He has no clinical e/o end-organ damage to suggest hypertensive emergency.  Will give dose of home valsartan.  Will consider psych consult if pt is medically cleared.

## 2025-01-26 NOTE — ED PROVIDER NOTE - CLINICAL SUMMARY MEDICAL DECISION MAKING FREE TEXT BOX
SENA Brooks PGY2- patient SENA Brooks PGY2- patient presenting after ingesting approx 10 tablets of tylenol with codeine around midnight tonight, denies any SI, states took these because was in severe pain 2/2 lupus. no dose noted on bottle, however presumed 325mg each of acetaminophen. patient tachycardic and hypertensive on evaluation with borderline oral temp of 99.3F. will obtain blood work including tox screen, infectious workup if rectal temp elevated, acetaminophen level at 4am, 4 hours from time of ingestion to determine if patient needing NAC.

## 2025-01-26 NOTE — ED ADULT NURSE NOTE - NSFALLUNIVINTERV_ED_ALL_ED
Bed/Stretcher in lowest position, wheels locked, appropriate side rails in place/Call bell, personal items and telephone in reach/Instruct patient to call for assistance before getting out of bed/chair/stretcher/Non-slip footwear applied when patient is off stretcher/[place] to call system/Physically safe environment - no spills, clutter or unnecessary equipment/Purposeful proactive rounding/Room/bathroom lighting operational, light cord in reach

## 2025-01-26 NOTE — ED ADULT TRIAGE NOTE - CHIEF COMPLAINT QUOTE
pt c/o ingestion. Endorsing taking unknown amount of acetaminophen-cod#3 tablets. Denies current SI, HI, ETOH, AH/VH. Denies any curretnt complaints. Hx Lupus, t2DM, Cardiac x1 stent. Calm and cooeraptive at present to get undressed. pt c/o ingestion. Endorsing taking unknown amount of acetaminophen-cod#3 tablets. Superficial cuts noted to b/l wrists. Denies current SI, HI, ETOH, AH/VH. Denies any current complaints. Hx Lupus, t2DM, Cardiac x1 stent. Calm and cooeraptive at present. valuables given to sister and belongings labeled placed in cabinet. pt c/o ingestion. Endorsing taking unknown amount of acetaminophen-cod#3 tablets. Superficial cuts noted to b/l wrists. Denies current SI, HI, ETOH, AH/VH. Denies any current complaints. Hx Lupus, T2DM, Cardiac x1 stent. Calm and cooperative at present. valuables given to sister and belongings labeled placed in cabinet.

## 2025-01-26 NOTE — CONSULT NOTE ADULT - PROBLEM SELECTOR RECOMMENDATION 9
Acetaminophen can cause hepatotoxicity via metabolism by CPY2E1 enzyme into NAPQI, which depletes the liver's glutathione capacity leading to oxidative injury. In massive overdose, nephrotoxicity, metabolic acidosis with lactatemia, and altered mentation could occur. The AlvaroSergio normogram allows us to estimate the likelihood of hepatotoxicity in an acute ingestion within the first 24 hours, and guide whether or not to initiate n-acetylcysteine (NAC) therapy. NAC is an antioxidant and detoxified NAQPI.    #Recommendations  - Patient acetaminophen level does not meet criteria for treatment with NAC.   - Currently no evidence of significant toxicity from this ingestion, and no evidence of opioid toxicity.   - Can clear from acute toxicologic perspective.  - Further medical and/or psychiatric care per primary team    Thank you for involving us in the care of this patient. Assessment and plan discussed with toxicology attending Dr. John Guzman. Please do not hesitate to reach out to the toxicology team for any further questions or concerns.    The On-Call Toxicology Fellow can be reached 24/7 via Pager #[phone]  Please send a 10 digit call back # as [place] cover multiple hospitals    Giacomo Lee MD  Toxicology Fellow  PGY-4

## 2025-01-26 NOTE — ED BEHAVIORAL HEALTH ASSESSMENT NOTE - NSBHROSSYSTEMS_PSY_ALL_CORE
Pt currently denies experiencing any headaches; has no dizziness, no blurring of vision; no cough/ colds, no sore throat; no fever. not complaining of any chest pains, no SOB, no palpitations; no abdominal/ flank pains, no nausea/ vomiting or diarrhea/ constipation; no dysuria, no hesitancy, no polyuria, no hematuria; no pruritus/ no rashes nor any edema

## 2025-01-26 NOTE — ED BEHAVIORAL HEALTH ASSESSMENT NOTE - OTHER
family work related stressors, financial constraints, conflict with mother, father has left sided residual (from stroke) and in  need of supervision at home distracted but redirectable currently in bed CVM: no OPD or [facility] visits; PSYCKES: diagnosis of tobacco related disorder alone; no psych admissions sister manages own business - [employer]

## 2025-01-26 NOTE — ED PROVIDER NOTE - CARE PLAN
1 Principal Discharge DX:	Tylenol ingestion   Principal Discharge DX:	Major depression  Secondary Diagnosis:	Tylenol ingestion

## 2025-01-26 NOTE — ED PROVIDER NOTE - PHYSICAL EXAMINATION
General: no acute distress  Psych: mood appropriate  Head: normocephalic; atraumatic  Eyes: conjunctivae clear bilaterally, sclerae anicteric  ENT: no nasal flaring, patent nares  Cardio: tachycardia with regular rhythm  Resp: clear to auscultation bilaterally  GI: abdomen soft, nontender, nondistended  Neuro: A&Ox3  Skin: no rashes or bruising noted  MSK: normal movement of extremities

## 2025-01-26 NOTE — ED BEHAVIORAL HEALTH ASSESSMENT NOTE - SUMMARY
29/M with no established psych hx; no psych admissions; denied hx of SA nor engaged in any NSSIB; and hx of occasional cannabis use (PRN for pain).  pertinent medical issues include:  hx of SLE; hx of Lupus nephritis; HTN, DM, CAD s/p stent.  Pt is not in psych care; he has refused medical care as well.  today, presented to the ED BIB EMS from home after sister activated 911 following Pt's intentional overdose of 10 acetaminophen-codeine combination pill around midnight (which had been prescribed to a family member).    at this time, endorses worsening bout of depression since the start of this yr - which he admitted, impacting his overall functionality; "struggled with his ADLs last month".  depressive symptoms meeting MDD criteria.  he has also been feeling hopeless and worthless.  with intermittent episodes of harboring passive SI - "having a death wish" which eventually culminated early this morning with Pt intentionally overdosing on pills screaming at family that "I should just die".      currently, is showing mood instability, remains unpredictable; with poor insight (he attempts to minimize symptoms as ploy to get discharged) + impaired judgement.  in addition, he is evasive/ not forthcoming with his gambling issues.   ongoing social stressors + poorly controlled pain issues + non compliance to treatment help propagate Pt's underlying depression/ anxiety.      Pt is not acutely manic; he is not psychotic. does not appear intoxicated; is not delirious; no toxidrome noted.  ongoing uncontrolled depressive + anxiety symptoms causing impairment in his  overall functionality.  Pt will benefit from psych admission aimed at stabilization and ensuring safety.

## 2025-01-26 NOTE — ED BEHAVIORAL HEALTH ASSESSMENT NOTE - SUICIDAL BEHAVIOR DETAILS
around midnight, ingested 10 acetaminophen-codeine combination pill; Pt admits to harboring intermittent/ passive SI recently ,

## 2025-01-26 NOTE — CONSULT NOTE ADULT - SUBJECTIVE AND OBJECTIVE BOX
MEDICAL TOXICOLOGY CONSULT    HPI:    This is a 29 year old male with history of lupus presenting for acetaminophen-codeine ingestion. Per ED, around midnight he ingested 10 pills of acetaminophen-codeine combination pill, unclear dose but suspected 325mg-30mg. He reports taking this for pain but per collateral from family unclear intent. He also takes mycophenolate, hydroxychloroquine, valsartan, pantoprazole and prednisone, but reportedly denies ingestion of these except for 1 pill of prednisone 5mg. His vitals were initially remarkable for tachycardia and low grade temperature but these have improved. His exam is reportedly normal with no evidence of opioid toxidrome. His labs show 4 hour acetaminophen level undetectable and the remainder of labs are unremarkable. ECG:  rate 135, ST, , QTc 96    PAST MEDICAL & SURGICAL HISTORY:  Lupus nephritis      SLE (systemic lupus erythematosus)      No significant past surgical history          MEDICATION HISTORY:      FAMILY HISTORY:  FH: diabetes mellitus (Father, Mother)  in Father and Mother        PHYSICAL EXAM  Vital Signs Last 24 Hrs  T(C): 36.7 (2025 05:09), Max: 37.4 (2025 00:55)  T(F): 98.1 (2025 05:09), Max: 99.3 (2025 00:55)  HR: 106 (2025 05:09) (106 - 135)  BP: 122/77 (2025 05:09) (122/77 - 169/126)  BP(mean): --  RR: 18 (2025 05:09) (18 - 19)  SpO2: 96% (2025 05:09) (95% - 96%)    Parameters below as of 2025 05:09  Patient On (Oxygen Delivery Method): room air        SIGNIFICANT LABORATORY STUDIES:                        15.3   11.75 )-----------( 295      ( 2025 02:54 )             46.7           138  |  101  |  16  ----------------------------<  290[H]  3.9   |  19[L]  |  0.87    Ca    8.9      2025 02:54    TPro  6.9  /  Alb  3.5  /  TBili  0.4  /  DBili  x   /  AST  23  /  ALT  38  /  AlkPhos  92            Urinalysis Basic - ( 2025 02:54 )    Color: x / Appearance: x / SG: x / pH: x  Gluc: 290 mg/dL / Ketone: x  / Bili: x / Urobili: x   Blood: x / Protein: x / Nitrite: x   Leuk Esterase: x / RBC: x / WBC x   Sq Epi: x / Non Sq Epi: x / Bacteria: x        Anion Gap: --  @ 04:00  CK: --  @ 04:00  Troponin:  --   @ 04:00  Pro-BNP:  --   @ 04:00  VBG:  --   @ 04:00  Carboxyhemoglobin %:  --   04:00  Methemoglobin %:  --   @ 04:00  Osmolality Serum:  --   @ 04:00  Aspirin Level: --   @ 04:00  Acetaminophen Level:  10[L]   @ 04:00  Ethanol Level:  --   @ 04:00  Digoxin Level:  --   @ 04:00  Phenytoin Level:  --   @ 04:00  Carbamazepine level:  --   @ 04:00  Lamotrigine level:  --   @ 04:00  Anion Gap: 18[H]  @ 02:54  CK: -- :54  Troponin:  --   02:54  Pro-BNP:  --  :54  VBG:  --  :54  Carboxyhemoglobin %:  --  :54  Methemoglobin %:  --   @ 02:54  Osmolality Serum:  --  :54  Aspirin Level: <0.3[L]  :54  Acetaminophen Level:  15   @ 02:54  Ethanol Level:  --   @ 02:54  Digoxin Level:  --   @ 02:54  Phenytoin Level:  --   @ 02:54  Carbamazepine level:  --   @ 02:54  Lamotrigine level:  --   @ 02:54  Anion Gap: --  @ 02:35  CK: --  @ 02:35  Troponin:  --   @ 02:35  Pro-BNP:  --   @ 02:35  VB   @ 02:35  Carboxyhemoglobin %:  --   @ 02:35  Methemoglobin %:  --   @ 02:35  Osmolality Serum:  --   @ 02:35  Aspirin Level: --   @ 02:35  Acetaminophen Level:  --   @ 02:35  Ethanol Level:  --   @ 02:35  Digoxin Level:  --   @ 02:35  Phenytoin Level:  --   @ 02:35  Carbamazepine level:  --   @ 02:35  Lamotrigine level:  --   @ 02:35      RADIOLOGIC STUDIES

## 2025-01-26 NOTE — ED BEHAVIORAL HEALTH ASSESSMENT NOTE - HPI (INCLUDE ILLNESS QUALITY, SEVERITY, DURATION, TIMING, CONTEXT, MODIFYING FACTORS, ASSOCIATED SIGNS AND SYMPTOMS)
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  with no established psych hx; denied any hx of in-Pt psych admissions; no past hx of SA nor engaged in any NSSIB; admits to occasional cannabis use (PRN for pain) - no alcohol abuse.  pertinent medical issues include:  hx of SLE; hx of Lupus nephritis; HTN, DM, CAD s/p stent.  today, presented to the ED BIB EMS from home after sister activated 911 following Pt's intentional overdose of 10 acetaminophen-codeine combination pill around midnight (which had been prescribed to a family member).      managed in the main ED.. seen and cleared by Tox service who suspected that combo pill dosage was around 325mg-30mg. Pt claimed that he took said medication "for pain".  however per initial collateral information obtained from his family, they were unaware of Pt's intention. on initial medical evaluation, he was noted to be slightly tachycardic and had low grade temperature.  these subsequently improved. Pt's physical examination did not show any evidence of opioid toxidrome.  med ED team initially reached out to Pt's sister who reported that the Pt has been more withdrawn lately, isolating and staying in bedroom       with regards to Pt's anxiety, described symptoms as experiencing an "overall sense of nervousness" associated with racing thoughts + "over thinking".  he denied experiencing any severe specific anxiety disorder symptoms like VALENTIN, panic disorder, etc.   furthermore, he denied experiencing any signs/ symptoms suggestive of mireya (denied grandiosity/ racing thoughts/ increased goal directed activities or engaged in risk taking behavior/ no pressured speech/ no elevated mood/ denied any increased in energy level causing sleep disruption).  is not feeling paranoid/ no referential ideations. denied any perceptual disturbances. 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  with no established psych hx; denied any hx of in-Pt psych admissions; no past hx of SA nor engaged in any NSSIB; admits to occasional cannabis use (PRN for pain) - no alcohol abuse.  pertinent medical issues include:  hx of SLE; hx of Lupus nephritis; HTN, DM, CAD s/p stent.  today, presented to the ED BIB EMS from home after sister activated 911 following Pt's intentional overdose of 10 acetaminophen-codeine combination pill around midnight (which had been prescribed to a family member).      managed in the main ED.. seen and cleared by Tox service who suspected that combo pill dosage was around 325mg-30mg. Pt claimed that he took said medication "for pain".  however per initial collateral information obtained from his family, they were unaware of Pt's intention. on initial medical evaluation, he was noted to be slightly tachycardic and had low grade temperature.  these subsequently improved. Pt's physical examination did not show any evidence of opioid toxidrome.  med ED team initially reached out to Pt's sister who reported that the Pt has been more withdrawn lately, isolating and staying in bedroom       with regards to Pt's anxiety, described symptoms as experiencing an "overall sense of nervousness" associated with racing thoughts + "over thinking".  he denied experiencing any severe specific anxiety disorder symptoms like VALENTIN, panic disorder, etc.   furthermore, he denied experiencing any signs/ symptoms suggestive of mireya (denied grandiosity/ racing thoughts/ increased goal directed activities or engaged in risk taking behavior/ no pressured speech/ no elevated mood/ denied any increased in energy level causing sleep disruption).  is not feeling paranoid/ no referential ideations. denied any perceptual disturbances. 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  with no established psych hx; denied any hx of in-Pt psych admissions; no past hx of SA nor engaged in any NSSIB; admits to occasional cannabis use (PRN for pain) - no alcohol abuse.  pertinent medical issues include:  hx of SLE; hx of Lupus nephritis; HTN, DM, CAD s/p stent.  today, presented to the ED BIB EMS from home after sister activated 911 following Pt's intentional overdose of 10 acetaminophen-codeine combination pill around midnight (which had been prescribed to a family member).      managed in the main ED.. seen and cleared by Tox service who suspected that combo pill dosage was around 325mg-30mg. Pt claimed that he took said medication "for pain".  however per initial collateral information obtained from his family, they were unaware of Pt's intention. on initial medical evaluation, he was noted to be slightly tachycardic and had low grade temperature.  these subsequently improved. Pt's physical examination did not show any evidence of opioid toxidrome.  med ED team initially reached out to Pt's sister who reported that the Pt has been more withdrawn lately, isolating and staying in bedroom     seen bedside. does not appear to be in distress.  complaining of chronic pain - "I have lupus".  he is preoccupied with going home as "I have work to attend to".  reported that last night, has an argument with mother (did not want to provide details on this argument). no escalation towards violence nor destroying of properties apart from throwing his phone.  admitted to feeling angry, increasingly frustrated following their argument.  he reached out this his sister - encouraged sister to talk to their mother.      in the meantime as he claimed experiencing "a lot of bodily pains" + feeling frustrated of what has been going on with his life, , he thought of: "I should just die".  he then took a lot of pain pills - "I don't know what I was doing".  then tried to vomit it all out.  sister called 911.  Pt admits to previously harboring intermittent/ passive SI -having death wish.  currently though, denied having active SI and no HI      Last euthymia endorsed x few yrs ago.  on & off depressive symptoms but denied any past escalation of said symptom until recently (starting this january), he began to feel more depressed.  symptoms experienced described as feeling sad daily; "does not feel like doing anything/ amotivated"; has early + middle insomnia, poor concentration, anergia and "stress eating" (resulted to wt gain).  in addition, been feeling hopeless and worthless; denied helplessness.       Last month due to the severity of the depression, he admitted struggling with his ADLs: "I didn't have any motivation; no energy to get out of bed nor care for my self."   apart from the depression, he also claims experiencing anxiety.      with regards to Pt's anxiety, described symptoms as experiencing an "overall sense of nervousness" associated with racing thoughts + "over thinking".  he denied experiencing any severe specific anxiety disorder symptoms like VALENTIN, panic disorder, etc.   furthermore, he denied experiencing any signs/ symptoms suggestive of mireya (denied grandiosity/ racing thoughts/ increased goal directed activities or engaged in risk taking behavior/ no pressured speech/ no elevated mood/ denied any increased in energy level causing sleep disruption).  is not feeling paranoid/ no referential ideations. denied any perceptual disturbances. 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  with no established psych hx; denied any hx of in-Pt psych admissions; no past hx of SA nor engaged in any NSSIB; admits to occasional cannabis use (PRN for pain) - no alcohol abuse.  pertinent medical issues include:  hx of SLE; hx of Lupus nephritis; HTN, DM, CAD s/p stent.  today, presented to the ED BIB EMS from home after sister activated 911 following Pt's intentional overdose of 10 acetaminophen-codeine combination pill around midnight (which had been prescribed to a family member).      managed in the main ED.. seen and cleared by Tox service who suspected that combo pill dosage was around 325mg-30mg. Pt claimed that he took said medication "for pain".  however per initial collateral information obtained from his family, they were unaware of Pt's intention. on initial medical evaluation, he was noted to be slightly tachycardic and had low grade temperature.  these subsequently improved. Pt's physical examination did not show any evidence of opioid toxidrome.  med ED team initially reached out to Pt's sister who reported that the Pt has been more withdrawn lately, isolating and staying in bedroom     seen bedside. does not appear to be in distress.  complaining of chronic pain - "I have lupus".  he is preoccupied with going home as "I have work to attend to".  reported that last night, has an argument with mother (did not want to provide details on this argument). no escalation towards violence nor destroying of properties apart from throwing his phone.  admitted to feeling angry, increasingly frustrated following their argument.  he reached out this his sister - encouraged sister to talk to their mother.      in the meantime as he claimed experiencing "a lot of bodily pains" + feeling frustrated of what has been going on with his life, , he thought of: "I should just die".  he then took a lot of pain pills - "I don't know what I was doing".  then tried to vomit it all out.  sister called 911.  Pt admits to previously harboring intermittent/ passive SI -having death wish.  currently though, denied having active SI and no HI      Last euthymia endorsed x few yrs ago.  on & off depressive symptoms but denied any past escalation of said symptom until recently (starting this january), he began to feel more depressed.  symptoms experienced described as feeling sad daily; "does not feel like doing anything/ amotivated"; has early + middle insomnia, poor concentration, anergia and "stress eating" (resulted to wt gain).  in addition, been feeling hopeless and worthless; denied helplessness.       Last month due to the severity of the depression, he admitted struggling with his ADLs: "I didn't have any motivation; no energy to get out of bed nor care for my self."   apart from the depression, he also claims experiencing anxiety.      with regards to Pt's anxiety, described symptoms as experiencing an "overall sense of nervousness" associated with racing thoughts + "over thinking".  he denied experiencing any severe specific anxiety disorder symptoms like VALENTIN, panic disorder, etc.   furthermore, he denied experiencing any signs/ symptoms suggestive of mireya (denied grandiosity/ racing thoughts/ increased goal directed activities or engaged in risk taking behavior/ no pressured speech/ no elevated mood/ denied any increased in energy level causing sleep disruption).  is not feeling paranoid/ no referential ideations. denied any perceptual disturbances. no thought insertion/ withdrawal nor broadcasting    cites the following stressors as causing him to be more depressed, anxious: work related stressors (competition from other [employer] outlets, maintaining the work place), financial constraints, conflict with mother, helps care for his father who has left sided residual (from stroke) and in  need of supervision at home    COLLATERAL INFORMATION OBTAINED PERSONALLY FROM HIS MOTHER WHO described Pt as a "very nice kid"; diagnosed with LE (around 2018 or 2019). 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 been complaining of worsening pain for past 4 weeks; has not received any medications;  Pt reportedly very depressed, isolating self; stress eating.  recently, been having financial constraints due to gambling. "He (in reference to the Pt) was with the wrong crowd", she tells writer.  added that she had recently found out that Pt had sold the gold chains that she had bought for him;  has been spending a lot of money due to his gambling.  Last night as she confronted Pt with this, he became agitated, angry. screamed "I just want to die".  mother reported Pt took pain medications - medications which had been prescribed for Pt's father.   no reported HI.  Pt is not violent.  no manic or psychotic symptoms endorsed.      **see separate  note for collateral information obtained from his sister **

## 2025-01-26 NOTE — BH PATIENT PROFILE - NSPROIMPLANTSMEDDEV_GEN_A_NUR
patient reports having a cardiac stent but option does not appear to be available for selection/None

## 2025-01-26 NOTE — ED BEHAVIORAL HEALTH ASSESSMENT NOTE - DIFFERENTIAL
MDD vs depressive disorder vs adjustment disorder with depressed mood   anxiety disorder  will need to explore on gambling disorder (Pt not forthcoming about this; hx obtained from his mother who reported that the Pt has lost significant amount of money, sold gold chain she bought for him, etc)

## 2025-01-26 NOTE — ED ADULT NURSE NOTE - CHIEF COMPLAINT QUOTE
pt c/o ingestion. Endorsing taking unknown amount of acetaminophen-cod#3 tablets. Superficial cuts noted to b/l wrists. Denies current SI, HI, ETOH, AH/VH. Denies any current complaints. Hx Lupus, T2DM, Cardiac x1 stent. Calm and cooperative at present. valuables given to sister and belongings labeled placed in cabinet.

## 2025-01-26 NOTE — ED BEHAVIORAL HEALTH ASSESSMENT NOTE - NSSUICPROTFACT_PSY_ALL_CORE
Responsibility to children, family, or others/Supportive social network of family or friends/Engaged in work or school/Beloved pets

## 2025-01-26 NOTE — BH PATIENT PROFILE - SURGICAL SITE INCISION
no

## 2025-01-26 NOTE — ED ADULT NURSE NOTE - OBJECTIVE STATEMENT
Patient received in 10, A&Ox3 ambulatory at baseline pmh: HTN, lupus nephritis presenting to ED s/p taking 10 tablets of tylenol with codeine which was prescribed to a family member. Pt states he took this because of pain from his lupus. Pt denies SI/HI. Per sister at bedside, pt is more withdrawn lately and stays in his room. Right 20g placed, labs drawn and sent. Sinus tach on cardiac monitor.

## 2025-01-26 NOTE — BH PATIENT PROFILE - HOME MEDICATIONS
amoxicillin 875 mg oral tablet , 1 tab(s) orally 2 times a day  ibuprofen 800 mg oral tablet , 1 tab(s) orally every 6 hours  hydrocodone-acetaminophen 5 mg-325 mg oral tablet , 1 tab(s) orally every 6 hours MDD: 4 tablets  Toprol-XL 25 mg oral tablet, extended release , 3 tab(s) orally once a day   atorvastatin 80 mg oral tablet , 1 tab(s) orally once a day (at bedtime)  ticagrelor 90 mg oral tablet , 1 tab(s) orally every 12 hours  aspirin 81 mg oral tablet, chewable , 1 tab(s) orally once a day  Protonix 40 mg oral delayed release tablet , 1 tab(s) orally once a day  Plaquenil 200 mg oral tablet , 2 tab(s) orally once a day  CellCept 500 mg oral tablet , 3 tab(s) orally 2 times a day

## 2025-01-26 NOTE — ED PROVIDER NOTE - OBJECTIVE STATEMENT
The patient is a 28 y/o M with past medical history of HTN, lupus nephritis on cellcept,  presenting for evaluation s/p medication ingestion. At midnight patient reports taking approximately 10 tablets of tylenol with codeine which had been prescribed to a family member. Dosing not indicated on prescription bottle. States took this because was having severe body pains related to his lupus. Denies any SI or intent to harm himself. Notes has been noncompliant with the Valsartan he is supposed to be taking for his hypertension. Today also took 4mg prednisone which he has taken in the past for lupus though is not taking currently. Per sister, patient has been more withdrawn lately, mostly keeping to his bedroom in the family home, and recently got into an argument with his father. The patient is a 30 y/o M with past medical history of HTN, lupus nephritis on cellcept,  coronary stent x1, presenting for evaluation s/p medication ingestion. At midnight patient reports taking approximately 10 tablets of tylenol with codeine which had been prescribed to a family member. Dosing not indicated on prescription bottle. States took this because was having severe body pains related to his lupus. Denies any SI or intent to harm himself. Notes has been noncompliant with the Valsartan he is supposed to be taking for his hypertension. Today also took 4mg prednisone which he has taken in the past for lupus though is not taking currently. Per sister, patient has been more withdrawn lately, mostly keeping to his bedroom in the family home, and recently got into an argument with his father.

## 2025-01-26 NOTE — ED BEHAVIORAL HEALTH ASSESSMENT NOTE - MEDICAL ISSUES AND PLAN (INCLUDE STANDING AND PRN MEDICATION)
pain meds PRN. will defer to hospitalist re: reinitiation of meds aimed at targeting/ controlling his HTN, DM, SLE .  PRN:: insulin coverage - discussed with ED attending, Dr REGINA Tovar

## 2025-01-26 NOTE — BH PATIENT PROFILE - STATED REASON FOR ADMISSION
I had an argument with my mom and took a bunch of pills. I instantly regretted it and made myself throw up."

## 2025-01-26 NOTE — BH PATIENT PROFILE - FALL HARM RISK - UNIVERSAL INTERVENTIONS
Bed in lowest position, wheels locked, appropriate side rails in place/Call bell, personal items and telephone in reach/Instruct patient to call for assistance before getting out of bed or chair/Non-slip footwear when patient is out of bed/[place] to call system/Physically safe environment - no spills, clutter or unnecessary equipment/Purposeful Proactive Rounding/Room/bathroom lighting operational, light cord in reach

## 2025-01-27 DIAGNOSIS — M32.9 SYSTEMIC LUPUS ERYTHEMATOSUS, UNSPECIFIED: ICD-10-CM

## 2025-01-27 LAB
A1C WITH ESTIMATED AVERAGE GLUCOSE RESULT: 8.7 % — HIGH (ref 4–5.6)
ESTIMATED AVERAGE GLUCOSE: 203 — SIGNIFICANT CHANGE UP

## 2025-01-27 PROCEDURE — 99222 1ST HOSP IP/OBS MODERATE 55: CPT

## 2025-01-27 RX ORDER — VALSARTAN 80 MG
40 TABLET ORAL DAILY
Refills: 0 | Status: DISCONTINUED | OUTPATIENT
Start: 2025-01-28 | End: 2025-01-31

## 2025-01-27 RX ORDER — HYDROXYCHLOROQUINE SULFATE 200 MG/1
400 TABLET, FILM COATED ORAL ONCE
Refills: 0 | Status: COMPLETED | OUTPATIENT
Start: 2025-01-27 | End: 2025-01-27

## 2025-01-27 RX ORDER — MYCOPHENOLATE MOFETIL 200 MG/ML
500 POWDER, FOR SUSPENSION ORAL
Refills: 0 | Status: DISCONTINUED | OUTPATIENT
Start: 2025-01-27 | End: 2025-01-31

## 2025-01-27 RX ORDER — HYDROXYCHLOROQUINE SULFATE 200 MG/1
400 TABLET, FILM COATED ORAL DAILY
Refills: 0 | Status: DISCONTINUED | OUTPATIENT
Start: 2025-01-28 | End: 2025-01-31

## 2025-01-27 RX ADMIN — MYCOPHENOLATE MOFETIL 500 MILLIGRAM(S): 200 POWDER, FOR SUSPENSION ORAL at 20:42

## 2025-01-27 RX ADMIN — HYDROXYCHLOROQUINE SULFATE 400 MILLIGRAM(S): 200 TABLET, FILM COATED ORAL at 18:02

## 2025-01-27 NOTE — BH INPATIENT PSYCHIATRY ASSESSMENT NOTE - HPI (INCLUDE ILLNESS QUALITY, SEVERITY, DURATION, TIMING, CONTEXT, MODIFYING FACTORS, ASSOCIATED SIGNS AND SYMPTOMS)
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  with no established psych hx; denied any hx of in-Pt psych admissions; no past hx of SA nor engaged in any NSSIB; admits to occasional cannabis use (PRN for pain) - no alcohol abuse.  pertinent medical issues include:  hx of SLE; hx of Lupus nephritis; HTN, DM, CAD s/p stent.  today, presented to the ED BIB EMS from home after sister activated 911 following Pt's intentional overdose of 10 acetaminophen-codeine combination pill around midnight (which had been prescribed to a family member).      managed in the main ED.. seen and cleared by Tox service who suspected that combo pill dosage was around 325mg-30mg. Pt claimed that he took said medication "for pain".  however per initial collateral information obtained from his family, they were unaware of Pt's intention. on initial medical evaluation, he was noted to be slightly tachycardic and had low grade temperature.  these subsequently improved. Pt's physical examination did not show any evidence of opioid toxidrome.  med ED team initially reached out to Pt's sister who reported that the Pt has been more withdrawn lately, isolating and staying in bedroom     seen bedside. does not appear to be in distress.  complaining of chronic pain - "I have lupus".  he is preoccupied with going home as "I have work to attend to".  reported that last night, has an argument with mother (did not want to provide details on this argument). no escalation towards violence nor destroying of properties apart from throwing his phone.  admitted to feeling angry, increasingly frustrated following their argument.  he reached out this his sister - encouraged sister to talk to their mother.      in the meantime as he claimed experiencing "a lot of bodily pains" + feeling frustrated of what has been going on with his life, , he thought of: "I should just die".  he then took a lot of pain pills - "I don't know what I was doing".  then tried to vomit it all out.  sister called 911.  Pt admits to previously harboring intermittent/ passive SI -having death wish.  currently though, denied having active SI and no HI      Last euthymia endorsed x few yrs ago.  on & off depressive symptoms but denied any past escalation of said symptom until recently (starting this january), he began to feel more depressed.  symptoms experienced described as feeling sad daily; "does not feel like doing anything/ amotivated"; has early + middle insomnia, poor concentration, anergia and "stress eating" (resulted to wt gain).  in addition, been feeling hopeless and worthless; denied helplessness.       Last month due to the severity of the depression, he admitted struggling with his ADLs: "I didn't have any motivation; no energy to get out of bed nor care for my self."   apart from the depression, he also claims experiencing anxiety.      with regards to Pt's anxiety, described symptoms as experiencing an "overall sense of nervousness" associated with racing thoughts + "over thinking".  he denied experiencing any severe specific anxiety disorder symptoms like VALENTIN, panic disorder, etc.   furthermore, he denied experiencing any signs/ symptoms suggestive of mireya (denied grandiosity/ racing thoughts/ increased goal directed activities or engaged in risk taking behavior/ no pressured speech/ no elevated mood/ denied any increased in energy level causing sleep disruption).  is not feeling paranoid/ no referential ideations. denied any perceptual disturbances. no thought insertion/ withdrawal nor broadcasting    cites the following stressors as causing him to be more depressed, anxious: work related stressors (competition from other [employer] outlets, maintaining the work place), financial constraints, conflict with mother, helps care for his father who has left sided residual (from stroke) and in  need of supervision at home    COLLATERAL INFORMATION OBTAINED PERSONALLY FROM HIS MOTHER WHO described Pt as a "very nice kid"; diagnosed with LE (around 2018 or 2019). 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 been complaining of worsening pain for past 4 weeks; has not received any medications;  Pt reportedly very depressed, isolating self; stress eating.  recently, been having financial constraints due to gambling. "He (in reference to the Pt) was with the wrong crowd", she tells writer.  added that she had recently found out that Pt had sold the gold chains that she had bought for him;  has been spending a lot of money due to his gambling.  Last night as she confronted Pt with this, he became agitated, angry. screamed "I just want to die".  mother reported Pt took pain medications - medications which had been prescribed for Pt's father.   no reported HI.  Pt is not violent.  no manic or psychotic symptoms endorsed.  "    Pt on interview states he has been sad for a few months, has not left home in 2 weeks, has been sleeping a lot or watching TV, no other hobbies or interests, states appetite is intact, in context of psychosocial stressors of frequent family arguments since his father had a stroke 3 yrs ago and also occupational stress (pt owns and manages a laundromat).  Pt reports that on day of presentation, he left his home for first time in weeks to spend time with a friend, upon return he had an argument with his mother, and due to this and also having recent lupus flare causing widespread joint pain, he went into his room and took overdose on Tylenol with codeine.  Pt states he emptied the bottle, but that it was not full to begin with.  Pt states his intent in the moment was to harm himself but states it was impulsive and he did not actually think it was enough to harm him.  Pt states he regretted his action immediately and tried to induce vomiting, and that his sister then took him to the ER.  Pt states that he did not write any suicide notes, say good bye to anyone, give away any of his belongings, or any other preparatory actions for suicide.  Pt states he is generally not an impulsive person.  Pt denies any history of mireya or psychosis.  He denies any previous history of suicidality, NSSIB, or homicidality.  Pt states that since coming in the hospital, he does not have any further thoughts to harm himself, wants to go home as his family does not know how to manage the laundromat.

## 2025-01-27 NOTE — BH SOCIAL WORK INITIAL PSYCHOSOCIAL EVALUATION - NSBHEDULEVEL_PSY_ALL_CORE
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

## 2025-01-27 NOTE — BH INPATIENT PSYCHIATRY ASSESSMENT NOTE - NSBHMETABOLIC_PSY_ALL_CORE_FT
BMI: BMI (kg/m2): 38.4 (01-26-25 @ 14:15)  HbA1c: A1C with Estimated Average Glucose Result: 8.7 % (01-27-25 @ 10:47)    Glucose: POCT Blood Glucose.: 194 mg/dL (01-27-25 @ 07:33)    BP: 147/104 (01-26-25 @ 13:36) (120/81 - 169/126)Vital Signs Last 24 Hrs  T(C): --  T(F): --  HR: --  BP: --  BP(mean): --  RR: --  SpO2: --    Orthostatic VS  01-26-25 @ 14:15  Lying BP: --/-- HR: --  Sitting BP: 141/100 HR: 101  Standing BP: 154/109 HR: 103  Site: --  Mode: --    Lipid Panel: Date/Time: 01-26-25 @ 10:29  Cholesterol, Serum: 278  LDL Cholesterol Calculated: 181  HDL Cholesterol, Serum: 49  Total Cholesterol/HDL Ration Measurement: --  Triglycerides, Serum: 250

## 2025-01-27 NOTE — BH INPATIENT PSYCHIATRY ASSESSMENT NOTE - MSE UNSTRUCTURED FT
Appearance: Dressed appropriately.  Behavior: Cooperative.  Calm.   Motor: No abnormal movements, no psychomotor slowing or activation.  Speech: Regular rate.  Mood: "Ok."  Affect: Sad, tired, but reactive, full range.  Thought Process: Linear.  Associations: Fair.  Thought Content: Denies AVH.  Denies SIIP or HIIP.  Insight: Limited.  Judgment: Fair on interview.  Attention: Fair.  Language: Fluent.  Gait: Intact.

## 2025-01-27 NOTE — BH SOCIAL WORK INITIAL PSYCHOSOCIAL EVALUATION - NSPTSTATEDGOAL_PSY_ALL_CORE
Upon admission patient stated "I had an argument with my mom and took a bunch of pills. I instantly regretted it and made myself throw up." SW will continue to assess patient's goals.

## 2025-01-27 NOTE — DIETITIAN INITIAL EVALUATION ADULT - OTHER INFO
Pt is a 28 y/o male with no established psych hx. Admits to occasional cannabis use (PRN for pain) - no alcohol abuse.  Pertinent medical issues include:  hx of SLE; hx of Lupus nephritis; HTN, DM, CAD s/p stent. Presented to the ED BIB EMS from home after sister activated 911 following Pt's intentional overdose of 10 acetaminophen-codeine combination pill around midnight (which had been prescribed to a family member).    Managed in the main ED.. seen and cleared by Toxicology service.  Admitted to [address]. Met Pt in his room. Reports did not eat breakfast this morning. No GI distress noted.  NKFA. States eating well PTA. Follows a Regular diet. Declines any snacks/supplements at this time. States had a short treatment with Ozempic PTA. Discussed with Pt elevated finger sticks and A1C: 8.7 %  Provided verbal and written education to Pt re: Consistent carbohydrate diet.  Pt verbalized understanding. Encouraged po intake. States he is willing to eat some food for lunch.  UBW: 230-240 lbs.

## 2025-01-27 NOTE — BH INPATIENT PSYCHIATRY ASSESSMENT NOTE - OTHER PAST PSYCHIATRIC HISTORY (INCLUDE DETAILS REGARDING ONSET, COURSE OF ILLNESS, INPATIENT/OUTPATIENT TREATMENT)
No formal past psychiatric history. Consent 2/Introductory Paragraph: Mohs surgery was explained to the patient and consent was obtained. The risks, benefits and alternatives to therapy were discussed in detail. Specifically, the risks of infection, scarring, bleeding, prolonged wound healing, incomplete removal, allergy to anesthesia, nerve injury and recurrence were addressed. Prior to the procedure, the treatment site was clearly identified and confirmed by the patient. All components of Universal Protocol/PAUSE Rule completed.

## 2025-01-27 NOTE — BH INPATIENT PSYCHIATRY ASSESSMENT NOTE - DESCRIPTION
single and not .  currently domiciled with parents.  self employed/ manages hjis own laundromat.  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  mother and sister are both RNs.  when euthymic, he  likes playing video games and watching football.  Pt is not Baptism. no reported access to guns.  has pet dog "Vignesh".

## 2025-01-27 NOTE — DIETITIAN INITIAL EVALUATION ADULT - PERTINENT LABORATORY DATA
CAPILLARY BLOOD GLUCOSE    POCT Blood Glucose.: 194 mg/dL (2025 07:33)  POCT Blood Glucose.: 182 mg/dL (2025 16:40)    A1C with Estimated Average Glucose Result: 8.7 % (25 @ 10:47)   Tri H,  H

## 2025-01-27 NOTE — BH INPATIENT PSYCHIATRY ASSESSMENT NOTE - CURRENT MEDICATION
MEDICATIONS  (STANDING):  hydroxychloroquine 400 milliGRAM(s) Oral once  insulin lispro (ADMELOG) corrective regimen sliding scale   SubCutaneous three times a day before meals  insulin lispro (ADMELOG) corrective regimen sliding scale   SubCutaneous at bedtime  mycophenolate mofetil 500 milliGRAM(s) Oral two times a day    MEDICATIONS  (PRN):  acetaminophen     Tablet .. 650 milliGRAM(s) Oral every 6 hours PRN Mild Pain (1 - 3), Moderate Pain (4 - 6), Severe Pain (7 - 10)  LORazepam     Tablet 1 milliGRAM(s) Oral every 6 hours PRN agitation due to severe anxiety  LORazepam   Injectable 2 milliGRAM(s) IntraMuscular Once PRN Menacing behavior

## 2025-01-27 NOTE — BH INPATIENT PSYCHIATRY ASSESSMENT NOTE - NSBHCHARTREVIEWVS_PSY_A_CORE FT
Vital Signs Last 24 Hrs  T(C): --  T(F): --  HR: --  BP: --  BP(mean): --  RR: --  SpO2: --    Orthostatic VS  01-26-25 @ 14:15  Lying BP: --/-- HR: --  Sitting BP: 141/100 HR: 101  Standing BP: 154/109 HR: 103  Site: --  Mode: --

## 2025-01-27 NOTE — DIETITIAN INITIAL EVALUATION ADULT - PERTINENT MEDS FT
MEDICATIONS  (STANDING):  dextrose 5%. 1000 milliLiter(s) (100 mL/Hr) IV Continuous <Continuous>  dextrose 5%. 1000 milliLiter(s) (50 mL/Hr) IV Continuous <Continuous>  glucagon  Injectable 1 milliGRAM(s) IntraMuscular once  insulin lispro (ADMELOG) corrective regimen sliding scale   SubCutaneous three times a day before meals  insulin lispro (ADMELOG) corrective regimen sliding scale   SubCutaneous at bedtime    MEDICATIONS  (PRN):  acetaminophen     Tablet .. 650 milliGRAM(s) Oral every 6 hours PRN Mild Pain (1 - 3), Moderate Pain (4 - 6), Severe Pain (7 - 10)  LORazepam     Tablet 1 milliGRAM(s) Oral every 6 hours PRN agitation due to severe anxiety  LORazepam   Injectable 2 milliGRAM(s) IntraMuscular Once PRN Menacing behavior

## 2025-01-27 NOTE — BH INPATIENT PSYCHIATRY ASSESSMENT NOTE - NSBHASSESSSUMMFT_PSY_ALL_CORE
29M w/no formal pphx, admitted to [facility] 2N after intentional overdose in context of family, medical, and occupational stressors.  Presentation is most consistent with multifactorial depression - MDD, persistent depressive disorder, medically induced depression, adjustment, personality traits vs full disorder.    1.) Discussed medication management with pt, who declined this in favor of psychotherapeutic interventions.  Demonstrates capacity to do so.  2.) Psychotherapeutic Interventions.  3.) Lupus: Confirmed med list with [phone], pt takes hydroxychloroquine 400 mg daily, mycophenalate mofetil 500 mg BID, Valsartan 40 mg daily.  As per [facility], pt also may be filling vitamin D 50K units/week and carvedilol 3.125 mg 1/2 tab q12H, elsewhere.  4.) Collateral if pt allows for HIPAA release.

## 2025-01-27 NOTE — BH SOCIAL WORK INITIAL PSYCHOSOCIAL EVALUATION - DETAILS
diabetes mellitus: parents; father - hx of stroke with left sided residual; no reported hx of SA; denied any illicit substance use including alcohol

## 2025-01-27 NOTE — BH SOCIAL WORK INITIAL PSYCHOSOCIAL EVALUATION - OTHER PAST PSYCHIATRIC HISTORY (INCLUDE DETAILS REGARDING ONSET, COURSE OF ILLNESS, INPATIENT/OUTPATIENT TREATMENT)
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  He denied past hx of SA nor engaged in any NSSIB.  He admitted to occasional cannabis use (PRN for pain) - no alcohol abuse.  Pertinent medical issues include:  hx of SLE; hx of Lupus nephritis; HTN, DM, CAD s/p stent. He presented to the ED BIB EMS from home after sister activated 911 following Pt's intentional overdose of 10 acetaminophen-codeine combination pill around midnight (which had been prescribed to a family member).  Patient cites the following stressors as causing him to be more depressed, anxious: work related stressors (competition from other [employer] outlets, maintaining the work place), financial constraints, conflict with mother, helps care for his father who has left sided residual (from stroke) and in need of supervision at home.  Per collateral obtained from patient's mother, patient has been gambling.    contacts : Gail ( sister) and Serge Yoo (mom) [phone]. and [phone]

## 2025-01-28 ENCOUNTER — NON-APPOINTMENT (OUTPATIENT)
Age: 30
End: 2025-01-28

## 2025-01-28 PROCEDURE — 99232 SBSQ HOSP IP/OBS MODERATE 35: CPT

## 2025-01-28 RX ADMIN — MYCOPHENOLATE MOFETIL 500 MILLIGRAM(S): 200 POWDER, FOR SUSPENSION ORAL at 20:08

## 2025-01-28 RX ADMIN — Medication 40 MILLIGRAM(S): at 08:38

## 2025-01-28 RX ADMIN — HYDROXYCHLOROQUINE SULFATE 400 MILLIGRAM(S): 200 TABLET, FILM COATED ORAL at 08:37

## 2025-01-28 RX ADMIN — MYCOPHENOLATE MOFETIL 500 MILLIGRAM(S): 200 POWDER, FOR SUSPENSION ORAL at 08:37

## 2025-01-28 NOTE — BH INPATIENT PSYCHIATRY PROGRESS NOTE - NSBHFUPINTERVALHXFT_PSY_A_CORE
Chart reviewed. Case discussed with treatment team. Patient seen and examined. No acute overnight events, no PRNs required/requested. Compliant with standing medications. Per sleep log and pt interview, he experienced difficulty sleeping last night, only sleeping for a little over 4 hours. Pt rarely left room and has not attended group therapy sessions, spending most of his time in bed alone. Pt has not been in contact with family, especially his mother, to allow him to reflect and recollect himself after being hospitalized. Pt expresses a general feeling of nervousness being in the hospital. 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 Per staff,     Pt endorses improved diffuse joint pains, attributed to his SLE, which he rates at a severity of 6/10 coming down from a 9/10 on admission. Pt ate breakfast this morning after a 2-day period of not consuming food after admissions. Denies audtiory/visual hallucinations. Denies symptoms of mireya. Denies passive or active SI, intent, or plan.  Chart reviewed. Case discussed with treatment team. Patient seen and examined. No acute overnight events, no PRNs required/requested. Compliant with standing medications. Per sleep log and pt interview, he experienced difficulty sleeping last night, only sleeping for a little over 4 hours. Pt rarely left room and has not attended group therapy sessions, spending most of his time in bed alone. Pt has not been in contact with family, especially his mother, to allow him to calm down and recollect himself after being hospitalized. Pt expresses a general feeling of nervousness being in the hospital. 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    Pt endorses improved diffuse joint pains, attributed to his SLE, which he rates at a severity of 6/10 coming down from a 9/10 on admission. Pt ate breakfast this morning after a 2-day period of not consuming food after admission. Denies auditory/visual hallucinations. Denies symptoms of mireya. Denies passive or active SI, intent, or plan.  Chart reviewed. Case discussed with treatment team. Patient seen and examined. No acute overnight events, no PRNs required/requested. Pt refused several doses of insulin Lispro; otherwise, he has been compliant with standing medications. Per sleep log and pt interview, he experienced difficulty sleeping last night, only sleeping for a little over 4 hours. Pt rarely left room and has not attended group therapy sessions, spending most of his time in bed alone. Pt has not been in contact with family, especially his mother, to allow him to calm down and recollect himself after being hospitalized. Pt expresses a general feeling of nervousness being in the hospital. 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    Pt endorses improved diffuse joint pains, attributed to his SLE, which he rates at a severity of 6/10 coming down from a 9/10 on admission. Pt ate breakfast this morning after a 2-day period of not consuming food after admission. Denies auditory/visual hallucinations. Denies symptoms of mireya. Denies passive or active SI, intent, or plan.

## 2025-01-28 NOTE — BH INPATIENT PSYCHIATRY PROGRESS NOTE - NSBHASSESSSUMMFT_PSY_ALL_CORE
29M w/no formal pphx, admitted to [facility] 2N after intentional overdose in context of family, medical, and occupational stressors.  Presentation is most consistent with multifactorial depression - MDD, persistent depressive disorder, medically induced depression, adjustment, personality traits vs full disorder.    1.) Discussed medication management with pt, who declined this in favor of psychotherapeutic interventions.  Demonstrates capacity to do so.  2.) Psychotherapeutic Interventions.  3.) Lupus: Confirmed med list with [phone], pt takes hydroxychloroquine 400 mg daily, mycophenalate mofetil 500 mg BID, Valsartan 40 mg daily.  [facility], pt also may be filling vitamin D 50K units/week and carvedilol 3.125 mg 1/2 tab q12H, elsewhere.  4.) Collateral if pt allows for HIPAA release. ND is a 29M w/a pmhx of SLE c/b lupus nephritis but no formal pphx, admitted to [facility] 2N after intentional overdose on combination acetaminophen-codeine (father's medications) in context of family, medical, and occupational stressors. On exam, pt's mood is improved with good insight into the need for hospitalization, but insists on being discharged as soon as possible, reflecting suboptimal judgement. Presentation is most consistent with multifactorial depression, including MDD, persistent depressive disorder, medically induced depression, adjustment, personality traits vs. full disorder.    1.) Discussed medication management with pt, who declined this in favor of psychotherapeutic interventions. Demonstrates capacity to do so.  2.) Psychotherapeutic interventions.  3.) Lupus: Called pt's outpt [facility] rheumatologist (Dr. America Ramos) to discuss potential diagnostic and therapeutic interventions for SLE flare-up; pending call back. Confirmed med list with [phone] - pt takes hydroxychloroquine 400 mg daily, mycophenolate mofetil 500 mg BID, Valsartan 40 mg daily. [facility], pt also may be filling vitamin D 50K units/week and carvedilol 3.125 mg 1/2 tab q12H elsewhere.  4.) Collateral if pt allows for HIPAA release. ND is a 29M w/a pmhx of SLE c/b lupus nephritis but no formal pphx, admitted to [facility] 2N after intentional overdose on combination acetaminophen-codeine (father's medications) in the context of biopsychosocial stressors. On exam, pt's mood is improved with good insight into the need for hospitalization, but insists on being discharged as soon as possible, reflecting suboptimal judgement. Presentation is most consistent with multifactorial depression, including MDD, persistent depressive disorder, medically induced depression, adjustment, personality traits vs. full disorder.    1.) Discussed medication management with pt, who declined this in favor of psychotherapeutic interventions. Demonstrates capacity to do so.  2.) SLE: Called pt's outpt [facility] rheumatologist (Dr. America Ramos) to discuss potential diagnostic and therapeutic interventions for SLE flare-up; pending call back. Confirmed med list with [phone] - pt takes hydroxychloroquine 400 mg daily, mycophenolate mofetil 500 mg BID, Valsartan 40 mg daily. [facility], pt also may be filling vitamin D 50K units/week and carvedilol 3.125 mg 1/2 tab q12H elsewhere.  3.) Collateral if pt allows for HIPAA release. ND is a 29M w/a pmhx of SLE c/b lupus nephritis but no formal pphx, admitted to [facility] 2N after intentional overdose on combination acetaminophen-codeine (father's medications) in the context of biopsychosocial stressors. On exam, pt's mood is improved with fair insight into the need for hospitalization, but insists on being discharged as soon as possible, reflecting poor judgement. Presentation is most consistent with multifactorial depression, including MDD, persistent depressive disorder, medically induced depression, adjustment, personality traits vs. full disorder.    1.) Discussed medication management with pt, who declined this in favor of psychotherapeutic interventions. Demonstrates capacity to do so.  2.) SLE: Called pt's outpt [facility] rheumatologist (Dr. America Ramos) to discuss potential diagnostic and therapeutic interventions for SLE flare-up; pending call back. Confirmed med list with [phone] - pt takes hydroxychloroquine 400 mg daily, mycophenolate mofetil 500 mg BID, Valsartan 40 mg daily. [facility], pt also may be filling vitamin D 50K units/week and carvedilol 3.125 mg 1/2 tab q12H elsewhere.  3.) Collateral if pt allows for HIPAA release.

## 2025-01-28 NOTE — BH INPATIENT PSYCHIATRY PROGRESS NOTE - NSBHMETABOLIC_PSY_ALL_CORE_FT
BMI: BMI (kg/m2): 38.4 (01-26-25 @ 14:15)  HbA1c: A1C with Estimated Average Glucose Result: 8.7 % (01-27-25 @ 10:47)    Glucose: POCT Blood Glucose.: 148 mg/dL (01-27-25 @ 20:14)    BP: 147/104 (01-26-25 @ 13:36) (120/81 - 169/126)Vital Signs Last 24 Hrs  T(C): 36.5 (01-28-25 @ 08:09), Max: 36.5 (01-28-25 @ 08:09)  T(F): 97.7 (01-28-25 @ 08:09), Max: 97.7 (01-28-25 @ 08:09)  HR: --  BP: --  BP(mean): --  RR: --  SpO2: --    Orthostatic VS  01-28-25 @ 08:09  Lying BP: --/-- HR: --  Sitting BP: 132/86 HR: 99  Standing BP: 130/90 HR: 113  Site: --  Mode: --  Orthostatic VS  01-26-25 @ 14:15  Lying BP: --/-- HR: --  Sitting BP: 141/100 HR: 101  Standing BP: 154/109 HR: 103  Site: --  Mode: --    Lipid Panel: Date/Time: 01-26-25 @ 10:29  Cholesterol, Serum: 278  LDL Cholesterol Calculated: 181  HDL Cholesterol, Serum: 49  Total Cholesterol/HDL Ration Measurement: --  Triglycerides, Serum: 250   BMI: BMI (kg/m2): 38.4 (01-26-25 @ 14:15)  HbA1c: A1C with Estimated Average Glucose Result: 8.7 % (01-27-25 @ 10:47)    Glucose: POCT Blood Glucose.: 148 mg/dL (01-27-25 @ 20:14)    BP: 147/104 (01-26-25 @ 13:36) (120/81 - 169/126)Vital Signs Last 24 Hrs  T(C): 36.5 (01-28-25 @ 08:09), Max: 36.5 (01-28-25 @ 08:09)  T(F): 97.7 (01-28-25 @ 08:09), Max: 97.7 (01-28-25 @ 08:09)  HR: --  BP: --  BP(mean): --  RR: --  SpO2: --    Orthostatic VS  01-28-25 @ 08:09  Lying BP: --/-- HR: --  Sitting BP: 132/86 HR: 99  Standing BP: 130/90 HR: 113  Site: --  Mode: --    Lipid Panel: Date/Time: 01-26-25 @ 10:29  Cholesterol, Serum: 278  LDL Cholesterol Calculated: 181  HDL Cholesterol, Serum: 49  Total Cholesterol/HDL Ration Measurement: --  Triglycerides, Serum: 250

## 2025-01-28 NOTE — BH INPATIENT PSYCHIATRY PROGRESS NOTE - ADDITIONAL DETAILS / COMMENTS
Pt has good insight into the reason/need for hospitalization, but insists on being discharged as soon as possible in opposition to medical advice (reflecting suboptimal judgement). Pt has fair insight into the need for hospitalization, but insists on being discharged as soon as possible in opposition to medical advice (reflecting poor judgement).

## 2025-01-28 NOTE — BH INPATIENT PSYCHIATRY PROGRESS NOTE - NSBHCHARTREVIEWVS_PSY_A_CORE FT
Vital Signs Last 24 Hrs  T(C): 36.5 (01-28-25 @ 08:09), Max: 36.5 (01-28-25 @ 08:09)  T(F): 97.7 (01-28-25 @ 08:09), Max: 97.7 (01-28-25 @ 08:09)  HR: --  BP: --  BP(mean): --  RR: --  SpO2: --    Orthostatic VS  01-28-25 @ 08:09  Lying BP: --/-- HR: --  Sitting BP: 132/86 HR: 99  Standing BP: 130/90 HR: 113  Site: --  Mode: --  Orthostatic VS  01-26-25 @ 14:15  Lying BP: --/-- HR: --  Sitting BP: 141/100 HR: 101  Standing BP: 154/109 HR: 103  Site: --  Mode: --   Vital Signs Last 24 Hrs  T(C): 36.5 (01-28-25 @ 08:09), Max: 36.5 (01-28-25 @ 08:09)  T(F): 97.7 (01-28-25 @ 08:09), Max: 97.7 (01-28-25 @ 08:09)  HR: --  BP: --  BP(mean): --  RR: --  SpO2: --    Orthostatic VS  01-28-25 @ 08:09  Lying BP: --/-- HR: --  Sitting BP: 132/86 HR: 99  Standing BP: 130/90 HR: 113  Site: --  Mode: --

## 2025-01-28 NOTE — PSYCHIATRIC REHAB INITIAL EVALUATION - NSBHPRRECOMMEND_PSY_ALL_CORE
Writer met with patient in order orient patient to the unit, introduce patient to Psychiatric Rehabilitation Staff, department functions and to establish a Psychiatric Rehabilitation goal. Patient was verbal, polite and cooperative throughout the duration of the interview. Patient has maintained appropriate behavioral control since arrival to . 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 Patient was able to be full oriented to group programming and was encouraged to attend. Patient was able to establish a collaborative Psychiatric Rehabilitation goal. Psychiatric Rehabilitation Staff will continue to engage patient daily in order to develop rapport, provide support, and to assist patient in demonstrating progress towards Psychiatric Rehabilitation goals.
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
## 2025-01-28 NOTE — BH INPATIENT PSYCHIATRY PROGRESS NOTE - OTHER
Improved from yesterday Fair but improving Withdrawn Discharge-focused; occupational worries regarding laundromat business Limited.

## 2025-01-28 NOTE — BH INPATIENT PSYCHIATRY PROGRESS NOTE - CURRENT MEDICATION
MEDICATIONS  (STANDING):  hydroxychloroquine 400 milliGRAM(s) Oral daily  insulin lispro (ADMELOG) corrective regimen sliding scale   SubCutaneous three times a day before meals  insulin lispro (ADMELOG) corrective regimen sliding scale   SubCutaneous at bedtime  mycophenolate mofetil 500 milliGRAM(s) Oral two times a day  valsartan 40 milliGRAM(s) Oral daily    MEDICATIONS  (PRN):  acetaminophen     Tablet .. 650 milliGRAM(s) Oral every 6 hours PRN Mild Pain (1 - 3), Moderate Pain (4 - 6), Severe Pain (7 - 10)  LORazepam     Tablet 1 milliGRAM(s) Oral every 6 hours PRN agitation due to severe anxiety  LORazepam   Injectable 2 milliGRAM(s) IntraMuscular Once PRN Menacing behavior

## 2025-01-28 NOTE — PSYCHIATRIC REHAB INITIAL EVALUATION - NSBHSTRENGTHHOME_PSY_ALL_CORE
Patient is domiciled and has access to family support. Patient is able to be engaged in meaningful safety planning. Patient appears future oriented.

## 2025-01-28 NOTE — PSYCHIATRIC REHAB INITIAL EVALUATION - NSBHEMPDEFICITSFT_PSY_ALL_CORE
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

## 2025-01-28 NOTE — BH INPATIENT PSYCHIATRY PROGRESS NOTE - NSBHATTESTCOMMENTATTENDFT_PSY_A_CORE
Pt at this time continues to appear sad, reports limited appetite, has been staying in room all day as per staff.  Pt continues to decline medication management, is focused on discharge, and asks for therapy referral.  Pt gave verbal consent for rheumatologist, but continues to decline HIPAA release for family.

## 2025-01-28 NOTE — PSYCHIATRIC REHAB INITIAL EVALUATION - NSBHEMPSTRENGTHSFT_PSY_ALL_CORE
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

## 2025-01-28 NOTE — BH INPATIENT PSYCHIATRY PROGRESS NOTE - PRN MEDS
Spoke with patient using Cantonese  Junie (ID#[id number]) as well as patient's granddaughter Nafisa. Patient is in agreement to undergo EGD/C-scope with GI and will be transferred to Dr. Silva's service. This has been discussed with the primary team. MEDICATIONS  (PRN):  acetaminophen     Tablet .. 650 milliGRAM(s) Oral every 6 hours PRN Mild Pain (1 - 3), Moderate Pain (4 - 6), Severe Pain (7 - 10)  LORazepam     Tablet 1 milliGRAM(s) Oral every 6 hours PRN agitation due to severe anxiety  LORazepam   Injectable 2 milliGRAM(s) IntraMuscular Once PRN Menacing behavior

## 2025-01-29 ENCOUNTER — NON-APPOINTMENT (OUTPATIENT)
Age: 30
End: 2025-01-29

## 2025-01-29 PROCEDURE — 99232 SBSQ HOSP IP/OBS MODERATE 35: CPT

## 2025-01-29 RX ORDER — ESCITALOPRAM 10 MG/1
10 TABLET, FILM COATED ORAL DAILY
Refills: 0 | Status: DISCONTINUED | OUTPATIENT
Start: 2025-01-30 | End: 2025-01-31

## 2025-01-29 RX ORDER — ESCITALOPRAM 10 MG/1
10 TABLET, FILM COATED ORAL ONCE
Refills: 0 | Status: DISCONTINUED | OUTPATIENT
Start: 2025-01-29 | End: 2025-01-31

## 2025-01-29 RX ADMIN — HYDROXYCHLOROQUINE SULFATE 400 MILLIGRAM(S): 200 TABLET, FILM COATED ORAL at 08:36

## 2025-01-29 RX ADMIN — Medication 40 MILLIGRAM(S): at 08:36

## 2025-01-29 RX ADMIN — Medication 1: at 17:23

## 2025-01-29 RX ADMIN — Medication 0: at 20:33

## 2025-01-29 RX ADMIN — Medication 2: at 12:04

## 2025-01-29 RX ADMIN — MYCOPHENOLATE MOFETIL 500 MILLIGRAM(S): 200 POWDER, FOR SUSPENSION ORAL at 20:09

## 2025-01-29 RX ADMIN — MYCOPHENOLATE MOFETIL 500 MILLIGRAM(S): 200 POWDER, FOR SUSPENSION ORAL at 08:36

## 2025-01-29 NOTE — BH INPATIENT PSYCHIATRY PROGRESS NOTE - NSBHMETABOLIC_PSY_ALL_CORE_FT
BMI: BMI (kg/m2): 38.4 (01-26-25 @ 14:15)  HbA1c: A1C with Estimated Average Glucose Result: 8.7 % (01-27-25 @ 10:47)    Glucose: POCT Blood Glucose.: 187 mg/dL (01-28-25 @ 20:26)    BP: 147/104 (01-26-25 @ 13:36) (147/104 - 157/122)Vital Signs Last 24 Hrs  T(C): 36.7 (01-29-25 @ 08:15), Max: 36.7 (01-29-25 @ 07:45)  T(F): 98.1 (01-29-25 @ 08:15), Max: 98.1 (01-29-25 @ 07:45)  HR: --  BP: --  BP(mean): --  RR: --  SpO2: --    Orthostatic VS  01-29-25 @ 08:15  Lying BP: --/-- HR: --  Sitting BP: 128/88 HR: 99  Standing BP: 124/84 HR: 108  Site: --  Mode: --  Orthostatic VS  01-29-25 @ 07:45  Lying BP: --/-- HR: --  Sitting BP: 128/88 HR: 99  Standing BP: 124/84 HR: 108  Site: --  Mode: --  Orthostatic VS  01-28-25 @ 08:09  Lying BP: --/-- HR: --  Sitting BP: 132/86 HR: 99  Standing BP: 130/90 HR: 113  Site: --  Mode: --    Lipid Panel: Date/Time: 01-26-25 @ 10:29  Cholesterol, Serum: 278  LDL Cholesterol Calculated: 181  HDL Cholesterol, Serum: 49  Total Cholesterol/HDL Ration Measurement: --  Triglycerides, Serum: 250   BMI: BMI (kg/m2): 38.4 (01-26-25 @ 14:15)  HbA1c: A1C with Estimated Average Glucose Result: 8.7 % (01-27-25 @ 10:47)    Glucose: POCT Blood Glucose.: 233 mg/dL (01-29-25 @ 11:38)    BP: 147/104 (01-26-25 @ 13:36) (147/104 - 147/104)Vital Signs Last 24 Hrs  T(C): 36.7 (01-29-25 @ 08:15), Max: 36.7 (01-29-25 @ 07:45)  T(F): 98.1 (01-29-25 @ 08:15), Max: 98.1 (01-29-25 @ 07:45)  HR: --  BP: --  BP(mean): --  RR: --  SpO2: --    Orthostatic VS  01-29-25 @ 08:15  Lying BP: --/-- HR: --  Sitting BP: 128/88 HR: 99  Standing BP: 124/84 HR: 108  Site: --  Mode: --    Orthostatic VS  01-29-25 @ 07:45  Lying BP: --/-- HR: --  Sitting BP: 128/88 HR: 99  Standing BP: 124/84 HR: 108  Site: --  Mode: --    Orthostatic VS  01-28-25 @ 08:09  Lying BP: --/-- HR: --  Sitting BP: 132/86 HR: 99  Standing BP: 130/90 HR: 113  Site: --  Mode: --    Lipid Panel: Date/Time: 01-26-25 @ 10:29  Cholesterol, Serum: 278  LDL Cholesterol Calculated: 181  HDL Cholesterol, Serum: 49  Total Cholesterol/HDL Ration Measurement: --  Triglycerides, Serum: 250   BMI: BMI (kg/m2): 38.4 (01-26-25 @ 14:15)  HbA1c: A1C with Estimated Average Glucose Result: 8.7 % (01-27-25 @ 10:47)    Glucose: POCT Blood Glucose.: 233 mg/dL (01-29-25 @ 11:38)    BP: 147/104 (01-26-25 @ 13:36) (147/104 - 147/104)Vital Signs Last 24 Hrs  T(C): 36.7 (01-29-25 @ 08:15), Max: 36.7 (01-29-25 @ 07:45)  T(F): 98.1 (01-29-25 @ 08:15), Max: 98.1 (01-29-25 @ 07:45)  HR: --  BP: --  BP(mean): --  RR: --  SpO2: --    Orthostatic VS  01-29-25 @ 08:15  Lying BP: --/-- HR: --  Sitting BP: 128/88 HR: 99  Standing BP: 124/84 HR: 108  Site: --  Mode: --  Orthostatic VS  01-29-25 @ 07:45  Lying BP: --/-- HR: --  Sitting BP: 128/88 HR: 99  Standing BP: 124/84 HR: 108  Site: --  Mode: --  Orthostatic VS  01-28-25 @ 08:09  Lying BP: --/-- HR: --  Sitting BP: 132/86 HR: 99  Standing BP: 130/90 HR: 113  Site: --  Mode: --    Lipid Panel: Date/Time: 01-26-25 @ 10:29  Cholesterol, Serum: 278  LDL Cholesterol Calculated: 181  HDL Cholesterol, Serum: 49  Total Cholesterol/HDL Ration Measurement: --  Triglycerides, Serum: 250   BMI: BMI (kg/m2): 38.4 (01-26-25 @ 14:15)  HbA1c: A1C with Estimated Average Glucose Result: 8.7 % (01-27-25 @ 10:47)    Glucose: POCT Blood Glucose.: 163 mg/dL (01-29-25 @ 20:25)    BP: --Vital Signs Last 24 Hrs  T(C): 36.7 (01-29-25 @ 08:15), Max: 36.7 (01-29-25 @ 07:45)  T(F): 98.1 (01-29-25 @ 08:15), Max: 98.1 (01-29-25 @ 07:45)  HR: --  BP: --  BP(mean): --  RR: --  SpO2: --    Orthostatic VS  01-29-25 @ 08:15  Lying BP: --/-- HR: --  Sitting BP: 128/88 HR: 99  Standing BP: 124/84 HR: 108  Site: --  Mode: --  Orthostatic VS  01-29-25 @ 07:45  Lying BP: --/-- HR: --  Sitting BP: 128/88 HR: 99  Standing BP: 124/84 HR: 108  Site: --  Mode: --  Orthostatic VS  01-28-25 @ 08:09  Lying BP: --/-- HR: --  Sitting BP: 132/86 HR: 99  Standing BP: 130/90 HR: 113  Site: --  Mode: --    Lipid Panel: Date/Time: 01-26-25 @ 10:29  Cholesterol, Serum: 278  LDL Cholesterol Calculated: 181  HDL Cholesterol, Serum: 49  Total Cholesterol/HDL Ration Measurement: --  Triglycerides, Serum: 250

## 2025-01-29 NOTE — BH INPATIENT PSYCHIATRY PROGRESS NOTE - PRN MEDS
MEDICATIONS  (PRN):  acetaminophen     Tablet .. 650 milliGRAM(s) Oral every 6 hours PRN Mild Pain (1 - 3), Moderate Pain (4 - 6), Severe Pain (7 - 10)  LORazepam     Tablet 1 milliGRAM(s) Oral every 6 hours PRN agitation due to severe anxiety  LORazepam   Injectable 2 milliGRAM(s) IntraMuscular Once PRN Menacing behavior

## 2025-01-29 NOTE — BH INPATIENT PSYCHIATRY PROGRESS NOTE - CURRENT MEDICATION
MEDICATIONS  (STANDING):  hydroxychloroquine 400 milliGRAM(s) Oral daily  insulin lispro (ADMELOG) corrective regimen sliding scale   SubCutaneous three times a day before meals  insulin lispro (ADMELOG) corrective regimen sliding scale   SubCutaneous at bedtime  mycophenolate mofetil 500 milliGRAM(s) Oral two times a day  valsartan 40 milliGRAM(s) Oral daily    MEDICATIONS  (PRN):  acetaminophen     Tablet .. 650 milliGRAM(s) Oral every 6 hours PRN Mild Pain (1 - 3), Moderate Pain (4 - 6), Severe Pain (7 - 10)  LORazepam     Tablet 1 milliGRAM(s) Oral every 6 hours PRN agitation due to severe anxiety  LORazepam   Injectable 2 milliGRAM(s) IntraMuscular Once PRN Menacing behavior   MEDICATIONS  (STANDING):  escitalopram 10 milliGRAM(s) Oral once  escitalopram 10 milliGRAM(s) Oral daily  hydroxychloroquine 400 milliGRAM(s) Oral daily  insulin lispro (ADMELOG) corrective regimen sliding scale   SubCutaneous three times a day before meals  insulin lispro (ADMELOG) corrective regimen sliding scale   SubCutaneous at bedtime  mycophenolate mofetil 500 milliGRAM(s) Oral two times a day  valsartan 40 milliGRAM(s) Oral daily    MEDICATIONS  (PRN):  acetaminophen     Tablet .. 650 milliGRAM(s) Oral every 6 hours PRN Mild Pain (1 - 3), Moderate Pain (4 - 6), Severe Pain (7 - 10)  LORazepam     Tablet 1 milliGRAM(s) Oral every 6 hours PRN agitation due to severe anxiety  LORazepam   Injectable 2 milliGRAM(s) IntraMuscular Once PRN Menacing behavior

## 2025-01-29 NOTE — BH INPATIENT PSYCHIATRY PROGRESS NOTE - NSBHASSESSSUMMFT_PSY_ALL_CORE
ND is a 29M w/a pmhx of SLE c/b lupus nephritis but no formal pphx, admitted to [facility] after intentional overdose on combination acetaminophen-codeine (father's medications) in the context of biopsychosocial stressors. On exam, pt's mood is improved with fair insight into the need for hospitalization, but insists on being discharged as soon as possible, reflecting poor judgement. Presentation is most consistent with multifactorial depression, including MDD, persistent depressive disorder, medically induced depression, adjustment, personality traits vs. full disorder.    1.) Discussed medication management with pt, who declined this in favor of psychotherapeutic interventions. Demonstrates capacity to do so.  2.) SLE: Called pt's outpt [facility] rheumatologist (Dr. America Ramos) to discuss potential diagnostic and therapeutic interventions for SLE flare-up; pending call back. Confirmed med list with [phone] - pt takes hydroxychloroquine 400 mg daily, mycophenolate mofetil 500 mg BID, Valsartan 40 mg daily. As per [facility], pt also may be filling vitamin D 50K units/week and carvedilol 3.125 mg 1/2 tab q12H elsewhere.  3.) Collateral if pt allows for HIPAA release. ND is a 29M w/a pmhx of SLE c/b lupus nephritis but no formal pphx, admitted to [facility] after intentional overdose on combination acetaminophen-codeine (father's medications) in the context of biopsychosocial stressors. On exam, pt's mood is improved with fair insight into the need for hospitalization. Presentation is most consistent with multifactorial depression, including MDD, persistent depressive disorder, medically induced depression, adjustment, personality traits vs. full disorder. Although he is eager to be discharged as soon as possible, pt has agreed to begin pharmacotherapy, acknowledging the indication, benefits, risks, and alternatives to treatment.    1.) Repeated medication management discussion with pt, who now agrees to pharmacotherapy in addition to psychotherapeutic interventions. Demonstrates capacity to do so. Start Lexapro 10mg PO daily.  2.) SLE: Symptoms, including diffuse joint pain, have improved. Called pt's outpt [facility] rheumatologist (Dr. America Ramos) to discuss potential diagnostic and therapeutic interventions for SLE flare-up; pending call back. Confirmed med list with [phone] - pt takes hydroxychloroquine 400 mg daily, mycophenolate mofetil 500 mg BID, Valsartan 40 mg daily. As per [facility], pt also may be filling vitamin D 50K units/week and carvedilol 3.125 mg 1/2 tab q12H elsewhere.  3.) Collateral if pt allows for HIPAA release. ND is a 29M w/a pmhx of SLE c/b lupus nephritis but no formal pphx, admitted to [facility] after intentional overdose on combination acetaminophen-codeine (father's medications) in the context of biopsychosocial stressors. On exam, pt's mood is improved with fair insight into the need for hospitalization. Presentation is most consistent with multifactorial depression, including MDD, persistent depressive disorder, medically induced depression, adjustment, personality traits vs. full disorder. Although he is eager to be discharged as soon as possible, pt has agreed to begin pharmacotherapy, acknowledging the indication, benefits, risks, and alternatives to treatment.    1.) Repeated medication management discussion with pt, who now agrees to pharmacotherapy in addition to psychotherapeutic interventions. Start Lexapro 10mg PO daily.  Indications, benefits, risk of adverse effects were discussed with pt.  2.) SLE: Symptoms, including diffuse joint pain, have improved. Called pt's outpt [facility] rheumatologist (Dr. America Ramos) to discuss potential diagnostic and therapeutic interventions for SLE flare-up; pending call back. Confirmed med list with [phone] - pt takes hydroxychloroquine 400 mg daily, mycophenolate mofetil 500 mg BID, Valsartan 40 mg daily. As per [facility], pt also may be filling vitamin D 50K units/week and carvedilol 3.125 mg 1/2 tab q12H elsewhere.  3.) Collateral if pt allows for HIPAA release.

## 2025-01-29 NOTE — BH INPATIENT PSYCHIATRY PROGRESS NOTE - NSBHCHARTREVIEWVS_PSY_A_CORE FT
Vital Signs Last 24 Hrs  T(C): 36.7 (01-29-25 @ 08:15), Max: 36.7 (01-29-25 @ 07:45)  T(F): 98.1 (01-29-25 @ 08:15), Max: 98.1 (01-29-25 @ 07:45)  HR: --  BP: --  BP(mean): --  RR: --  SpO2: --    Orthostatic VS  01-29-25 @ 08:15  Lying BP: --/-- HR: --  Sitting BP: 128/88 HR: 99  Standing BP: 124/84 HR: 108  Site: --  Mode: --  Orthostatic VS  01-29-25 @ 07:45  Lying BP: --/-- HR: --  Sitting BP: 128/88 HR: 99  Standing BP: 124/84 HR: 108  Site: --  Mode: --  Orthostatic VS  01-28-25 @ 08:09  Lying BP: --/-- HR: --  Sitting BP: 132/86 HR: 99  Standing BP: 130/90 HR: 113  Site: --  Mode: --   Vital Signs Last 24 Hrs  T(C): 36.7 (01-29-25 @ 08:15), Max: 36.7 (01-29-25 @ 07:45)  T(F): 98.1 (01-29-25 @ 08:15), Max: 98.1 (01-29-25 @ 07:45)  HR: --  BP: --  BP(mean): --  RR: --  SpO2: --    Orthostatic VS  01-29-25 @ 08:15  Lying BP: --/-- HR: --  Sitting BP: 128/88 HR: 99  Standing BP: 124/84 HR: 108  Site: --  Mode: --    Orthostatic VS  01-29-25 @ 07:45  Lying BP: --/-- HR: --  Sitting BP: 128/88 HR: 99  Standing BP: 124/84 HR: 108  Site: --  Mode: --    Orthostatic VS  01-28-25 @ 08:09  Lying BP: --/-- HR: --  Sitting BP: 132/86 HR: 99  Standing BP: 130/90 HR: 113  Site: --  Mode: --

## 2025-01-29 NOTE — BH INPATIENT PSYCHIATRY PROGRESS NOTE - OTHER
Fair but improving Improved from yesterday Withdrawn Limited.  Discharge-focused; occupational worries regarding laundromat business Discharge-focused; open to starting pharmacotherapy

## 2025-01-29 NOTE — BH INPATIENT PSYCHIATRY PROGRESS NOTE - NSBHFUPINTERVALHXFT_PSY_A_CORE
Chart reviewed. Case discussed with treatment team. Patient seen and examined at bedside. No acute overnight events, no PRNs required/requested. Pt noted that he has been refusing doses of insulin Lispro because he has never taken it outside of [facility] and is unfamiliar with taking the medication; otherwise, he has been compliant with standing medications. Per sleep log and pt interview, he experienced difficulty sleeping last night, only sleeping for a little less than 4 hours. Pt has been leaving his room more often to interact with other patients; in particular, he played games, including [email or web address], with friends he made on the unit. He attended group therapy yesterday and noted that it was a positive experience for him. Pt also spoke with a psychiatric rehabilitation staff member yesterday, noting that they had a productive initial conversation. Pt called his mother recently and reaffirmed that he would like to go home soon, stating that he misses his family and sleeping in his own bed; he feels safe at home living with his mother and father.  Pt expresses a general feeling of nervousness being in the hospital. Pt agreed to begin pharmacotherapy to treat MDD vs. persistent depressive disorder, expressing an understanding of the benefits, risks, and alternatives of treatment.    Pt endorses improved diffuse joint pains, attributed to his SLE, which he rates at a severity of 3/10 coming down from a 6/10 yesterday. Pt ate and enjoyed breakfast this morning and states that his appetite has stably returned. Denies auditory/visual hallucinations. Denies symptoms of mireya. Chart reviewed. Case discussed with treatment team. Patient seen and examined at bedside. No acute overnight events, no PRNs required/requested. Pt noted that he has been refusing doses of insulin Lispro because he has never taken it outside of [facility] and is unfamiliar with taking the medication; otherwise, he has been compliant with standing medications. Per sleep log and pt interview, he experienced difficulty sleeping last night, only sleeping for a little less than 4 hours. Pt has been leaving his room more often to interact with other patients; in particular, he played games, including [employer], with friends he made on the unit. He attended group therapy yesterday and noted that it was a positive experience for him. Pt also spoke with a psychiatric rehabilitation staff member yesterday, noting that they had a productive initial conversation. Pt called his mother recently and reaffirmed that he would like to go home soon, stating that he misses his family and sleeping in his own bed; he feels safe at home living with his mother and father.  Pt expresses a general feeling of nervousness being in the hospital. Pt agreed to begin pharmacotherapy to treat MDD vs. persistent depressive disorder, expressing an understanding of the indication, benefits, risks, and alternatives to treatment.    Pt endorses improved diffuse joint pains, attributed to his SLE, which he rates at a severity of 3/10 coming down from a 6/10 yesterday. Pt ate and enjoyed breakfast this morning and states that his appetite has stably returned. Denies auditory/visual hallucinations. Denies symptoms of mireya.

## 2025-01-29 NOTE — BH INPATIENT PSYCHIATRY PROGRESS NOTE - NSBHATTESTCOMMENTATTENDFT_PSY_A_CORE
Pt today reports going to group and trying to socialize more, pt also reports speaking to mother.  Pt is now in agreement with medication management, will start escitalopram as stated above.  Pt also provided HIPAA release for mother.

## 2025-01-30 PROCEDURE — 99232 SBSQ HOSP IP/OBS MODERATE 35: CPT

## 2025-01-30 RX ORDER — ESCITALOPRAM 10 MG/1
1 TABLET, FILM COATED ORAL
Qty: 14 | Refills: 0
Start: 2025-01-30 | End: 2025-02-12

## 2025-01-30 RX ADMIN — MYCOPHENOLATE MOFETIL 500 MILLIGRAM(S): 200 POWDER, FOR SUSPENSION ORAL at 08:01

## 2025-01-30 RX ADMIN — ESCITALOPRAM 10 MILLIGRAM(S): 10 TABLET, FILM COATED ORAL at 08:02

## 2025-01-30 RX ADMIN — Medication 40 MILLIGRAM(S): at 08:02

## 2025-01-30 RX ADMIN — Medication 1: at 08:00

## 2025-01-30 RX ADMIN — Medication 2: at 11:20

## 2025-01-30 RX ADMIN — Medication 1: at 17:08

## 2025-01-30 RX ADMIN — HYDROXYCHLOROQUINE SULFATE 400 MILLIGRAM(S): 200 TABLET, FILM COATED ORAL at 08:02

## 2025-01-30 RX ADMIN — MYCOPHENOLATE MOFETIL 500 MILLIGRAM(S): 200 POWDER, FOR SUSPENSION ORAL at 20:04

## 2025-01-30 NOTE — BH INPATIENT PSYCHIATRY PROGRESS NOTE - NSBHCHARTREVIEWVS_PSY_A_CORE FT
Vital Signs Last 24 Hrs  T(C): 36.4 (01-30-25 @ 07:49), Max: 36.4 (01-30-25 @ 07:49)  T(F): 97.5 (01-30-25 @ 07:49), Max: 97.5 (01-30-25 @ 07:49)  HR: --  BP: --  BP(mean): --  RR: --  SpO2: --    Orthostatic VS  01-30-25 @ 07:49  Lying BP: --/-- HR: --  Sitting BP: 139/87 HR: 99  Standing BP: 142/90 HR: 108  Site: --  Mode: --  Orthostatic VS  01-29-25 @ 08:15  Lying BP: --/-- HR: --  Sitting BP: 128/88 HR: 99  Standing BP: 124/84 HR: 108  Site: --  Mode: --  Orthostatic VS  01-29-25 @ 07:45  Lying BP: --/-- HR: --  Sitting BP: 128/88 HR: 99  Standing BP: 124/84 HR: 108  Site: --  Mode: --   Vital Signs Last 24 Hrs  T(C): 36.4 (01-30-25 @ 07:49), Max: 36.4 (01-30-25 @ 07:49)  T(F): 97.5 (01-30-25 @ 07:49), Max: 97.5 (01-30-25 @ 07:49)  HR: --  BP: --  BP(mean): --  RR: --  SpO2: --    Orthostatic VS  01-30-25 @ 07:49  Lying BP: --/-- HR: --  Sitting BP: 139/87 HR: 99  Standing BP: 142/90 HR: 108  Site: --  Mode: --    Orthostatic VS  01-29-25 @ 08:15  Lying BP: --/-- HR: --  Sitting BP: 128/88 HR: 99  Standing BP: 124/84 HR: 108  Site: --  Mode: --    Orthostatic VS  01-29-25 @ 07:45  Lying BP: --/-- HR: --  Sitting BP: 128/88 HR: 99  Standing BP: 124/84 HR: 108  Site: --  Mode: --

## 2025-01-30 NOTE — BH INPATIENT PSYCHIATRY PROGRESS NOTE - NSBHASSESSSUMMFT_PSY_ALL_CORE
ND is a 29M w/a pmhx of SLE c/b lupus nephritis but no formal pphx, admitted to [facility] 2N after intentional overdose on combination acetaminophen-codeine (father's medications) in the context of biopsychosocial stressors. On exam, pt's mood is improved with fair insight into the need for hospitalization. Presentation is most consistent with multifactorial depression, including MDD, persistent depressive disorder, medically induced depression, adjustment, personality traits vs. full disorder. Although he is eager to be discharged as soon as possible, pt has agreed to begin pharmacotherapy, acknowledging the indication, benefits, risks, and alternatives to treatment.    1.) Repeated medication management discussion with pt, who now agrees to pharmacotherapy in addition to psychotherapeutic interventions. Start Lexapro 10mg PO daily.  Indications, benefits, risk of adverse effects were discussed with pt.  2.) SLE: Symptoms, including diffuse joint pain, have improved. Called pt's outpt [facility] rheumatologist (Dr. America Ramos) to discuss potential diagnostic and therapeutic interventions for SLE flare-up; pending call back. Confirmed med list with [phone] - pt takes hydroxychloroquine 400 mg daily, mycophenolate mofetil 500 mg BID, Valsartan 40 mg daily. As per [facility], pt also may be filling vitamin D 50K units/week and carvedilol 3.125 mg 1/2 tab q12H elsewhere.  3.) Collateral if pt allows for HIPAA release. ND is a 29M w/a pmhx of SLE c/b lupus nephritis but no formal pphx, admitted to [facility] 2N after intentional overdose on combination acetaminophen-codeine (father's medications) in the context of biopsychosocial stressors. On exam, pt's appears tired and reports exhaustion. Presentation is most consistent with multifactorial depression, including MDD, persistent depressive disorder, medically induced depression, adjustment, personality traits vs. full disorder. Although he is eager to be discharged as soon as possible, pt has fair insight into the need for hospitalization and fair judgment regarding his adherence to pharmacologic and psychotherapeutic interventions. Pt reports tolerable GI upset on Lexapro. Pt has a sufficient safety plan upon discharge.    1.) Continue Lexapro 10mg PO daily and psychotherapeutic intervention. Indications, benefits, risk of adverse effects, and alternatives to treatment were discussed with pt.  2.) Safety plan discussed with the pt in anticipation of discharge.  3.) SLE: Symptoms, including diffuse joint pain, have improved. Called pt's outpt [facility] rheumatologist (Dr. America Ramos) to discuss potential diagnostic and therapeutic interventions for SLE flare-up; pending call back. Confirmed med list with [phone] - pt takes hydroxychloroquine 400 mg daily, mycophenolate mofetil 500 mg BID, Valsartan 40 mg daily. [facility], pt also may be filling vitamin D 50K units/week and carvedilol 3.125 mg 1/2 tab q12H elsewhere.  4.) Collateral if pt allows for HIPAA release. ND is a 29M w/a pmhx of SLE c/b lupus nephritis but no formal pphx, admitted to [facility] 2N after intentional overdose on combination acetaminophen-codeine (father's medications) in the context of biopsychosocial stressors. On exam, pt's appears tired and reports exhaustion. Presentation is most consistent with multifactorial depression, including MDD, persistent depressive disorder, medically induced depression, adjustment, personality traits vs. full disorder. Although he is eager to be discharged as soon as possible, pt has fair insight into the need for hospitalization and fair judgment regarding his adherence to pharmacologic and psychotherapeutic interventions. Pt reports tolerable GI upset on Lexapro. Pt has a sufficient safety plan upon discharge. However, pt's mother expressed concern regarding pt's adherence to follow-up appointments and medications upon discharge.    1.) Continue Lexapro 10mg PO daily and psychotherapeutic intervention. Indications, benefits, risk of adverse effects, and alternatives to treatment were discussed with pt.  2.) Safety plan discussed with the pt in anticipation of discharge.  3.) SLE: Symptoms, including diffuse joint pain, have improved. Called pt's outpt [facility] rheumatologist (Dr. America Ramos) to discuss potential diagnostic and therapeutic interventions for SLE flare-up; pending call back. Confirmed med list with [phone] - pt takes hydroxychloroquine 400 mg daily, mycophenolate mofetil 500 mg BID, Valsartan 40 mg daily. As [facility], pt also may be filling vitamin D 50K units/week and carvedilol 3.125 mg 1/2 tab q12H elsewhere.  4.) Collateral if pt allows for HIPAA release. ND is a 29M w/a pmhx of SLE c/b lupus nephritis but no formal pphx, admitted to [facility] 2N after intentional overdose on combination acetaminophen-codeine (father's medications) in the context of biopsychosocial stressors. Pt reports exhaustion and appears tired on exam. Presentation is most consistent with multifactorial depression, including MDD, persistent depressive disorder, medically induced depression, adjustment, personality traits vs. full disorder. Although he is eager to be discharged as soon as possible, pt has fair insight into the need for hospitalization and fair judgment regarding the importance of adherence to pharmacologic and psychotherapeutic interventions. Pt reports tolerable GI upset on Lexapro. Pt has a sufficient safety plan upon discharge. However, pt's mother expressed concern regarding pt's adherence to follow-up appointments and medications upon discharge.    1.) Continue Lexapro 10mg PO daily and psychotherapeutic intervention. Indications, benefits, risk of adverse effects, and alternatives to treatment were discussed with pt.  2.) Safety plan discussed with pt in anticipation of discharge.  3.) SLE: Symptoms, including diffuse joint pain, have improved. Called pt's outpt [facility] rheumatologist (Dr. America Ramos) to discuss potential diagnostic and therapeutic interventions for SLE flare-up; pending call back. Confirmed med list with [phone] - pt takes hydroxychloroquine 400 mg daily, mycophenolate mofetil 500 mg BID, Valsartan 40 mg daily. [facility], pt also may be filling vitamin D 50K units/week and carvedilol 3.125 mg 1/2 tab q12H elsewhere.  4.) Collateral if pt allows for HIPAA release. ND is a 29M w/a pmhx of SLE c/b lupus nephritis but no formal pphx, admitted to [facility] 2N after intentional overdose on combination acetaminophen-codeine (father's medications) in the context of biopsychosocial stressors. Pt reports exhaustion and appears tired on exam. Presentation is most consistent with multifactorial depression, including MDD, persistent depressive disorder, medically induced depression, adjustment, personality traits vs. full disorder. Although he is eager to be discharged as soon as possible, pt has fair insight into the need for hospitalization and fair judgment regarding the importance of adherence to pharmacologic and psychotherapeutic interventions. Pt reports tolerable GI upset on Lexapro. Pt has a sufficient safety plan upon discharge. However, pt's mother expressed concern regarding pt's adherence to follow-up appointments and medications upon discharge.    1.) Continue Lexapro 10mg PO daily and psychotherapeutic intervention. Indications, benefits, risk of adverse effects, and alternatives to treatment were discussed with pt.  2.) Safety plan discussed with pt in anticipation of discharge.  3.) SLE: Symptoms, including diffuse joint pain, have improved. Called pt's outpt [facility] rheumatologist (Dr. America Ramos) to discuss potential diagnostic and therapeutic interventions for SLE flare-up; pending call back. Confirmed med list with [phone] - pt takes hydroxychloroquine 400 mg daily, mycophenolate mofetil 500 mg BID, Valsartan 40 mg daily. [facility], pt also may be filling vitamin D 50K units/week and carvedilol 3.125 mg 1/2 tab q12H elsewhere.  4.) Dispo planning.

## 2025-01-30 NOTE — BH INPATIENT PSYCHIATRY PROGRESS NOTE - NSBHATTESTBILLING_PSY_A_CORE
69176-Nwwewmfknz OBS or IP - moderate complexity OR 35-49 mins
98480-Rnbvpgvvpm OBS or IP - moderate complexity OR 35-49 mins
43023-Qsceqkvlvr OBS or IP - moderate complexity OR 35-49 mins

## 2025-01-30 NOTE — BH INPATIENT PSYCHIATRY PROGRESS NOTE - NSBHFUPINTERVALHXFT_PSY_A_CORE
Chart reviewed. Case discussed with treatment team. Patient seen and examined at bedside. No acute overnight events, no PRNs required/requested. He has been compliant with standing medications, except for his insulin Lispro because he is unfamiliar with receiving this medication. Per sleep log and pt interview, he woke up once in the middle of the night and slept for a little less than 4 hours, which he attributed to his roommate's snoring. Pt reports that he has been maintained his hygiene on a daily basis. Pt has been leaving his room frequently to interact with other patients, such as playing games of [employer] with friends on the unit. He attended group therapy yesterday and noted that it was a positive experience for him. Pt called his mother this morning and is looking forward to seeing her in person after discharge; he feels safe at home living with his mother and father.     Pt is currently taking pharmacotherapy to treat MDD vs. persistent depressive disorder, reporting GI upset after one day of taking Lexapro 10mg PO daily. Pt was counseled again on the adverse effects of Lexapro. He was also counseled that he     Pt endorses stably mild diffuse joint pains, attributed to his SLE, which he rates at a severity of 3/10 today. Pt ate and enjoyed breakfast this morning and states that his appetite is normal. Denies suicidal ideation, intent, and/or plan. Chart reviewed. Case discussed with treatment team. Patient seen and examined at bedside. No acute overnight events, no PRNs required/requested. He has been compliant with standing medications, except for his insulin Lispro because he is unfamiliar with receiving this medication. Per sleep log and pt interview, he woke up once in the middle of the night and slept for a little less than 4 hours, which he attributed to his roommate's snoring. Pt reports that he has been maintained his hygiene on a daily basis. Pt has been leaving his room frequently to interact with other patients, such as playing games of [employer] with friends on the unit. He attended group therapy yesterday and noted that it was a positive experience for him. Pt called his mother this morning and is looking forward to seeing her in person after discharge; he feels safe at home living with his mother and father.     Pt endorses stably mild diffuse joint pains, attributed to his SLE, which he rates at a severity of 3/10 today. Pt ate and enjoyed breakfast this morning and states that his appetite is normal. Denies suicidal ideation, intent, and/or plan.    Pt is currently taking pharmacotherapy to treat MDD vs. persistent depressive disorder, reporting GI upset after one day of taking Lexapro 10mg PO daily. Pt was counseled again on the adverse effects of Lexapro. He was also counseled that it is important to adhere to his Lexapro regimen, as the therapeutic effect of the medication occurs after 4-6 weeks of consistent use. Pt also informed that if the adverse effects of Lexapro are intolerable, there are alternative medication options at his disposal.    Pt agreed to follow up outpt at [facility] after discharge. A safety plan was also discussed with the pt. He identified his main warning sign of developing acute depression/suicidality as isolation. He identified going to the gym regularly and attending his local [employer] as coping strategies for managing his emotions. Pt feels comfortable turning to his mother to ask for help or in the event of an acute crisis. Pt overall feels prepared to face stressors in his life upon discharge. Chart reviewed. Case discussed with treatment team. Patient seen and examined at bedside. No acute overnight events, no PRNs required/requested. He has been compliant with standing medications, except for his insulin Lispro because he is unfamiliar with receiving this medication. Per sleep log and pt interview, he woke up once in the middle of the night and slept for a little less than 4 hours, which he attributed to his roommate's snoring. Pt reports that he has been maintained his hygiene on a daily basis. Pt has been leaving his room frequently to interact with other patients, such as playing games of [employer] with friends on the unit. He attended group therapy yesterday and noted that it was a positive experience for him. Pt called his mother this morning and is looking forward to seeing her in person after discharge; he feels safe at home living with his mother and father.     Pt endorses stably mild diffuse joint pains, attributed to his SLE, which he rates at a severity of 3/10 today. Pt ate and enjoyed breakfast this morning and states that his appetite is normal. Denies suicidal ideation, intent, and/or plan.    Pt is currently taking pharmacotherapy to treat MDD vs. persistent depressive disorder, reporting GI upset after one day of taking Lexapro 10mg PO daily. Pt was counseled again on the adverse effects of Lexapro. He was also counseled that it is important to adhere to his Lexapro regimen, as the therapeutic effect of the medication occurs after 4-6 weeks of consistent use. Pt also informed that if the adverse effects of Lexapro are intolerable, there are alternative medication options at his disposal.    Pt agreed to follow up outpt at [facility] after discharge. A safety plan was also discussed with the pt. He identified his main warning sign of developing acute depression/suicidality as isolation. He identified going to the gym regularly and attending his local [employer] as coping strategies for managing his emotions. Pt feels comfortable turning to his mother to ask for help or in the event of an acute crisis. Pt overall feels prepared to face stressors in his life upon discharge.    Spoke to pt's mother on the phone. She expressed concern regarding the pt's compliance with follow-up appointments and medications upon discharge. She requested that family therapy take place before discharge.  Chart reviewed. Case discussed with treatment team. Patient seen and examined at bedside. No acute overnight events, no PRNs required/requested. He has been compliant with standing medications, except for his insulin Lispro because he is unfamiliar with receiving this medication. Per sleep log and pt interview, he woke up once in the middle of the night and slept for a little less than 4 hours, which he attributed to his roommate's snoring. Pt reports that he has maintained his hygiene on a daily basis. Pt has been leaving his room frequently to interact with other patients, engaging in activities like playing chess with friends on the unit. He attended group therapy yesterday and noted that it was a positive experience for him. Pt called his mother this morning and is looking forward to seeing her in person after discharge; he feels safe at home living with his mother and father.     Pt endorses stably mild diffuse joint pains, attributed to his SLE, which he rates at a severity of 3/10 today. Pt ate and enjoyed breakfast this morning and states that his appetite is normal. Denies suicidal ideation, intent, and/or plan.    Pt is currently taking pharmacotherapy to treat MDD vs. persistent depressive disorder, reporting nausea after one day of taking Lexapro 10mg PO daily. Pt was counseled again on the adverse effects of Lexapro. He was also counseled that it is important to adhere to his Lexapro regimen, as the therapeutic effect of the medication occurs after 4-6 weeks of consistent use. Pt also informed that if the adverse effects of Lexapro are intolerable, there are alternative medication options at his disposal.    Pt agreed to follow up outpt at [facility] after discharge. A safety plan was also discussed with the pt. He identified his main warning sign of developing acute depression/suicidality as isolation. He identified going to the gym regularly and attending his local [employer] as coping strategies for managing his emotions. Pt feels comfortable turning to his mother to ask for help or in the event of an acute crisis. Pt overall feels prepared to face stressors in his life upon discharge.    Spoke to pt's mother on the phone. She expressed concern regarding the pt's compliance with follow-up appointments and medications upon discharge. She requested that family therapy take place before discharge.  Chart reviewed. Case discussed with treatment team. Patient seen and examined at bedside. No acute overnight events, no PRNs required/requested. He has been compliant with standing medications, except for his insulin Lispro because he is unfamiliar with receiving this medication. Per sleep log and pt interview, he woke up once in the middle of the night and slept for a little less than 4 hours, which he attributed to his roommate's snoring. Pt reports that he has maintained his hygiene on a daily basis. Pt has been leaving his room frequently to interact with other patients, engaging in activities like playing chess with friends on the unit. He attended group therapy yesterday and noted that it was a positive experience for him. Pt called his mother this morning and is looking forward to seeing her in person after discharge; he feels safe at home living with his mother and father.     Pt endorses stably mild diffuse joint pains, attributed to his SLE, which he rates at a severity of 3/10 today. Pt ate and enjoyed breakfast this morning and states that his appetite is normal. Denies suicidal ideation, intent, and/or plan.    Pt is currently taking pharmacotherapy to treat MDD vs. persistent depressive disorder, reporting nausea after one day of taking Lexapro 10mg PO daily. Pt was counseled again on the adverse effects of Lexapro. He was also counseled that it is important to adhere to his Lexapro regimen, as the therapeutic effect of the medication occurs after 4-6 weeks of consistent use. Pt also informed that if the adverse effects of Lexapro are intolerable, there are alternative medication options at his disposal.    Pt agreed to follow up outpt at [facility] after discharge. A safety plan was also discussed with the pt. He identified his main warning sign of developing acute depression/suicidality as isolation. He identified going to the gym regularly and attending his local [employer] as coping strategies for managing his emotions. Pt feels comfortable turning to his mother to ask for help or in the event of an acute crisis. Pt overall feels prepared to face stressors in his life upon discharge.    Spoke to pt's mother on the phone, update provided, family meeting scheduled.

## 2025-01-30 NOTE — BH DISCHARGE NOTE NURSING/SOCIAL WORK/PSYCH REHAB - NSDCPRGOAL_PSY_ALL_CORE
Patient met specified goal of identifying and utilize 2 coping skills that meet his needs. Progress was evidenced by patients’ ability to both identify and utilize various coping skills (meditation, journaling, exercise). Patient attended 30 percent of psychiatric rehabilitation groups. In group settings, patient was able to meaningfully engage in discussions and tolerated group structure. Patient and staff collaborated on safety planning prior to discharge.

## 2025-01-30 NOTE — BH DISCHARGE NOTE NURSING/SOCIAL WORK/PSYCH REHAB - PATIENT PORTAL LINK FT
You can access the FollowMyHealth Patient Portal offered by [facility] by registering at the following website: [email or web address]. By joining [employer]’s FollowMyHealth portal, you will also be able to view your health information using other applications (apps) compatible with our system.

## 2025-01-30 NOTE — BH DISCHARGE NOTE NURSING/SOCIAL WORK/PSYCH REHAB - NSDCADDINFO1FT_PSY_ALL_CORE
This is a "crisis first This is a "crisis first" appointment. In order to receive your next appointment through the Adult Outpatient Psychiatry Department, you must attend this appointment. Please arrive a few minutes early and provide your ID and insurance card.

## 2025-01-30 NOTE — BH DISCHARGE NOTE NURSING/SOCIAL WORK/PSYCH REHAB - DISCHARGE INSTRUCTIONS AFTERCARE APPOINTMENTS
In order to check the location, date, or time of your aftercare appointment, please refer to your Discharge Instructions Document given to you upon leaving the hospital.  If you have lost the instructions please call [phone]

## 2025-01-30 NOTE — BH INPATIENT PSYCHIATRY PROGRESS NOTE - MSE UNSTRUCTURED FT
Appearance: Dressed appropriately.  Behavior: Cooperative.  Calm.   Motor: No abnormal movements, no psychomotor slowing or activation.  Speech: Regular rate.  Mood: "Ok."  Affect: Sad, tired, but reactive, full range.  Thought Process: Linear.  Associations: Fair.  Thought Content: Denies AVH.  Denies SIIP or HIIP.  Insight: Limited.  Judgment: Fair on interview.  Attention: Fair.  Language: Fluent.  Gait: Intact.  Appearance: Dressed appropriately.  Behavior: Cooperative. Calm.   Motor: No abnormal movements, no psychomotor slowing or activation.  Speech: Regular rate.  Mood: "Exhausted."  Affect: Tired and blunted; mood-congruent.  Thought Process: Linear.  Associations: Fair.  Thought Content: Denies SIIP.  Insight: Fair.  Judgment: Fair on interview.  Attention: Fair.  Language: Fluent.  Gait: Intact. Appearance: Dressed appropriately.  Behavior: Cooperative. Calm.   Motor: No abnormal movements, no psychomotor slowing or activation.  Speech: Regular rate.  Mood: "Exhausted."  Affect: Tired, but pleasant..  Thought Process: Linear.  Associations: Fair.  Thought Content: Future oriented.  Denies SIIP, HIIP.  Insight: Fair.  Judgment: Fair on interview.  Attention: Fair.  Language: Fluent.  Gait: Intact.

## 2025-01-30 NOTE — BH INPATIENT PSYCHIATRY PROGRESS NOTE - CURRENT MEDICATION
MEDICATIONS  (STANDING):  escitalopram 10 milliGRAM(s) Oral once  escitalopram 10 milliGRAM(s) Oral daily  hydroxychloroquine 400 milliGRAM(s) Oral daily  insulin lispro (ADMELOG) corrective regimen sliding scale   SubCutaneous three times a day before meals  insulin lispro (ADMELOG) corrective regimen sliding scale   SubCutaneous at bedtime  mycophenolate mofetil 500 milliGRAM(s) Oral two times a day  valsartan 40 milliGRAM(s) Oral daily    MEDICATIONS  (PRN):  acetaminophen     Tablet .. 650 milliGRAM(s) Oral every 6 hours PRN Mild Pain (1 - 3), Moderate Pain (4 - 6), Severe Pain (7 - 10)  LORazepam     Tablet 1 milliGRAM(s) Oral every 6 hours PRN agitation due to severe anxiety  LORazepam   Injectable 2 milliGRAM(s) IntraMuscular Once PRN Menacing behavior

## 2025-01-30 NOTE — BH DISCHARGE NOTE NURSING/SOCIAL WORK/PSYCH REHAB - NSCDUDCCRISIS_PSY_A_CORE
.National Suicide Prevention Lifeline 5 [phone] [phone]/.  [facility]  [address] [place], [place] [id number] [phone]   Hours:  Monday through Friday from 9 AM to 3 PM/988 Suicide and Crisis Lifeline

## 2025-01-30 NOTE — BH INPATIENT PSYCHIATRY PROGRESS NOTE - NSBHMETABOLIC_PSY_ALL_CORE_FT
BMI: BMI (kg/m2): 38.4 (01-26-25 @ 14:15)  HbA1c: A1C with Estimated Average Glucose Result: 8.7 % (01-27-25 @ 10:47)    Glucose: POCT Blood Glucose.: 175 mg/dL (01-30-25 @ 07:51)    BP: --Vital Signs Last 24 Hrs  T(C): 36.4 (01-30-25 @ 07:49), Max: 36.4 (01-30-25 @ 07:49)  T(F): 97.5 (01-30-25 @ 07:49), Max: 97.5 (01-30-25 @ 07:49)  HR: --  BP: --  BP(mean): --  RR: --  SpO2: --    Orthostatic VS  01-30-25 @ 07:49  Lying BP: --/-- HR: --  Sitting BP: 139/87 HR: 99  Standing BP: 142/90 HR: 108  Site: --  Mode: --  Orthostatic VS  01-29-25 @ 08:15  Lying BP: --/-- HR: --  Sitting BP: 128/88 HR: 99  Standing BP: 124/84 HR: 108  Site: --  Mode: --  Orthostatic VS  01-29-25 @ 07:45  Lying BP: --/-- HR: --  Sitting BP: 128/88 HR: 99  Standing BP: 124/84 HR: 108  Site: --  Mode: --    Lipid Panel: Date/Time: 01-26-25 @ 10:29  Cholesterol, Serum: 278  LDL Cholesterol Calculated: 181  HDL Cholesterol, Serum: 49  Total Cholesterol/HDL Ration Measurement: --  Triglycerides, Serum: 250   BMI: BMI (kg/m2): 38.4 (01-26-25 @ 14:15)  HbA1c: A1C with Estimated Average Glucose Result: 8.7 % (01-27-25 @ 10:47)    Glucose: POCT Blood Glucose.: 208 mg/dL (01-30-25 @ 11:49)    BP: --Vital Signs Last 24 Hrs  T(C): 36.4 (01-30-25 @ 07:49), Max: 36.4 (01-30-25 @ 07:49)  T(F): 97.5 (01-30-25 @ 07:49), Max: 97.5 (01-30-25 @ 07:49)  HR: --  BP: --  BP(mean): --  RR: --  SpO2: --    Orthostatic VS  01-30-25 @ 07:49  Lying BP: --/-- HR: --  Sitting BP: 139/87 HR: 99  Standing BP: 142/90 HR: 108  Site: --  Mode: --  Orthostatic VS  01-29-25 @ 08:15  Lying BP: --/-- HR: --  Sitting BP: 128/88 HR: 99  Standing BP: 124/84 HR: 108  Site: --  Mode: --  Orthostatic VS  01-29-25 @ 07:45  Lying BP: --/-- HR: --  Sitting BP: 128/88 HR: 99  Standing BP: 124/84 HR: 108  Site: --  Mode: --    Lipid Panel: Date/Time: 01-26-25 @ 10:29  Cholesterol, Serum: 278  LDL Cholesterol Calculated: 181  HDL Cholesterol, Serum: 49  Total Cholesterol/HDL Ration Measurement: --  Triglycerides, Serum: 250

## 2025-01-31 VITALS — TEMPERATURE: 98 F

## 2025-01-31 RX ORDER — MYCOPHENOLATE MOFETIL 200 MG/ML
2 POWDER, FOR SUSPENSION ORAL
Qty: 0 | Refills: 0 | DISCHARGE
Start: 2025-01-31

## 2025-01-31 RX ORDER — HYDROXYCHLOROQUINE SULFATE 200 MG/1
2 TABLET, FILM COATED ORAL
Qty: 0 | Refills: 0 | DISCHARGE
Start: 2025-01-31

## 2025-01-31 RX ORDER — VALSARTAN 80 MG
1 TABLET ORAL
Qty: 0 | Refills: 0 | DISCHARGE
Start: 2025-01-31

## 2025-01-31 RX ADMIN — Medication 40 MILLIGRAM(S): at 09:23

## 2025-01-31 RX ADMIN — HYDROXYCHLOROQUINE SULFATE 400 MILLIGRAM(S): 200 TABLET, FILM COATED ORAL at 09:22

## 2025-01-31 RX ADMIN — Medication 1: at 09:51

## 2025-01-31 RX ADMIN — ESCITALOPRAM 10 MILLIGRAM(S): 10 TABLET, FILM COATED ORAL at 09:22

## 2025-01-31 RX ADMIN — MYCOPHENOLATE MOFETIL 500 MILLIGRAM(S): 200 POWDER, FOR SUSPENSION ORAL at 09:22

## 2025-01-31 NOTE — BH INPATIENT PSYCHIATRY DISCHARGE NOTE - HPI (INCLUDE ILLNESS QUALITY, SEVERITY, DURATION, TIMING, CONTEXT, MODIFYING FACTORS, ASSOCIATED SIGNS AND SYMPTOMS)
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  with no established psych hx; denied any hx of in-Pt psych admissions; no past hx of SA nor engaged in any NSSIB; admits to occasional cannabis use (PRN for pain) - no alcohol abuse.  pertinent medical issues include:  hx of SLE; hx of Lupus nephritis; HTN, DM, CAD s/p stent.  today, presented to the ED BIB EMS from home after sister activated 911 following Pt's intentional overdose of 10 acetaminophen-codeine combination pill around midnight (which had been prescribed to a family member).      managed in the main ED.. seen and cleared by Tox service who suspected that combo pill dosage was around 325mg-30mg. Pt claimed that he took said medication "for pain".  however per initial collateral information obtained from his family, they were unaware of Pt's intention. on initial medical evaluation, he was noted to be slightly tachycardic and had low grade temperature.  these subsequently improved. Pt's physical examination did not show any evidence of opioid toxidrome.  med ED team initially reached out to Pt's sister who reported that the Pt has been more withdrawn lately, isolating and staying in bedroom     seen bedside. does not appear to be in distress.  complaining of chronic pain - "I have lupus".  he is preoccupied with going home as "I have work to attend to".  reported that last night, has an argument with mother (did not want to provide details on this argument). no escalation towards violence nor destroying of properties apart from throwing his phone.  admitted to feeling angry, increasingly frustrated following their argument.  he reached out this his sister - encouraged sister to talk to their mother.      in the meantime as he claimed experiencing "a lot of bodily pains" + feeling frustrated of what has been going on with his life, , he thought of: "I should just die".  he then took a lot of pain pills - "I don't know what I was doing".  then tried to vomit it all out.  sister called 911.  Pt admits to previously harboring intermittent/ passive SI -having death wish.  currently though, denied having active SI and no HI      Last euthymia endorsed x few yrs ago.  on & off depressive symptoms but denied any past escalation of said symptom until recently (starting this january), he began to feel more depressed.  symptoms experienced described as feeling sad daily; "does not feel like doing anything/ amotivated"; has early + middle insomnia, poor concentration, anergia and "stress eating" (resulted to wt gain).  in addition, been feeling hopeless and worthless; denied helplessness.       Last month due to the severity of the depression, he admitted struggling with his ADLs: "I didn't have any motivation; no energy to get out of bed nor care for my self."   apart from the depression, he also claims experiencing anxiety.      with regards to Pt's anxiety, described symptoms as experiencing an "overall sense of nervousness" associated with racing thoughts + "over thinking".  he denied experiencing any severe specific anxiety disorder symptoms like VALENTIN, panic disorder, etc.   furthermore, he denied experiencing any signs/ symptoms suggestive of mireya (denied grandiosity/ racing thoughts/ increased goal directed activities or engaged in risk taking behavior/ no pressured speech/ no elevated mood/ denied any increased in energy level causing sleep disruption).  is not feeling paranoid/ no referential ideations. denied any perceptual disturbances. no thought insertion/ withdrawal nor broadcasting    cites the following stressors as causing him to be more depressed, anxious: work related stressors (competition from other [employer] outlets, maintaining the work place), financial constraints, conflict with mother, helps care for his father who has left sided residual (from stroke) and in  need of supervision at home    COLLATERAL INFORMATION OBTAINED PERSONALLY FROM HIS MOTHER WHO described Pt as a "very nice kid"; diagnosed with LE (around 2018 or 2019). 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 been complaining of worsening pain for past 4 weeks; has not received any medications;  Pt reportedly very depressed, isolating self; stress eating.  recently, been having financial constraints due to gambling. "He (in reference to the Pt) was with the wrong crowd", she tells writer.  added that she had recently found out that Pt had sold the gold chains that she had bought for him;  has been spending a lot of money due to his gambling.  Last night as she confronted Pt with this, he became agitated, angry. screamed "I just want to die".  mother reported Pt took pain medications - medications which had been prescribed for Pt's father.   no reported HI.  Pt is not violent.  no manic or psychotic symptoms endorsed.  "    Pt on interview states he has been sad for a few months, has not left home in 2 weeks, has been sleeping a lot or watching TV, no other hobbies or interests, states appetite is intact, in context of psychosocial stressors of frequent family arguments since his father had a stroke 3 yrs ago and also occupational stress (pt owns and manages a laundromat).  Pt reports that on day of presentation, he left his home for first time in weeks to spend time with a friend, upon return he had an argument with his mother, and due to this and also having recent lupus flare causing widespread joint pain, he went into his room and took overdose on Tylenol with codeine.  Pt states he emptied the bottle, but that it was not full to begin with.  Pt states his intent in the moment was to harm himself but states it was impulsive and he did not actually think it was enough to harm him.  Pt states he regretted his action immediately and tried to induce vomiting, and that his sister then took him to the ER.  Pt states that he did not write any suicide notes, say good bye to anyone, give away any of his belongings, or any other preparatory actions for suicide.  Pt states he is generally not an impulsive person.  Pt denies any history of mireya or psychosis.  He denies any previous history of suicidality, NSSIB, or homicidality.  Pt states that since coming in the hospital, he does not have any further thoughts to harm himself, wants to go home as his family does not know how to manage the laundromat. 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  with no established psych hx; denied any hx of in-Pt psych admissions; no past hx of SA nor engaged in any NSSIB; admits to occasional cannabis use (PRN for pain) - no alcohol abuse.  pertinent medical issues include:  hx of SLE; hx of Lupus nephritis; HTN, DM, CAD s/p stent.  today, presented to the ED BIB EMS from home after sister activated 911 following Pt's intentional overdose of 10 acetaminophen-codeine combination pill around midnight (which had been prescribed to a family member).  managed in the main ED.. seen and cleared by Tox service who suspected that combo pill dosage was around 325mg-30mg. Pt claimed that he took said medication "for pain".  however per initial collateral information obtained from his family, they were unaware of Pt's intention. on initial medical evaluation, he was noted to be slightly tachycardic and had low grade temperature.  these subsequently improved. Pt's physical examination did not show any evidence of opioid toxidrome.  med ED team initially reached out to Pt's sister who reported that the Pt has been more withdrawn lately, isolating and staying in bedroom.  seen bedside. does not appear to be in distress.  complaining of chronic pain - "I have lupus".  he is preoccupied with going home as "I have work to attend to".  reported that last night, has an argument with mother (did not want to provide details on this argument). no escalation towards violence nor destroying of properties apart from throwing his phone.  admitted to feeling angry, increasingly frustrated following their argument.  he reached out this his sister - encouraged sister to talk to their mother.  in the meantime as he claimed experiencing "a lot of bodily pains" + feeling frustrated of what has been going on with his life, , he thought of: "I should just die".  he then took a lot of pain pills - "I don't know what I was doing".  then tried to vomit it all out.  sister called 911.  Pt admits to previously harboring intermittent/ passive SI -having death wish.  currently though, denied having active SI and no HI.  Last euthymia endorsed x few yrs ago.  on & off depressive symptoms but denied any past escalation of said symptom until recently (starting this january), he began to feel more depressed.  symptoms experienced described as feeling sad daily; "does not feel like doing anything/ amotivated"; has early + middle insomnia, poor concentration, anergia and "stress eating" (resulted to wt gain).  in addition, been feeling hopeless and worthless; denied helplessness.   Last month due to the severity of the depression, he admitted struggling with his ADLs: "I didn't have any motivation; no energy to get out of bed nor care for my self."   apart from the depression, he also claims experiencing anxiety.  with regards to Pt's anxiety, described symptoms as experiencing an "overall sense of nervousness" associated with racing thoughts + "over thinking".  he denied experiencing any severe specific anxiety disorder symptoms like VALENTIN, panic disorder, etc.   furthermore, he denied experiencing any signs/ symptoms suggestive of mireya (denied grandiosity/ racing thoughts/ increased goal directed activities or engaged in risk taking behavior/ no pressured speech/ no elevated mood/ denied any increased in energy level causing sleep disruption).  is not feeling paranoid/ no referential ideations. denied any perceptual disturbances. no thought insertion/ withdrawal nor broadcasting.  cites the following stressors as causing him to be more depressed, anxious: work related stressors (competition from other [employer] outlets, maintaining the work place), financial constraints, conflict with mother, helps care for his father who has left sided residual (from stroke) and in  need of supervision at home.    COLLATERAL INFORMATION OBTAINED PERSONALLY FROM HIS MOTHER WHO described Pt as a "very nice kid"; diagnosed with LE (around 2018 or 2019). 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 been complaining of worsening pain for past 4 weeks; has not received any medications;  Pt reportedly very depressed, isolating self; stress eating.  recently, been having financial constraints due to gambling. "He (in reference to the Pt) was with the wrong crowd", she tells writer.  added that she had recently found out that Pt had sold the gold chains that she had bought for him;  has been spending a lot of money due to his gambling.  Last night as she confronted Pt with this, he became agitated, angry. screamed "I just want to die".  mother reported Pt took pain medications - medications which had been prescribed for Pt's father.   no reported HI.  Pt is not violent.  no manic or psychotic symptoms endorsed.  "

## 2025-01-31 NOTE — BH INPATIENT PSYCHIATRY PROGRESS NOTE - NSDCCRITERIA_PSY_ALL_CORE
When pt is no longer an acute or imminent risk of harm to self or others, and is able to care for self safely, pt may then be discharged. 

## 2025-01-31 NOTE — BH INPATIENT PSYCHIATRY DISCHARGE NOTE - MSE UNSTRUCTURED FT
Appearance: Dressed appropriately.  Good hygiene and grooming.   Behavior: Cooperative.  Calm.  Good eye contact.  Well related.   Motor: No abnormal movements, no psychomotor slowing or activation.  Speech: Regular rate.  Mood: "Better."  Affect: Pleasant, full range, stable.   Thought Process: Linear.  Associations: Fair.  Thought Content:  Future oriented.  Denies AVH.  Denies SIIP or HIIP.  Insight: Improved.  Judgment: Fair on interview.  Attention: Fair.  Language: Fluent.  Gait: Intact.

## 2025-01-31 NOTE — BH INPATIENT PSYCHIATRY PROGRESS NOTE - NSBHASSESSSUMMFT_PSY_ALL_CORE
ND is a 29M w/a pmhx of SLE but no formal pphx, admitted to [facility] 2N after intentional overdose on combination acetaminophen-codeine in the context of biopsychosocial stressors, consistent with multifactorial depression now being treated with Lexapro and psychotherapy, with improving psychiatric status on the unit pending discharge.    1.) Continue Lexapro 10mg PO daily and psychotherapeutic intervention. Indications, benefits, risk of adverse effects, and alternatives to treatment were discussed with pt.  2.) Safety plan discussed with pt in anticipation of discharge.  3.) SLE: Symptoms, including diffuse joint pain, have improved. Called pt's outpt [facility] rheumatologist (Dr. America Ramos) to discuss potential diagnostic and therapeutic interventions for SLE flare-up; pending call back. Confirmed med list with [phone] - pt takes hydroxychloroquine 400 mg daily, mycophenolate mofetil 500 mg BID, Valsartan 40 mg daily. As per [facility], pt also may be filling vitamin D 50K units/week and carvedilol 3.125 mg 1/2 tab q12H elsewhere.  4.) Dispo planning; discharge today. ND is a 29M w/a pmhx of SLE but no formal pphx, admitted to [facility] 2N after intentional overdose on combination acetaminophen-codeine in the context of biopsychosocial stressors, consistent with multifactorial depression now being treated with Lexapro and psychotherapy, with improving psychiatric status on the unit pending discharge.    1.) Dispo planning; discharge today.  2.) Continue Lexapro 10mg PO daily and psychotherapeutic intervention. Indications, benefits, risk of adverse effects, and alternatives to treatment were discussed with pt.  3.) Safety plan discussed with pt in anticipation of discharge.  4.) SLE: Symptoms, including diffuse joint pain, have improved. Called pt's outpt [facility] rheumatologist (Dr. America Ramos) to discuss potential diagnostic and therapeutic interventions for SLE flare-up; pending call back. Confirmed med list with [phone] - pt takes hydroxychloroquine 400 mg daily, mycophenolate mofetil 500 mg BID, Valsartan 40 mg daily. As per [facility], pt also may be filling vitamin D 50K units/week and carvedilol 3.125 mg 1/2 tab q12H elsewhere.

## 2025-01-31 NOTE — BH INPATIENT PSYCHIATRY DISCHARGE NOTE - HOSPITAL COURSE
Pt was admitted to  with depression and after impulsive overdose attempt on father's pain medications.  Pt reported that overdose attempt was impulsive, without any premeditative acts, during period of emotional dysregulation in context of argument with mother (chronic family interpersonal discord) and widespread joint pain from lupus.  Pt indicated that while his intent in the moment was to harm self, he expressed remorse over his actions.  Pt described recent depression in context of longstanding family discord and occupational/financial stress.  Presentation was most consistent with multifactorial depression, with likely contributions from MDD/persistent depressive disorder, possible medically induced component, possible substance induced component, adjustment, cluster B traits/personality disorder (hx of emotional dysregulations and associated maladaptive coping responses), and chronic parent child conflict.      Pt was prescribed escitalopram 10 mg daily.  Throughout admission, pt reported improvement in moods, was linear in thought process and goal directed in behavior.  He became more visible on unit, social with select peers, more active in therapeutic milieu.  He was compliant with medications and basic care, and maintained ADLs independently.  Pt was future oriented, and pt consistently denied any suicidality or homicidality.  Pt declined any PHP or other step down program and opted for [facility] CC/AOPD follow up.  Family meeting was conducted prior to discharge with pt and mother.  Of note, chronic family interpersonal discord/parent child conflict was significantly apparent with unmet expectations from both sides, poor communication, and minimal compromise - pt and mother were advised to seek out family therapy once pt establishes outpatient care.  Given that pt was no longer an acute or imminent risk of harm to self or others, pt was discharged from hospital.

## 2025-01-31 NOTE — BH INPATIENT PSYCHIATRY PROGRESS NOTE - NSCGISEVERITYSCORE_CAL_PSY_ALL_CORE
4
4
I have personally seen and examined this patient.  I have fully participated in the care of this patient. I have reviewed all pertinent clinical information, including history, physical exam, plan and the Resident’s note and agree except as noted.
4

## 2025-01-31 NOTE — BH INPATIENT PSYCHIATRY DISCHARGE NOTE - NSDCCPCAREPLAN_GEN_ALL_CORE_FT
PRINCIPAL DISCHARGE DIAGNOSIS  Diagnosis: Depression  Assessment and Plan of Treatment: Medication management and psychotherapy      SECONDARY DISCHARGE DIAGNOSES  Diagnosis: Systemic lupus  Assessment and Plan of Treatment: Routine medical follow up

## 2025-01-31 NOTE — BH INPATIENT PSYCHIATRY PROGRESS NOTE - NSBHMETABOLIC_PSY_ALL_CORE_FT
BMI: BMI (kg/m2): 38.4 (01-26-25 @ 14:15)  HbA1c: A1C with Estimated Average Glucose Result: 8.7 % (01-27-25 @ 10:47)    Glucose: POCT Blood Glucose.: 168 mg/dL (01-31-25 @ 08:02)    BP: --Vital Signs Last 24 Hrs  T(C): 36.6 (01-31-25 @ 08:45), Max: 36.6 (01-31-25 @ 08:45)  T(F): 97.8 (01-31-25 @ 08:45), Max: 97.8 (01-31-25 @ 08:45)  HR: --  BP: --  BP(mean): --  RR: --  SpO2: --    Orthostatic VS  01-31-25 @ 08:45  Lying BP: --/-- HR: --  Sitting BP: 141/85 HR: 99  Standing BP: 138/96 HR: 100  Site: --  Mode: --  Orthostatic VS  01-30-25 @ 07:49  Lying BP: --/-- HR: --  Sitting BP: 139/87 HR: 99  Standing BP: 142/90 HR: 108  Site: --  Mode: --    Lipid Panel: Date/Time: 01-26-25 @ 10:29  Cholesterol, Serum: 278  LDL Cholesterol Calculated: 181  HDL Cholesterol, Serum: 49  Total Cholesterol/HDL Ration Measurement: --  Triglycerides, Serum: 250   BMI: BMI (kg/m2): 38.4 (01-26-25 @ 14:15)  HbA1c: A1C with Estimated Average Glucose Result: 8.7 % (01-27-25 @ 10:47)    Glucose: POCT Blood Glucose.: 168 mg/dL (01-31-25 @ 08:02)    BP: --Vital Signs Last 24 Hrs  T(C): --  T(F): --  HR: --  BP: --  BP(mean): --  RR: --  SpO2: --    Orthostatic VS  01-31-25 @ 08:45  Lying BP: --/-- HR: --  Sitting BP: 141/85 HR: 99  Standing BP: 138/96 HR: 100  Site: --  Mode: --    Lipid Panel: Date/Time: 01-26-25 @ 10:29  Cholesterol, Serum: 278  LDL Cholesterol Calculated: 181  HDL Cholesterol, Serum: 49  Total Cholesterol/HDL Ration Measurement: --  Triglycerides, Serum: 250

## 2025-01-31 NOTE — BH INPATIENT PSYCHIATRY PROGRESS NOTE - PRN MEDS
MEDICATIONS  (PRN):  acetaminophen     Tablet .. 650 milliGRAM(s) Oral every 6 hours PRN Mild Pain (1 - 3), Moderate Pain (4 - 6), Severe Pain (7 - 10)  LORazepam     Tablet 1 milliGRAM(s) Oral every 6 hours PRN agitation due to severe anxiety  LORazepam   Injectable 2 milliGRAM(s) IntraMuscular Once PRN Menacing behavior   MEDICATIONS  (PRN):

## 2025-01-31 NOTE — BH INPATIENT PSYCHIATRY PROGRESS NOTE - DETAILS
Diffuse joint pains (attributed to SLE)
Diffuse joint pains (attributed to SLE)
Diffuse 6/10 joint pains
Diffuse joint pains (attributed to SLE)

## 2025-01-31 NOTE — BH INPATIENT PSYCHIATRY DISCHARGE NOTE - DESCRIPTION
single and not .  currently domiciled with parents.  self employed/ manages hjis own laundromat.  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  mother and sister are both RNs.  when euthymic, he  likes playing video games and watching football.  Pt is not [employer]. no reported access to guns.  has pet dog "Vignesh". Single and not .  Currently domiciled with parents.  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  Previously attended [employer] for civil engineering but did not complete degree.  Pt enjoys playing video games and watching football.  No [employer] affiliation.  No reported access to guns.  Has pet dog "Vignesh".

## 2025-01-31 NOTE — BH INPATIENT PSYCHIATRY DISCHARGE NOTE - NSBHASSESSSUMMFT_PSY_ALL_CORE
Attenuated depression.  Pt is future oriented without any suicidality or homicidality.  Family meeting conducted on unit with pt and mother prior to discharge.    Risk assessment on discharge: Pt has chronic risk factors of hx lupus with renal and cardiac complications, hx of cannabis use, hx of gambling, with acute risk factors of recent depression and overdose attempt, now treated with inpatient care consisting of medication management and psychotherapeutic interventions.  Protective factors of supportive family, stable housing, future orientation, therapeutic alliances, has dependent pet.  Pt has consistently denied suicidality and homicidality, is emotionally regulated with good behavioral control, and is caring for ADLs independently.  Pt is CHRONIC risk of harm, but is NO longer an acute or imminent risk of harm to self or others, pt can be discharged with outpatient follow up.     1. Will d/c home on current regimen.  2. Psychoeducation provided regarding importance of compliance with outpatient appointments and medications.  Pt declined any PHP or other step down program.  Pt agreed to [facility] CC/AOPD follow up.  3. Pt was advised to remain abstinent with substances.  4. Pt was advised to dial 911 or return to ER if they become danger to self or others.

## 2025-01-31 NOTE — BH INPATIENT PSYCHIATRY PROGRESS NOTE - CURRENT MEDICATION
MEDICATIONS  (STANDING):  escitalopram 10 milliGRAM(s) Oral once  escitalopram 10 milliGRAM(s) Oral daily  hydroxychloroquine 400 milliGRAM(s) Oral daily  insulin lispro (ADMELOG) corrective regimen sliding scale   SubCutaneous three times a day before meals  insulin lispro (ADMELOG) corrective regimen sliding scale   SubCutaneous at bedtime  mycophenolate mofetil 500 milliGRAM(s) Oral two times a day  valsartan 40 milliGRAM(s) Oral daily    MEDICATIONS  (PRN):  acetaminophen     Tablet .. 650 milliGRAM(s) Oral every 6 hours PRN Mild Pain (1 - 3), Moderate Pain (4 - 6), Severe Pain (7 - 10)  LORazepam     Tablet 1 milliGRAM(s) Oral every 6 hours PRN agitation due to severe anxiety  LORazepam   Injectable 2 milliGRAM(s) IntraMuscular Once PRN Menacing behavior   MEDICATIONS  (STANDING):    MEDICATIONS  (PRN):

## 2025-01-31 NOTE — BH INPATIENT PSYCHIATRY DISCHARGE NOTE - REASON FOR ADMISSION
The patient was brought into the ED for his safety by concerned EMS.  The patient was brought into the ED for his safety by concerned EMS for impulsive overdose on medications.

## 2025-01-31 NOTE — BH INPATIENT PSYCHIATRY PROGRESS NOTE - NSBHCHARTREVIEWVS_PSY_A_CORE FT
Vital Signs Last 24 Hrs  T(C): 36.6 (01-31-25 @ 08:45), Max: 36.6 (01-31-25 @ 08:45)  T(F): 97.8 (01-31-25 @ 08:45), Max: 97.8 (01-31-25 @ 08:45)  HR: --  BP: --  BP(mean): --  RR: --  SpO2: --    Orthostatic VS  01-31-25 @ 08:45  Lying BP: --/-- HR: --  Sitting BP: 141/85 HR: 99  Standing BP: 138/96 HR: 100  Site: --  Mode: --  Orthostatic VS  01-30-25 @ 07:49  Lying BP: --/-- HR: --  Sitting BP: 139/87 HR: 99  Standing BP: 142/90 HR: 108  Site: --  Mode: --   Vital Signs Last 24 Hrs  T(C): --  T(F): --  HR: --  BP: --  BP(mean): --  RR: --  SpO2: --    Orthostatic VS  01-31-25 @ 08:45  Lying BP: --/-- HR: --  Sitting BP: 141/85 HR: 99  Standing BP: 138/96 HR: 100  Site: --  Mode: --

## 2025-01-31 NOTE — BH INPATIENT PSYCHIATRY DISCHARGE NOTE - NSBHFUPINTERVALHXFT_PSY_A_CORE
No overnight events.  Pt states sleep and appetite have improved.  Pt has been socializing more on unit and has been attending unit activities.  Pt denies any thoughts of harming self or others.  Pt states after discharge he will follow up with [facility] and rheumatology, and hopes to return to work.

## 2025-01-31 NOTE — BH INPATIENT PSYCHIATRY PROGRESS NOTE - MSE UNSTRUCTURED FT
Appearance: Dressed appropriately.  Behavior: Cooperative. Calm.   Motor: No abnormal movements, no psychomotor slowing or activation.  Speech: Regular rate.  Mood:   Affect: ___, but pleasant.  Thought Process: Linear.  Associations: Fair.  Thought Content: Future oriented. Denies SIIP, HIIP.  Insight: Fair.  Judgment: Fair on interview.  Attention: Fair.  Language: Fluent.  Gait: Intact. Appearance: Dressed appropriately.  Behavior: Cooperative. Calm.   Motor: No abnormal movements, no psychomotor slowing or activation.  Speech: Regular rate.  Mood: "Alright."  Affect: Pleasant; mood-congruent.  Thought Process: Linear.  Associations: Fair.  Thought Content: Future oriented. Denies SIIP, HIIP.  Insight: Fair.  Judgment: Fair on interview.  Attention: Fair.  Language: Fluent.  Gait: Intact.

## 2025-01-31 NOTE — BH INPATIENT PSYCHIATRY DISCHARGE NOTE - NSBHDCMEDICALFT_PSY_A_CORE
There were no acute medical problems requiring any urgent medical intervention during this 2N admission.

## 2025-01-31 NOTE — BH INPATIENT PSYCHIATRY DISCHARGE NOTE - NSBHDCHANDOFFFT_PSY_ALL_CORE
Clinical handoff including hospital course, diagnosis, and treatment was sent to: [facility] [phone].

## 2025-01-31 NOTE — BH INPATIENT PSYCHIATRY PROGRESS NOTE - NSBHFUPINTERVALHXFT_PSY_A_CORE
Chart reviewed. Case discussed with treatment team. Patient seen and examined at bedside. No acute overnight events, no PRNs required/requested. He has been compliant with standing medications, except for his insulin Lispro because he is unfamiliar with receiving this medication. Per sleep log and pt interview, he slept over 6 hours last night, which is more sleep than what he has previously obtained in the unit. Pt reports that he has maintained his hygiene on a daily basis. Pt has been leaving his room frequently to interact with other patients, engaging in activities like playing chess with friends on the unit. He attended group therapy yesterday and noted that it was a positive experience for him. Pt called his mother this morning and is looking forward to seeing her in person after discharge; he feels safe at home living with his mother and father.     Pt endorses stably mild diffuse joint pains, attributed to his SLE, which he rates at a severity of __ today. Pt ate and enjoyed breakfast this morning and states that his appetite is normal. Denies suicidal ideation, intent, and/or plan.    Pt is currently taking pharmacotherapy to treat MDD vs. persistent depressive disorder, reporting nausea after one day of taking Lexapro 10mg PO daily. Pt was counseled again on the adverse effects of Lexapro. He was also counseled that it is important to adhere to his Lexapro regimen, as the therapeutic effect of the medication occurs after 4-6 weeks of consistent use. Pt also informed that if the adverse effects of Lexapro are intolerable, there are alternative medication options at his disposal.    Pt agreed to follow up outpt at [facility] after discharge. A safety plan was also discussed with the pt. He identified his main warning sign of developing acute depression/suicidality as isolation. He identified going to the gym regularly and attending his [facility] [employer] as coping strategies for managing his emotions. Pt feels comfortable turning to his mother to ask for help or in the event of an acute crisis. Pt overall feels prepared to face stressors in his life upon discharge.    Spoke to pt's mother on the phone, update provided, family meeting scheduled. Chart reviewed. Case discussed with treatment team. Patient seen and examined at bedside. No acute overnight events, no PRNs required/requested. He has been compliant with standing medications, except for his insulin Lispro because he is unfamiliar with receiving this medication. Per sleep log and pt interview, he slept over 6 hours last night, which is more sleep than what he has previously obtained in the unit. Pt reports that he has maintained his hygiene on a daily basis. Pt has been leaving his room frequently to interact with other patients, engaging in activities like playing chess with friends on the unit. He attended group therapy yesterday and noted that it was a positive experience for him. Pt is in contact with his mother and is looking forward to seeing her in person after discharge; he feels safe at home living with his mother and father.     Pt endorses improving diffuse joint pains, attributed to his SLE, which he rates at a severity of 1/10 today. Pt ate and enjoyed breakfast this morning and states that his appetite is normal. Denies suicidal ideation, intent, and/or plan.    Pt's nausea after beginning Lexapro 10mg PO daily has improved today. Pt was counseled again that it is important to adhere to his Lexapro regimen, as the therapeutic effect of the medication occurs after 4-6 weeks of consistent use.    Pt was informed regarding details of outpt follow-up after discharge. 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 Pt overall feels prepared to face stressors in his life upon discharge.    Family meeting with pt and his mother is scheduled for today in the afternoon. After family meeting, pt is scheduled for discharge.

## 2025-01-31 NOTE — BH INPATIENT PSYCHIATRY DISCHARGE NOTE - NSBHMETABOLIC_PSY_ALL_CORE_FT
BMI: BMI (kg/m2): 38.4 (01-26-25 @ 14:15)  HbA1c: A1C with Estimated Average Glucose Result: 8.7 % (01-27-25 @ 10:47)    Glucose: POCT Blood Glucose.: 168 mg/dL (01-31-25 @ 08:02)    BP: --Vital Signs Last 24 Hrs  T(C): 36.6 (01-31-25 @ 08:45), Max: 36.6 (01-31-25 @ 08:45)  T(F): 97.8 (01-31-25 @ 08:45), Max: 97.8 (01-31-25 @ 08:45)  HR: --  BP: --  BP(mean): --  RR: --  SpO2: --    Orthostatic VS  01-31-25 @ 08:45  Lying BP: --/-- HR: --  Sitting BP: 141/85 HR: 99  Standing BP: 138/96 HR: 100  Site: --  Mode: --  Orthostatic VS  01-30-25 @ 07:49  Lying BP: --/-- HR: --  Sitting BP: 139/87 HR: 99  Standing BP: 142/90 HR: 108  Site: --  Mode: --    Lipid Panel: Date/Time: 01-26-25 @ 10:29  Cholesterol, Serum: 278  LDL Cholesterol Calculated: 181  HDL Cholesterol, Serum: 49  Total Cholesterol/HDL Ration Measurement: --  Triglycerides, Serum: 250

## 2025-01-31 NOTE — BH INPATIENT PSYCHIATRY DISCHARGE NOTE - NSDCMRMEDTOKEN_GEN_ALL_CORE_FT
escitalopram 10 mg oral tablet: 1 tab(s) orally once a day  hydroxychloroquine 200 mg oral tablet: 2 tab(s) orally once a day  mycophenolate mofetil 250 mg oral capsule: 2 cap(s) orally 2 times a day  valsartan 40 mg oral tablet: 1 tab(s) orally once a day

## 2025-02-04 ENCOUNTER — OUTPATIENT (OUTPATIENT)
Dept: OUTPATIENT SERVICES | Facility: HOSPITAL | Age: 30
LOS: 1 days | Discharge: ANOTHER HOSPITAL | End: 2025-02-04
Payer: MEDICAID

## 2025-02-04 PROCEDURE — 90839 PSYTX CRISIS INITIAL 60 MIN: CPT

## 2025-02-08 NOTE — PATIENT PROFILE ADULT - DO YOU FEEL UNSAFE AT WORK?
66 y/o Fwith hx of DM and cirrhosis, was brought in by EMS for AMS at home. In the ED patient was obtunded, intubated for airway protection (2/2/25). Pt admitted to MICU with severe DKA, pancolitis, acute pancreatitis, UTI and pneumonia.
no

## 2025-02-10 DIAGNOSIS — F32.2 MAJOR DEPRESSIVE DISORDER, SINGLE EPISODE, SEVERE WITHOUT PSYCHOTIC FEATURES: ICD-10-CM

## 2025-02-11 ENCOUNTER — APPOINTMENT (OUTPATIENT)
Dept: RHEUMATOLOGY | Facility: CLINIC | Age: 30
End: 2025-02-11

## 2025-02-11 ENCOUNTER — TRANSCRIPTION ENCOUNTER (OUTPATIENT)
Age: 30
End: 2025-02-11

## 2025-02-11 DIAGNOSIS — Z79.899 OTHER LONG TERM (CURRENT) DRUG THERAPY: ICD-10-CM

## 2025-02-11 DIAGNOSIS — E55.9 VITAMIN D DEFICIENCY, UNSPECIFIED: ICD-10-CM

## 2025-02-11 DIAGNOSIS — M32.9 SYSTEMIC LUPUS ERYTHEMATOSUS, UNSPECIFIED: ICD-10-CM

## 2025-02-11 DIAGNOSIS — Z51.81 ENCOUNTER FOR THERAPEUTIC DRUG LVL MONITORING: ICD-10-CM

## 2025-02-20 ENCOUNTER — LABORATORY RESULT (OUTPATIENT)
Age: 30
End: 2025-02-20

## 2025-02-20 LAB
25(OH)D3 SERPL-MCNC: 10.3 NG/ML
ALBUMIN SERPL ELPH-MCNC: 3.7 G/DL
ALP BLD-CCNC: 86 U/L
ALT SERPL-CCNC: 28 U/L
ANION GAP SERPL CALC-SCNC: 15 MMOL/L
APPEARANCE: CLEAR
AST SERPL-CCNC: 16 U/L
BASOPHILS # BLD AUTO: 0.08 K/UL
BASOPHILS NFR BLD AUTO: 0.9 %
BILIRUB SERPL-MCNC: 0.7 MG/DL
BILIRUBIN URINE: NEGATIVE
BLOOD URINE: ABNORMAL
BUN SERPL-MCNC: 13 MG/DL
CALCIUM SERPL-MCNC: 9.2 MG/DL
CHLORIDE SERPL-SCNC: 103 MMOL/L
CK SERPL-CCNC: 98 U/L
CO2 SERPL-SCNC: 22 MMOL/L
COLOR: YELLOW
CREAT SERPL-MCNC: 0.86 MG/DL
CREAT SPEC-SCNC: 203 MG/DL
CREAT/PROT UR: 3.9 RATIO
CRP SERPL-MCNC: 10 MG/L
EGFR: 119 ML/MIN/1.73M2
EOSINOPHIL # BLD AUTO: 0.28 K/UL
EOSINOPHIL NFR BLD AUTO: 3.2 %
ERYTHROCYTE [SEDIMENTATION RATE] IN BLOOD BY WESTERGREN METHOD: 66 MM/HR
GLUCOSE QUALITATIVE U: 100 MG/DL
HCT VFR BLD CALC: 46.9 %
HGB BLD-MCNC: 15.8 G/DL
IMM GRANULOCYTES NFR BLD AUTO: 0.6 %
KETONES URINE: NEGATIVE MG/DL
LEUKOCYTE ESTERASE URINE: NEGATIVE
LYMPHOCYTES # BLD AUTO: 2.45 K/UL
LYMPHOCYTES NFR BLD AUTO: 28.3 %
MAN DIFF?: NORMAL
MCHC RBC-ENTMCNC: 26.5 PG
MCHC RBC-ENTMCNC: 33.7 G/DL
MCV RBC AUTO: 78.7 FL
MONOCYTES # BLD AUTO: 0.62 K/UL
MONOCYTES NFR BLD AUTO: 7.2 %
NEUTROPHILS # BLD AUTO: 5.18 K/UL
NEUTROPHILS NFR BLD AUTO: 59.8 %
NITRITE URINE: NEGATIVE
PH URINE: 5.5
PLATELET # BLD AUTO: 310 K/UL
POTASSIUM SERPL-SCNC: 4.6 MMOL/L
PROT SERPL-MCNC: 6.5 G/DL
PROT UR-MCNC: 789 MG/DL
PROTEIN URINE: 300 MG/DL
RBC # BLD: 5.96 M/UL
RBC # FLD: 13.7 %
SODIUM SERPL-SCNC: 140 MMOL/L
SPECIFIC GRAVITY URINE: 1.03
UROBILINOGEN URINE: 0.2 MG/DL
WBC # FLD AUTO: 8.66 K/UL

## 2025-02-21 LAB
C3 SERPL-MCNC: 225 MG/DL
C4 SERPL-MCNC: 49 MG/DL
CHROMATIN AB SERPL-ACNC: 0.3 AL
DSDNA AB SER-ACNC: 2 IU/ML
RIBOSOMAL P AB SER IA-ACNC: <0.2 AL

## 2025-02-26 ENCOUNTER — NON-APPOINTMENT (OUTPATIENT)
Age: 30
End: 2025-02-26

## 2025-02-26 ENCOUNTER — APPOINTMENT (OUTPATIENT)
Dept: RHEUMATOLOGY | Facility: CLINIC | Age: 30
End: 2025-02-26

## 2025-02-26 VITALS
BODY MASS INDEX: 37.89 KG/M2 | WEIGHT: 250 LBS | HEIGHT: 68 IN | SYSTOLIC BLOOD PRESSURE: 131 MMHG | OXYGEN SATURATION: 100 % | DIASTOLIC BLOOD PRESSURE: 92 MMHG | HEART RATE: 97 BPM

## 2025-02-26 DIAGNOSIS — F32.2 MAJOR DEPRESSIVE DISORDER, SINGLE EPISODE, SEVERE W/OUT PSYCHOTIC FEATURES: ICD-10-CM

## 2025-02-26 DIAGNOSIS — Z71.9 COUNSELING, UNSPECIFIED: ICD-10-CM

## 2025-02-26 DIAGNOSIS — M32.9 SYSTEMIC LUPUS ERYTHEMATOSUS, UNSPECIFIED: ICD-10-CM

## 2025-02-26 DIAGNOSIS — Z51.81 ENCOUNTER FOR THERAPEUTIC DRUG LVL MONITORING: ICD-10-CM

## 2025-02-26 DIAGNOSIS — M32.14 GLOMERULAR DISEASE IN SYSTEMIC LUPUS ERYTHEMATOSUS: ICD-10-CM

## 2025-02-26 DIAGNOSIS — R80.9 PROTEINURIA, UNSPECIFIED: ICD-10-CM

## 2025-02-26 DIAGNOSIS — Z79.899 OTHER LONG TERM (CURRENT) DRUG THERAPY: ICD-10-CM

## 2025-02-26 DIAGNOSIS — Z91.199 PATIENT'S NONCOMPLIANCE WITH OTHER MEDICAL TREATMENT AND REGIMEN DUE TO UNSPECIFIED REASON: ICD-10-CM

## 2025-02-26 PROCEDURE — 99215 OFFICE O/P EST HI 40 MIN: CPT

## 2025-02-26 PROCEDURE — G2211 COMPLEX E/M VISIT ADD ON: CPT | Mod: NC

## 2025-02-26 RX ORDER — ESCITALOPRAM OXALATE 5 MG/1
TABLET, FILM COATED ORAL
Refills: 0 | Status: ACTIVE | COMMUNITY

## 2025-03-05 PROBLEM — F32.2 CURRENT SEVERE EPISODE OF MAJOR DEPRESSIVE DISORDER WITHOUT PSYCHOTIC FEATURES WITHOUT PRIOR EPISODE: Status: ACTIVE | Noted: 2025-03-05

## 2025-03-05 PROBLEM — Z91.199 NON-ADHERENCE TO MEDICAL TREATMENT: Status: ACTIVE | Noted: 2022-03-29

## 2025-03-05 PROBLEM — M32.9 ACUTE SYSTEMIC LUPUS ERYTHEMATOSUS: Status: RESOLVED | Noted: 2024-05-04 | Resolved: 2025-03-05

## 2025-03-05 RX ORDER — HYDROCORTISONE SODIUM SUCCINATE 100 MG/2ML
100 INJECTION INTRAMUSCULAR; INTRAVENOUS
Refills: 0 | Status: ACTIVE | OUTPATIENT
Start: 2025-03-05

## 2025-03-05 RX ORDER — CETIRIZINE HYDROCHLORIDE 10 MG/1
10 TABLET, FILM COATED ORAL
Refills: 0 | Status: ACTIVE | OUTPATIENT
Start: 2025-03-05

## 2025-03-05 RX ORDER — ACETAMINOPHEN 325 MG/1
325 TABLET ORAL
Refills: 0 | Status: ACTIVE | OUTPATIENT
Start: 2025-03-05

## 2025-03-05 RX ORDER — BELIMUMAB 400 MG/5ML
400 INJECTION, POWDER, LYOPHILIZED, FOR SOLUTION INTRAVENOUS
Refills: 0 | Status: ACTIVE | OUTPATIENT
Start: 2025-03-05

## 2025-03-05 RX ADMIN — CETIRIZINE HYDROCHLORIDE 0 MG: 10 TABLET, FILM COATED ORAL at 00:00

## 2025-03-05 RX ADMIN — HYDROCORTISONE SODIUM SUCCINATE 1 MG: 100 INJECTION, POWDER, FOR SOLUTION INTRAMUSCULAR; INTRAVENOUS at 00:00

## 2025-03-05 RX ADMIN — BELIMUMAB 0 MG: 400 INJECTION, POWDER, LYOPHILIZED, FOR SOLUTION INTRAVENOUS at 00:00

## 2025-03-05 RX ADMIN — ACETAMINOPHEN 2 MG: 325 TABLET ORAL at 00:00

## 2025-03-11 ENCOUNTER — APPOINTMENT (OUTPATIENT)
Dept: RHEUMATOLOGY | Facility: CLINIC | Age: 30
End: 2025-03-11

## 2025-03-13 ENCOUNTER — APPOINTMENT (OUTPATIENT)
Dept: RHEUMATOLOGY | Facility: CLINIC | Age: 30
End: 2025-03-13
Payer: MEDICAID

## 2025-03-13 DIAGNOSIS — Z79.899 OTHER LONG TERM (CURRENT) DRUG THERAPY: ICD-10-CM

## 2025-03-13 DIAGNOSIS — Z91.199 PATIENT'S NONCOMPLIANCE WITH OTHER MEDICAL TREATMENT AND REGIMEN DUE TO UNSPECIFIED REASON: ICD-10-CM

## 2025-03-13 DIAGNOSIS — F32.A DEPRESSION, UNSPECIFIED: ICD-10-CM

## 2025-03-13 DIAGNOSIS — Z51.81 ENCOUNTER FOR THERAPEUTIC DRUG LVL MONITORING: ICD-10-CM

## 2025-03-13 DIAGNOSIS — R80.9 PROTEINURIA, UNSPECIFIED: ICD-10-CM

## 2025-03-13 DIAGNOSIS — M32.14 GLOMERULAR DISEASE IN SYSTEMIC LUPUS ERYTHEMATOSUS: ICD-10-CM

## 2025-03-13 DIAGNOSIS — M32.9 SYSTEMIC LUPUS ERYTHEMATOSUS, UNSPECIFIED: ICD-10-CM

## 2025-03-13 DIAGNOSIS — E55.9 VITAMIN D DEFICIENCY, UNSPECIFIED: ICD-10-CM

## 2025-03-13 DIAGNOSIS — R42 DIZZINESS AND GIDDINESS: ICD-10-CM

## 2025-03-13 PROCEDURE — G2211 COMPLEX E/M VISIT ADD ON: CPT | Mod: NC,95

## 2025-03-13 PROCEDURE — 99215 OFFICE O/P EST HI 40 MIN: CPT | Mod: 95

## 2025-04-16 NOTE — BH DISCHARGE NOTE NURSING/SOCIAL WORK/PSYCH REHAB - NSSCCARECORDBH_PSY_ALL_CORE
Other Discontinue Regimen: DAPSONE 7.5% QAM\\n\\nAvoid AVAR/CLINDAMYCIN (NO IMPROVEMENT)\\n\\n(DID D/C OCP DUE TO MIGRAINES) Continue Regimen: RA 0.025% CREAM QOHS\\n\\nSPIRO 100MG QAM Initiate Treatment: METROCREAM DIANE Detail Level: Zone Render In Strict Bullet Format?: No Plan: WOULD LIKE TO AVOID ORAL ABX DUE TO GI PROBLEMS.\\n\\nWOULD LIKE TO AVOID ACCUTANE DUE TO S/E PROFILE.\\n\\nDISCUSSED OPTIONS INCLUDING OB/GYN W/UP VS ELECTIVE LIGHT TX WITH ANGELINE (INFORMATION PROVIDED).

## 2025-04-23 NOTE — BH SOCIAL WORK INITIAL PSYCHOSOCIAL EVALUATION - MONEY FOR FOOD
Ambulatory Care Coordination Note     2025 4:00 PM     Patient Current Location:  Home: [address] [place] [id number]     ACM contacted the patient by telephone. Verified name and  with patient as identifiers.         ACM: Glory Crowe RN     Challenges to be reviewed by the provider   Additional needs identified to be addressed with provider No  none               Method of communication with provider: none.    Utilization: Patient has not had any utilization since our last call.     Care Summary Note:     Charisse saw Dr. Menon on  via virtual visit. She is very pleased with Dr. Menon.  POC discussed with cardiologist about Watchman procedure instead of Eliquis.  Also patient requested a stair lift DME script to navigate the stairs in her daughter's home where she is relocating to in the near future.  Script sent per Dr. Menon.  Patient does not want to go forward with any new Parkinsons medications yet. Plan is to discuss with Dr. Campbell about the possibility of the Watchman and follow through with any appointments    Charisse is currently showing her house to sell so she will be able to move in with her daughter. She reports many showings but nothing definite as of yet.     See assessment for HTN, DM  Denies further questions/needs  Appointments reviewed      Offered patient enrollment in the Remote Patient Monitoring (RPM) program for in-home monitoring: Yes, but did not enroll at this time: declined to enroll in the program becausepatient continues to decline .     Assessments Completed:   Diabetes Assessment      Meal Planning: Avoidance of concentrated sweets   How often do you test your blood sugar?: No Testing   Do you have barriers with adherence to non-pharmacologic self-management interventions? (Nutrition/Exercise/Self-Monitoring): No   Have you ever had to go to the ED for symptoms of low blood sugar?: No       Do you have hyperglycemia symptoms?: No   Do you have hypoglycemia 
no

## 2025-05-05 ENCOUNTER — APPOINTMENT (OUTPATIENT)
Dept: RHEUMATOLOGY | Facility: CLINIC | Age: 30
End: 2025-05-05

## 2025-05-05 DIAGNOSIS — M32.9 SYSTEMIC LUPUS ERYTHEMATOSUS, UNSPECIFIED: ICD-10-CM

## 2025-05-05 DIAGNOSIS — M32.14 GLOMERULAR DISEASE IN SYSTEMIC LUPUS ERYTHEMATOSUS: ICD-10-CM

## 2025-05-05 DIAGNOSIS — R80.9 PROTEINURIA, UNSPECIFIED: ICD-10-CM

## 2025-05-05 DIAGNOSIS — Z79.899 OTHER LONG TERM (CURRENT) DRUG THERAPY: ICD-10-CM

## 2025-05-05 DIAGNOSIS — Z51.81 ENCOUNTER FOR THERAPEUTIC DRUG LVL MONITORING: ICD-10-CM

## 2025-05-05 DIAGNOSIS — E11.69 TYPE 2 DIABETES MELLITUS WITH OTHER SPECIFIED COMPLICATION: ICD-10-CM

## 2025-05-05 DIAGNOSIS — E55.9 VITAMIN D DEFICIENCY, UNSPECIFIED: ICD-10-CM

## 2025-06-14 ENCOUNTER — NON-APPOINTMENT (OUTPATIENT)
Age: 30
End: 2025-06-14

## 2025-07-08 PROBLEM — S93.491A SPRAIN OF OTHER LIGAMENT OF RIGHT ANKLE, INITIAL ENCOUNTER: Status: ACTIVE | Noted: 2025-07-08

## 2025-07-09 ENCOUNTER — APPOINTMENT (OUTPATIENT)
Dept: ORTHOPEDIC SURGERY | Facility: CLINIC | Age: 30
End: 2025-07-09

## 2025-09-03 ENCOUNTER — INPATIENT (INPATIENT)
Facility: HOSPITAL | Age: 30
LOS: 1 days | Discharge: HOME | DRG: 313 | End: 2025-09-05
Attending: STUDENT IN AN ORGANIZED HEALTH CARE EDUCATION/TRAINING PROGRAM | Admitting: STUDENT IN AN ORGANIZED HEALTH CARE EDUCATION/TRAINING PROGRAM
Payer: MEDICAID

## 2025-09-03 ENCOUNTER — TRANSCRIPTION ENCOUNTER (OUTPATIENT)
Age: 30
End: 2025-09-03

## 2025-09-03 VITALS
WEIGHT: 250 LBS | SYSTOLIC BLOOD PRESSURE: 142 MMHG | TEMPERATURE: 98 F | HEART RATE: 106 BPM | OXYGEN SATURATION: 94 % | DIASTOLIC BLOOD PRESSURE: 85 MMHG | RESPIRATION RATE: 20 BRPM | HEIGHT: 68 IN

## 2025-09-03 DIAGNOSIS — R07.9 CHEST PAIN, UNSPECIFIED: ICD-10-CM

## 2025-09-03 DIAGNOSIS — E11.9 TYPE 2 DIABETES MELLITUS WITHOUT COMPLICATIONS: ICD-10-CM

## 2025-09-03 DIAGNOSIS — Z29.9 ENCOUNTER FOR PROPHYLACTIC MEASURES, UNSPECIFIED: ICD-10-CM

## 2025-09-03 DIAGNOSIS — R05.9 COUGH, UNSPECIFIED: ICD-10-CM

## 2025-09-03 DIAGNOSIS — M32.9 SYSTEMIC LUPUS ERYTHEMATOSUS, UNSPECIFIED: ICD-10-CM

## 2025-09-03 DIAGNOSIS — I25.10 ATHEROSCLEROTIC HEART DISEASE OF NATIVE CORONARY ARTERY WITHOUT ANGINA PECTORIS: ICD-10-CM

## 2025-09-03 DIAGNOSIS — I31.39 OTHER PERICARDIAL EFFUSION (NONINFLAMMATORY): ICD-10-CM

## 2025-09-03 LAB
ADD ON TEST-SPECIMEN IN LAB: SIGNIFICANT CHANGE UP
ALBUMIN SERPL ELPH-MCNC: 3.2 G/DL — LOW (ref 3.3–5)
ALP SERPL-CCNC: 95 U/L — SIGNIFICANT CHANGE UP (ref 40–120)
ALT FLD-CCNC: 37 U/L — SIGNIFICANT CHANGE UP (ref 10–45)
ANION GAP SERPL CALC-SCNC: 14 MMOL/L — SIGNIFICANT CHANGE UP (ref 5–17)
APPEARANCE UR: CLEAR — SIGNIFICANT CHANGE UP
AST SERPL-CCNC: 45 U/L — HIGH (ref 10–40)
BACTERIA # UR AUTO: NEGATIVE /HPF — SIGNIFICANT CHANGE UP
BASOPHILS # BLD AUTO: 0.08 K/UL — SIGNIFICANT CHANGE UP (ref 0–0.2)
BASOPHILS NFR BLD AUTO: 0.8 % — SIGNIFICANT CHANGE UP (ref 0–2)
BILIRUB SERPL-MCNC: 0.4 MG/DL — SIGNIFICANT CHANGE UP (ref 0.2–1.2)
BILIRUB UR-MCNC: NEGATIVE — SIGNIFICANT CHANGE UP
BUN SERPL-MCNC: 13 MG/DL — SIGNIFICANT CHANGE UP (ref 7–23)
CALCIUM SERPL-MCNC: 8.9 MG/DL — SIGNIFICANT CHANGE UP (ref 8.4–10.5)
CAST: 7 /LPF — HIGH (ref 0–4)
CHLORIDE SERPL-SCNC: 100 MMOL/L — SIGNIFICANT CHANGE UP (ref 96–108)
CO2 SERPL-SCNC: 18 MMOL/L — LOW (ref 22–31)
COLOR SPEC: YELLOW — SIGNIFICANT CHANGE UP
CREAT SERPL-MCNC: 0.74 MG/DL — SIGNIFICANT CHANGE UP (ref 0.5–1.3)
CRP SERPL-MCNC: 8 MG/L — HIGH (ref 0–4)
D DIMER BLD IA.RAPID-MCNC: <150 NG/ML DDU — SIGNIFICANT CHANGE UP
DIFF PNL FLD: ABNORMAL
EGFR: 123 ML/MIN/1.73M2 — SIGNIFICANT CHANGE UP
EGFR: 123 ML/MIN/1.73M2 — SIGNIFICANT CHANGE UP
EOSINOPHIL # BLD AUTO: 0.16 K/UL — SIGNIFICANT CHANGE UP (ref 0–0.5)
EOSINOPHIL NFR BLD AUTO: 1.6 % — SIGNIFICANT CHANGE UP (ref 0–6)
FLUAV AG NPH QL: SIGNIFICANT CHANGE UP
FLUBV AG NPH QL: SIGNIFICANT CHANGE UP
GAS PNL BLDV: SIGNIFICANT CHANGE UP
GAS PNL BLDV: SIGNIFICANT CHANGE UP
GLUCOSE BLDC GLUCOMTR-MCNC: 171 MG/DL — HIGH (ref 70–99)
GLUCOSE SERPL-MCNC: 289 MG/DL — HIGH (ref 70–99)
GLUCOSE UR QL: >=1000 MG/DL
HCT VFR BLD CALC: 47.2 % — SIGNIFICANT CHANGE UP (ref 39–50)
HGB BLD-MCNC: 15.7 G/DL — SIGNIFICANT CHANGE UP (ref 13–17)
IMM GRANULOCYTES # BLD AUTO: 0.16 K/UL — HIGH (ref 0–0.07)
IMM GRANULOCYTES NFR BLD AUTO: 1.6 % — HIGH (ref 0–0.9)
KETONES UR QL: ABNORMAL MG/DL
LEUKOCYTE ESTERASE UR-ACNC: NEGATIVE — SIGNIFICANT CHANGE UP
LIDOCAIN IGE QN: 41 U/L — SIGNIFICANT CHANGE UP (ref 7–60)
LYMPHOCYTES # BLD AUTO: 3.25 K/UL — SIGNIFICANT CHANGE UP (ref 1–3.3)
LYMPHOCYTES NFR BLD AUTO: 31.7 % — SIGNIFICANT CHANGE UP (ref 13–44)
MAGNESIUM SERPL-MCNC: 1.8 MG/DL — SIGNIFICANT CHANGE UP (ref 1.6–2.6)
MCHC RBC-ENTMCNC: 26.2 PG — LOW (ref 27–34)
MCHC RBC-ENTMCNC: 33.3 G/DL — SIGNIFICANT CHANGE UP (ref 32–36)
MCV RBC AUTO: 78.7 FL — LOW (ref 80–100)
MONOCYTES # BLD AUTO: 0.63 K/UL — SIGNIFICANT CHANGE UP (ref 0–0.9)
MONOCYTES NFR BLD AUTO: 6.1 % — SIGNIFICANT CHANGE UP (ref 2–14)
NEUTROPHILS # BLD AUTO: 5.97 K/UL — SIGNIFICANT CHANGE UP (ref 1.8–7.4)
NEUTROPHILS NFR BLD AUTO: 58.2 % — SIGNIFICANT CHANGE UP (ref 43–77)
NITRITE UR-MCNC: NEGATIVE — SIGNIFICANT CHANGE UP
NRBC # BLD AUTO: 0 K/UL — SIGNIFICANT CHANGE UP (ref 0–0)
NRBC # FLD: 0 K/UL — SIGNIFICANT CHANGE UP (ref 0–0)
NRBC BLD AUTO-RTO: 0 /100 WBCS — SIGNIFICANT CHANGE UP (ref 0–0)
NT-PROBNP SERPL-SCNC: 145 PG/ML — SIGNIFICANT CHANGE UP (ref 0–300)
PH UR: 5.5 — SIGNIFICANT CHANGE UP (ref 5–8)
PLATELET # BLD AUTO: 269 K/UL — SIGNIFICANT CHANGE UP (ref 150–400)
PMV BLD: 11 FL — SIGNIFICANT CHANGE UP (ref 7–13)
POTASSIUM SERPL-MCNC: 5.3 MMOL/L — SIGNIFICANT CHANGE UP (ref 3.5–5.3)
POTASSIUM SERPL-SCNC: 5.3 MMOL/L — SIGNIFICANT CHANGE UP (ref 3.5–5.3)
PROT SERPL-MCNC: 7 G/DL — SIGNIFICANT CHANGE UP (ref 6–8.3)
PROT UR-MCNC: 300 MG/DL
RBC # BLD: 6 M/UL — HIGH (ref 4.2–5.8)
RBC # FLD: 13.2 % — SIGNIFICANT CHANGE UP (ref 10.3–14.5)
RBC CASTS # UR COMP ASSIST: 7 /HPF — HIGH (ref 0–4)
REVIEW: SIGNIFICANT CHANGE UP
RSV RNA NPH QL NAA+NON-PROBE: SIGNIFICANT CHANGE UP
SARS-COV-2 RNA SPEC QL NAA+PROBE: SIGNIFICANT CHANGE UP
SODIUM SERPL-SCNC: 132 MMOL/L — LOW (ref 135–145)
SOURCE RESPIRATORY: SIGNIFICANT CHANGE UP
SP GR SPEC: 1.02 — SIGNIFICANT CHANGE UP (ref 1–1.03)
SQUAMOUS # UR AUTO: 3 /HPF — SIGNIFICANT CHANGE UP (ref 0–5)
TROPONIN T, HIGH SENSITIVITY RESULT: 15 NG/L — SIGNIFICANT CHANGE UP
TROPONIN T, HIGH SENSITIVITY RESULT: 16 NG/L — SIGNIFICANT CHANGE UP
TROPONIN T, HIGH SENSITIVITY RESULT: 18 NG/L — SIGNIFICANT CHANGE UP
UROBILINOGEN FLD QL: 0.2 MG/DL — SIGNIFICANT CHANGE UP (ref 0.2–1)
WBC # BLD: 10.25 K/UL — SIGNIFICANT CHANGE UP (ref 3.8–10.5)
WBC # FLD AUTO: 10.25 K/UL — SIGNIFICANT CHANGE UP (ref 3.8–10.5)
WBC UR QL: 2 /HPF — SIGNIFICANT CHANGE UP (ref 0–5)

## 2025-09-03 PROCEDURE — 83735 ASSAY OF MAGNESIUM: CPT

## 2025-09-03 PROCEDURE — 83880 ASSAY OF NATRIURETIC PEPTIDE: CPT

## 2025-09-03 PROCEDURE — 85014 HEMATOCRIT: CPT

## 2025-09-03 PROCEDURE — 83605 ASSAY OF LACTIC ACID: CPT

## 2025-09-03 PROCEDURE — 71046 X-RAY EXAM CHEST 2 VIEWS: CPT

## 2025-09-03 PROCEDURE — 82010 KETONE BODYS QUAN: CPT

## 2025-09-03 PROCEDURE — 80053 COMPREHEN METABOLIC PANEL: CPT

## 2025-09-03 PROCEDURE — 93010 ELECTROCARDIOGRAM REPORT: CPT

## 2025-09-03 PROCEDURE — 94640 AIRWAY INHALATION TREATMENT: CPT

## 2025-09-03 PROCEDURE — 82803 BLOOD GASES ANY COMBINATION: CPT

## 2025-09-03 PROCEDURE — 85025 COMPLETE CBC W/AUTO DIFF WBC: CPT

## 2025-09-03 PROCEDURE — 82330 ASSAY OF CALCIUM: CPT

## 2025-09-03 PROCEDURE — 99285 EMERGENCY DEPT VISIT HI MDM: CPT

## 2025-09-03 PROCEDURE — 99223 1ST HOSP IP/OBS HIGH 75: CPT

## 2025-09-03 PROCEDURE — 93308 TTE F-UP OR LMTD: CPT | Mod: 26

## 2025-09-03 PROCEDURE — 82962 GLUCOSE BLOOD TEST: CPT

## 2025-09-03 PROCEDURE — 84295 ASSAY OF SERUM SODIUM: CPT

## 2025-09-03 PROCEDURE — 84484 ASSAY OF TROPONIN QUANT: CPT

## 2025-09-03 PROCEDURE — 83690 ASSAY OF LIPASE: CPT

## 2025-09-03 PROCEDURE — 76604 US EXAM CHEST: CPT | Mod: 26

## 2025-09-03 PROCEDURE — 82947 ASSAY GLUCOSE BLOOD QUANT: CPT

## 2025-09-03 PROCEDURE — 76604 US EXAM CHEST: CPT

## 2025-09-03 PROCEDURE — 87637 SARSCOV2&INF A&B&RSV AMP PRB: CPT

## 2025-09-03 PROCEDURE — 81001 URINALYSIS AUTO W/SCOPE: CPT

## 2025-09-03 PROCEDURE — 93308 TTE F-UP OR LMTD: CPT

## 2025-09-03 PROCEDURE — 71046 X-RAY EXAM CHEST 2 VIEWS: CPT | Mod: 26

## 2025-09-03 PROCEDURE — 82435 ASSAY OF BLOOD CHLORIDE: CPT

## 2025-09-03 PROCEDURE — 85018 HEMOGLOBIN: CPT

## 2025-09-03 PROCEDURE — 87086 URINE CULTURE/COLONY COUNT: CPT

## 2025-09-03 PROCEDURE — 84132 ASSAY OF SERUM POTASSIUM: CPT

## 2025-09-03 RX ORDER — SODIUM CHLORIDE 9 G/1000ML
1000 INJECTION, SOLUTION INTRAVENOUS
Refills: 0 | Status: DISCONTINUED | OUTPATIENT
Start: 2025-09-03 | End: 2025-09-05

## 2025-09-03 RX ORDER — MYCOPHENOLATE MOFETIL 500 MG/1
500 TABLET, FILM COATED ORAL
Refills: 0 | Status: DISCONTINUED | OUTPATIENT
Start: 2025-09-03 | End: 2025-09-04

## 2025-09-03 RX ORDER — INSULIN LISPRO 100 U/ML
INJECTION, SOLUTION INTRAVENOUS; SUBCUTANEOUS
Refills: 0 | Status: DISCONTINUED | OUTPATIENT
Start: 2025-09-03 | End: 2025-09-05

## 2025-09-03 RX ORDER — GLUCAGON 3 MG/1
1 POWDER NASAL ONCE
Refills: 0 | Status: DISCONTINUED | OUTPATIENT
Start: 2025-09-03 | End: 2025-09-05

## 2025-09-03 RX ORDER — ONDANSETRON HCL/PF 4 MG/2 ML
4 VIAL (ML) INJECTION EVERY 8 HOURS
Refills: 0 | Status: DISCONTINUED | OUTPATIENT
Start: 2025-09-03 | End: 2025-09-05

## 2025-09-03 RX ORDER — HYDROXYCHLOROQUINE SULFATE 200 MG/1
200 TABLET, FILM COATED ORAL DAILY
Refills: 0 | Status: DISCONTINUED | OUTPATIENT
Start: 2025-09-03 | End: 2025-09-03

## 2025-09-03 RX ORDER — ACETAMINOPHEN 500 MG/5ML
650 LIQUID (ML) ORAL EVERY 6 HOURS
Refills: 0 | Status: DISCONTINUED | OUTPATIENT
Start: 2025-09-03 | End: 2025-09-05

## 2025-09-03 RX ORDER — ASPIRIN 325 MG
81 TABLET ORAL DAILY
Refills: 0 | Status: DISCONTINUED | OUTPATIENT
Start: 2025-09-03 | End: 2025-09-05

## 2025-09-03 RX ORDER — INSULIN LISPRO 100 U/ML
INJECTION, SOLUTION INTRAVENOUS; SUBCUTANEOUS AT BEDTIME
Refills: 0 | Status: DISCONTINUED | OUTPATIENT
Start: 2025-09-03 | End: 2025-09-05

## 2025-09-03 RX ORDER — HYDROXYCHLOROQUINE SULFATE 200 MG/1
400 TABLET, FILM COATED ORAL DAILY
Refills: 0 | Status: DISCONTINUED | OUTPATIENT
Start: 2025-09-03 | End: 2025-09-05

## 2025-09-03 RX ORDER — DEXTROMETHORPHAN HBR, GUAIFENESIN 200 MG/10ML
200 LIQUID ORAL EVERY 6 HOURS
Refills: 0 | Status: DISCONTINUED | OUTPATIENT
Start: 2025-09-03 | End: 2025-09-05

## 2025-09-03 RX ORDER — DEXTROSE 50 % IN WATER 50 %
15 SYRINGE (ML) INTRAVENOUS ONCE
Refills: 0 | Status: DISCONTINUED | OUTPATIENT
Start: 2025-09-03 | End: 2025-09-05

## 2025-09-03 RX ORDER — MELATONIN 5 MG
3 TABLET ORAL AT BEDTIME
Refills: 0 | Status: DISCONTINUED | OUTPATIENT
Start: 2025-09-03 | End: 2025-09-05

## 2025-09-03 RX ORDER — DEXTROSE 50 % IN WATER 50 %
25 SYRINGE (ML) INTRAVENOUS ONCE
Refills: 0 | Status: DISCONTINUED | OUTPATIENT
Start: 2025-09-03 | End: 2025-09-05

## 2025-09-03 RX ORDER — MAGNESIUM, ALUMINUM HYDROXIDE 200-200 MG
30 TABLET,CHEWABLE ORAL EVERY 4 HOURS
Refills: 0 | Status: DISCONTINUED | OUTPATIENT
Start: 2025-09-03 | End: 2025-09-05

## 2025-09-03 RX ORDER — IPRATROPIUM BROMIDE AND ALBUTEROL SULFATE .5; 2.5 MG/3ML; MG/3ML
3 SOLUTION RESPIRATORY (INHALATION) ONCE
Refills: 0 | Status: COMPLETED | OUTPATIENT
Start: 2025-09-03 | End: 2025-09-03

## 2025-09-03 RX ORDER — DEXTROSE 50 % IN WATER 50 %
12.5 SYRINGE (ML) INTRAVENOUS ONCE
Refills: 0 | Status: DISCONTINUED | OUTPATIENT
Start: 2025-09-03 | End: 2025-09-05

## 2025-09-03 RX ORDER — SODIUM CHLORIDE 9 G/1000ML
1000 INJECTION, SOLUTION INTRAVENOUS ONCE
Refills: 0 | Status: COMPLETED | OUTPATIENT
Start: 2025-09-03 | End: 2025-09-03

## 2025-09-03 RX ORDER — ENOXAPARIN SODIUM 100 MG/ML
40 INJECTION SUBCUTANEOUS EVERY 12 HOURS
Refills: 0 | Status: DISCONTINUED | OUTPATIENT
Start: 2025-09-03 | End: 2025-09-05

## 2025-09-03 RX ORDER — BENZONATATE 100 MG
100 CAPSULE ORAL ONCE
Refills: 0 | Status: COMPLETED | OUTPATIENT
Start: 2025-09-03 | End: 2025-09-03

## 2025-09-03 RX ADMIN — SODIUM CHLORIDE 1000 MILLILITER(S): 9 INJECTION, SOLUTION INTRAVENOUS at 14:27

## 2025-09-03 RX ADMIN — IPRATROPIUM BROMIDE AND ALBUTEROL SULFATE 3 MILLILITER(S): .5; 2.5 SOLUTION RESPIRATORY (INHALATION) at 13:55

## 2025-09-03 RX ADMIN — MYCOPHENOLATE MOFETIL 500 MILLIGRAM(S): 500 TABLET, FILM COATED ORAL at 23:00

## 2025-09-03 RX ADMIN — Medication 100 MILLIGRAM(S): at 17:36

## 2025-09-03 RX ADMIN — HYDROXYCHLOROQUINE SULFATE 400 MILLIGRAM(S): 200 TABLET, FILM COATED ORAL at 23:00

## 2025-09-03 RX ADMIN — Medication 650 MILLIGRAM(S): at 23:00

## 2025-09-04 ENCOUNTER — RESULT REVIEW (OUTPATIENT)
Age: 30
End: 2025-09-04

## 2025-09-04 DIAGNOSIS — E11.65 TYPE 2 DIABETES MELLITUS WITH HYPERGLYCEMIA: ICD-10-CM

## 2025-09-04 DIAGNOSIS — E78.5 HYPERLIPIDEMIA, UNSPECIFIED: ICD-10-CM

## 2025-09-04 DIAGNOSIS — I10 ESSENTIAL (PRIMARY) HYPERTENSION: ICD-10-CM

## 2025-09-04 LAB
A1C WITH ESTIMATED AVERAGE GLUCOSE RESULT: 10.8 % — HIGH (ref 4–5.6)
ALBUMIN SERPL ELPH-MCNC: 2.9 G/DL — LOW (ref 3.3–5)
ALP SERPL-CCNC: 77 U/L — SIGNIFICANT CHANGE UP (ref 40–120)
ALT FLD-CCNC: 29 U/L — SIGNIFICANT CHANGE UP (ref 10–45)
ANION GAP SERPL CALC-SCNC: 13 MMOL/L — SIGNIFICANT CHANGE UP (ref 5–17)
AST SERPL-CCNC: 18 U/L — SIGNIFICANT CHANGE UP (ref 10–40)
BILIRUB SERPL-MCNC: 0.8 MG/DL — SIGNIFICANT CHANGE UP (ref 0.2–1.2)
BUN SERPL-MCNC: 14 MG/DL — SIGNIFICANT CHANGE UP (ref 7–23)
C3 SERPL-MCNC: 153 MG/DL — SIGNIFICANT CHANGE UP (ref 81–157)
C4 SERPL-MCNC: 35 MG/DL — SIGNIFICANT CHANGE UP (ref 13–39)
CALCIUM SERPL-MCNC: 8.2 MG/DL — LOW (ref 8.4–10.5)
CHLORIDE SERPL-SCNC: 100 MMOL/L — SIGNIFICANT CHANGE UP (ref 96–108)
CHOLEST SERPL-MCNC: 251 MG/DL — HIGH
CO2 SERPL-SCNC: 21 MMOL/L — LOW (ref 22–31)
CREAT SERPL-MCNC: 0.78 MG/DL — SIGNIFICANT CHANGE UP (ref 0.5–1.3)
EGFR: 122 ML/MIN/1.73M2 — SIGNIFICANT CHANGE UP
EGFR: 122 ML/MIN/1.73M2 — SIGNIFICANT CHANGE UP
ERYTHROCYTE [SEDIMENTATION RATE] IN BLOOD: 68 MM/HR — HIGH (ref 0–15)
ESTIMATED AVERAGE GLUCOSE: 263 MG/DL — HIGH (ref 68–114)
GLUCOSE BLDC GLUCOMTR-MCNC: 209 MG/DL — HIGH (ref 70–99)
GLUCOSE BLDC GLUCOMTR-MCNC: 222 MG/DL — HIGH (ref 70–99)
GLUCOSE BLDC GLUCOMTR-MCNC: 240 MG/DL — HIGH (ref 70–99)
GLUCOSE BLDC GLUCOMTR-MCNC: 245 MG/DL — HIGH (ref 70–99)
GLUCOSE SERPL-MCNC: 226 MG/DL — HIGH (ref 70–99)
HCT VFR BLD CALC: 43 % — SIGNIFICANT CHANGE UP (ref 39–50)
HDLC SERPL-MCNC: 34 MG/DL — LOW
HGB BLD-MCNC: 14.5 G/DL — SIGNIFICANT CHANGE UP (ref 13–17)
LDLC SERPL-MCNC: 168 MG/DL — HIGH
LIPID PNL WITH DIRECT LDL SERPL: 168 MG/DL — HIGH
MCHC RBC-ENTMCNC: 26.7 PG — LOW (ref 27–34)
MCHC RBC-ENTMCNC: 33.7 G/DL — SIGNIFICANT CHANGE UP (ref 32–36)
MCV RBC AUTO: 79 FL — LOW (ref 80–100)
NONHDLC SERPL-MCNC: 217 MG/DL — HIGH
NRBC # BLD AUTO: 0 K/UL — SIGNIFICANT CHANGE UP (ref 0–0)
NRBC # FLD: 0 K/UL — SIGNIFICANT CHANGE UP (ref 0–0)
NRBC BLD AUTO-RTO: 0 /100 WBCS — SIGNIFICANT CHANGE UP (ref 0–0)
PLATELET # BLD AUTO: 258 K/UL — SIGNIFICANT CHANGE UP (ref 150–400)
PMV BLD: 11 FL — SIGNIFICANT CHANGE UP (ref 7–13)
POTASSIUM SERPL-MCNC: 3.7 MMOL/L — SIGNIFICANT CHANGE UP (ref 3.5–5.3)
POTASSIUM SERPL-SCNC: 3.7 MMOL/L — SIGNIFICANT CHANGE UP (ref 3.5–5.3)
PROCALCITONIN SERPL-MCNC: 0.06 NG/ML — SIGNIFICANT CHANGE UP (ref 0.02–0.1)
PROT SERPL-MCNC: 5.8 G/DL — LOW (ref 6–8.3)
RBC # BLD: 5.44 M/UL — SIGNIFICANT CHANGE UP (ref 4.2–5.8)
RBC # FLD: 13.4 % — SIGNIFICANT CHANGE UP (ref 10.3–14.5)
SODIUM SERPL-SCNC: 134 MMOL/L — LOW (ref 135–145)
TRIGL SERPL-MCNC: 263 MG/DL — HIGH
WBC # BLD: 10.63 K/UL — HIGH (ref 3.8–10.5)
WBC # FLD AUTO: 10.63 K/UL — HIGH (ref 3.8–10.5)

## 2025-09-04 PROCEDURE — 82330 ASSAY OF CALCIUM: CPT

## 2025-09-04 PROCEDURE — 85014 HEMATOCRIT: CPT

## 2025-09-04 PROCEDURE — 94640 AIRWAY INHALATION TREATMENT: CPT

## 2025-09-04 PROCEDURE — 83036 HEMOGLOBIN GLYCOSYLATED A1C: CPT

## 2025-09-04 PROCEDURE — 76604 US EXAM CHEST: CPT

## 2025-09-04 PROCEDURE — 82962 GLUCOSE BLOOD TEST: CPT

## 2025-09-04 PROCEDURE — 36415 COLL VENOUS BLD VENIPUNCTURE: CPT

## 2025-09-04 PROCEDURE — 85027 COMPLETE CBC AUTOMATED: CPT

## 2025-09-04 PROCEDURE — 85018 HEMOGLOBIN: CPT

## 2025-09-04 PROCEDURE — 85652 RBC SED RATE AUTOMATED: CPT

## 2025-09-04 PROCEDURE — 82947 ASSAY GLUCOSE BLOOD QUANT: CPT

## 2025-09-04 PROCEDURE — 87637 SARSCOV2&INF A&B&RSV AMP PRB: CPT

## 2025-09-04 PROCEDURE — 93306 TTE W/DOPPLER COMPLETE: CPT | Mod: 26

## 2025-09-04 PROCEDURE — 83880 ASSAY OF NATRIURETIC PEPTIDE: CPT

## 2025-09-04 PROCEDURE — 84132 ASSAY OF SERUM POTASSIUM: CPT

## 2025-09-04 PROCEDURE — 82803 BLOOD GASES ANY COMBINATION: CPT

## 2025-09-04 PROCEDURE — 99255 IP/OBS CONSLTJ NEW/EST HI 80: CPT

## 2025-09-04 PROCEDURE — 85379 FIBRIN DEGRADATION QUANT: CPT

## 2025-09-04 PROCEDURE — 87086 URINE CULTURE/COLONY COUNT: CPT

## 2025-09-04 PROCEDURE — 84145 PROCALCITONIN (PCT): CPT

## 2025-09-04 PROCEDURE — 86255 FLUORESCENT ANTIBODY SCREEN: CPT

## 2025-09-04 PROCEDURE — 83605 ASSAY OF LACTIC ACID: CPT

## 2025-09-04 PROCEDURE — 83735 ASSAY OF MAGNESIUM: CPT

## 2025-09-04 PROCEDURE — 85025 COMPLETE CBC W/AUTO DIFF WBC: CPT

## 2025-09-04 PROCEDURE — 82435 ASSAY OF BLOOD CHLORIDE: CPT

## 2025-09-04 PROCEDURE — 99223 1ST HOSP IP/OBS HIGH 75: CPT

## 2025-09-04 PROCEDURE — 99233 SBSQ HOSP IP/OBS HIGH 50: CPT

## 2025-09-04 PROCEDURE — 93010 ELECTROCARDIOGRAM REPORT: CPT

## 2025-09-04 PROCEDURE — 84295 ASSAY OF SERUM SODIUM: CPT

## 2025-09-04 PROCEDURE — 82010 KETONE BODYS QUAN: CPT

## 2025-09-04 PROCEDURE — 83690 ASSAY OF LIPASE: CPT

## 2025-09-04 PROCEDURE — 93308 TTE F-UP OR LMTD: CPT

## 2025-09-04 PROCEDURE — 80053 COMPREHEN METABOLIC PANEL: CPT

## 2025-09-04 PROCEDURE — 81001 URINALYSIS AUTO W/SCOPE: CPT

## 2025-09-04 PROCEDURE — 84484 ASSAY OF TROPONIN QUANT: CPT

## 2025-09-04 PROCEDURE — 71046 X-RAY EXAM CHEST 2 VIEWS: CPT

## 2025-09-04 PROCEDURE — 86160 COMPLEMENT ANTIGEN: CPT

## 2025-09-04 PROCEDURE — 86140 C-REACTIVE PROTEIN: CPT

## 2025-09-04 PROCEDURE — 80061 LIPID PANEL: CPT

## 2025-09-04 RX ORDER — INSULIN LISPRO 100 U/ML
4 INJECTION, SOLUTION INTRAVENOUS; SUBCUTANEOUS
Refills: 0 | Status: DISCONTINUED | OUTPATIENT
Start: 2025-09-05 | End: 2025-09-05

## 2025-09-04 RX ORDER — MYCOPHENOLATE MOFETIL 500 MG/1
3 TABLET, FILM COATED ORAL
Refills: 0 | DISCHARGE

## 2025-09-04 RX ORDER — INSULIN GLARGINE-YFGN 100 [IU]/ML
12 INJECTION, SOLUTION SUBCUTANEOUS AT BEDTIME
Refills: 0 | Status: DISCONTINUED | OUTPATIENT
Start: 2025-09-04 | End: 2025-09-05

## 2025-09-04 RX ORDER — MYCOPHENOLATE MOFETIL 500 MG/1
1500 TABLET, FILM COATED ORAL
Refills: 0 | Status: DISCONTINUED | OUTPATIENT
Start: 2025-09-04 | End: 2025-09-05

## 2025-09-04 RX ADMIN — Medication 40 MILLIGRAM(S): at 05:10

## 2025-09-04 RX ADMIN — MYCOPHENOLATE MOFETIL 1500 MILLIGRAM(S): 500 TABLET, FILM COATED ORAL at 17:30

## 2025-09-04 RX ADMIN — Medication 650 MILLIGRAM(S): at 00:00

## 2025-09-04 RX ADMIN — Medication 81 MILLIGRAM(S): at 11:38

## 2025-09-04 RX ADMIN — ENOXAPARIN SODIUM 40 MILLIGRAM(S): 100 INJECTION SUBCUTANEOUS at 05:09

## 2025-09-04 RX ADMIN — HYDROXYCHLOROQUINE SULFATE 400 MILLIGRAM(S): 200 TABLET, FILM COATED ORAL at 11:38

## 2025-09-04 RX ADMIN — MYCOPHENOLATE MOFETIL 500 MILLIGRAM(S): 500 TABLET, FILM COATED ORAL at 05:09

## 2025-09-04 RX ADMIN — INSULIN LISPRO 2: 100 INJECTION, SOLUTION INTRAVENOUS; SUBCUTANEOUS at 11:37

## 2025-09-04 RX ADMIN — Medication 650 MILLIGRAM(S): at 05:10

## 2025-09-04 RX ADMIN — ENOXAPARIN SODIUM 40 MILLIGRAM(S): 100 INJECTION SUBCUTANEOUS at 17:30

## 2025-09-04 RX ADMIN — INSULIN GLARGINE-YFGN 12 UNIT(S): 100 INJECTION, SOLUTION SUBCUTANEOUS at 21:49

## 2025-09-04 RX ADMIN — Medication 650 MILLIGRAM(S): at 06:10

## 2025-09-04 RX ADMIN — INSULIN LISPRO 2: 100 INJECTION, SOLUTION INTRAVENOUS; SUBCUTANEOUS at 08:04

## 2025-09-04 RX ADMIN — INSULIN LISPRO 2: 100 INJECTION, SOLUTION INTRAVENOUS; SUBCUTANEOUS at 17:29

## 2025-09-05 ENCOUNTER — TRANSCRIPTION ENCOUNTER (OUTPATIENT)
Age: 30
End: 2025-09-05

## 2025-09-05 VITALS — SYSTOLIC BLOOD PRESSURE: 130 MMHG | HEART RATE: 102 BPM | DIASTOLIC BLOOD PRESSURE: 82 MMHG

## 2025-09-05 LAB
ANION GAP SERPL CALC-SCNC: 15 MMOL/L — SIGNIFICANT CHANGE UP (ref 5–17)
BUN SERPL-MCNC: 12 MG/DL — SIGNIFICANT CHANGE UP (ref 7–23)
CALCIUM SERPL-MCNC: 8.6 MG/DL — SIGNIFICANT CHANGE UP (ref 8.4–10.5)
CHLORIDE SERPL-SCNC: 100 MMOL/L — SIGNIFICANT CHANGE UP (ref 96–108)
CO2 SERPL-SCNC: 19 MMOL/L — LOW (ref 22–31)
CREAT SERPL-MCNC: 0.76 MG/DL — SIGNIFICANT CHANGE UP (ref 0.5–1.3)
CULTURE RESULTS: SIGNIFICANT CHANGE UP
EGFR: 122 ML/MIN/1.73M2 — SIGNIFICANT CHANGE UP
EGFR: 122 ML/MIN/1.73M2 — SIGNIFICANT CHANGE UP
GLUCOSE BLDC GLUCOMTR-MCNC: 207 MG/DL — HIGH (ref 70–99)
GLUCOSE BLDC GLUCOMTR-MCNC: 217 MG/DL — HIGH (ref 70–99)
GLUCOSE BLDC GLUCOMTR-MCNC: 233 MG/DL — HIGH (ref 70–99)
GLUCOSE SERPL-MCNC: 239 MG/DL — HIGH (ref 70–99)
HCT VFR BLD CALC: 47.7 % — SIGNIFICANT CHANGE UP (ref 39–50)
HGB BLD-MCNC: 16 G/DL — SIGNIFICANT CHANGE UP (ref 13–17)
MCHC RBC-ENTMCNC: 26.7 PG — LOW (ref 27–34)
MCHC RBC-ENTMCNC: 33.5 G/DL — SIGNIFICANT CHANGE UP (ref 32–36)
MCV RBC AUTO: 79.6 FL — LOW (ref 80–100)
NRBC # BLD AUTO: 0 K/UL — SIGNIFICANT CHANGE UP (ref 0–0)
NRBC # FLD: 0 K/UL — SIGNIFICANT CHANGE UP (ref 0–0)
NRBC BLD AUTO-RTO: 0 /100 WBCS — SIGNIFICANT CHANGE UP (ref 0–0)
PLATELET # BLD AUTO: 321 K/UL — SIGNIFICANT CHANGE UP (ref 150–400)
PMV BLD: 10.8 FL — SIGNIFICANT CHANGE UP (ref 7–13)
POTASSIUM SERPL-MCNC: 4.1 MMOL/L — SIGNIFICANT CHANGE UP (ref 3.5–5.3)
POTASSIUM SERPL-SCNC: 4.1 MMOL/L — SIGNIFICANT CHANGE UP (ref 3.5–5.3)
RBC # BLD: 5.99 M/UL — HIGH (ref 4.2–5.8)
RBC # FLD: 13.3 % — SIGNIFICANT CHANGE UP (ref 10.3–14.5)
SODIUM SERPL-SCNC: 134 MMOL/L — LOW (ref 135–145)
SPECIMEN SOURCE: SIGNIFICANT CHANGE UP
WBC # BLD: 11.16 K/UL — HIGH (ref 3.8–10.5)
WBC # FLD AUTO: 11.16 K/UL — HIGH (ref 3.8–10.5)

## 2025-09-05 PROCEDURE — 86255 FLUORESCENT ANTIBODY SCREEN: CPT

## 2025-09-05 PROCEDURE — 86140 C-REACTIVE PROTEIN: CPT

## 2025-09-05 PROCEDURE — 84484 ASSAY OF TROPONIN QUANT: CPT

## 2025-09-05 PROCEDURE — 84295 ASSAY OF SERUM SODIUM: CPT

## 2025-09-05 PROCEDURE — 80061 LIPID PANEL: CPT

## 2025-09-05 PROCEDURE — 83735 ASSAY OF MAGNESIUM: CPT

## 2025-09-05 PROCEDURE — 82330 ASSAY OF CALCIUM: CPT

## 2025-09-05 PROCEDURE — 85027 COMPLETE CBC AUTOMATED: CPT

## 2025-09-05 PROCEDURE — 36415 COLL VENOUS BLD VENIPUNCTURE: CPT

## 2025-09-05 PROCEDURE — 99232 SBSQ HOSP IP/OBS MODERATE 35: CPT

## 2025-09-05 PROCEDURE — 82947 ASSAY GLUCOSE BLOOD QUANT: CPT

## 2025-09-05 PROCEDURE — 99239 HOSP IP/OBS DSCHRG MGMT >30: CPT

## 2025-09-05 PROCEDURE — 83605 ASSAY OF LACTIC ACID: CPT

## 2025-09-05 PROCEDURE — 87637 SARSCOV2&INF A&B&RSV AMP PRB: CPT

## 2025-09-05 PROCEDURE — 76604 US EXAM CHEST: CPT

## 2025-09-05 PROCEDURE — 80048 BASIC METABOLIC PNL TOTAL CA: CPT

## 2025-09-05 PROCEDURE — 84132 ASSAY OF SERUM POTASSIUM: CPT

## 2025-09-05 PROCEDURE — 85025 COMPLETE CBC W/AUTO DIFF WBC: CPT

## 2025-09-05 PROCEDURE — 81001 URINALYSIS AUTO W/SCOPE: CPT

## 2025-09-05 PROCEDURE — 82435 ASSAY OF BLOOD CHLORIDE: CPT

## 2025-09-05 PROCEDURE — 83880 ASSAY OF NATRIURETIC PEPTIDE: CPT

## 2025-09-05 PROCEDURE — 99233 SBSQ HOSP IP/OBS HIGH 50: CPT

## 2025-09-05 PROCEDURE — 94640 AIRWAY INHALATION TREATMENT: CPT

## 2025-09-05 PROCEDURE — 93308 TTE F-UP OR LMTD: CPT

## 2025-09-05 PROCEDURE — C1887: CPT

## 2025-09-05 PROCEDURE — 86160 COMPLEMENT ANTIGEN: CPT

## 2025-09-05 PROCEDURE — C8929: CPT

## 2025-09-05 PROCEDURE — 83036 HEMOGLOBIN GLYCOSYLATED A1C: CPT

## 2025-09-05 PROCEDURE — 85014 HEMATOCRIT: CPT

## 2025-09-05 PROCEDURE — 82010 KETONE BODYS QUAN: CPT

## 2025-09-05 PROCEDURE — C1894: CPT

## 2025-09-05 PROCEDURE — 83690 ASSAY OF LIPASE: CPT

## 2025-09-05 PROCEDURE — 85018 HEMOGLOBIN: CPT

## 2025-09-05 PROCEDURE — 85379 FIBRIN DEGRADATION QUANT: CPT

## 2025-09-05 PROCEDURE — 71046 X-RAY EXAM CHEST 2 VIEWS: CPT

## 2025-09-05 PROCEDURE — 82803 BLOOD GASES ANY COMBINATION: CPT

## 2025-09-05 PROCEDURE — 87086 URINE CULTURE/COLONY COUNT: CPT

## 2025-09-05 PROCEDURE — 93458 L HRT ARTERY/VENTRICLE ANGIO: CPT

## 2025-09-05 PROCEDURE — 85652 RBC SED RATE AUTOMATED: CPT

## 2025-09-05 PROCEDURE — 84145 PROCALCITONIN (PCT): CPT

## 2025-09-05 PROCEDURE — 82962 GLUCOSE BLOOD TEST: CPT

## 2025-09-05 PROCEDURE — C1769: CPT

## 2025-09-05 PROCEDURE — 80053 COMPREHEN METABOLIC PANEL: CPT

## 2025-09-05 PROCEDURE — 93005 ELECTROCARDIOGRAM TRACING: CPT

## 2025-09-05 PROCEDURE — 93458 L HRT ARTERY/VENTRICLE ANGIO: CPT | Mod: 26

## 2025-09-05 PROCEDURE — 99285 EMERGENCY DEPT VISIT HI MDM: CPT

## 2025-09-05 RX ORDER — ATORVASTATIN CALCIUM 80 MG/1
40 TABLET, FILM COATED ORAL AT BEDTIME
Refills: 0 | Status: DISCONTINUED | OUTPATIENT
Start: 2025-09-05 | End: 2025-09-05

## 2025-09-05 RX ORDER — INSULIN GLARGINE-YFGN 100 [IU]/ML
16 INJECTION, SOLUTION SUBCUTANEOUS AT BEDTIME
Refills: 0 | Status: DISCONTINUED | OUTPATIENT
Start: 2025-09-05 | End: 2025-09-05

## 2025-09-05 RX ORDER — ASPIRIN 325 MG
1 TABLET ORAL
Qty: 30 | Refills: 0
Start: 2025-09-05 | End: 2025-10-04

## 2025-09-05 RX ORDER — INSULIN LISPRO 100 U/ML
6 INJECTION, SOLUTION INTRAVENOUS; SUBCUTANEOUS
Refills: 0 | Status: DISCONTINUED | OUTPATIENT
Start: 2025-09-05 | End: 2025-09-05

## 2025-09-05 RX ORDER — METOPROLOL SUCCINATE 50 MG/1
25 TABLET, EXTENDED RELEASE ORAL DAILY
Refills: 0 | Status: DISCONTINUED | OUTPATIENT
Start: 2025-09-05 | End: 2025-09-05

## 2025-09-05 RX ORDER — METOPROLOL SUCCINATE 50 MG/1
1 TABLET, EXTENDED RELEASE ORAL
Qty: 30 | Refills: 0
Start: 2025-09-05 | End: 2025-10-04

## 2025-09-05 RX ORDER — ATORVASTATIN CALCIUM 80 MG/1
1 TABLET, FILM COATED ORAL
Qty: 30 | Refills: 0
Start: 2025-09-05 | End: 2025-10-04

## 2025-09-05 RX ORDER — TIRZEPATIDE 7.5 MG/.5ML
2.5 INJECTION, SOLUTION SUBCUTANEOUS
Qty: 1 | Refills: 0
Start: 2025-09-05 | End: 2025-10-04

## 2025-09-05 RX ADMIN — MYCOPHENOLATE MOFETIL 1500 MILLIGRAM(S): 500 TABLET, FILM COATED ORAL at 18:06

## 2025-09-05 RX ADMIN — INSULIN LISPRO 4 UNIT(S): 100 INJECTION, SOLUTION INTRAVENOUS; SUBCUTANEOUS at 08:14

## 2025-09-05 RX ADMIN — INSULIN LISPRO 4 UNIT(S): 100 INJECTION, SOLUTION INTRAVENOUS; SUBCUTANEOUS at 11:43

## 2025-09-05 RX ADMIN — INSULIN LISPRO 2: 100 INJECTION, SOLUTION INTRAVENOUS; SUBCUTANEOUS at 08:13

## 2025-09-05 RX ADMIN — INSULIN LISPRO 2: 100 INJECTION, SOLUTION INTRAVENOUS; SUBCUTANEOUS at 18:05

## 2025-09-05 RX ADMIN — HYDROXYCHLOROQUINE SULFATE 400 MILLIGRAM(S): 200 TABLET, FILM COATED ORAL at 11:43

## 2025-09-05 RX ADMIN — Medication 81 MILLIGRAM(S): at 11:43

## 2025-09-05 RX ADMIN — MYCOPHENOLATE MOFETIL 1500 MILLIGRAM(S): 500 TABLET, FILM COATED ORAL at 05:06

## 2025-09-05 RX ADMIN — ENOXAPARIN SODIUM 40 MILLIGRAM(S): 100 INJECTION SUBCUTANEOUS at 05:06

## 2025-09-05 RX ADMIN — Medication 650 MILLIGRAM(S): at 18:07

## 2025-09-05 RX ADMIN — INSULIN LISPRO 2: 100 INJECTION, SOLUTION INTRAVENOUS; SUBCUTANEOUS at 11:43

## 2025-09-05 RX ADMIN — Medication 40 MILLIGRAM(S): at 05:06

## 2025-09-05 RX ADMIN — METOPROLOL SUCCINATE 25 MILLIGRAM(S): 50 TABLET, EXTENDED RELEASE ORAL at 12:03

## 2025-09-05 RX ADMIN — INSULIN LISPRO 6 UNIT(S): 100 INJECTION, SOLUTION INTRAVENOUS; SUBCUTANEOUS at 18:05

## 2025-09-06 LAB — DSDNA AB SER QL CLIF: NEGATIVE — SIGNIFICANT CHANGE UP

## 2025-09-10 ENCOUNTER — TRANSCRIPTION ENCOUNTER (OUTPATIENT)
Age: 30
End: 2025-09-10

## 2025-09-11 ENCOUNTER — APPOINTMENT (OUTPATIENT)
Dept: RHEUMATOLOGY | Facility: CLINIC | Age: 30
End: 2025-09-11
Payer: MEDICAID

## 2025-09-11 DIAGNOSIS — I31.39 OTHER PERICARDIAL EFFUSION (NONINFLAMMATORY): ICD-10-CM

## 2025-09-11 DIAGNOSIS — M32.9 SYSTEMIC LUPUS ERYTHEMATOSUS, UNSPECIFIED: ICD-10-CM

## 2025-09-11 DIAGNOSIS — Z91.199 PATIENT'S NONCOMPLIANCE WITH OTHER MEDICAL TREATMENT AND REGIMEN DUE TO UNSPECIFIED REASON: ICD-10-CM

## 2025-09-11 DIAGNOSIS — Z51.81 ENCOUNTER FOR THERAPEUTIC DRUG LVL MONITORING: ICD-10-CM

## 2025-09-11 DIAGNOSIS — Z79.899 OTHER LONG TERM (CURRENT) DRUG THERAPY: ICD-10-CM

## 2025-09-11 DIAGNOSIS — E11.69 TYPE 2 DIABETES MELLITUS WITH OTHER SPECIFIED COMPLICATION: ICD-10-CM

## 2025-09-11 DIAGNOSIS — E55.9 VITAMIN D DEFICIENCY, UNSPECIFIED: ICD-10-CM

## 2025-09-11 DIAGNOSIS — M32.14 GLOMERULAR DISEASE IN SYSTEMIC LUPUS ERYTHEMATOSUS: ICD-10-CM

## 2025-09-11 PROCEDURE — 99215 OFFICE O/P EST HI 40 MIN: CPT | Mod: 95

## 2025-09-11 PROCEDURE — G2211 COMPLEX E/M VISIT ADD ON: CPT | Mod: NC,95

## 2025-09-12 ENCOUNTER — TRANSCRIPTION ENCOUNTER (OUTPATIENT)
Age: 30
End: 2025-09-12